# Patient Record
Sex: MALE | Race: WHITE | NOT HISPANIC OR LATINO | Employment: OTHER | URBAN - METROPOLITAN AREA
[De-identification: names, ages, dates, MRNs, and addresses within clinical notes are randomized per-mention and may not be internally consistent; named-entity substitution may affect disease eponyms.]

---

## 2017-01-19 ENCOUNTER — GENERIC CONVERSION - ENCOUNTER (OUTPATIENT)
Dept: OTHER | Facility: OTHER | Age: 62
End: 2017-01-19

## 2017-01-19 ENCOUNTER — LAB CONVERSION - ENCOUNTER (OUTPATIENT)
Dept: OTHER | Facility: OTHER | Age: 62
End: 2017-01-19

## 2017-01-19 LAB — HBA1C MFR BLD HPLC: 6.7 % OF TOTAL HGB

## 2017-02-10 ENCOUNTER — ALLSCRIPTS OFFICE VISIT (OUTPATIENT)
Dept: OTHER | Facility: OTHER | Age: 62
End: 2017-02-10

## 2017-07-24 ENCOUNTER — LAB CONVERSION - ENCOUNTER (OUTPATIENT)
Dept: OTHER | Facility: OTHER | Age: 62
End: 2017-07-24

## 2017-07-24 LAB
A/G RATIO (HISTORICAL): 1.3 (CALC) (ref 1–2.5)
ALBUMIN SERPL BCP-MCNC: 3.9 G/DL (ref 3.6–5.1)
ALP SERPL-CCNC: 152 U/L (ref 40–115)
ALT SERPL W P-5'-P-CCNC: 32 U/L (ref 9–46)
AST SERPL W P-5'-P-CCNC: 35 U/L (ref 10–35)
BILIRUB SERPL-MCNC: 0.4 MG/DL (ref 0.2–1.2)
BUN SERPL-MCNC: 18 MG/DL (ref 7–25)
BUN/CREA RATIO (HISTORICAL): ABNORMAL (CALC) (ref 6–22)
CALCIUM SERPL-MCNC: 9.4 MG/DL (ref 8.6–10.3)
CHLORIDE SERPL-SCNC: 104 MMOL/L (ref 98–110)
CHOLEST SERPL-MCNC: 159 MG/DL (ref 125–200)
CHOLEST/HDLC SERPL: 3.3 (CALC)
CO2 SERPL-SCNC: 27 MMOL/L (ref 20–31)
CREAT SERPL-MCNC: 1.1 MG/DL (ref 0.7–1.25)
CREATININE, RANDOM URINE (HISTORICAL): 137 MG/DL (ref 20–370)
EGFR AFRICAN AMERICAN (HISTORICAL): 84 ML/MIN/1.73M2
EGFR-AMERICAN CALC (HISTORICAL): 72 ML/MIN/1.73M2
GAMMA GLOBULIN (HISTORICAL): 3 G/DL (CALC) (ref 1.9–3.7)
GLUCOSE (HISTORICAL): 126 MG/DL (ref 65–99)
HBA1C MFR BLD HPLC: 6.6 % OF TOTAL HGB
HDLC SERPL-MCNC: 48 MG/DL
LDL CHOLESTEROL (HISTORICAL): 86 MG/DL (CALC)
MAGNESIUM, UR (HISTORICAL): 5 MG/DL
MICROALBUMIN/CREATININE RATIO (HISTORICAL): 36 MCG/MG CREAT
NON-HDL-CHOL (CHOL-HDL) (HISTORICAL): 111 MG/DL (CALC)
POTASSIUM SERPL-SCNC: 4.5 MMOL/L (ref 3.5–5.3)
SODIUM SERPL-SCNC: 139 MMOL/L (ref 135–146)
TOTAL PROTEIN (HISTORICAL): 6.9 G/DL (ref 6.1–8.1)
TRIGL SERPL-MCNC: 125 MG/DL

## 2017-08-01 ENCOUNTER — GENERIC CONVERSION - ENCOUNTER (OUTPATIENT)
Dept: OTHER | Facility: OTHER | Age: 62
End: 2017-08-01

## 2017-08-11 ENCOUNTER — ALLSCRIPTS OFFICE VISIT (OUTPATIENT)
Dept: OTHER | Facility: OTHER | Age: 62
End: 2017-08-11

## 2018-01-10 NOTE — RESULT NOTES
Verified Results  NM MYOCARDIAL PERFUSION SPECT (RX STRESS AND/OR REST) [id number] 08:26AM Lillie Winters     Test Name Result Flag Reference   NM MYOCARDIAL PERFUSION SPECT (RX STRESS AND/OR REST) (Report)     [facility] [address]   [phone]     Rest/Stress Gated SPECT Myocardial Perfusion Imaging After Regadenoson     Patient: Christiano Ross   MR number: [id number]   Account number: [de-identified]   : 1955   Age: 62 years   Gender: Male   Status: Outpatient   Location: [address] [place] [address] [facility]   Height: 60 in   Weight: 165 lb   BP: 120/ 80 mmHg     Allergies: NO KNOWN ALLERGIES     Referring Physician: Shyann Elizabeth DO   Primary Physician: Montez Rangel MD   RN: Corrinne Shih,, RN   Group: [facility]   Report Prepared by[de-identified] Corrinne Shih,, RN   RN: Hernesto Bustos RN   Interpreting Physician: Miles Sadler MD     INDICATIONS: Evaluation of known coronary artery disease  HISTORY: The patient is a 62year old  male  Chest pain status: no   chest pain  Coronary artery disease risk factors: dyslipidemia, hypertension,   former smoker, family history of premature coronary artery disease, and   diabetes mellitus  Cardiovascular history: coronary artery disease and prior   myocardial infarction  Prior cardiovascular procedures: percutaneous   transluminal coronary angioplasty  PHYSICAL EXAM: Baseline physical exam screening: normal      REST ECG: Normal sinus rhythm  Nonspecific ST and T wave abnormalities were   present  PROCEDURE: The study was performed at the [address] [facility]  A   regadenoson infusion pharmacologic stress test was performed  Gated SPECT   myocardial perfusion imaging was performed after stress  Systolic blood   pressure was 120 mmHg, at the start of the study  Diastolic blood pressure was   80 mmHg, at the start of the study   The heart rate was 91 bpm, at the start of   the study  IV double checked  Regadenoson protocol:   HR bpm SBP mmHg DBP mmHg Symptoms   Baseline 91 120 80 none   Immediate 136 138 78 nausea   2 min 90 136 76 none   No medications or fluids given  The patient also performed low level exercise   with leg lifts  STRESS SUMMARY: Duration of pharmacologic stress was 3 min  Maximal heart rate   during stress was 136 bpm  The rate-pressure product for the peak heart rate   and blood pressure was 02424  There was no chest pain during stress  The stress   test was terminated due to protocol completion  Pre oxygen saturation: 97 %  Peak oxygen saturation: 98 %  The stress ECG was negative for ischemia  There   were no stress arrhythmias or conduction abnormalities  ISOTOPE ADMINISTRATION:   Resting isotope administration Stress isotope administration   Agent Tetrofosmin Tetrofosmin   Dose 10 11 mCi 30 6 mCi   Date 04/26/2016 04/26/2016   Injection time 09:00 10:40   Imaging time 09:51 11:56   Injection-image interval 51 min 76 min     The radiopharmaceutical was injected at the peak effect of pharmacologic   stress  MYOCARDIAL PERFUSION IMAGING:   The image quality was good  The TID ratio was 0 86  PERFUSION DEFECTS:   - There was a small to moderate, moderately severe, partially reversible   myocardial perfusion defect of the basal to mid inferior wall  GATED SPECT:   The calculated left ventricular ejection fraction was 72 %  There was no left   ventricular regional abnormality  SUMMARY:   - Stress results: There was no chest pain during stress  - ECG conclusions: The stress ECG was negative for ischemia  - Perfusion imaging: There was a small to moderate, moderately severe,   partially reversible myocardial perfusion defect of the basal to mid inferior   wall  - Gated SPECT: The calculated left ventricular ejection fraction was 72   %  There was no left ventricular regional abnormality       IMPRESSIONS: Abnormal study after pharmacologic vasodilation  There was a   moderate amount of ischemia in the basal to mid inferior region   Left   ventricular systolic function was normal      Prepared and signed by     Ender Melissa MD   Signed 04/26/2016 16:10:56

## 2018-01-11 NOTE — RESULT NOTES
Verified Results  (Q) HEMOGLOBIN A1c WITH eAG [id number] 07:48AM [place]   REPORT COMMENT:  FASTING:NO     Test Name Result Flag Reference   HEMOGLOBIN A1c 7 1 % of total Hgb H <5 7   According to ADA guidelines, hemoglobin A1c <7 0%  represents optimal control in non-pregnant diabetic  patients  Different metrics may apply to specific  patient populations  Standards of Medical Care in  [phone]  Diabetes Care  2013;36:s11-s66     For the purpose of screening for the presence of  diabetes  <5 7%       Consistent with the absence of diabetes  5 7-6 4%    Consistent with increased risk for diabetes              (prediabetes)  >or=6 5%    Consistent with diabetes     This assay result is consistent with diabetes  mellitus  Currently, no consensus exists for use of hemoglobin  A1c for diagnosis of diabetes for children  eAG (mg/dL) 157 (calc)     eAG (mmol/L) 8 7 (calc)         Discussion/Summary   This shows much better sugar control   Dr Jean Jauregui

## 2018-01-11 NOTE — RESULT NOTES
Verified Results  (1) COMPREHENSIVE METABOLIC PANEL [id number] 17:05LE Beatrice Broccoli     Test Name Result Flag Reference   GLUCOSE 126 mg/dL H 65-99   Fasting reference interval     For someone without known diabetes, a glucose  value >125 mg/dL indicates that they may have  diabetes and this should be confirmed with a  follow-up test    UREA NITROGEN (BUN) 18 mg/dL  7-25   CREATININE 1 10 mg/dL  0 70-1 25   For patients >52years of age, the reference limit  for Creatinine is approximately 13% higher for people  identified as -American  eGFR NON-AFR  AMERICAN 72 mL/min/1 73m2  > OR = 60   eGFR AFRICAN AMERICAN 84 mL/min/1 73m2  > OR = 60   BUN/CREATININE RATIO   5-11   NOT APPLICABLE (calc)   SODIUM 139 mmol/L  135-146   POTASSIUM 4 5 mmol/L  3 5-5 3   CHLORIDE 104 mmol/L     CARBON DIOXIDE 27 mmol/L  20-31   CALCIUM 9 4 mg/dL  8 6-10 3   PROTEIN, TOTAL 6 9 g/dL  6 1-8 1   ALBUMIN 3 9 g/dL  3 6-5 1   GLOBULIN 3 0 g/dL (calc)  1 9-3 7   ALBUMIN/GLOBULIN RATIO 1 3 (calc)  1 0-2 5   BILIRUBIN, TOTAL 0 4 mg/dL  0 2-1 2   ALKALINE PHOSPHATASE 152 U/L H    AST 35 U/L  10-35   ALT 32 U/L  9-46     (1) LIPID PANEL, FASTING [id number] 08:01AM [facility]     Test Name Result Flag Reference   CHOLESTEROL, TOTAL 159 mg/dL  125-200   HDL CHOLESTEROL 48 mg/dL  > OR = 40   TRIGLICERIDES 664 mg/dL  <150   LDL-CHOLESTEROL 86 mg/dL (calc)  <130   Desirable range <100 mg/dL for patients with CHD or  diabetes and <70 mg/dL for diabetic patients with  known heart disease  CHOL/HDLC RATIO 3 3 (calc)  < OR = 5 0   NON HDL CHOLESTEROL 111 mg/dL (calc)     Target for non-HDL cholesterol is 30 mg/dL higher than   LDL cholesterol target       (Q) MICROALBUMIN, RANDOM URINE (W/CREATININE) [id number] 08:01AM [facility]     Test Name Result Flag Reference   CREATININE, RANDOM URINE 137 mg/dL     MICROALBUMIN 5 0 mg/dL     Reference Range  Not established   MICROALBUMIN/CREATININE$RATIO, RANDOM URINE 36 mcg/mg creat H <30   The ADA defines abnormalities in albumin  excretion as follows:     Category         Result (mcg/mg creatinine)     Normal                    <30  Microalbuminuria            Clinical albuminuria   > OR = 300     The ADA recommends that at least two of three  specimens collected within a 3-6 month period be  abnormal before considering a patient to be  within a diagnostic category  (Q) HEMOGLOBIN A1c [id number] 08:01AM Radha Alonzo   REPORT COMMENT:  FASTING:YES     Test Name Result Flag Reference   HEMOGLOBIN A1c 6 6 % of total Hgb H <5 7   For someone without known diabetes, a hemoglobin A1c  value of 6 5% or greater indicates that they may have   diabetes and this should be confirmed with a follow-up   test      For someone with known diabetes, a value <7% indicates   that their diabetes is well controlled and a value   greater than or equal to 7% indicates suboptimal   control  A1c targets should be individualized based on   duration of diabetes, age, comorbid conditions, and   other considerations  Currently, no consensus exists regarding use of  hemoglobin A1c for diagnosis of diabetes for children

## 2018-01-11 NOTE — RESULT NOTES
Verified Results  (Q) CBC (INCLUDES DIFF/PLT) WITH SMEAR REVIEW 08Apr2016 07:50AM [facility]     Test Name Result Flag Reference   WHITE BLOOD CELL COUNT 8 2 Thousand/uL  3 8-10 8   RED BLOOD CELL COUNT 5 17 Million/uL  4 20-5 80   HEMOGLOBIN 13 8 g/dL  13 2-17 1   HEMATOCRIT 43 0 %  38 5-50 0   MCV 83 1 fL  80 0-100 0   MCH 26 7 pg L 27 0-33 0   MCHC 32 1 g/dL  32 0-36 0   RDW 14 9 %  11 0-15 0   PLATELET COUNT 658 Thousand/uL  140-400   MPV 10 2 fL  7 5-11 5   ABSOLUTE NEUTROPHILS 5904 cells/uL  4656-3357   ABSOLUTE LYMPHOCYTES 1722 cells/uL  850-3900   ABSOLUTE MONOCYTES 492 cells/uL  200-950   ABSOLUTE EOSINOPHILS 82 cells/uL     ABSOLUTE BASOPHILS 0 cells/uL  0-200   NEUTROPHILS 72 %     LYMPHOCYTES 21 %     MONOCYTES 6 %     EOSINOPHILS 1 %     BASOPHILS 0 %     CBC MORPHOLOGY   NORMAL   Red cell morphology appears unremarkable     (1) COMPREHENSIVE METABOLIC PANEL [id number] 36:91OT [facility]     Test Name Result Flag Reference   GLUCOSE 269 mg/dL H 65-99   Fasting reference interval   UREA NITROGEN (BUN) 21 mg/dL  7-25   CREATININE 1 19 mg/dL  0 70-1 25   For patients >52years of age, the reference limit  for Creatinine is approximately 13% higher for people  identified as -American  eGFR NON-AFR   AMERICAN 66 mL/min/1 73m2  > OR = 60   eGFR AFRICAN AMERICAN 76 mL/min/1 73m2  > OR = 60   BUN/CREATININE RATIO   4-19   NOT APPLICABLE (calc)   SODIUM 135 mmol/L  135-146   POTASSIUM 4 5 mmol/L  3 5-5 3   CHLORIDE 102 mmol/L     CARBON DIOXIDE 21 mmol/L  19-30   CALCIUM 9 3 mg/dL  8 6-10 3   PROTEIN, TOTAL 7 0 g/dL  6 1-8 1   ALBUMIN 4 0 g/dL  3 6-5 1   GLOBULIN 3 0 g/dL (calc)  1 9-3 7   ALBUMIN/GLOBULIN RATIO 1 3 (calc)  1 0-2 5   BILIRUBIN, TOTAL 0 4 mg/dL  0 2-1 2   ALKALINE PHOSPHATASE 179 U/L H    AST 29 U/L  10-35   ALT 35 U/L  9-46     (Q) HEMOGLOBIN A1c WITH eAG 08Apr2016 07:50AM [facility]     Test Name Result Flag Reference   HEMOGLOBIN A1c 10 1 % of total Hgb H <5 7   According to ADA guidelines, hemoglobin A1c <7 0%  represents optimal control in non-pregnant diabetic  patients  Different metrics may apply to specific  patient populations  Standards of Medical Care in    Diabetes Care  2013;36:s11-s66     For the purpose of screening for the presence of  diabetes  <5 7%       Consistent with the absence of diabetes  5 7-6 4%    Consistent with increased risk for diabetes              (prediabetes)  >or=6 5%    Consistent with diabetes     This assay result is consistent with diabetes  mellitus  Currently, no consensus exists for use of hemoglobin  A1c for diagnosis of diabetes for children  eAG (mg/dL) 243 (calc)     eAG (mmol/L) 13 5 (calc)       (Q) LIPID PANEL WITH REFLEX TO DIRECT LDL 08Apr2016 07:50AM [facility]     Test Name Result Flag Reference   CHOLESTEROL, TOTAL 167 mg/dL  125-200   HDL CHOLESTEROL 44 mg/dL  > OR = 40   TRIGLICERIDES 688 mg/dL H <150   LDL-CHOLESTEROL 84 mg/dL (calc)  <130   Desirable range <100 mg/dL for patients with CHD or  diabetes and <70 mg/dL for diabetic patients with  known heart disease  CHOL/HDLC RATIO 3 8 (calc)  < OR = 5 0   NON HDL CHOLESTEROL 123 mg/dL (calc)     Target for non-HDL cholesterol is 30 mg/dL higher than   LDL cholesterol target  (Q) PSA, TOTAL WITH REFLEX TO PSA, FREE 08Apr2016 07:50AM Yohan Eri     Test Name Result Flag Reference   TOTAL PSA 2 0 ng/mL  < OR = 4 0   This test was performed using the [employer] Faxon  immunoassay method  Values obtained with other assay  methods cannot be used interchangeably  PSA levels,  regardless of value, should not be interpreted as  absolute evidence of the presence or absence of disease       (Q) TSH, 3RD GENERATION W/REFLEX TO FT4 08Apr2016 07:50AM Yohan Eri     Test Name Result Flag Reference   TSH W/REFLEX TO FT4 1 10 mIU/L  0 40-4 50     (Q) URINALYSIS REFLEX 08Apr2016 07:50AM Yohan Eri   REPORT COMMENT:  FASTING:YES     Test Name Result Flag Reference   COLOR YELLOW  YELLOW   APPEARANCE CLEAR  CLEAR   SPECIFIC GRAVITY 1 030  1 001-1 035   PH 5 5  5 0-8 0   GLUCOSE 3+ A NEGATIVE   BILIRUBIN NEGATIVE  NEGATIVE   KETONES NEGATIVE  NEGATIVE   OCCULT BLOOD NEGATIVE  NEGATIVE   PROTEIN TRACE A NEGATIVE   NITRITE NEGATIVE  NEGATIVE   LEUKOCYTE ESTERASE NEGATIVE  NEGATIVE   WBC NONE SEEN /HPF  < OR = 5   RBC NONE SEEN /HPF  < OR = 2   SQUAMOUS EPITHELIAL CELLS NONE SEEN /HPF  < OR = 5   BACTERIA NONE SEEN /HPF  NONE SEEN   HYALINE CAST NONE SEEN /LPF  NONE SEEN

## 2018-01-12 VITALS
WEIGHT: 156.25 LBS | DIASTOLIC BLOOD PRESSURE: 70 MMHG | TEMPERATURE: 96.5 F | SYSTOLIC BLOOD PRESSURE: 142 MMHG | RESPIRATION RATE: 16 BRPM | HEIGHT: 60 IN | HEART RATE: 83 BPM | BODY MASS INDEX: 30.67 KG/M2 | OXYGEN SATURATION: 92 %

## 2018-01-13 NOTE — RESULT NOTES
Verified Results  (1) CBC/PLT/DIFF [id number] 07:48AM Oswaldo Damian     Test Name Result Flag Reference   ABSOLUTE LYMPHOCYTES 2043 cells/uL  850-3900   ABSOLUTE MONOCYTES 494 cells/uL  200-950   ABSOLUTE EOSINOPHILS 257 cells/uL     ABSOLUTE BASOPHILS 19 cells/uL  0-200   NEUTROPHILS 70 4 %     LYMPHOCYTES 21 5 %     EOSINOPHILS 2 7 %     BASOPHILS 0 2 %     WHITE BLOOD CELL COUNT 9 5 Thousand/uL  3 8-10 8   RED BLOOD CELL COUNT 5 22 Million/uL  4 20-5 80   HEMOGLOBIN 13 9 g/dL  13 2-17 1   HEMATOCRIT 43 9 %  38 5-50 0   MCV 84 1 fL  80 0-100 0   MONOCYTES 5 2 %     MCH 26 7 pg L 27 0-33 0   MCHC 31 7 g/dL L 32 0-36 0   RDW 15 4 % H 11 0-15 0   PLATELET COUNT 487 Thousand/uL  140-400   MPV 10 3 fL  7 5-11 5   ABSOLUTE NEUTROPHILS 6688 cells/uL  4377-0132     (1) COMPREHENSIVE METABOLIC PANEL [id number] 31:90UP Oswaldo Damian     Test Name Result Flag Reference   ALT 33 U/L  9-46   ALBUMIN 4 1 g/dL  3 6-5 1   GLOBULIN 3 1 g/dL (calc)  1 9-3 7   ALBUMIN/GLOBULIN RATIO 1 3 (calc)  1 0-2 5   BILIRUBIN, TOTAL 0 3 mg/dL  0 2-1 2   ALKALINE PHOSPHATASE 172 U/L H    BUN/CREATININE RATIO   9-39   NOT APPLICABLE (calc)   PROTEIN, TOTAL 7 2 g/dL  6 1-8 1   GLUCOSE 138 mg/dL H 65-99   Fasting reference interval   UREA NITROGEN (BUN) 11 mg/dL  7-25   CREATININE 1 16 mg/dL  0 70-1 25   For patients >52years of age, the reference limit  for Creatinine is approximately 13% higher for people  identified as -American  eGFR NON-AFR   AMERICAN 68 mL/min/1 73m2  > OR = 60   eGFR AFRICAN AMERICAN 79 mL/min/1 73m2  > OR = 60   AST 33 U/L  10-35   SODIUM 140 mmol/L  135-146   POTASSIUM 5 1 mmol/L  3 5-5 3   CHLORIDE 103 mmol/L     CARBON DIOXIDE 29 mmol/L  20-31   CALCIUM 9 4 mg/dL  8 6-10 3     (1) LIPID PANEL, FASTING [id number] 07:48AM Radha Alonzo     Test Name Result Flag Reference   CHOLESTEROL, TOTAL 155 mg/dL  125-200   HDL CHOLESTEROL 49 mg/dL  > OR = 40   TRIGLICERIDES 066 mg/dL  <150 LDL-CHOLESTEROL 77 mg/dL (calc)  <130   Desirable range <100 mg/dL for patients with CHD or  diabetes and <70 mg/dL for diabetic patients with  known heart disease  CHOL/HDLC RATIO 3 2 (calc)  < OR = 5 0   NON HDL CHOLESTEROL 106 mg/dL (calc)     Target for non-HDL cholesterol is 30 mg/dL higher than   LDL cholesterol target  (1) PSA (SCREEN) (Dx V76 44 Screen for Prostate Cancer) [id number] 07:48AM Tito Mccordmarifer     Test Name Result Flag Reference   PSA, TOTAL 2 0 ng/mL  < OR = 4 0   This test was performed using the Siemens  chemiluminescent method  Values obtained from  different assay methods cannot be used  interchangeably  PSA levels, regardless of  value, should not be interpreted as absolute  evidence of the presence or absence of disease  (Q) HEMOGLOBIN A1c [id number] 07:48AM Radha Alonzo   REPORT COMMENT:  FASTING:YES     Test Name Result Flag Reference   HEMOGLOBIN A1c 6 7 % of total Hgb H <5 7   According to ADA guidelines, hemoglobin A1c <7 0%  represents optimal control in non-pregnant diabetic  patients  Different metrics may apply to specific  patient populations  Standards of Medical Care in    Diabetes Care  2013;36:s11-s66     For the purpose of screening for the presence of  diabetes  <5 7%       Consistent with the absence of diabetes  5 7-6 4%    Consistent with increased risk for diabetes              (prediabetes)  >or=6 5%    Consistent with diabetes     This assay result is consistent with diabetes  mellitus  Currently, no consensus exists for use of hemoglobin  A1c for diagnosis of diabetes for children

## 2018-01-14 VITALS
DIASTOLIC BLOOD PRESSURE: 80 MMHG | RESPIRATION RATE: 16 BRPM | HEIGHT: 60 IN | BODY MASS INDEX: 30.35 KG/M2 | TEMPERATURE: 98.1 F | OXYGEN SATURATION: 98 % | WEIGHT: 154.56 LBS | SYSTOLIC BLOOD PRESSURE: 136 MMHG | HEART RATE: 66 BPM

## 2018-01-24 DIAGNOSIS — I10 BENIGN ESSENTIAL HYPERTENSION: Primary | ICD-10-CM

## 2018-02-01 DIAGNOSIS — I10 ESSENTIAL (PRIMARY) HYPERTENSION: ICD-10-CM

## 2018-02-01 DIAGNOSIS — E78.5 HYPERLIPIDEMIA: ICD-10-CM

## 2018-02-01 DIAGNOSIS — E11.65 TYPE 2 DIABETES MELLITUS WITH HYPERGLYCEMIA (HCC): ICD-10-CM

## 2018-02-01 DIAGNOSIS — Z11.59 ENCOUNTER FOR SCREENING FOR OTHER VIRAL DISEASES: ICD-10-CM

## 2018-02-01 DIAGNOSIS — Z12.5 ENCOUNTER FOR SCREENING FOR MALIGNANT NEOPLASM OF PROSTATE: ICD-10-CM

## 2018-02-02 LAB
ALBUMIN SERPL-MCNC: 3.8 G/DL (ref 3.6–5.1)
ALBUMIN/CREAT UR: 37 MCG/MG CREAT
ALBUMIN/GLOB SERPL: 1.3 (CALC) (ref 1–2.5)
ALP SERPL-CCNC: 148 U/L (ref 40–115)
ALT SERPL-CCNC: 30 U/L (ref 9–46)
AST SERPL-CCNC: 43 U/L (ref 10–35)
BILIRUB SERPL-MCNC: 0.4 MG/DL (ref 0.2–1.2)
BUN SERPL-MCNC: 15 MG/DL (ref 7–25)
BUN/CREAT SERPL: ABNORMAL (CALC) (ref 6–22)
CALCIUM SERPL-MCNC: 9.1 MG/DL (ref 8.6–10.3)
CHLORIDE SERPL-SCNC: 101 MMOL/L (ref 98–110)
CHOLEST SERPL-MCNC: 149 MG/DL
CHOLEST/HDLC SERPL: 3.3 (CALC)
CO2 SERPL-SCNC: 27 MMOL/L (ref 20–31)
CREAT SERPL-MCNC: 1.1 MG/DL (ref 0.7–1.25)
CREAT UR-MCNC: 71 MG/DL (ref 20–370)
GLOBULIN SER CALC-MCNC: 2.9 G/DL (CALC) (ref 1.9–3.7)
GLUCOSE SERPL-MCNC: 123 MG/DL (ref 65–99)
HBA1C MFR BLD: 6.5 % OF TOTAL HGB
HCV AB S/CO SERPL IA: 0.04
HCV AB SERPL QL IA: NORMAL
HDLC SERPL-MCNC: 45 MG/DL
LDLC SERPL CALC-MCNC: 83 MG/DL (CALC)
MICROALBUMIN UR-MCNC: 2.6 MG/DL
NONHDLC SERPL-MCNC: 104 MG/DL (CALC)
POTASSIUM SERPL-SCNC: 4.8 MMOL/L (ref 3.5–5.3)
PROT SERPL-MCNC: 6.7 G/DL (ref 6.1–8.1)
PSA SERPL-MCNC: 2.3 NG/ML
SL AMB EGFR AFRICAN AMERICAN: 84 ML/MIN/1.73M2
SL AMB EGFR NON AFRICAN AMERICAN: 72 ML/MIN/1.73M2
SODIUM SERPL-SCNC: 137 MMOL/L (ref 135–146)
TRIGL SERPL-MCNC: 109 MG/DL

## 2018-05-10 ENCOUNTER — OFFICE VISIT (OUTPATIENT)
Dept: FAMILY MEDICINE CLINIC | Facility: OTHER | Age: 63
End: 2018-05-10
Payer: COMMERCIAL

## 2018-05-10 VITALS
DIASTOLIC BLOOD PRESSURE: 74 MMHG | OXYGEN SATURATION: 95 % | SYSTOLIC BLOOD PRESSURE: 148 MMHG | WEIGHT: 151.06 LBS | HEART RATE: 75 BPM | BODY MASS INDEX: 29.66 KG/M2 | HEIGHT: 60 IN | TEMPERATURE: 98.3 F

## 2018-05-10 DIAGNOSIS — IMO0001 UNCONTROLLED TYPE 2 DIABETES MELLITUS WITHOUT COMPLICATION, WITHOUT LONG-TERM CURRENT USE OF INSULIN: ICD-10-CM

## 2018-05-10 DIAGNOSIS — E78.2 MIXED HYPERLIPIDEMIA: ICD-10-CM

## 2018-05-10 DIAGNOSIS — I10 BENIGN ESSENTIAL HYPERTENSION: Primary | ICD-10-CM

## 2018-05-10 DIAGNOSIS — Z23 NEED FOR PNEUMOCOCCAL VACCINATION: ICD-10-CM

## 2018-05-10 PROCEDURE — 90471 IMMUNIZATION ADMIN: CPT

## 2018-05-10 PROCEDURE — 99214 OFFICE O/P EST MOD 30 MIN: CPT | Performed by: FAMILY MEDICINE

## 2018-05-10 PROCEDURE — 90732 PPSV23 VACC 2 YRS+ SUBQ/IM: CPT

## 2018-05-10 PROCEDURE — 3725F SCREEN DEPRESSION PERFORMED: CPT | Performed by: FAMILY MEDICINE

## 2018-05-10 RX ORDER — NITROGLYCERIN 0.4 MG/1
TABLET SUBLINGUAL
COMMUNITY
Start: 2013-01-11 | End: 2019-01-14

## 2018-05-10 RX ORDER — LISINOPRIL 10 MG/1
1 TABLET ORAL DAILY
COMMUNITY
Start: 2011-11-29 | End: 2018-05-10 | Stop reason: SDUPTHER

## 2018-05-10 RX ORDER — ATORVASTATIN CALCIUM 40 MG/1
1 TABLET, FILM COATED ORAL DAILY
COMMUNITY
Start: 2013-03-19 | End: 2018-10-22 | Stop reason: SDUPTHER

## 2018-05-10 RX ORDER — LANCETS 33 GAUGE
EACH MISCELLANEOUS DAILY
COMMUNITY
Start: 2016-05-17

## 2018-05-10 RX ORDER — LISINOPRIL 10 MG/1
10 TABLET ORAL DAILY
Qty: 90 TABLET | Refills: 1 | Status: SHIPPED | OUTPATIENT
Start: 2018-05-10 | End: 2018-06-01 | Stop reason: SDUPTHER

## 2018-05-10 NOTE — PROGRESS NOTES
Subjective:      Patient ID: Ronan Wu is a 59 y o  male  Hypertension   This is a chronic problem  The current episode started more than 1 year ago  The problem has been gradually improving since onset  The problem is controlled  Pertinent negatives include no anxiety, blurred vision, chest pain, headaches, malaise/fatigue, palpitations, peripheral edema, shortness of breath or sweats  There are no associated agents to hypertension  Risk factors for coronary artery disease include dyslipidemia, male gender, obesity and sedentary lifestyle  Past treatments include ACE inhibitors and beta blockers  The current treatment provides moderate improvement  There are no compliance problems  There is no history of angina, kidney disease, CAD/MI or heart failure  There is no history of chronic renal disease, hyperparathyroidism, a hypertension causing med, renovascular disease or a thyroid problem  Hyperlipidemia   This is a chronic problem  The current episode started more than 1 year ago  The problem is controlled  Exacerbating diseases include diabetes and obesity  He has no history of chronic renal disease, hypothyroidism or nephrotic syndrome  Factors aggravating his hyperlipidemia include fatty foods  Pertinent negatives include no chest pain, myalgias or shortness of breath  Current antihyperlipidemic treatment includes statins and diet change  The current treatment provides moderate improvement of lipids  There are no compliance problems  Risk factors for coronary artery disease include diabetes mellitus, dyslipidemia, hypertension, male sex and obesity  Diabetes   He presents for his follow-up diabetic visit  He has type 2 diabetes mellitus  His disease course has been stable  Pertinent negatives for hypoglycemia include no confusion, dizziness, headaches, mood changes, nervousness/anxiousness or sweats   Pertinent negatives for diabetes include no blurred vision, no chest pain, no fatigue, no foot paresthesias, no polydipsia, no polyphagia, no polyuria, no visual change and no weakness  There are no hypoglycemic complications  There are no diabetic complications  Risk factors for coronary artery disease include diabetes mellitus, dyslipidemia, hypertension, male sex, obesity and sedentary lifestyle  Current diabetic treatment includes diet and oral agent (monotherapy)  He is compliant with treatment all of the time  His weight is stable  He is following a diabetic diet  He participates in exercise intermittently  He monitors blood glucose at home 1-2 x per day  Blood glucose monitoring compliance is excellent  There is no change in his home blood glucose trend  His breakfast blood glucose is taken between 6-7 am  His breakfast blood glucose range is generally  mg/dl  An ACE inhibitor/angiotensin II receptor blocker is being taken  He does not see a podiatrist Eye exam is current  The following portions of the patient's history were reviewed and updated as appropriate: allergies, current medications, past family history, past medical history, past social history, past surgical history and problem list     Review of Systems   Constitutional: Negative for activity change, fatigue, fever and malaise/fatigue  HENT: Negative for congestion, ear pain, sinus pain and sore throat  Eyes: Negative for blurred vision, pain, itching and visual disturbance  Respiratory: Negative for cough and shortness of breath  Cardiovascular: Negative for chest pain, palpitations and leg swelling  Gastrointestinal: Negative for abdominal pain, constipation, diarrhea, nausea and vomiting  Endocrine: Negative for cold intolerance, heat intolerance, polydipsia, polyphagia and polyuria  Genitourinary: Negative for dysuria  Musculoskeletal: Negative for myalgias  Skin: Negative for color change and rash  Neurological: Negative for dizziness, syncope, weakness and headaches     Hematological: Negative for adenopathy  Psychiatric/Behavioral: Negative for confusion and dysphoric mood  The patient is not nervous/anxious  Objective:      /74 (BP Location: Left arm, Patient Position: Sitting, Cuff Size: Adult)   Pulse 75   Temp 98 3 °F (36 8 °C) (Tympanic)   Ht 4' 10" (1 473 m)   Wt 68 5 kg (151 lb 1 oz)   SpO2 95%   BMI 31 57 kg/m²          Physical Exam   Constitutional: He is oriented to person, place, and time  He appears well-developed and well-nourished  No distress  HENT:   Head: Normocephalic and atraumatic  Mouth/Throat: Oropharynx is clear and moist    Eyes: Conjunctivae and EOM are normal  Pupils are equal, round, and reactive to light  No scleral icterus  Neck: Normal range of motion  Neck supple  Cardiovascular: Normal rate, regular rhythm and normal heart sounds  No murmur heard  Pulmonary/Chest: Effort normal and breath sounds normal  No respiratory distress  Abdominal: Soft  Bowel sounds are normal  He exhibits no distension  There is no tenderness  Musculoskeletal: Normal range of motion  He exhibits no edema  Lymphadenopathy:     He has no cervical adenopathy  Neurological: He is alert and oriented to person, place, and time  Skin: Skin is warm and dry  No rash noted  Psychiatric: He has a normal mood and affect   His behavior is normal          Lab Results   Component Value Date     07/21/2017    K 4 5 07/21/2017     07/21/2017    CO2 27 07/21/2017    BUN 15 02/01/2018    CREATININE 1 10 02/01/2018    CALCIUM 9 1 02/01/2018    AST 35 07/21/2017    ALT 32 07/21/2017    ALKPHOS 152 (H) 07/21/2017    PROT 6 9 07/21/2017    BILITOT 0 4 07/21/2017     Lab Results   Component Value Date    HGBA1C 6 5 (H) 02/01/2018     Lab Results   Component Value Date    CHOL 159 07/21/2017    CHOL 155 01/18/2017    CHOL 167 04/08/2016     Lab Results   Component Value Date    HDL 48 07/21/2017    HDL 49 01/18/2017    HDL 44 04/08/2016     No results found for: [facility]  Lab Results   Component Value Date    TRIG 109 02/01/2018    TRIG 125 07/21/2017    TRIG 143 01/18/2017     No components found for: CHOLHDL      Assessment/Plan:    Benign essential hypertension  -     Comprehensive metabolic panel; Future  -     lisinopril (ZESTRIL) 10 mg tablet; Take 1 tablet (10 mg total) by mouth daily    Mixed hyperlipidemia  -     Comprehensive metabolic panel; Future  -     Lipid panel; Future    Uncontrolled type 2 diabetes mellitus without complication, without long-term current use of insulin (HCC)  -     HEMOGLOBIN A1C W/ EAG ESTIMATION; Future  -     Comprehensive metabolic panel; Future  -     Microalbumin / creatinine urine ratio; Future    Need for pneumococcal vaccination  -     Pneumococcal polysaccharide vaccine 23-valent greater than or equal to 3yo subcutaneous/IM      Healthy appearing 60-year-old male multiple comorbid conditions appears for routine follow-up  Labs from February 2018 reviewed, orders placed today for follow-up blood work to reassess glucose, lipids, and renal function  Continue current medications per lab review  Pneumovax administered today per current ACIP guidelines  RTO 6 months for recheck chronic conditions/AWV  The patient indicates understanding of these issues and agrees with the plan      Mercedes Paniagua DO

## 2018-06-01 DIAGNOSIS — I10 BENIGN ESSENTIAL HYPERTENSION: ICD-10-CM

## 2018-06-02 PROCEDURE — 4010F ACE/ARB THERAPY RXD/TAKEN: CPT | Performed by: FAMILY MEDICINE

## 2018-06-02 RX ORDER — LISINOPRIL 10 MG/1
TABLET ORAL
Qty: 90 TABLET | Refills: 3 | Status: SHIPPED | OUTPATIENT
Start: 2018-06-02 | End: 2019-08-30 | Stop reason: SDUPTHER

## 2018-08-31 DIAGNOSIS — E11.9 TYPE 2 DIABETES MELLITUS TREATED WITHOUT INSULIN (HCC): Primary | ICD-10-CM

## 2018-09-19 ENCOUNTER — OFFICE VISIT (OUTPATIENT)
Dept: FAMILY MEDICINE CLINIC | Facility: OTHER | Age: 63
End: 2018-09-19
Payer: COMMERCIAL

## 2018-09-19 VITALS
HEART RATE: 98 BPM | BODY MASS INDEX: 29.08 KG/M2 | OXYGEN SATURATION: 99 % | SYSTOLIC BLOOD PRESSURE: 144 MMHG | DIASTOLIC BLOOD PRESSURE: 80 MMHG | HEIGHT: 60 IN | WEIGHT: 148.13 LBS | TEMPERATURE: 99.4 F

## 2018-09-19 DIAGNOSIS — B02.9 HERPES ZOSTER WITHOUT COMPLICATION: Primary | ICD-10-CM

## 2018-09-19 PROCEDURE — 99213 OFFICE O/P EST LOW 20 MIN: CPT | Performed by: FAMILY MEDICINE

## 2018-09-19 PROCEDURE — 1036F TOBACCO NON-USER: CPT | Performed by: FAMILY MEDICINE

## 2018-09-19 PROCEDURE — 3008F BODY MASS INDEX DOCD: CPT | Performed by: FAMILY MEDICINE

## 2018-09-19 RX ORDER — VALACYCLOVIR HYDROCHLORIDE 1 G/1
1000 TABLET, FILM COATED ORAL 2 TIMES DAILY
Qty: 14 TABLET | Refills: 0 | Status: SHIPPED | OUTPATIENT
Start: 2018-09-19 | End: 2019-01-14

## 2018-09-19 NOTE — PROGRESS NOTES
Subjective:      Patient ID: Lizbeth Beltrán is a 59 y o  male  Rash   This is a new problem  The current episode started yesterday  The problem has been gradually worsening since onset  The affected locations include the chest, left arm, left shoulder and back  The rash is characterized by redness, burning and blistering  He was exposed to nothing  Pertinent negatives include no anorexia, congestion, cough, diarrhea, eye pain, facial edema, fatigue, fever, joint pain, nail changes, rhinorrhea, shortness of breath, sore throat or vomiting  (MILD HA AND BODY ACHES) Past treatments include nothing  His past medical history is significant for varicella  There is no history of allergies, asthma or eczema  The following portions of the patient's history were reviewed and updated as appropriate: allergies, current medications, past family history, past medical history, past social history, past surgical history and problem list       Current Outpatient Prescriptions:     aspirin 81 MG tablet, Take 2 tablets by mouth daily, Disp: , Rfl:     atorvastatin (LIPITOR) 40 mg tablet, Take 1 tablet by mouth daily, Disp: , Rfl:     glucose blood test strip, by In Vitro route daily, Disp: , Rfl:     lisinopril (ZESTRIL) 10 mg tablet, TAKE 1 TABLET EVERY DAY, Disp: 90 tablet, Rfl: 3    metFORMIN (GLUCOPHAGE) 500 mg tablet, TAKE 1 TABLET BY MOUTH TWICE A DAY, Disp: 180 tablet, Rfl: 1    metoprolol tartrate (LOPRESSOR) 25 mg tablet, Take 1 tablet by mouth 2 (two) times a day, Disp: 180 tablet, Rfl: 3    nitroglycerin (NITROSTAT) 0 4 mg SL tablet, Place under the tongue, Disp: , Rfl:     ONETOUCH DELICA LANCETS 57P MISC, by Does not apply route daily, Disp: , Rfl:     valACYclovir (VALTREX) 1,000 mg tablet, Take 1 tablet (1,000 mg total) by mouth 2 (two) times a day for 7 days, Disp: 14 tablet, Rfl: 0      Review of Systems   Constitutional: Negative for activity change, fatigue and fever     HENT: Negative for congestion, ear pain, rhinorrhea, sinus pain and sore throat  Eyes: Negative for pain, itching and visual disturbance  Respiratory: Negative for cough and shortness of breath  Cardiovascular: Negative for chest pain, palpitations and leg swelling  Gastrointestinal: Negative for abdominal pain, anorexia, constipation, diarrhea, nausea and vomiting  Endocrine: Negative for cold intolerance and heat intolerance  Genitourinary: Negative for dysuria  Musculoskeletal: Positive for myalgias  Negative for joint pain  Skin: Positive for rash  Negative for color change and nail changes  Neurological: Positive for headaches  Negative for dizziness and syncope  Hematological: Negative for adenopathy  Psychiatric/Behavioral: Negative for dysphoric mood  The patient is not nervous/anxious  Objective:      /80 (BP Location: Right arm, Patient Position: Sitting, Cuff Size: Large)   Pulse 98   Temp 99 4 °F (37 4 °C) (Tympanic)   Ht 4' 10" (1 473 m)   Wt 67 2 kg (148 lb 2 oz)   SpO2 99%   BMI 30 96 kg/m²          Physical Exam   Constitutional: He is oriented to person, place, and time  He appears well-developed and well-nourished  No distress  HENT:   Head: Normocephalic and atraumatic  Nose: Nose normal    Mouth/Throat: Oropharynx is clear and moist    Eyes: Conjunctivae and EOM are normal  Pupils are equal, round, and reactive to light  No scleral icterus  Neck: Normal range of motion  Neck supple  Cardiovascular: Normal rate, regular rhythm and normal heart sounds  No murmur heard  Pulmonary/Chest: Effort normal and breath sounds normal  No stridor  No respiratory distress  He has no wheezes  Abdominal: Soft  Bowel sounds are normal  He exhibits no distension  There is no tenderness  Musculoskeletal: Normal range of motion  He exhibits no edema, tenderness or deformity  Lymphadenopathy:     He has no cervical adenopathy     Neurological: He is alert and oriented to person, place, and time  No cranial nerve deficit  Skin: Skin is warm and dry  Rash noted  Rash is vesicular  He is not diaphoretic  There is erythema  Erythematous, vesicular rash in C7-8 and T1-2 distribution on LEFT SIDE   Psychiatric: He has a normal mood and affect  His behavior is normal            Assessment/Plan:   Diagnoses and all orders for this visit:    Herpes zoster without complication  -     valACYclovir (VALTREX) 1,000 mg tablet; Take 1 tablet (1,000 mg total) by mouth 2 (two) times a day for 7 days            21-year-old male presents with symptoms concerning for shingles  Will empirically treat with 7 day course of oral Valtrex  Patient to call if symptoms not improving in the next 5-7 days  RTO as previously planned next month  The patient indicates understanding of these issues and agrees with the plan          Melissa Molina DO

## 2018-09-26 ENCOUNTER — TELEPHONE (OUTPATIENT)
Dept: FAMILY MEDICINE CLINIC | Facility: OTHER | Age: 63
End: 2018-09-26

## 2018-09-26 DIAGNOSIS — B02.9 HERPES ZOSTER WITHOUT COMPLICATION: Primary | ICD-10-CM

## 2018-09-26 RX ORDER — NAPROXEN 500 MG/1
500 TABLET ORAL 2 TIMES DAILY WITH MEALS
Qty: 28 TABLET | Refills: 0 | Status: SHIPPED | OUTPATIENT
Start: 2018-09-26 | End: 2019-01-14

## 2018-09-26 NOTE — TELEPHONE ENCOUNTER
Patient called today stating that Dr Lavonne Moody wanted to know how he was doing whith his Shingles  He was seen in our office last week  He finished his antibiotic today and still has pain on his back and arm and wants to know what he should do

## 2018-09-26 NOTE — TELEPHONE ENCOUNTER
Spoke to pt via telephone -- shingles is overall better than last week, but still c/o pain  Recommend trial of NSAID and follow up if not improving in 1-2 wks  If pain persists at that time, will consider long-term medication for nerve pain  The patient indicates understanding of these issues and agrees with the plan        Romeo Cartwright,

## 2018-10-22 DIAGNOSIS — E78.2 MIXED HYPERLIPIDEMIA: Primary | ICD-10-CM

## 2018-10-22 RX ORDER — ATORVASTATIN CALCIUM 40 MG/1
TABLET, FILM COATED ORAL
Qty: 90 TABLET | Refills: 1 | Status: SHIPPED | OUTPATIENT
Start: 2018-10-22 | End: 2019-04-29 | Stop reason: SDUPTHER

## 2018-12-20 ENCOUNTER — TRANSCRIBE ORDERS (OUTPATIENT)
Dept: LAB | Facility: HOSPITAL | Age: 63
End: 2018-12-20

## 2018-12-20 DIAGNOSIS — E11.00 TYPE II DIABETES MELLITUS WITH HYPEROSMOLARITY, UNCONTROLLED (HCC): Primary | ICD-10-CM

## 2018-12-20 DIAGNOSIS — E11.65 TYPE II DIABETES MELLITUS WITH HYPEROSMOLARITY, UNCONTROLLED (HCC): Primary | ICD-10-CM

## 2019-01-02 DIAGNOSIS — I10 BENIGN ESSENTIAL HYPERTENSION: ICD-10-CM

## 2019-01-03 ENCOUNTER — APPOINTMENT (OUTPATIENT)
Dept: LAB | Facility: HOSPITAL | Age: 64
End: 2019-01-03
Payer: COMMERCIAL

## 2019-01-03 DIAGNOSIS — E78.2 MIXED HYPERLIPIDEMIA: ICD-10-CM

## 2019-01-03 DIAGNOSIS — I10 BENIGN ESSENTIAL HYPERTENSION: ICD-10-CM

## 2019-01-03 DIAGNOSIS — E11.65 TYPE II DIABETES MELLITUS WITH HYPEROSMOLARITY, UNCONTROLLED (HCC): ICD-10-CM

## 2019-01-03 DIAGNOSIS — E11.00 TYPE II DIABETES MELLITUS WITH HYPEROSMOLARITY, UNCONTROLLED (HCC): ICD-10-CM

## 2019-01-03 DIAGNOSIS — IMO0001 UNCONTROLLED TYPE 2 DIABETES MELLITUS WITHOUT COMPLICATION, WITHOUT LONG-TERM CURRENT USE OF INSULIN: ICD-10-CM

## 2019-01-03 LAB
ALBUMIN SERPL BCP-MCNC: 3.4 G/DL (ref 3.5–5)
ALP SERPL-CCNC: 151 U/L (ref 46–116)
ALT SERPL W P-5'-P-CCNC: 68 U/L (ref 12–78)
ANION GAP SERPL CALCULATED.3IONS-SCNC: 5 MMOL/L (ref 4–13)
AST SERPL W P-5'-P-CCNC: 94 U/L (ref 5–45)
BILIRUB SERPL-MCNC: 0.43 MG/DL (ref 0.2–1)
BUN SERPL-MCNC: 16 MG/DL (ref 5–25)
CALCIUM SERPL-MCNC: 8.9 MG/DL (ref 8.3–10.1)
CHLORIDE SERPL-SCNC: 103 MMOL/L (ref 100–108)
CHOLEST SERPL-MCNC: 144 MG/DL (ref 50–200)
CO2 SERPL-SCNC: 27 MMOL/L (ref 21–32)
CREAT SERPL-MCNC: 0.98 MG/DL (ref 0.6–1.3)
CREAT UR-MCNC: 68.9 MG/DL
EST. AVERAGE GLUCOSE BLD GHB EST-MCNC: 148 MG/DL
GFR SERPL CREATININE-BSD FRML MDRD: 82 ML/MIN/1.73SQ M
GLUCOSE P FAST SERPL-MCNC: 116 MG/DL (ref 65–99)
HBA1C MFR BLD: 6.8 % (ref 4.2–6.3)
HDLC SERPL-MCNC: 47 MG/DL (ref 40–60)
LDLC SERPL CALC-MCNC: 74 MG/DL (ref 0–100)
MICROALBUMIN UR-MCNC: 20.4 MG/L (ref 0–20)
MICROALBUMIN/CREAT 24H UR: 30 MG/G CREATININE (ref 0–30)
NONHDLC SERPL-MCNC: 97 MG/DL
POTASSIUM SERPL-SCNC: 4.7 MMOL/L (ref 3.5–5.3)
PROT SERPL-MCNC: 7.8 G/DL (ref 6.4–8.2)
SODIUM SERPL-SCNC: 135 MMOL/L (ref 136–145)
TRIGL SERPL-MCNC: 116 MG/DL
VENIPUNCTURE: NORMAL

## 2019-01-03 PROCEDURE — 36415 COLL VENOUS BLD VENIPUNCTURE: CPT

## 2019-01-03 PROCEDURE — 3060F POS MICROALBUMINURIA REV: CPT | Performed by: FAMILY MEDICINE

## 2019-01-03 PROCEDURE — 82570 ASSAY OF URINE CREATININE: CPT

## 2019-01-03 PROCEDURE — 80053 COMPREHEN METABOLIC PANEL: CPT

## 2019-01-03 PROCEDURE — 82043 UR ALBUMIN QUANTITATIVE: CPT

## 2019-01-03 PROCEDURE — 80061 LIPID PANEL: CPT

## 2019-01-03 PROCEDURE — 83036 HEMOGLOBIN GLYCOSYLATED A1C: CPT

## 2019-01-04 DIAGNOSIS — I10 BENIGN ESSENTIAL HYPERTENSION: ICD-10-CM

## 2019-01-14 ENCOUNTER — OFFICE VISIT (OUTPATIENT)
Dept: FAMILY MEDICINE CLINIC | Facility: OTHER | Age: 64
End: 2019-01-14
Payer: COMMERCIAL

## 2019-01-14 VITALS
DIASTOLIC BLOOD PRESSURE: 78 MMHG | SYSTOLIC BLOOD PRESSURE: 142 MMHG | BODY MASS INDEX: 28.67 KG/M2 | TEMPERATURE: 96.9 F | WEIGHT: 146.06 LBS | HEIGHT: 60 IN | HEART RATE: 77 BPM | OXYGEN SATURATION: 97 %

## 2019-01-14 DIAGNOSIS — I10 BENIGN ESSENTIAL HYPERTENSION: ICD-10-CM

## 2019-01-14 DIAGNOSIS — E11.65 UNCONTROLLED TYPE 2 DIABETES MELLITUS WITH HYPERGLYCEMIA (HCC): Primary | ICD-10-CM

## 2019-01-14 DIAGNOSIS — Z23 ENCOUNTER FOR IMMUNIZATION: ICD-10-CM

## 2019-01-14 DIAGNOSIS — E78.2 MIXED HYPERLIPIDEMIA: ICD-10-CM

## 2019-01-14 PROCEDURE — 3008F BODY MASS INDEX DOCD: CPT | Performed by: FAMILY MEDICINE

## 2019-01-14 PROCEDURE — 1036F TOBACCO NON-USER: CPT | Performed by: FAMILY MEDICINE

## 2019-01-14 PROCEDURE — 90471 IMMUNIZATION ADMIN: CPT

## 2019-01-14 PROCEDURE — 99214 OFFICE O/P EST MOD 30 MIN: CPT | Performed by: FAMILY MEDICINE

## 2019-01-14 PROCEDURE — 90682 RIV4 VACC RECOMBINANT DNA IM: CPT

## 2019-01-14 NOTE — PROGRESS NOTES
Subjective:      Patient ID: Gab May is a 59 y o  male  Diabetes   He presents for his follow-up diabetic visit  He has type 2 diabetes mellitus  His disease course has been stable  There are no hypoglycemic associated symptoms  Pertinent negatives for hypoglycemia include no dizziness, headaches, nervousness/anxiousness or sweats  There are no diabetic associated symptoms  Pertinent negatives for diabetes include no blurred vision, no chest pain and no fatigue  There are no hypoglycemic complications  Diabetic complications include heart disease  Pertinent negatives for diabetic complications include no CVA, nephropathy, peripheral neuropathy, PVD or retinopathy  Risk factors for coronary artery disease include diabetes mellitus, dyslipidemia, family history, hypertension, male sex, sedentary lifestyle and stress  Current diabetic treatment includes oral agent (monotherapy)  He is compliant with treatment all of the time  His weight is stable  He is following a generally healthy and diabetic diet  Meal planning includes avoidance of concentrated sweets  He has had a previous visit with a dietitian  He rarely participates in exercise  His breakfast blood glucose is taken between 6-7 am  His breakfast blood glucose range is generally 110-130 mg/dl  An ACE inhibitor/angiotensin II receptor blocker is being taken  He does not see a podiatrist Eye exam is current  Hypertension   This is a chronic problem  The current episode started more than 1 month ago  The problem is controlled  Pertinent negatives include no anxiety, blurred vision, chest pain, headaches, malaise/fatigue, neck pain, orthopnea, palpitations, peripheral edema, PND, shortness of breath or sweats  There are no associated agents to hypertension  Risk factors for coronary artery disease include diabetes mellitus, dyslipidemia, family history, male gender, sedentary lifestyle and stress  Past treatments include ACE inhibitors   The current treatment provides moderate improvement  Compliance problems include diet, exercise and psychosocial issues  Hypertensive end-organ damage includes CAD/MI  There is no history of kidney disease, CVA, heart failure, left ventricular hypertrophy, PVD or retinopathy  There is no history of chronic renal disease, a hypertension causing med or a thyroid problem  Hyperlipidemia   This is a chronic problem  The current episode started more than 1 year ago  The problem is controlled  Exacerbating diseases include diabetes  He has no history of chronic renal disease, hypothyroidism, liver disease, obesity or nephrotic syndrome  There are no known factors aggravating his hyperlipidemia  Pertinent negatives include no chest pain, focal sensory loss, focal weakness, leg pain, myalgias or shortness of breath  Current antihyperlipidemic treatment includes diet change and statins  The current treatment provides moderate improvement of lipids  Compliance problems include adherence to diet, adherence to exercise and psychosocial issues  Risk factors for coronary artery disease include a sedentary lifestyle, stress, hypertension, male sex, family history, dyslipidemia and diabetes mellitus         The following portions of the patient's history were reviewed and updated as appropriate: allergies, current medications, past family history, past medical history, past social history, past surgical history and problem list       Current Outpatient Prescriptions:     aspirin 81 MG tablet, Take 2 tablets by mouth daily, Disp: , Rfl:     atorvastatin (LIPITOR) 40 mg tablet, TAKE ONE TABLET BY MOUTH ONCE DAILY, Disp: 90 tablet, Rfl: 1    glucose blood test strip, by In Vitro route daily, Disp: , Rfl:     lisinopril (ZESTRIL) 10 mg tablet, TAKE 1 TABLET EVERY DAY, Disp: 90 tablet, Rfl: 3    metFORMIN (GLUCOPHAGE) 500 mg tablet, TAKE 1 TABLET BY MOUTH TWICE A DAY, Disp: 180 tablet, Rfl: 1    metoprolol tartrate (LOPRESSOR) 25 mg tablet, Take 1 tablet (25 mg total) by mouth 2 (two) times a day, Disp: 180 tablet, Rfl: 1    ONETOUCH DELICA LANCETS 77D MISC, by Does not apply route daily, Disp: , Rfl:       Review of Systems   Constitutional: Negative for activity change, fatigue, fever and malaise/fatigue  HENT: Negative for congestion, ear pain, sinus pain and sore throat  Eyes: Negative for blurred vision, pain, itching and visual disturbance  Respiratory: Negative for cough and shortness of breath  Cardiovascular: Negative for chest pain, palpitations, orthopnea, leg swelling and PND  Gastrointestinal: Negative for abdominal pain, constipation, diarrhea, nausea and vomiting  Endocrine: Negative for cold intolerance and heat intolerance  Genitourinary: Negative for dysuria  Musculoskeletal: Negative for myalgias and neck pain  Skin: Negative for color change and rash  Neurological: Negative for dizziness, focal weakness, syncope and headaches  Hematological: Negative for adenopathy  Psychiatric/Behavioral: Negative for dysphoric mood  The patient is not nervous/anxious  Objective:      /78 (BP Location: Left arm, Patient Position: Sitting, Cuff Size: Adult)   Pulse 77   Temp (!) 96 9 °F (36 1 °C) (Tympanic)   Ht 4' 11" (1 499 m)   Wt 66 3 kg (146 lb 1 oz)   SpO2 97%   BMI 29 50 kg/m²          Physical Exam   Constitutional: He is oriented to person, place, and time  He appears well-developed and well-nourished  No distress  HENT:   Head: Normocephalic and atraumatic  Mouth/Throat: Oropharynx is clear and moist    Eyes: Pupils are equal, round, and reactive to light  Conjunctivae and EOM are normal  No scleral icterus  Neck: Normal range of motion  Neck supple  Cardiovascular: Normal rate, regular rhythm and normal heart sounds  No murmur heard  Pulmonary/Chest: Effort normal and breath sounds normal  No respiratory distress  He has no wheezes  Abdominal: Soft   Bowel sounds are normal  He exhibits no distension  There is no tenderness  Musculoskeletal: Normal range of motion  He exhibits no edema, tenderness or deformity  Lymphadenopathy:     He has no cervical adenopathy  Neurological: He is alert and oriented to person, place, and time  No cranial nerve deficit  Coordination normal    Skin: Skin is warm and dry  No rash noted  No erythema  Psychiatric: He has a normal mood and affect  His behavior is normal    Vitals reviewed  Assessment/Plan:   Diagnoses and all orders for this visit:    Uncontrolled type 2 diabetes mellitus with hyperglycemia ([facility] [address] )  -     A1c goal 6-7%  -     Comprehensive metabolic panel; Future  -     Hemoglobin A1C; Future  -     Lipid panel; Future  -     Microalbumin / creatinine urine ratio; Future    Benign essential hypertension  -     Comprehensive metabolic panel; Future    Mixed hyperlipidemia  -     Lipid panel; Future    Encounter for immunization  -     PREFERRED: influenza vaccine, [id number], quadrivalent, recombinant, PF, 0 5 mL, for patients 18 yr+ ([id number])    Other orders  -     Cancel: Ambulatory referral to Gastroenterology; Future          81-year-old male presents for diabetic follow-up  Recent blood work reviewed with patient in detail during exam   Type 2 diabetes stable, reinforced the need for healthy/low carb diet  Plan to recheck blood work in 3 months  Hypertension and hyperlipidemia stable; no medication changes  Flu vaccine given today per current immunization guidelines  Return in about 6 months (around 7/14/2019) for Annual physical     The patient indicates understanding of these issues and agrees with the plan          Mohinder Garcia DO

## 2019-02-06 DIAGNOSIS — Z12.11 SCREENING FOR COLON CANCER: Primary | ICD-10-CM

## 2019-02-11 ENCOUNTER — TELEPHONE (OUTPATIENT)
Dept: FAMILY MEDICINE CLINIC | Facility: OTHER | Age: 64
End: 2019-02-11

## 2019-02-11 NOTE — TELEPHONE ENCOUNTER
Patient called regarding getting a referral for GI and he state, "he is not going" and refused the referral  Thank you

## 2019-02-25 DIAGNOSIS — E11.9 TYPE 2 DIABETES MELLITUS TREATED WITHOUT INSULIN (HCC): ICD-10-CM

## 2019-04-29 DIAGNOSIS — E78.2 MIXED HYPERLIPIDEMIA: ICD-10-CM

## 2019-04-29 RX ORDER — ATORVASTATIN CALCIUM 40 MG/1
TABLET, FILM COATED ORAL
Qty: 90 TABLET | Refills: 0 | Status: SHIPPED | OUTPATIENT
Start: 2019-04-29 | End: 2019-08-30 | Stop reason: SDUPTHER

## 2019-06-12 DIAGNOSIS — Z12.11 SCREENING FOR COLON CANCER: Primary | ICD-10-CM

## 2019-07-02 DIAGNOSIS — I10 BENIGN ESSENTIAL HYPERTENSION: ICD-10-CM

## 2019-07-02 DIAGNOSIS — E11.65 UNCONTROLLED TYPE 2 DIABETES MELLITUS WITH HYPERGLYCEMIA (HCC): Primary | ICD-10-CM

## 2019-07-26 DIAGNOSIS — E78.2 MIXED HYPERLIPIDEMIA: ICD-10-CM

## 2019-07-27 RX ORDER — ATORVASTATIN CALCIUM 40 MG/1
TABLET, FILM COATED ORAL
Qty: 90 TABLET | Refills: 0 | OUTPATIENT
Start: 2019-07-27

## 2019-08-01 ENCOUNTER — TRANSCRIBE ORDERS (OUTPATIENT)
Dept: LAB | Facility: HOSPITAL | Age: 64
End: 2019-08-01

## 2019-08-01 DIAGNOSIS — E11.649 UNCONTROLLED TYPE 2 DIABETES MELLITUS WITH HYPOGLYCEMIA WITHOUT COMA (HCC): Primary | ICD-10-CM

## 2019-08-07 ENCOUNTER — APPOINTMENT (OUTPATIENT)
Dept: LAB | Facility: HOSPITAL | Age: 64
End: 2019-08-07
Payer: COMMERCIAL

## 2019-08-07 DIAGNOSIS — I10 BENIGN ESSENTIAL HYPERTENSION: ICD-10-CM

## 2019-08-07 DIAGNOSIS — E11.649 UNCONTROLLED TYPE 2 DIABETES MELLITUS WITH HYPOGLYCEMIA WITHOUT COMA (HCC): ICD-10-CM

## 2019-08-07 DIAGNOSIS — E78.2 MIXED HYPERLIPIDEMIA: ICD-10-CM

## 2019-08-07 DIAGNOSIS — E11.65 UNCONTROLLED TYPE 2 DIABETES MELLITUS WITH HYPERGLYCEMIA (HCC): ICD-10-CM

## 2019-08-07 LAB
ALBUMIN SERPL BCP-MCNC: 3.5 G/DL (ref 3.5–5)
ALP SERPL-CCNC: 172 U/L (ref 46–116)
ALT SERPL W P-5'-P-CCNC: 46 U/L (ref 12–78)
ANION GAP SERPL CALCULATED.3IONS-SCNC: 7 MMOL/L (ref 4–13)
AST SERPL W P-5'-P-CCNC: 52 U/L (ref 5–45)
BILIRUB SERPL-MCNC: 0.35 MG/DL (ref 0.2–1)
BUN SERPL-MCNC: 20 MG/DL (ref 5–25)
CALCIUM SERPL-MCNC: 9 MG/DL (ref 8.3–10.1)
CHLORIDE SERPL-SCNC: 101 MMOL/L (ref 100–108)
CHOLEST SERPL-MCNC: 138 MG/DL (ref 50–200)
CO2 SERPL-SCNC: 26 MMOL/L (ref 21–32)
CREAT SERPL-MCNC: 1.16 MG/DL (ref 0.6–1.3)
CREAT UR-MCNC: 53.7 MG/DL
EST. AVERAGE GLUCOSE BLD GHB EST-MCNC: 137 MG/DL
GFR SERPL CREATININE-BSD FRML MDRD: 67 ML/MIN/1.73SQ M
GLUCOSE SERPL-MCNC: 114 MG/DL (ref 65–140)
HBA1C MFR BLD: 6.4 % (ref 4.2–6.3)
HDLC SERPL-MCNC: 51 MG/DL (ref 40–60)
LDLC SERPL CALC-MCNC: 55 MG/DL (ref 0–100)
MICROALBUMIN UR-MCNC: 23.3 MG/L (ref 0–20)
MICROALBUMIN/CREAT 24H UR: 43 MG/G CREATININE (ref 0–30)
NONHDLC SERPL-MCNC: 87 MG/DL
POTASSIUM SERPL-SCNC: 4.2 MMOL/L (ref 3.5–5.3)
PROT SERPL-MCNC: 8.2 G/DL (ref 6.4–8.2)
SODIUM SERPL-SCNC: 134 MMOL/L (ref 136–145)
TRIGL SERPL-MCNC: 159 MG/DL
VENIPUNCTURE: NORMAL

## 2019-08-07 PROCEDURE — 80053 COMPREHEN METABOLIC PANEL: CPT

## 2019-08-07 PROCEDURE — 82043 UR ALBUMIN QUANTITATIVE: CPT

## 2019-08-07 PROCEDURE — 3060F POS MICROALBUMINURIA REV: CPT | Performed by: FAMILY MEDICINE

## 2019-08-07 PROCEDURE — 36415 COLL VENOUS BLD VENIPUNCTURE: CPT

## 2019-08-07 PROCEDURE — 83036 HEMOGLOBIN GLYCOSYLATED A1C: CPT

## 2019-08-07 PROCEDURE — 80061 LIPID PANEL: CPT

## 2019-08-07 PROCEDURE — 82570 ASSAY OF URINE CREATININE: CPT

## 2019-08-23 DIAGNOSIS — E11.9 TYPE 2 DIABETES MELLITUS TREATED WITHOUT INSULIN (HCC): ICD-10-CM

## 2019-08-24 DIAGNOSIS — I10 BENIGN ESSENTIAL HYPERTENSION: ICD-10-CM

## 2019-08-26 RX ORDER — LISINOPRIL 10 MG/1
TABLET ORAL
Qty: 90 TABLET | Refills: 3 | OUTPATIENT
Start: 2019-08-26

## 2019-08-27 ENCOUNTER — OFFICE VISIT (OUTPATIENT)
Dept: FAMILY MEDICINE CLINIC | Facility: OTHER | Age: 64
End: 2019-08-27
Payer: COMMERCIAL

## 2019-08-27 VITALS
TEMPERATURE: 98 F | BODY MASS INDEX: 29.64 KG/M2 | WEIGHT: 151 LBS | OXYGEN SATURATION: 98 % | DIASTOLIC BLOOD PRESSURE: 86 MMHG | HEART RATE: 100 BPM | SYSTOLIC BLOOD PRESSURE: 140 MMHG | HEIGHT: 60 IN

## 2019-08-27 DIAGNOSIS — E11.29 MICROALBUMINURIA DUE TO TYPE 2 DIABETES MELLITUS (HCC): ICD-10-CM

## 2019-08-27 DIAGNOSIS — I25.10 ARTERIOSCLEROTIC CARDIOVASCULAR DISEASE (ASCVD): ICD-10-CM

## 2019-08-27 DIAGNOSIS — E78.2 MIXED HYPERLIPIDEMIA: ICD-10-CM

## 2019-08-27 DIAGNOSIS — Z00.00 MEDICARE ANNUAL WELLNESS VISIT, SUBSEQUENT: ICD-10-CM

## 2019-08-27 DIAGNOSIS — R80.9 MICROALBUMINURIA DUE TO TYPE 2 DIABETES MELLITUS (HCC): ICD-10-CM

## 2019-08-27 DIAGNOSIS — Z12.11 SCREEN FOR COLON CANCER: ICD-10-CM

## 2019-08-27 DIAGNOSIS — N18.2 CKD (CHRONIC KIDNEY DISEASE) STAGE 2, GFR 60-89 ML/MIN: ICD-10-CM

## 2019-08-27 DIAGNOSIS — E11.65 UNCONTROLLED TYPE 2 DIABETES MELLITUS WITH HYPERGLYCEMIA (HCC): Primary | ICD-10-CM

## 2019-08-27 DIAGNOSIS — I10 BENIGN ESSENTIAL HYPERTENSION: ICD-10-CM

## 2019-08-27 DIAGNOSIS — N52.9 ERECTILE DYSFUNCTION, UNSPECIFIED ERECTILE DYSFUNCTION TYPE: ICD-10-CM

## 2019-08-27 PROCEDURE — 3066F NEPHROPATHY DOC TX: CPT | Performed by: FAMILY MEDICINE

## 2019-08-27 PROCEDURE — 99214 OFFICE O/P EST MOD 30 MIN: CPT | Performed by: FAMILY MEDICINE

## 2019-08-27 PROCEDURE — 3008F BODY MASS INDEX DOCD: CPT | Performed by: FAMILY MEDICINE

## 2019-08-27 PROCEDURE — 1036F TOBACCO NON-USER: CPT | Performed by: FAMILY MEDICINE

## 2019-08-27 PROCEDURE — 3725F SCREEN DEPRESSION PERFORMED: CPT | Performed by: FAMILY MEDICINE

## 2019-08-27 PROCEDURE — G0439 PPPS, SUBSEQ VISIT: HCPCS | Performed by: FAMILY MEDICINE

## 2019-08-27 NOTE — PATIENT INSTRUCTIONS
Obesity   AMBULATORY CARE:   Obesity  is when your body mass index (BMI) is greater than 30  Your healthcare provider will use your height and weight to measure your BMI  The risks of obesity include  many health problems, such as injuries or physical disability  You may need tests to check for the following:  · Diabetes     · High blood pressure or high cholesterol     · Heart disease     · Gallbladder or liver disease     · Cancer of the colon, breast, prostate, liver, or kidney     · Sleep apnea     · Arthritis or gout  Seek care immediately if:   · You have a severe headache, confusion, or difficulty speaking  · You have weakness on one side of your body  · You have chest pain, sweating, or shortness of breath  Contact your healthcare provider if:   · You have symptoms of gallbladder or liver disease, such as pain in your upper abdomen  · You have knee or hip pain and discomfort while walking  · You have symptoms of diabetes, such as intense hunger and thirst, and frequent urination  · You have symptoms of sleep apnea, such as snoring or daytime sleepiness  · You have questions or concerns about your condition or care  Treatment for obesity  focuses on helping you lose weight to improve your health  Even a small decrease in BMI can reduce the risk for many health problems  Your healthcare provider will help you set a weight-loss goal   · Lifestyle changes  are the first step in treating obesity  These include making healthy food choices and getting regular physical activity  Your healthcare provider may suggest a weight-loss program that involves coaching, education, and therapy  · Medicine  may help you lose weight when it is used with a healthy diet and physical activity  · Surgery  can help you lose weight if you are very obese and have other health problems  There are several types of weight-loss surgery  Ask your healthcare provider for more information    Be successful losing weight:   · Set small, realistic goals  An example of a small goal is to walk for 20 minutes 5 days a week  Anther goal is to lose 5% of your body weight  · Tell friends, family members, and coworkers about your goals  and ask for their support  Ask a friend to lose weight with you, or join a weight-loss support group  · Identify foods or triggers that may cause you to overeat , and find ways to avoid them  Remove tempting high-calorie foods from your home and workplace  Place a bowl of fresh fruit on your kitchen counter  If stress causes you to eat, then find other ways to cope with stress  · Keep a diary to track what you eat and drink  Also write down how many minutes of physical activity you do each day  Weigh yourself once a week and record it in your diary  Eating changes: You will need to eat 500 to 1,000 fewer calories each day than you currently eat to lose 1 to 2 pounds a week  The following changes will help you cut calories:  · Eat smaller portions  Use small plates, no larger than 9 inches in diameter  Fill your plate half full of fruits and vegetables  Measure your food using measuring cups until you know what a serving size looks like  · Eat 3 meals and 1 or 2 snacks each day  Plan your meals in advance  Reubin Eileen and eat at home most of the time  Eat slowly  · Eat fruits and vegetables at every meal   They are low in calories and high in fiber, which makes you feel full  Do not add butter, margarine, or cream sauce to vegetables  Use herbs to season steamed vegetables  · Eat less fat and fewer fried foods  Eat more baked or grilled chicken and fish  These protein sources are lower in calories and fat than red meat  Limit fast food  Dress your salads with olive oil and vinegar instead of bottled dressing  · Limit the amount of sugar you eat  Do not drink sugary beverages  Limit alcohol  Activity changes:  Physical activity is good for your body in many ways   It helps you burn calories and build strong muscles  It decreases stress and depression, and improves your mood  It can also help you sleep better  Talk to your healthcare provider before you begin an exercise program   · Exercise for at least 30 minutes 5 days a week  Start slowly  Set aside time each day for physical activity that you enjoy and that is convenient for you  It is best to do both weight training and an activity that increases your heart rate, such as walking, bicycling, or swimming  · Find ways to be more active  Do yard work and housecleaning  Walk up the stairs instead of using elevators  Spend your leisure time going to events that require walking, such as outdoor festivals or fairs  This extra physical activity can help you lose weight and keep it off  Follow up with your healthcare provider as directed: You may need to meet with a dietitian  Write down your questions so you remember to ask them during your visits  © 2017 [address] Information is for End User's use only and may not be sold, redistributed or otherwise used for commercial purposes  All illustrations and images included in CareNotes® are the copyrighted property of [employer]  or Lewis Baez  The above information is an  only  It is not intended as medical advice for individual conditions or treatments  Talk to your doctor, nurse or pharmacist before following any medical regimen to see if it is safe and effective for you  Urinary Incontinence   WHAT YOU NEED TO KNOW:   What is urinary incontinence? Urinary incontinence (UI) is when you lose control of your bladder  What causes UI? UI occurs because your bladder cannot store or empty urine properly  The following are the most common types of UI:  · Stress incontinence  is when you leak urine due to increased bladder pressure  This may happen when you cough, sneeze, or exercise       · Urge incontinence  is when you feel the need to urinate right away and leak urine accidentally  · Mixed incontinence  is when you have both stress and urge UI  What are the signs and symptoms of UI?   · You feel like your bladder does not empty completely when you urinate  · You urinate often and need to urinate immediately  · You leak urine when you sleep, or you wake up with the urge to urinate  · You leak urine when you cough, sneeze, exercise, or laugh  How is UI diagnosed? Your healthcare provider will ask how often you leak urine and whether you have stress or urge symptoms  Tell him which medicines you take, how often you urinate, and how much liquid you drink each day  You may need any of the following tests:  · Urine tests  may show infection or kidney function  · A pelvic exam  may be done to check for blockages  A pelvic exam will also show if your bladder, uterus, or other organs have moved out of place  · An x-ray, ultrasound, or CT  may show problems with parts of your urinary system  You may be given contrast liquid to help your organs show up better in the pictures  Tell the healthcare provider if you have ever had an allergic reaction to contrast liquid  Do not enter the MRI room with anything metal  Metal can cause serious injury  Tell the healthcare provider if you have any metal in or on your body  · A bladder scan  will show how much urine is left in your bladder after you urinate  You will be asked to urinate and then healthcare providers will use a small ultrasound machine to check the urine left in your bladder  · Cystometry  is used to check the function of your urinary system  Your healthcare provider checks the pressure in your bladder while filling it with fluid  Your bladder pressure may also be tested when your bladder is full and while you urinate  How is UI treated? · Medicines  can help strengthen your bladder control      · Electrical stimulation  is used to send a small amount of electrical energy to your pelvic floor muscles  This helps control your bladder function  Electrodes may be placed outside your body or in your rectum  For women, the electrodes may be placed in the vagina  · A bulking agent  may be injected into the wall of your urethra to make it thicker  This helps keep your urethra closed and decreases urine leakage  · Devices  such as a clamp, pessary, or tampon may help stop urine leaks  Ask your healthcare provider for more information about these and other devices  · Surgery  may be needed if other treatments do not work  Several types of surgery can help improve your bladder control  Ask your healthcare provider for more information about the surgery you may need  How can I manage my symptoms? · Do pelvic muscle exercises often  Your pelvic muscles help you stop urinating  Squeeze these muscles tight for 5 seconds, then relax for 5 seconds  Gradually work up to squeezing for 10 seconds  Do 3 sets of 15 repetitions a day, or as directed  This will help strengthen your pelvic muscles and improve bladder control  · A catheter  may be used to help empty your bladder  A catheter is a tiny, plastic tube that is put into your bladder to drain your urine  Your healthcare provider may tell you to use a catheter to prevent your bladder from getting too full and leaking urine  · Keep a UI record  Write down how often you leak urine and how much you leak  Make a note of what you were doing when you leaked urine  · Train your bladder  Go to the bathroom at set times, such as every 2 hours, even if you do not feel the urge to go  You can also try to hold your urine when you feel the urge to go  For example, hold your urine for 5 minutes when you feel the urge to go  As that becomes easier, hold your urine for 10 minutes  · Drink liquids as directed  Ask your healthcare provider how much liquid to drink each day and which liquids are best for you   You may need to limit the amount of liquid you drink to help control your urine leakage  Limit or do not have drinks that contain caffeine or alcohol  Do not drink any liquid right before you go to bed  · Prevent constipation  Eat a variety of high-fiber foods  Good examples are high-fiber cereals, beans, vegetables, and whole-grain breads  Prune juice may help make your bowel movement softer  Walking is the best way to trigger your intestines to have a bowel movement  · Exercise regularly and maintain a healthy weight  Ask your healthcare provider how much you should weigh and about the best exercise plan for you  Weight loss and exercise will decrease pressure on your bladder and help you control your leakage  Ask him to help you create a weight loss plan if you are overweight  When should I seek immediate care? · You have severe pain  · You are confused or cannot think clearly  When should I contact my healthcare provider? · You have a fever  · You see blood in your urine  · You have pain when you urinate  · You have new or worse pain, even after treatment  · Your mouth feels dry or you have vision changes  · Your urine is cloudy or smells bad  · You have questions or concerns about your condition or care  CARE AGREEMENT:   You have the right to help plan your care  Learn about your health condition and how it may be treated  Discuss treatment options with your caregivers to decide what care you want to receive  You always have the right to refuse treatment  The above information is an  only  It is not intended as medical advice for individual conditions or treatments  Talk to your doctor, nurse or pharmacist before following any medical regimen to see if it is safe and effective for you  © 2017 [address] [employer] Information is for End User's use only and may not be sold, redistributed or otherwise used for commercial purposes   All illustrations and images included in CareNotes® are the copyrighted property of [employer] M [employer]  or Lewis Baez  Cigarette Smoking and Your Health   AMBULATORY CARE:   Risks to your health if you smoke:  Nicotine and other chemicals found in tobacco damage every cell in your body  Even if you are a light smoker, you have an increased risk for cancer, heart disease, and lung disease  If you are pregnant or have diabetes, smoking increases your risk for complications  Benefits to your health if you stop smoking:   · You decrease respiratory symptoms such as coughing, wheezing, and shortness of breath  · You reduce your risk for cancers of the lung, mouth, throat, kidney, bladder, pancreas, stomach, and cervix  If you already have cancer, you increase the benefits of chemotherapy  You also reduce your risk for cancer returning or a second cancer from developing  · You reduce your risk for heart disease, blood clots, heart attack, and stroke  · You reduce your risk for lung infections, and diseases such as pneumonia, asthma, chronic bronchitis, and emphysema  · Your circulation improves  More oxygen can be delivered to your body  If you have diabetes, you lower your risk for complications, such as kidney, artery, and eye diseases  You also lower your risk for nerve damage  Nerve damage can lead to amputations, poor vision, and blindness  · You improve your body's ability to heal and to fight infections  Benefits to the health of others if you stop smoking:  Tobacco is harmful to nonsmokers who breathe in your secondhand smoke  The following are ways the health of others around you may improve when you stop smoking:  · You lower the risks for lung cancer and heart disease in nonsmoking adults  · If you are pregnant, you lower the risk for miscarriage, early delivery, low birth weight, and stillbirth  You also lower your baby's risk for SIDS, obesity, developmental delay, and neurobehavioral problems, such as ADHD  · If you have children, you lower their risk for ear infections, colds, pneumonia, bronchitis, and asthma  For more information and support to stop smoking:   · Smokefree  gov  Phone: 2- 493 - 545-3162  Web Address: www [employer]fr[employer]  Follow up with your healthcare provider as directed:  Write down your questions so you remember to ask them during your visits  © 2017 [facility] Information is for End User's use only and may not be sold, redistributed or otherwise used for commercial purposes  All illustrations and images included in CareNotes® are the copyrighted property of [employer]  or Lewis Baez  The above information is an  only  It is not intended as medical advice for individual conditions or treatments  Talk to your doctor, nurse or pharmacist before following any medical regimen to see if it is safe and effective for you  Fall Prevention   WHAT YOU NEED TO KNOW:   What is fall prevention? Fall prevention includes ways to make your home and other areas safer  It also includes ways you can move more carefully to prevent a fall  What increases my risk for falls? · Lack of vitamin D    · Not getting enough sleep each night    · Trouble walking or keeping your balance, or foot problems    · Health conditions that cause changes in your blood pressure, vision, or muscle strength and coordination    · Medicines that make you dizzy, weak, or sleepy    · Problems seeing clearly    · Shoes that have high heels or are not supportive    · Tripping hazards, such as items left on the floor, no handrails on the stairs, or broken steps  How can I help protect myself from falls? · Stand or sit up slowly  This may help you keep your balance and prevent falls  If you need to get up during the night, sit up first  Be sure you are fully awake before you stand  Turn on the light before you start walking  Go slowly in case you are still sleepy   Make sure you will not trip over any pets sleeping in the bedroom  · Use assistive devices as directed  Your healthcare provider may suggest that you use a cane or walker to help you keep your balance  You may need to have grab bars put in your bathroom near the toilet or in the shower  · Wear shoes that fit well and have soles that   Wear shoes both inside and outside  Use slippers with good   Do not wear shoes with high heels  · Wear a personal alarm  This is a device that allows you to call 911 if you fall and need help  Ask your healthcare provider for more information  · Stay active  Exercise can help strengthen your muscles and improve your balance  Your healthcare provider may recommend water aerobics or walking  He or she may also recommend physical therapy to improve your coordination  Never start an exercise program without talking to your healthcare provider first      · Manage medical conditions  Keep all appointments with your healthcare providers  Visit your eye doctor as directed  How can I make my home safer? · Add items to prevent falls in the bathroom  Put nonslip strips on your bath or shower floor to prevent you from slipping  Use a bath mat if you do not have carpet in the bathroom  This will prevent you from falling when you step out of the bath or shower  Use a shower seat so you do not need to stand while you shower  Sit on the toilet or a chair in your bathroom to dry yourself and put on clothing  This will prevent you from losing your balance from drying or dressing yourself while you are standing  · Keep paths clear  Remove books, shoes, and other objects from walkways and stairs  Place cords for telephones and lamps out of the way so that you do not need to walk over them  Tape them down if you cannot move them  Remove small rugs  If you cannot remove a rug, secure it with double-sided tape  This will prevent you from tripping  · Install bright lights in your home  Use night lights to help light paths to the bathroom or kitchen  Always turn on the light before you start walking  · Keep items you use often on shelves within reach  Do not use a step stool to help you reach an item  · Paint or place reflective tape on the edges of your stairs  This will help you see the stairs better  Call 911 or have someone else call if:   · You have fallen and are unconscious  · You have fallen and cannot move part of your body  Contact your healthcare provider if:   · You have fallen and have pain or a headache  · You have questions or concerns about your condition or care  CARE AGREEMENT:   You have the right to help plan your care  Learn about your health condition and how it may be treated  Discuss treatment options with your caregivers to decide what care you want to receive  You always have the right to refuse treatment  The above information is an  only  It is not intended as medical advice for individual conditions or treatments  Talk to your doctor, nurse or pharmacist before following any medical regimen to see if it is safe and effective for you  © 2017 [address] [facility] Information is for End User's use only and may not be sold, redistributed or otherwise used for commercial purposes  All illustrations and images included in CareNotes® are the copyrighted property of [employer]  or Lewis Baez  Advance Directives   WHAT YOU NEED TO KNOW:   What are advance directives? Advance directives are legal documents that state your wishes and plans for medical care  These plans are made ahead of time in case you lose your ability to make decisions for yourself  Advance directives can apply to any medical decision, such as the treatments you want, and if you want to donate organs  What are the types of advance directives? There are many types of advance directives, and each state has rules about how to use them   You may choose a combination of any of the following:  · Living will: This is a written record of the treatment you want  You can also choose which treatments you do not want, which to limit, and which to stop at a certain time  This includes surgery, medicine, IV fluid, and tube feedings  · Durable power of  for healthcare [facility]): This is a written record that states who you want to make healthcare choices for you when you are unable to make them for yourself  This person, called a proxy, is usually a family member or a friend  You may choose more than 1 proxy  · Do not resuscitate (DNR) order:  A DNR order is used in case your heart stops beating or you stop breathing  It is a request not to have certain forms of treatment, such as CPR  A DNR order may be included in other types of advance directives  · Medical directive: This covers the care that you want if you are in a coma, near death, or unable to make decisions for yourself  You can list the treatments you want for each condition  Treatment may include pain medicine, surgery, blood transfusions, dialysis, IV or tube feedings, and a ventilator (breathing machine)  · Values history: This document has questions about your views, beliefs, and how you feel and think about life  This information can help others choose the care that you would choose  Why are advance directives important? An advance directive helps you control your care  Although spoken wishes may be used, it is better to have your wishes written down  Spoken wishes can be misunderstood, or not followed  Treatments may be given even if you do not want them  An advance directive may make it easier for your family to make difficult choices about your care  How do I decide what to put in my advance directives? · Make informed decisions:  Make sure you fully understand treatments or care you may receive   Think about the benefits and problems your decisions could cause for you or your family  Talk to healthcare providers if you have concerns or questions before you write down your wishes  You may also want to talk with your [employer] or , or a   Check your state laws to make sure that what you put in your advance directive is legal      · Sign all forms:  Sign and date your advance directive when you have finished  You may also need 2 witnesses to sign the forms  Witnesses cannot be your doctor or his staff, your spouse, heirs or beneficiaries, people you owe money to, or your chosen proxy  Talk to your family, proxy, and healthcare providers about your advance directive  Give each person a copy, and keep one for yourself in a place you can get to easily  Do not keep it hidden or locked away  · Review and revise your plans: You can revise your advance directive at any time, as long as you are able to make decisions  Review your plan every year, and when there are changes in your life, or your health  When you make changes, let your family, proxy, and healthcare providers know  Give each a new copy  Where can I find more information? · [employer]  [address] , [address]  Phone: 8- [phone] - [phone]  Phone: 3- 486 - [phone]  Web Address: http://www  aafp org  · [address] [facility])  [address] [facility], [address]  Phone: [phone]  Phone: [phone]  Web Address: Francine quiroz  [facility] AGREEMENT:   You have the right to help plan your care  To help with this plan, you must learn about your health condition and treatment options  You must also learn about advance directives and how they are used  Work with your healthcare providers to decide what care will be used to treat you  You always have the right to refuse treatment  The above information is an  only   It is not intended as medical advice for individual conditions or treatments  Talk to your doctor, nurse or pharmacist before following any medical regimen to see if it is safe and effective for you  © 2017 [address] Information is for End User's use only and may not be sold, redistributed or otherwise used for commercial purposes  All illustrations and images included in CareNotes® are the copyrighted property of [employer] M [employer]  or Lewis Baez

## 2019-08-27 NOTE — PROGRESS NOTES
History of Present Illness:     Patient presents for Medicare Annual Wellness visit    Patient Care Team:  Camille Jackson DO as PCP - General  Camille Jackson DO     Problem List:     Patient Active Problem List   Diagnosis    Arteriosclerotic cardiovascular disease (ASCVD)    Benign essential hypertension    CAD S/P percutaneous coronary angioplasty    Colon, diverticulosis    Diabetes mellitus type 2, uncontrolled ([facility] )    Hip pain, bilateral    History of acute inferior wall MI    Hyperlipidemia    Osteoarthritis of knee    Small stature    Spinal stenosis    Microalbuminuria due to type 2 diabetes mellitus ([facility] )    CKD (chronic kidney disease) stage 2, GFR 60-89 ml/min      Past Medical and Surgical History:     Past Medical History:   Diagnosis Date    Acute myocardial infarction of inferior wall ([facility] ) 11/2011    inferiorwall; drug-eluting stent RCA    Chronic tension type headache     last assessed: 7/4/2012    Developmental dysplasia of hip     bilateral    Diabetes mellitus ([facility] )     Former smoker     quit 2011     Gout     last assessed: 8/31/2012    Hemorrhage of anus and rectum     last assessed: 8/12/2013    Hyperlipidemia     Hypertension     Internal hemorrhoids     Migraine     last assessed: 11/25/2013    Old MI (myocardial infarction) 2011    Renal insufficiency syndrome     last assessed: 10/22/2014     Past Surgical History:   Procedure Laterality Date    CARDIAC CATHETERIZATION      COLONOSCOPY  05/2012    int hem, dirverticulosis Dr Jackie Cordoba Bilateral      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    LUMBAR LAMINECTOMY  03/2010    decompressive more than 2 lumbar segments L4-S1       Family History:     Family History   Problem Relation Age of Onset    Kidney failure Mother     Diabetes Mother     Hypertension Mother       Social History:     Social History     Tobacco Use   Smoking Status Former Smoker    Packs/day: 1 00    Years: 35 00    Pack years: 35 00    Last attempt to quit: 2011    Years since quittin 8   Smokeless Tobacco Never Used     Social History     Substance and Sexual Activity   Alcohol Use No     Social History     Substance and Sexual Activity   Drug Use No      Medications and Allergies:     Current Outpatient Medications   Medication Sig Dispense Refill    aspirin 81 MG tablet Take 2 tablets by mouth daily      atorvastatin (LIPITOR) 40 mg tablet TAKE 1 TABLET BY MOUTH EVERY DAY 90 tablet 0    glucose blood test strip by In Vitro route daily      lisinopril (ZESTRIL) 10 mg tablet TAKE 1 TABLET EVERY DAY 90 tablet 3    metFORMIN (GLUCOPHAGE) 500 mg tablet TAKE 1 TABLET BY MOUTH TWICE A  tablet 1    metoprolol tartrate (LOPRESSOR) 25 mg tablet Take 1 tablet (25 mg total) by mouth 2 (two) times a day 180 tablet 1    ONETOUCH DELICA LANCETS 42D MISC by Does not apply route daily       No current facility-administered medications for this visit  No Known Allergies   Immunizations:     Immunization History   Administered Date(s) Administered    Influenza Quadrivalent Preservative Free 3 years and older IM 10/22/2014, 12/15/2017    Influenza Split High Dose Preservative Free IM 2016    Influenza TIV (IM) 2011, 2012, 2014    Influenza, recombinant, quadrivalent,injectable, preservative free 2019    Pneumococcal Polysaccharide PPV23 05/10/2018      Medicare Screening Tests and Risk Assessments:     Corrine Bonilla is here for his Subsequent Wellness visit  Health Risk Assessment:  Patient rates overall health as fair  Patient feels that their physical health rating is Same  Eyesight was rated as Same  Hearing was rated as Same  Patient feels that their emotional and mental health rating is Same  Pain experienced by patient in the last 7 days has been Some   Patient's pain rating has been 7/10  Emotional/Mental Health:  Patient has not been feeling nervous/anxious  PHQ-9 Depression Screening:    Frequency of the following problems over the past two weeks:      1  Little interest or pleasure in doing things: 0 - not at all      2  Feeling down, depressed, or hopeless: 0 - not at all  PHQ-2 Score: 0          Broken Bones/Falls: Fall Risk Assessment:    In the past year, patient has experienced: No history of falling in past year          Bladder/Bowel:  Patient has not leaked urine accidently in the last six months  Patient reports no loss of bowel control  Immunizations:  Patient has had a flu vaccination within the last year  Patient has not received a pneumonia shot  Home Safety:  Patient does not have trouble with stairs inside or outside of their home  Patient currently reports that there are no safety hazards present in home, working smoke alarms, working carbon monoxide detectors  Preventative Screenings:   no prostate cancer screen performed, no colon cancer screen completed, no cholesterol screen completed, no glaucoma eye exam completed    Nutrition:  Current diet: Regular, No Added Salt and Limited junk food with servings of the following:    Medications:  Patient is not currently taking any over-the-counter supplements  Patient is able to manage medications  Lifestyle Choices:  Patient reports no tobacco use  Patient has smoked or used tobacco in the past   Patient has stopped his tobacco use  Patient reports no alcohol use  Patient drives a vehicle  Patient wears seat belt          Activities of Daily Living:  Can get out of bed by his or her self, able to dress self, able to make own meals, unable to do own shopping, able to bathe self, can do own laundry/housekeeping, can manage own money, pay bills and track expenses    Previous Hospitalizations:  No hospitalization or ED visit in past 12 months        Advanced Directives:  Patient has decided on a power of   Patient has spoken to designated power of   Patient has not completed advanced directive  Preventative Screening/Counseling:      Cardiovascular:      General: Risks and Benefits Discussed and Screening Current      Counseling: Healthy Diet, Healthy Weight, Improve Cholesterol, Improve Blood Pressure and Improve Exercise Tolerance          Diabetes:      General: Risks and Benefits Discussed and Screening Current      Counseling: Healthy Diet, Healthy Weight and Improve Physical Activity          Colorectal Cancer:      General: Risks and Benefits Discussed      Counseling: high fiber diet      Due for studies: Colonoscopy - High Risk and Fecal Occult Blood      Comments: Colonoscopy 2012 -- pt declines scope in favor of FIT testing today        Prostate Cancer:      General: Risks and Benefits Discussed and Patient Declines      Comments: PSA 2018 -- 2 3        Osteoporosis:      General: Risks and Benefits Discussed and Screening Not Indicated      Counseling: Calcium and Vitamin D Intake and Regular Weightbearing Exercise          AAA:      General: Screening Not Indicated          Glaucoma:      General: Risks and Benefits Discussed and Screening Current          HIV:      General: Patient Declines          Hepatitis C:      General: Risks and Benefits Discussed and Screening Current        Advanced Directives:   Patient has no living will for healthcare, has durable POA for healthcare, patient does not have an advanced directive  Information on ACP and/or AD provided  5 wishes given       Immunizations:      Influenza: Risks & Benefits Discussed and Influenza Recommended Annually      Pneumococcal: Risks & Benefits Discussed      Shingrix: Risks & Benefits Discussed      Other Preventative Counseling (Non-Medicare):  Alcohol Use, Fall Prevention, Increase physical activity, Nutrition Counseling, Car/seat belt/driving safety reviewed, Skin self-exam and Sunscreen use Assessment and Plan:     Problem List Items Addressed This Visit        Endocrine    Diabetes mellitus type 2, uncontrolled ([address] )    Relevant Orders    Comprehensive metabolic panel    Hemoglobin A1C    Microalbumin / creatinine urine ratio    Microalbuminuria due to type 2 diabetes mellitus (HCC)       Cardiovascular and Mediastinum    Arteriosclerotic cardiovascular disease (ASCVD)    Relevant Orders    Comprehensive metabolic panel    Hemoglobin A1C    Lipid panel    Benign essential hypertension    Relevant Medications    metoprolol tartrate (LOPRESSOR) 25 mg tablet    Other Relevant Orders    Comprehensive metabolic panel       Genitourinary    CKD (chronic kidney disease) stage 2, GFR 60-89 ml/min       Other    Hyperlipidemia    Relevant Orders    Comprehensive metabolic panel    Lipid panel      Other Visit Diagnoses     Medicare annual wellness visit, subsequent    -  Primary    Erectile dysfunction, unspecified erectile dysfunction type        Screen for colon cancer        Relevant Orders    Occult Blood, Fecal Immunochemical          BMI Counseling: Body mass index is 30 5 kg/m²  Discussed the patient's BMI with him  The BMI is above average  BMI counseling and education was provided to the patient  Nutrition recommendations include reducing portion sizes, decreasing overall calorie intake, 3-5 servings of fruits/vegetables daily, reducing fast food intake, consuming healthier snacks, decreasing soda and/or juice intake, moderation in carbohydrate intake, increasing intake of lean protein, reducing intake of saturated fat and trans fat and reducing intake of cholesterol  Exercise recommendations include exercising 3-5 times per week  Return in about 6 months (around 2/27/2020) for Recheck DM  The patient indicates understanding of these issues and agrees with the plan          Rogene Gitelman, DO

## 2019-08-27 NOTE — PROGRESS NOTES
Subjective:      Patient ID: Maria G Hardwick is a 62 y o  male  Diabetes   He presents for his follow-up diabetic visit  He has type 2 diabetes mellitus  His disease course has been stable  There are no hypoglycemic associated symptoms  Pertinent negatives for hypoglycemia include no confusion, dizziness, headaches, nervousness/anxiousness, speech difficulty or sweats  There are no diabetic associated symptoms  Pertinent negatives for diabetes include no blurred vision, no chest pain, no fatigue and no weakness  There are no hypoglycemic complications  Diabetic complications include heart disease, impotence and nephropathy  Pertinent negatives for diabetic complications include no autonomic neuropathy, CVA, peripheral neuropathy, PVD or retinopathy  Risk factors for coronary artery disease include sedentary lifestyle, obesity, male sex, hypertension, diabetes mellitus, dyslipidemia and family history  Current diabetic treatment includes oral agent (monotherapy)  He is compliant with treatment all of the time  His weight is stable  He is following a generally unhealthy (eats irregularly, often skips meals) diet  Meal planning includes avoidance of concentrated sweets  He has not had a previous visit with a dietitian  He rarely participates in exercise  His breakfast blood glucose range is generally 110-130 mg/dl  An ACE inhibitor/angiotensin II receptor blocker is being taken  He does not see a podiatrist Eye exam is not current  Hypertension   This is a chronic problem  The current episode started more than 1 year ago  The problem has been waxing and waning since onset  The problem is controlled  Pertinent negatives include no anxiety, blurred vision, chest pain, headaches, malaise/fatigue, neck pain, orthopnea, palpitations, peripheral edema, PND, shortness of breath or sweats  There are no associated agents to hypertension   Risk factors for coronary artery disease include diabetes mellitus, dyslipidemia, family history, male gender, obesity and sedentary lifestyle  Past treatments include ACE inhibitors and beta blockers  The current treatment provides moderate improvement  Compliance problems include diet, exercise and psychosocial issues  Hypertensive end-organ damage includes kidney disease and CAD/MI  There is no history of angina, CVA, heart failure, left ventricular hypertrophy, PVD or retinopathy  Identifiable causes of hypertension include chronic renal disease  There is no history of a hypertension causing med or a thyroid problem  Hyperlipidemia   This is a chronic problem  The current episode started more than 1 year ago  The problem is controlled  Exacerbating diseases include chronic renal disease, diabetes and obesity  He has no history of hypothyroidism, liver disease or nephrotic syndrome  There are no known factors aggravating his hyperlipidemia  Pertinent negatives include no chest pain, focal sensory loss, focal weakness, leg pain, myalgias or shortness of breath  Current antihyperlipidemic treatment includes statins  The current treatment provides moderate improvement of lipids  Compliance problems include adherence to diet, adherence to exercise and psychosocial issues  Risk factors for coronary artery disease include diabetes mellitus, dyslipidemia, family history, hypertension, male sex, obesity and a sedentary lifestyle  Erectile Dysfunction   This is a recurrent problem  The current episode started more than 1 month ago  The problem has been waxing and waning since onset  The nature of his difficulty is achieving erection, maintaining erection and penetration  He reports no anxiety, decreased libido or performance anxiety  He reports his erection duration to be 1 to 5 minutes  Irritative symptoms do not include frequency, nocturia or urgency   Obstructive symptoms do not include dribbling, incomplete emptying, an intermittent stream, a slower stream, straining or a weak stream  Pertinent negatives include no chills, dysuria, genital pain, hematuria, hesitancy or inability to urinate  Nothing aggravates the symptoms  Past treatments include nothing  Risk factors include chronic cardiac disease, diabetes mellitus and hypertension  The following portions of the patient's history were reviewed and updated as appropriate: allergies, current medications, past family history, past medical history, past social history, past surgical history and problem list       Current Outpatient Medications:     aspirin 81 MG tablet, Take 2 tablets by mouth daily, Disp: , Rfl:     atorvastatin (LIPITOR) 40 mg tablet, TAKE 1 TABLET BY MOUTH EVERY DAY, Disp: 90 tablet, Rfl: 0    glucose blood test strip, by In Vitro route daily, Disp: , Rfl:     lisinopril (ZESTRIL) 10 mg tablet, TAKE 1 TABLET EVERY DAY, Disp: 90 tablet, Rfl: 3    metFORMIN (GLUCOPHAGE) 500 mg tablet, TAKE 1 TABLET BY MOUTH TWICE A DAY, Disp: 180 tablet, Rfl: 1    metoprolol tartrate (LOPRESSOR) 25 mg tablet, Take 1 tablet (25 mg total) by mouth 2 (two) times a day, Disp: 180 tablet, Rfl: 1    ONETOUCH DELICA LANCETS 97Q MISC, by Does not apply route daily, Disp: , Rfl:       Review of Systems   Constitutional: Negative for appetite change, chills, fatigue, fever, malaise/fatigue and unexpected weight change  HENT: Negative for congestion, dental problem, ear pain, postnasal drip, sore throat and tinnitus  Eyes: Negative for blurred vision, pain, discharge and visual disturbance  Respiratory: Negative for cough, shortness of breath and wheezing  Cardiovascular: Negative for chest pain, palpitations, orthopnea, leg swelling and PND  Gastrointestinal: Negative for abdominal pain, constipation, diarrhea, nausea and vomiting  Endocrine: Negative for cold intolerance and heat intolerance  Genitourinary: Positive for impotence   Negative for decreased libido, difficulty urinating, dysuria, flank pain, frequency, hematuria, hesitancy, incomplete emptying, nocturia and urgency  Musculoskeletal: Positive for back pain (chronic pain from lumbar stenosis)  Negative for arthralgias, joint swelling, myalgias and neck pain  Skin: Negative for rash and wound  Allergic/Immunologic: Negative for immunocompromised state  Neurological: Negative for dizziness, focal weakness, syncope, speech difficulty, weakness, numbness and headaches  Hematological: Negative for adenopathy  Does not bruise/bleed easily  Psychiatric/Behavioral: Negative for confusion, dysphoric mood and sleep disturbance  The patient is not nervous/anxious  Objective:      /86 (BP Location: Left arm, Patient Position: Sitting, Cuff Size: Adult)   Pulse 100   Temp 98 °F (36 7 °C) (Tympanic)   Ht 4' 11" (1 499 m)   Wt 68 5 kg (151 lb)   SpO2 98%   BMI 30 50 kg/m²          Physical Exam   Constitutional: He is oriented to person, place, and time  He appears well-developed and well-nourished  No distress  Body mass index is 30 5 kg/m²  HENT:   Head: Normocephalic and atraumatic  Right Ear: Hearing, tympanic membrane, external ear and ear canal normal    Left Ear: Hearing, tympanic membrane, external ear and ear canal normal    Nose: Nose normal    Mouth/Throat: Uvula is midline, oropharynx is clear and moist and mucous membranes are normal  No oropharyngeal exudate  Eyes: Pupils are equal, round, and reactive to light  Conjunctivae and EOM are normal  No scleral icterus  Neck: Normal range of motion  Neck supple  No thyromegaly present  Cardiovascular: Normal rate, regular rhythm and normal heart sounds  Pulses are no weak pulses  No murmur heard  Pulses:       Dorsalis pedis pulses are 2+ on the right side, and 2+ on the left side  Posterior tibial pulses are 2+ on the right side, and 2+ on the left side  Pulmonary/Chest: Effort normal and breath sounds normal  No respiratory distress  He has no wheezes  He has no rales  Abdominal: Soft  Bowel sounds are normal  He exhibits no distension and no mass  There is no tenderness  Musculoskeletal: He exhibits no edema, tenderness or deformity  Feet:   Right Foot:   Skin Integrity: Negative for ulcer, skin breakdown, erythema, warmth, callus or dry skin  Left Foot:   Skin Integrity: Negative for ulcer, skin breakdown, erythema, warmth, callus or dry skin  Lymphadenopathy:     He has no cervical adenopathy  Neurological: He is alert and oriented to person, place, and time  He has normal reflexes  No cranial nerve deficit  Coordination normal    Skin: Skin is warm and dry  No rash noted  No erythema  No pallor  Psychiatric: He has a normal mood and affect  His behavior is normal    Nursing note and vitals reviewed  Patient's shoes and socks removed  Right Foot/Ankle   Right Foot Inspection  Skin Exam: skin normal and skin intact no dry skin, no warmth, no callus, no erythema, no maceration, no abnormal color, no pre-ulcer, no ulcer and no callus                          Toe Exam: ROM and strength within normal limits  Sensory   Vibration: intact  Proprioception: intact   Monofilament testing: intact  Vascular  Capillary refills: < 3 seconds  The right DP pulse is 2+  The right PT pulse is 2+  Left Foot/Ankle  Left Foot Inspection  Skin Exam: skin normal and skin intactno dry skin, no warmth, no erythema, no maceration, normal color, no pre-ulcer, no ulcer and no callus                         Toe Exam: ROM and strength within normal limits                   Sensory   Vibration: intact  Proprioception: intact  Monofilament: intact  Vascular  Capillary refills: < 3 seconds  The left DP pulse is 2+  The left PT pulse is 2+  Assign Risk Category:  No deformity present; No loss of protective sensation;  No weak pulses       Risk: 0          Assessment/Plan:  Diagnoses and all orders for this visit:    Uncontrolled type 2 diabetes mellitus with hyperglycemia ([facility] [address] )  - Comprehensive metabolic panel; Future  -     Hemoglobin A1C; Future  -     Microalbumin / creatinine urine ratio; Future  -     A1C STABLE <7%; CONTINUE METFORMIN AND REVIEWED NUTRITION GOALS    Benign essential hypertension  -     Comprehensive metabolic panel; Future  -     metoprolol tartrate (LOPRESSOR) 25 mg tablet; Take 1 tablet (25 mg total) by mouth 2 (two) times a day  -     HTN STABLE ON LISINOPRIL + METOPROLOL, CONTINUE TO WATCH SODIUM INTAKE    Mixed hyperlipidemia  -     Comprehensive metabolic panel; Future  -     Lipid panel; Future  -     LDL <70; CONTINUE STATIN    Arteriosclerotic cardiovascular disease (ASCVD)  -     Comprehensive metabolic panel; Future  -     Hemoglobin A1C; Future  -     Lipid panel; Future  -     PT DENIES CP/SOB, LAST STRESS TEST ABNORMAL (2016);  HE DOES NOT CURRENTLY FOLLOW WITH CARDIOLOGY AND DOES NOT WISH TO ESTABLISH WITH THEM OR HAVE ANOTHER STRESS TEST AT THIS TIME;  CONTINUE ACE+BB+STATIN    Microalbuminuria due to type 2 diabetes mellitus (HCC)        -     STABLE, CONTINUE ACE FOR RENAL PROTECTION -- EMPHASIZED IMPORTANCE OF GOOD BP CONTROL AND PROPER NUTRITION     CKD (chronic kidney disease) stage 2, GFR 60-89 ml/min        -     GFR 67 -- MONITOR CLOSELY AND AVOID NEPHROTOXIC MEDICATIONS WHEN ABLE    Erectile dysfunction, unspecified erectile dysfunction type  -     LIKELY MULTIFACTORIAL;  PT DOES NOT WISH TO PROCEED WITH LABS/CARDIO EVAL AT THIS TIME (h/o abnormal stress)          Return in about 6 months (around 2/27/2020) for Recheck DM  The patient indicates understanding of these issues and agrees with the plan        Romeo Cartwright DO

## 2019-08-30 DIAGNOSIS — I10 BENIGN ESSENTIAL HYPERTENSION: ICD-10-CM

## 2019-08-30 DIAGNOSIS — E78.2 MIXED HYPERLIPIDEMIA: ICD-10-CM

## 2019-08-30 LAB
LEFT EYE DIABETIC RETINOPATHY: NORMAL
RIGHT EYE DIABETIC RETINOPATHY: NORMAL

## 2019-08-30 RX ORDER — LISINOPRIL 10 MG/1
10 TABLET ORAL DAILY
Qty: 90 TABLET | Refills: 1 | Status: SHIPPED | OUTPATIENT
Start: 2019-08-30 | End: 2020-02-24 | Stop reason: SDUPTHER

## 2019-08-30 RX ORDER — ATORVASTATIN CALCIUM 40 MG/1
40 TABLET, FILM COATED ORAL DAILY
Qty: 90 TABLET | Refills: 1 | Status: SHIPPED | OUTPATIENT
Start: 2019-08-30 | End: 2020-03-10

## 2019-11-04 ENCOUNTER — OFFICE VISIT (OUTPATIENT)
Dept: FAMILY MEDICINE CLINIC | Facility: OTHER | Age: 64
End: 2019-11-04
Payer: COMMERCIAL

## 2019-11-04 VITALS
WEIGHT: 146.25 LBS | TEMPERATURE: 97 F | HEART RATE: 107 BPM | SYSTOLIC BLOOD PRESSURE: 142 MMHG | HEIGHT: 60 IN | BODY MASS INDEX: 28.71 KG/M2 | OXYGEN SATURATION: 90 % | DIASTOLIC BLOOD PRESSURE: 84 MMHG

## 2019-11-04 DIAGNOSIS — R10.9 ABDOMINAL CRAMPING: ICD-10-CM

## 2019-11-04 DIAGNOSIS — R11.0 NAUSEA: Primary | ICD-10-CM

## 2019-11-04 DIAGNOSIS — Z23 ENCOUNTER FOR IMMUNIZATION: ICD-10-CM

## 2019-11-04 PROCEDURE — 99213 OFFICE O/P EST LOW 20 MIN: CPT

## 2019-11-04 PROCEDURE — 3008F BODY MASS INDEX DOCD: CPT

## 2019-11-04 PROCEDURE — 90682 RIV4 VACC RECOMBINANT DNA IM: CPT

## 2019-11-04 PROCEDURE — G0008 ADMIN INFLUENZA VIRUS VAC: HCPCS

## 2019-11-04 RX ORDER — FAMOTIDINE 40 MG/1
40 TABLET, FILM COATED ORAL DAILY
Qty: 30 TABLET | Refills: 1 | Status: SHIPPED | OUTPATIENT
Start: 2019-11-04 | End: 2020-01-06 | Stop reason: SDUPTHER

## 2019-11-04 NOTE — PROGRESS NOTES
Subjective:      Patient ID: J Carlos Brand is a 62 y o  male  Chief Complaint   Patient presents with    Nausea     stomach discomfort, losing voice - about a week now, hasnt eaten in the past two days        HPI   Pt presents c/o nausea/abd cramping and hoarse voice  Sx began about 1 wk ago, waxing/waning  Reports nausea, abdominal cramping, and cold sweats in AM  This reportedly does not happen if he does not eat the day prior  Fasting blood sugar 99 at its lowest  Denies voming, fevers, CP, SOB, cough  No known sick contacts  No questionable food ingested recently        The following portions of the patient's history were reviewed and updated as appropriate: allergies, current medications, past family history, past medical history, past social history, past surgical history and problem list       Current Outpatient Medications:     aspirin 81 MG tablet, Take 2 tablets by mouth daily, Disp: , Rfl:     atorvastatin (LIPITOR) 40 mg tablet, Take 1 tablet (40 mg total) by mouth daily, Disp: 90 tablet, Rfl: 1    glucose blood test strip, by In Vitro route daily, Disp: , Rfl:     lisinopril (ZESTRIL) 10 mg tablet, Take 1 tablet (10 mg total) by mouth daily, Disp: 90 tablet, Rfl: 1    metFORMIN (GLUCOPHAGE) 500 mg tablet, TAKE 1 TABLET BY MOUTH TWICE A DAY, Disp: 180 tablet, Rfl: 1    metoprolol tartrate (LOPRESSOR) 25 mg tablet, Take 1 tablet (25 mg total) by mouth 2 (two) times a day, Disp: 180 tablet, Rfl: 1    ONETOUCH DELICA LANCETS 19S MISC, by Does not apply route daily, Disp: , Rfl:     famotidine (PEPCID) 40 MG tablet, Take 1 tablet (40 mg total) by mouth daily, Disp: 30 tablet, Rfl: 1     Review of Systems   Constitutional: Negative for activity change, fatigue and fever  HENT: Negative for congestion, ear pain, sinus pain and sore throat  Eyes: Negative for pain, itching and visual disturbance  Respiratory: Negative for cough and shortness of breath      Cardiovascular: Negative for chest pain, palpitations and leg swelling  Gastrointestinal: Positive for abdominal pain and nausea  Negative for constipation, diarrhea and vomiting  Endocrine: Negative for cold intolerance and heat intolerance  Genitourinary: Negative for dysuria, frequency and urgency  Musculoskeletal: Negative for arthralgias, back pain and myalgias  Skin: Negative for color change and rash  Neurological: Negative for dizziness, syncope and headaches  Hematological: Negative for adenopathy  Psychiatric/Behavioral: Negative for dysphoric mood  The patient is not nervous/anxious  Objective:      /84 (BP Location: Left arm, Patient Position: Sitting, Cuff Size: Adult)   Pulse (!) 107   Temp (!) 97 °F (36 1 °C) (Tympanic)   Ht 4' 11" (1 499 m)   Wt 66 3 kg (146 lb 4 oz)   SpO2 90%   BMI 29 54 kg/m²          Physical Exam   Constitutional: He is oriented to person, place, and time  He appears well-developed and well-nourished  No distress  Body mass index is 29 54 kg/m²  HENT:   Head: Normocephalic and atraumatic  Mouth/Throat: Oropharynx is clear and moist    Eyes: Pupils are equal, round, and reactive to light  Conjunctivae and EOM are normal  No scleral icterus  Neck: Normal range of motion  Neck supple  Cardiovascular: Normal rate, regular rhythm and normal heart sounds  No murmur heard  Pulmonary/Chest: Effort normal and breath sounds normal  No respiratory distress  He has no wheezes  Abdominal: Soft  Bowel sounds are normal  He exhibits no distension  There is no tenderness  Musculoskeletal: Normal range of motion  He exhibits no edema, tenderness or deformity  Lymphadenopathy:     He has no cervical adenopathy  Neurological: He is alert and oriented to person, place, and time  No cranial nerve deficit  Coordination normal    Skin: Skin is warm and dry  No rash noted  No erythema  No pallor  Psychiatric: He has a normal mood and affect   His behavior is normal  Nursing note reviewed  Assessment/Plan:  Diagnoses and all orders for this visit:    Nausea  Abdominal cramping  -     famotidine (PEPCID) 40 MG tablet; Take 1 tablet (40 mg total) by mouth daily  -     Sx sound GERD-related or, possibly, d/t hypoglycemia even though pt does not see this on his at home monitor;  Recommend BRAT diet and trial of H2 blocker for a few weeks; Refer to GI for scope if not improving    Encounter for immunization  -     influenza vaccine, [id number], quadrivalent, recombinant, PF, 0 5 mL, for patients 18 yr+ (FLUBLOK)            Return if symptoms worsen or fail to improve  The patient indicates understanding of these issues and agrees with the plan            Lorena Denny,

## 2019-11-06 DIAGNOSIS — E11.65 UNCONTROLLED TYPE 2 DIABETES MELLITUS WITH HYPERGLYCEMIA (HCC): Primary | ICD-10-CM

## 2019-11-27 DIAGNOSIS — R11.0 NAUSEA: ICD-10-CM

## 2019-11-27 DIAGNOSIS — R10.9 ABDOMINAL CRAMPING: ICD-10-CM

## 2019-11-27 RX ORDER — FAMOTIDINE 40 MG/1
TABLET, FILM COATED ORAL
Qty: 30 TABLET | Refills: 1 | OUTPATIENT
Start: 2019-11-27

## 2019-12-29 DIAGNOSIS — R11.0 NAUSEA: ICD-10-CM

## 2019-12-29 DIAGNOSIS — R10.9 ABDOMINAL CRAMPING: ICD-10-CM

## 2019-12-29 RX ORDER — FAMOTIDINE 40 MG/1
TABLET, FILM COATED ORAL
Qty: 30 TABLET | Refills: 1 | OUTPATIENT
Start: 2019-12-29

## 2020-01-01 ENCOUNTER — TELEMEDICINE (OUTPATIENT)
Dept: FAMILY MEDICINE CLINIC | Facility: OTHER | Age: 65
End: 2020-01-01
Payer: COMMERCIAL

## 2020-01-01 ENCOUNTER — APPOINTMENT (OUTPATIENT)
Dept: LAB | Facility: HOSPITAL | Age: 65
End: 2020-01-01
Attending: FAMILY MEDICINE
Payer: COMMERCIAL

## 2020-01-01 ENCOUNTER — TRANSITIONAL CARE MANAGEMENT (OUTPATIENT)
Dept: FAMILY MEDICINE CLINIC | Facility: OTHER | Age: 65
End: 2020-01-01

## 2020-01-01 ENCOUNTER — HOSPITAL ENCOUNTER (INPATIENT)
Facility: HOSPITAL | Age: 65
LOS: 6 days | Discharge: HOME | DRG: 281 | End: 2020-11-17
Attending: EMERGENCY MEDICINE | Admitting: INTERNAL MEDICINE
Payer: COMMERCIAL

## 2020-01-01 ENCOUNTER — APPOINTMENT (INPATIENT)
Dept: RADIOLOGY | Facility: HOSPITAL | Age: 65
DRG: 281 | End: 2020-01-01
Payer: COMMERCIAL

## 2020-01-01 ENCOUNTER — TELEPHONE (OUTPATIENT)
Dept: LAB | Facility: HOSPITAL | Age: 65
End: 2020-01-01

## 2020-01-01 ENCOUNTER — TELEPHONE (OUTPATIENT)
Dept: GASTROENTEROLOGY | Facility: AMBULARY SURGERY CENTER | Age: 65
End: 2020-01-01

## 2020-01-01 ENCOUNTER — TELEPHONE (OUTPATIENT)
Dept: PULMONOLOGY | Facility: CLINIC | Age: 65
End: 2020-01-01

## 2020-01-01 ENCOUNTER — TELEPHONE (OUTPATIENT)
Dept: FAMILY MEDICINE CLINIC | Facility: OTHER | Age: 65
End: 2020-01-01

## 2020-01-01 ENCOUNTER — APPOINTMENT (INPATIENT)
Dept: ULTRASOUND IMAGING | Facility: HOSPITAL | Age: 65
DRG: 281 | End: 2020-01-01
Payer: COMMERCIAL

## 2020-01-01 ENCOUNTER — OFFICE VISIT (OUTPATIENT)
Dept: PULMONOLOGY | Facility: CLINIC | Age: 65
End: 2020-01-01
Payer: COMMERCIAL

## 2020-01-01 ENCOUNTER — APPOINTMENT (EMERGENCY)
Dept: RADIOLOGY | Facility: HOSPITAL | Age: 65
DRG: 281 | End: 2020-01-01
Payer: COMMERCIAL

## 2020-01-01 ENCOUNTER — TRANSCRIBE ORDERS (OUTPATIENT)
Dept: LAB | Facility: HOSPITAL | Age: 65
End: 2020-01-01

## 2020-01-01 ENCOUNTER — APPOINTMENT (INPATIENT)
Dept: NON INVASIVE DIAGNOSTICS | Facility: HOSPITAL | Age: 65
DRG: 281 | End: 2020-01-01
Payer: COMMERCIAL

## 2020-01-01 ENCOUNTER — TELEPHONE (OUTPATIENT)
Dept: GASTROENTEROLOGY | Facility: CLINIC | Age: 65
End: 2020-01-01

## 2020-01-01 ENCOUNTER — TELEMEDICINE (OUTPATIENT)
Dept: FAMILY MEDICINE CLINIC | Facility: CLINIC | Age: 65
End: 2020-01-01
Payer: COMMERCIAL

## 2020-01-01 ENCOUNTER — ANESTHESIA EVENT (INPATIENT)
Dept: GASTROENTEROLOGY | Facility: HOSPITAL | Age: 65
DRG: 281 | End: 2020-01-01
Payer: COMMERCIAL

## 2020-01-01 ENCOUNTER — APPOINTMENT (INPATIENT)
Dept: GASTROENTEROLOGY | Facility: HOSPITAL | Age: 65
DRG: 281 | End: 2020-01-01
Payer: COMMERCIAL

## 2020-01-01 ENCOUNTER — APPOINTMENT (EMERGENCY)
Dept: CT IMAGING | Facility: HOSPITAL | Age: 65
DRG: 189 | End: 2020-01-01
Payer: COMMERCIAL

## 2020-01-01 ENCOUNTER — HOSPITAL ENCOUNTER (INPATIENT)
Facility: HOSPITAL | Age: 65
LOS: 1 days | Discharge: HOME | DRG: 189 | End: 2020-10-24
Attending: EMERGENCY MEDICINE | Admitting: INTERNAL MEDICINE
Payer: COMMERCIAL

## 2020-01-01 ENCOUNTER — OFFICE VISIT (OUTPATIENT)
Dept: CARDIOLOGY CLINIC | Facility: CLINIC | Age: 65
End: 2020-01-01
Payer: COMMERCIAL

## 2020-01-01 ENCOUNTER — ANESTHESIA (INPATIENT)
Dept: GASTROENTEROLOGY | Facility: HOSPITAL | Age: 65
DRG: 281 | End: 2020-01-01
Payer: COMMERCIAL

## 2020-01-01 ENCOUNTER — TELEPHONE (OUTPATIENT)
Dept: OTHER | Facility: OTHER | Age: 65
End: 2020-01-01

## 2020-01-01 ENCOUNTER — APPOINTMENT (EMERGENCY)
Dept: RADIOLOGY | Facility: HOSPITAL | Age: 65
DRG: 189 | End: 2020-01-01
Payer: COMMERCIAL

## 2020-01-01 ENCOUNTER — APPOINTMENT (EMERGENCY)
Dept: CT IMAGING | Facility: HOSPITAL | Age: 65
DRG: 281 | End: 2020-01-01
Payer: COMMERCIAL

## 2020-01-01 VITALS
TEMPERATURE: 98.2 F | SYSTOLIC BLOOD PRESSURE: 120 MMHG | HEIGHT: 60 IN | BODY MASS INDEX: 24.63 KG/M2 | WEIGHT: 125.44 LBS | DIASTOLIC BLOOD PRESSURE: 56 MMHG | OXYGEN SATURATION: 96 % | HEART RATE: 75 BPM | RESPIRATION RATE: 16 BRPM

## 2020-01-01 VITALS
SYSTOLIC BLOOD PRESSURE: 130 MMHG | TEMPERATURE: 98.5 F | WEIGHT: 127 LBS | RESPIRATION RATE: 16 BRPM | DIASTOLIC BLOOD PRESSURE: 80 MMHG | BODY MASS INDEX: 24.94 KG/M2 | OXYGEN SATURATION: 98 % | HEART RATE: 87 BPM | HEIGHT: 60 IN

## 2020-01-01 VITALS
HEIGHT: 60 IN | WEIGHT: 119.3 LBS | DIASTOLIC BLOOD PRESSURE: 52 MMHG | BODY MASS INDEX: 23.42 KG/M2 | OXYGEN SATURATION: 95 % | SYSTOLIC BLOOD PRESSURE: 132 MMHG | HEART RATE: 94 BPM

## 2020-01-01 VITALS
SYSTOLIC BLOOD PRESSURE: 126 MMHG | TEMPERATURE: 97.4 F | HEART RATE: 72 BPM | OXYGEN SATURATION: 98 % | BODY MASS INDEX: 24.76 KG/M2 | DIASTOLIC BLOOD PRESSURE: 68 MMHG | HEIGHT: 60 IN | WEIGHT: 126.1 LBS | RESPIRATION RATE: 18 BRPM

## 2020-01-01 VITALS — HEART RATE: 62 BPM

## 2020-01-01 DIAGNOSIS — R10.13 EPIGASTRIC PAIN: ICD-10-CM

## 2020-01-01 DIAGNOSIS — I25.10 ARTERIOSCLEROTIC CARDIOVASCULAR DISEASE (ASCVD): ICD-10-CM

## 2020-01-01 DIAGNOSIS — Z87.891 FORMER SMOKER: ICD-10-CM

## 2020-01-01 DIAGNOSIS — I21.4 NSTEMI (NON-ST ELEVATED MYOCARDIAL INFARCTION) (HCC): ICD-10-CM

## 2020-01-01 DIAGNOSIS — Z98.61 CAD S/P PERCUTANEOUS CORONARY ANGIOPLASTY: ICD-10-CM

## 2020-01-01 DIAGNOSIS — R10.9 ABDOMINAL CRAMPING: ICD-10-CM

## 2020-01-01 DIAGNOSIS — I10 HYPERTENSION, UNSPECIFIED TYPE: ICD-10-CM

## 2020-01-01 DIAGNOSIS — D51.8 OTHER VITAMIN B12 DEFICIENCY ANEMIA: Primary | ICD-10-CM

## 2020-01-01 DIAGNOSIS — J84.10 PULMONARY FIBROSIS DETERMINED BY HIGH RESOLUTION COMPUTED TOMOGRAPHY (HCC): ICD-10-CM

## 2020-01-01 DIAGNOSIS — D51.8 OTHER VITAMIN B12 DEFICIENCY ANEMIA: ICD-10-CM

## 2020-01-01 DIAGNOSIS — J84.10 PULMONARY FIBROSIS (HCC): Primary | ICD-10-CM

## 2020-01-01 DIAGNOSIS — R10.9 ABDOMINAL PAIN: Primary | ICD-10-CM

## 2020-01-01 DIAGNOSIS — I10 BENIGN ESSENTIAL HYPERTENSION: ICD-10-CM

## 2020-01-01 DIAGNOSIS — E78.2 MIXED HYPERLIPIDEMIA: ICD-10-CM

## 2020-01-01 DIAGNOSIS — E11.9 TYPE 2 DIABETES MELLITUS TREATED WITHOUT INSULIN (HCC): ICD-10-CM

## 2020-01-01 DIAGNOSIS — I25.10 CORONARY ARTERY DISEASE INVOLVING NATIVE HEART WITHOUT ANGINA PECTORIS, UNSPECIFIED VESSEL OR LESION TYPE: Primary | ICD-10-CM

## 2020-01-01 DIAGNOSIS — R63.4 WEIGHT LOSS: ICD-10-CM

## 2020-01-01 DIAGNOSIS — I25.5 ISCHEMIC CARDIOMYOPATHY: ICD-10-CM

## 2020-01-01 DIAGNOSIS — I25.10 CAD S/P PERCUTANEOUS CORONARY ANGIOPLASTY: ICD-10-CM

## 2020-01-01 DIAGNOSIS — R63.4 UNINTENTIONAL WEIGHT LOSS: ICD-10-CM

## 2020-01-01 DIAGNOSIS — J84.10 PULMONARY FIBROSIS DETERMINED BY HIGH RESOLUTION COMPUTED TOMOGRAPHY (HCC): Primary | ICD-10-CM

## 2020-01-01 DIAGNOSIS — R10.30 LOWER ABDOMINAL PAIN: ICD-10-CM

## 2020-01-01 DIAGNOSIS — R63.0 LOSS OF APPETITE: ICD-10-CM

## 2020-01-01 DIAGNOSIS — J96.11 CHRONIC RESPIRATORY FAILURE WITH HYPOXIA (HCC): ICD-10-CM

## 2020-01-01 DIAGNOSIS — R79.89 ABNORMAL LFTS: ICD-10-CM

## 2020-01-01 DIAGNOSIS — R11.0 NAUSEA: ICD-10-CM

## 2020-01-01 DIAGNOSIS — I25.2 HISTORY OF ACUTE INFERIOR WALL MI: ICD-10-CM

## 2020-01-01 DIAGNOSIS — R09.02 HYPOXIA: ICD-10-CM

## 2020-01-01 DIAGNOSIS — I21.4 NSTEMI (NON-ST ELEVATED MYOCARDIAL INFARCTION) (HCC): Primary | ICD-10-CM

## 2020-01-01 DIAGNOSIS — R74.01 TRANSAMINITIS: ICD-10-CM

## 2020-01-01 LAB
A FUMIGATUS1 AB SER QL ID: NEGATIVE
A PULLULANS AB SER QL: NEGATIVE
ACTIN IGG SERPL-ACNC: 4 UNITS (ref 0–19)
ACTIN IGG SERPL-ACNC: NORMAL
ALBUMIN SERPL BCP-MCNC: 2.7 G/DL (ref 3.5–5)
ALBUMIN SERPL BCP-MCNC: 2.9 G/DL (ref 3.5–5)
ALBUMIN SERPL BCP-MCNC: 3 G/DL (ref 3.5–5)
ALBUMIN SERPL BCP-MCNC: 3.2 G/DL (ref 3.5–5)
ALBUMIN SERPL BCP-MCNC: 3.2 G/DL (ref 3.5–5)
ALBUMIN SERPL BCP-MCNC: 3.8 G/DL (ref 3.5–5)
ALP SERPL-CCNC: 104 U/L (ref 46–116)
ALP SERPL-CCNC: 108 U/L (ref 46–116)
ALP SERPL-CCNC: 111 U/L (ref 46–116)
ALP SERPL-CCNC: 115 U/L (ref 46–116)
ALP SERPL-CCNC: 116 U/L (ref 46–116)
ALP SERPL-CCNC: 129 U/L (ref 46–116)
ALP SERPL-CCNC: 146 U/L (ref 46–116)
ALP SERPL-CCNC: 98 U/L (ref 46–116)
ALT SERPL W P-5'-P-CCNC: 102 U/L (ref 12–78)
ALT SERPL W P-5'-P-CCNC: 64 U/L (ref 12–78)
ALT SERPL W P-5'-P-CCNC: 72 U/L (ref 12–78)
ALT SERPL W P-5'-P-CCNC: 76 U/L (ref 12–78)
ALT SERPL W P-5'-P-CCNC: 78 U/L (ref 12–78)
ALT SERPL W P-5'-P-CCNC: 85 U/L (ref 12–78)
ALT SERPL W P-5'-P-CCNC: 86 U/L (ref 12–78)
ALT SERPL W P-5'-P-CCNC: 88 U/L (ref 12–78)
ANION GAP SERPL CALCULATED.3IONS-SCNC: 10 MMOL/L (ref 4–13)
ANION GAP SERPL CALCULATED.3IONS-SCNC: 10 MMOL/L (ref 4–13)
ANION GAP SERPL CALCULATED.3IONS-SCNC: 11 MMOL/L (ref 4–13)
ANION GAP SERPL CALCULATED.3IONS-SCNC: 13 MMOL/L (ref 4–13)
ANION GAP SERPL CALCULATED.3IONS-SCNC: 13 MMOL/L (ref 4–13)
ANION GAP SERPL CALCULATED.3IONS-SCNC: 6 MMOL/L (ref 4–13)
ANION GAP SERPL CALCULATED.3IONS-SCNC: 8 MMOL/L (ref 4–13)
ANION GAP SERPL CALCULATED.3IONS-SCNC: 8 MMOL/L (ref 4–13)
APTT PPP: 27 SECONDS (ref 23–37)
APTT PPP: 28 SECONDS (ref 23–37)
APTT PPP: 66 SECONDS (ref 23–37)
APTT PPP: 66 SECONDS (ref 23–37)
APTT PPP: 77 SECONDS (ref 23–37)
APTT PPP: 85 SECONDS (ref 23–37)
APTT PPP: 97 SECONDS (ref 23–37)
AST SERPL W P-5'-P-CCNC: 108 U/L (ref 5–45)
AST SERPL W P-5'-P-CCNC: 137 U/L (ref 5–45)
AST SERPL W P-5'-P-CCNC: 143 U/L (ref 5–45)
AST SERPL W P-5'-P-CCNC: 148 U/L (ref 5–45)
AST SERPL W P-5'-P-CCNC: 158 U/L (ref 5–45)
AST SERPL W P-5'-P-CCNC: 158 U/L (ref 5–45)
AST SERPL W P-5'-P-CCNC: 160 U/L (ref 5–45)
AST SERPL W P-5'-P-CCNC: 199 U/L (ref 5–45)
ATRIAL RATE: 131 BPM
ATRIAL RATE: 86 BPM
ATRIAL RATE: 92 BPM
BASOPHILS # BLD AUTO: 0.04 THOUSANDS/ΜL (ref 0–0.1)
BASOPHILS # BLD AUTO: 0.06 THOUSANDS/ΜL (ref 0–0.1)
BASOPHILS NFR BLD AUTO: 0 % (ref 0–1)
BASOPHILS NFR BLD AUTO: 1 % (ref 0–1)
BILIRUB SERPL-MCNC: 0.38 MG/DL (ref 0.2–1)
BILIRUB SERPL-MCNC: 0.41 MG/DL (ref 0.2–1)
BILIRUB SERPL-MCNC: 0.43 MG/DL (ref 0.2–1)
BILIRUB SERPL-MCNC: 0.48 MG/DL (ref 0.2–1)
BILIRUB SERPL-MCNC: 0.5 MG/DL (ref 0.2–1)
BILIRUB SERPL-MCNC: 0.56 MG/DL (ref 0.2–1)
BILIRUB SERPL-MCNC: 0.56 MG/DL (ref 0.2–1)
BILIRUB SERPL-MCNC: 0.64 MG/DL (ref 0.2–1)
BUN SERPL-MCNC: 11 MG/DL (ref 5–25)
BUN SERPL-MCNC: 11 MG/DL (ref 5–25)
BUN SERPL-MCNC: 13 MG/DL (ref 5–25)
BUN SERPL-MCNC: 14 MG/DL (ref 5–25)
BUN SERPL-MCNC: 15 MG/DL (ref 5–25)
BUN SERPL-MCNC: 16 MG/DL (ref 5–25)
BUN SERPL-MCNC: 17 MG/DL (ref 5–25)
BUN SERPL-MCNC: 9 MG/DL (ref 5–25)
C-ANCA TITR SER IF: NORMAL TITER
CALCIUM ALBUM COR SERPL-MCNC: 10.1 MG/DL (ref 8.3–10.1)
CALCIUM ALBUM COR SERPL-MCNC: 10.1 MG/DL (ref 8.3–10.1)
CALCIUM ALBUM COR SERPL-MCNC: 9.2 MG/DL (ref 8.3–10.1)
CALCIUM ALBUM COR SERPL-MCNC: 9.3 MG/DL (ref 8.3–10.1)
CALCIUM ALBUM COR SERPL-MCNC: 9.4 MG/DL (ref 8.3–10.1)
CALCIUM ALBUM COR SERPL-MCNC: 9.5 MG/DL (ref 8.3–10.1)
CALCIUM ALBUM COR SERPL-MCNC: 9.7 MG/DL (ref 8.3–10.1)
CALCIUM SERPL-MCNC: 8.4 MG/DL (ref 8.3–10.1)
CALCIUM SERPL-MCNC: 8.4 MG/DL (ref 8.3–10.1)
CALCIUM SERPL-MCNC: 8.5 MG/DL (ref 8.3–10.1)
CALCIUM SERPL-MCNC: 8.7 MG/DL (ref 8.3–10.1)
CALCIUM SERPL-MCNC: 8.7 MG/DL (ref 8.3–10.1)
CALCIUM SERPL-MCNC: 9.2 MG/DL (ref 8.3–10.1)
CALCIUM SERPL-MCNC: 9.3 MG/DL (ref 8.3–10.1)
CALCIUM SERPL-MCNC: 9.5 MG/DL (ref 8.3–10.1)
CCP IGA+IGG SERPL IA-ACNC: 2 UNITS (ref 0–19)
CHLORIDE SERPL-SCNC: 101 MMOL/L (ref 100–108)
CHLORIDE SERPL-SCNC: 102 MMOL/L (ref 100–108)
CHLORIDE SERPL-SCNC: 103 MMOL/L (ref 100–108)
CHLORIDE SERPL-SCNC: 104 MMOL/L (ref 100–108)
CHLORIDE SERPL-SCNC: 104 MMOL/L (ref 100–108)
CHOLEST SERPL-MCNC: 127 MG/DL (ref 50–200)
CO2 SERPL-SCNC: 23 MMOL/L (ref 21–32)
CO2 SERPL-SCNC: 24 MMOL/L (ref 21–32)
CO2 SERPL-SCNC: 26 MMOL/L (ref 21–32)
CO2 SERPL-SCNC: 26 MMOL/L (ref 21–32)
CO2 SERPL-SCNC: 27 MMOL/L (ref 21–32)
CO2 SERPL-SCNC: 28 MMOL/L (ref 21–32)
CO2 SERPL-SCNC: 29 MMOL/L (ref 21–32)
CO2 SERPL-SCNC: 29 MMOL/L (ref 21–32)
CREAT SERPL-MCNC: 0.77 MG/DL (ref 0.6–1.3)
CREAT SERPL-MCNC: 0.79 MG/DL (ref 0.6–1.3)
CREAT SERPL-MCNC: 0.82 MG/DL (ref 0.6–1.3)
CREAT SERPL-MCNC: 0.86 MG/DL (ref 0.6–1.3)
CREAT SERPL-MCNC: 0.87 MG/DL (ref 0.6–1.3)
CREAT SERPL-MCNC: 0.92 MG/DL (ref 0.6–1.3)
CREAT SERPL-MCNC: 0.94 MG/DL (ref 0.6–1.3)
CREAT SERPL-MCNC: 1.04 MG/DL (ref 0.6–1.3)
ENA SCL70 AB SER-ACNC: <0.2 AI (ref 0–0.9)
ENA SS-A AB SER-ACNC: <0.2 AI (ref 0–0.9)
ENA SS-B AB SER-ACNC: <0.2 AI (ref 0–0.9)
EOSINOPHIL # BLD AUTO: 0.15 THOUSAND/ΜL (ref 0–0.61)
EOSINOPHIL # BLD AUTO: 0.16 THOUSAND/ΜL (ref 0–0.61)
EOSINOPHIL NFR BLD AUTO: 2 % (ref 0–6)
EOSINOPHIL NFR BLD AUTO: 2 % (ref 0–6)
ERYTHROCYTE [DISTWIDTH] IN BLOOD BY AUTOMATED COUNT: 14.5 % (ref 11.6–15.1)
ERYTHROCYTE [DISTWIDTH] IN BLOOD BY AUTOMATED COUNT: 14.6 % (ref 11.6–15.1)
EST. AVERAGE GLUCOSE BLD GHB EST-MCNC: 123 MG/DL
EST. AVERAGE GLUCOSE BLD GHB EST-MCNC: 137 MG/DL
FERRITIN SERPL-MCNC: 77 NG/ML (ref 8–388)
GFR SERPL CREATININE-BSD FRML MDRD: 76 ML/MIN/1.73SQ M
GFR SERPL CREATININE-BSD FRML MDRD: 85 ML/MIN/1.73SQ M
GFR SERPL CREATININE-BSD FRML MDRD: 88 ML/MIN/1.73SQ M
GFR SERPL CREATININE-BSD FRML MDRD: 91 ML/MIN/1.73SQ M
GFR SERPL CREATININE-BSD FRML MDRD: 92 ML/MIN/1.73SQ M
GFR SERPL CREATININE-BSD FRML MDRD: 93 ML/MIN/1.73SQ M
GFR SERPL CREATININE-BSD FRML MDRD: 95 ML/MIN/1.73SQ M
GFR SERPL CREATININE-BSD FRML MDRD: 96 ML/MIN/1.73SQ M
GLUCOSE SERPL-MCNC: 101 MG/DL (ref 65–140)
GLUCOSE SERPL-MCNC: 102 MG/DL (ref 65–140)
GLUCOSE SERPL-MCNC: 105 MG/DL (ref 65–140)
GLUCOSE SERPL-MCNC: 106 MG/DL (ref 65–140)
GLUCOSE SERPL-MCNC: 106 MG/DL (ref 65–140)
GLUCOSE SERPL-MCNC: 107 MG/DL (ref 65–140)
GLUCOSE SERPL-MCNC: 107 MG/DL (ref 65–140)
GLUCOSE SERPL-MCNC: 108 MG/DL (ref 65–140)
GLUCOSE SERPL-MCNC: 109 MG/DL (ref 65–140)
GLUCOSE SERPL-MCNC: 116 MG/DL (ref 65–140)
GLUCOSE SERPL-MCNC: 116 MG/DL (ref 65–140)
GLUCOSE SERPL-MCNC: 120 MG/DL (ref 65–140)
GLUCOSE SERPL-MCNC: 121 MG/DL (ref 65–140)
GLUCOSE SERPL-MCNC: 121 MG/DL (ref 65–140)
GLUCOSE SERPL-MCNC: 124 MG/DL (ref 65–140)
GLUCOSE SERPL-MCNC: 148 MG/DL (ref 65–140)
GLUCOSE SERPL-MCNC: 150 MG/DL (ref 65–140)
GLUCOSE SERPL-MCNC: 158 MG/DL (ref 65–140)
GLUCOSE SERPL-MCNC: 166 MG/DL (ref 65–140)
GLUCOSE SERPL-MCNC: 204 MG/DL (ref 65–140)
GLUCOSE SERPL-MCNC: 80 MG/DL (ref 65–140)
GLUCOSE SERPL-MCNC: 82 MG/DL (ref 65–140)
GLUCOSE SERPL-MCNC: 86 MG/DL (ref 65–140)
GLUCOSE SERPL-MCNC: 88 MG/DL (ref 65–140)
GLUCOSE SERPL-MCNC: 89 MG/DL (ref 65–140)
GLUCOSE SERPL-MCNC: 90 MG/DL (ref 65–140)
GLUCOSE SERPL-MCNC: 92 MG/DL (ref 65–140)
GLUCOSE SERPL-MCNC: 93 MG/DL (ref 65–140)
GLUCOSE SERPL-MCNC: 93 MG/DL (ref 65–140)
GLUCOSE SERPL-MCNC: 95 MG/DL (ref 65–140)
GLUCOSE SERPL-MCNC: 96 MG/DL (ref 65–140)
GLUCOSE SERPL-MCNC: 97 MG/DL (ref 65–140)
HAV IGM SER QL: NORMAL
HBA1C MFR BLD: 5.9 %
HBA1C MFR BLD: 6.4 %
HBV CORE IGM SER QL: NORMAL
HBV SURFACE AG SER QL: NORMAL
HCT VFR BLD AUTO: 38.5 % (ref 36.5–49.3)
HCT VFR BLD AUTO: 38.8 % (ref 36.5–49.3)
HCT VFR BLD AUTO: 39 % (ref 36.5–49.3)
HCT VFR BLD AUTO: 41.3 % (ref 36.5–49.3)
HCT VFR BLD AUTO: 41.8 % (ref 36.5–49.3)
HCT VFR BLD AUTO: 43.9 % (ref 36.5–49.3)
HCV AB SER QL: NORMAL
HDLC SERPL-MCNC: 51 MG/DL
HGB BLD-MCNC: 11.6 G/DL (ref 12–17)
HGB BLD-MCNC: 11.6 G/DL (ref 12–17)
HGB BLD-MCNC: 12 G/DL (ref 12–17)
HGB BLD-MCNC: 12.7 G/DL (ref 12–17)
HGB BLD-MCNC: 13.2 G/DL (ref 12–17)
HGB BLD-MCNC: 13.6 G/DL (ref 12–17)
IF BLOCK AB SER QL RIA: 1.3 AU/ML (ref 0–1.1)
IMM GRANULOCYTES # BLD AUTO: 0.02 THOUSAND/UL (ref 0–0.2)
IMM GRANULOCYTES # BLD AUTO: 0.03 THOUSAND/UL (ref 0–0.2)
IMM GRANULOCYTES NFR BLD AUTO: 0 % (ref 0–2)
IMM GRANULOCYTES NFR BLD AUTO: 0 % (ref 0–2)
INR PPP: 0.99 (ref 0.84–1.19)
INR PPP: 0.99 (ref 0.84–1.19)
LACEYELLA SACCHARI AB SER QL: NEGATIVE
LACTATE SERPL-SCNC: 0.8 MMOL/L (ref 0.5–2)
LDLC SERPL CALC-MCNC: 56 MG/DL (ref 0–100)
LIPASE SERPL-CCNC: 133 U/L (ref 73–393)
LYMPHOCYTES # BLD AUTO: 1.23 THOUSANDS/ΜL (ref 0.6–4.47)
LYMPHOCYTES # BLD AUTO: 1.63 THOUSANDS/ΜL (ref 0.6–4.47)
LYMPHOCYTES NFR BLD AUTO: 14 % (ref 14–44)
LYMPHOCYTES NFR BLD AUTO: 19 % (ref 14–44)
MAGNESIUM SERPL-MCNC: 1.5 MG/DL (ref 1.6–2.6)
MCH RBC QN AUTO: 25.8 PG (ref 26.8–34.3)
MCH RBC QN AUTO: 26.1 PG (ref 26.8–34.3)
MCH RBC QN AUTO: 26.3 PG (ref 26.8–34.3)
MCH RBC QN AUTO: 26.6 PG (ref 26.8–34.3)
MCH RBC QN AUTO: 26.6 PG (ref 26.8–34.3)
MCH RBC QN AUTO: 26.7 PG (ref 26.8–34.3)
MCHC RBC AUTO-ENTMCNC: 29.7 G/DL (ref 31.4–37.4)
MCHC RBC AUTO-ENTMCNC: 30.1 G/DL (ref 31.4–37.4)
MCHC RBC AUTO-ENTMCNC: 30.8 G/DL (ref 31.4–37.4)
MCHC RBC AUTO-ENTMCNC: 30.9 G/DL (ref 31.4–37.4)
MCHC RBC AUTO-ENTMCNC: 31 G/DL (ref 31.4–37.4)
MCHC RBC AUTO-ENTMCNC: 31.6 G/DL (ref 31.4–37.4)
MCV RBC AUTO: 84 FL (ref 82–98)
MCV RBC AUTO: 86 FL (ref 82–98)
MCV RBC AUTO: 87 FL (ref 82–98)
MCV RBC AUTO: 87 FL (ref 82–98)
MITOCHONDRIA M2 IGG SER-ACNC: <20 UNITS (ref 0–20)
MONOCYTES # BLD AUTO: 0.48 THOUSAND/ΜL (ref 0.17–1.22)
MONOCYTES # BLD AUTO: 0.55 THOUSAND/ΜL (ref 0.17–1.22)
MONOCYTES NFR BLD AUTO: 6 % (ref 4–12)
MONOCYTES NFR BLD AUTO: 6 % (ref 4–12)
MYELOPEROXIDASE AB SER IA-ACNC: <9 U/ML (ref 0–9)
NEUTROPHILS # BLD AUTO: 6.28 THOUSANDS/ΜL (ref 1.85–7.62)
NEUTROPHILS # BLD AUTO: 7.06 THOUSANDS/ΜL (ref 1.85–7.62)
NEUTS SEG NFR BLD AUTO: 72 % (ref 43–75)
NEUTS SEG NFR BLD AUTO: 78 % (ref 43–75)
NONHDLC SERPL-MCNC: 76 MG/DL
NRBC BLD AUTO-RTO: 0 /100 WBCS
NRBC BLD AUTO-RTO: 0 /100 WBCS
NT-PROBNP SERPL-MCNC: 1953 PG/ML
NT-PROBNP SERPL-MCNC: 868 PG/ML
P AXIS: 31 DEGREES
P AXIS: 38 DEGREES
P AXIS: 39 DEGREES
P-ANCA ATYPICAL TITR SER IF: NORMAL TITER
P-ANCA TITR SER IF: NORMAL TITER
PCA AB SER-ACNC: 4.9 UNITS (ref 0–20)
PIGEON SERUM AB QL ID: NEGATIVE
PLATELET # BLD AUTO: 243 THOUSANDS/UL (ref 149–390)
PLATELET # BLD AUTO: 247 THOUSANDS/UL (ref 149–390)
PLATELET # BLD AUTO: 249 THOUSANDS/UL (ref 149–390)
PLATELET # BLD AUTO: 286 THOUSANDS/UL (ref 149–390)
PLATELET # BLD AUTO: 307 THOUSANDS/UL (ref 149–390)
PLATELET # BLD AUTO: 309 THOUSANDS/UL (ref 149–390)
PLATELET # BLD AUTO: 321 THOUSANDS/UL (ref 149–390)
PMV BLD AUTO: 10.9 FL (ref 8.9–12.7)
PMV BLD AUTO: 11.1 FL (ref 8.9–12.7)
PMV BLD AUTO: 11.1 FL (ref 8.9–12.7)
PMV BLD AUTO: 11.3 FL (ref 8.9–12.7)
PMV BLD AUTO: 11.6 FL (ref 8.9–12.7)
PMV BLD AUTO: 11.8 FL (ref 8.9–12.7)
PMV BLD AUTO: 11.9 FL (ref 8.9–12.7)
POTASSIUM SERPL-SCNC: 3.7 MMOL/L (ref 3.5–5.3)
POTASSIUM SERPL-SCNC: 3.8 MMOL/L (ref 3.5–5.3)
POTASSIUM SERPL-SCNC: 4 MMOL/L (ref 3.5–5.3)
POTASSIUM SERPL-SCNC: 4.1 MMOL/L (ref 3.5–5.3)
POTASSIUM SERPL-SCNC: 4.1 MMOL/L (ref 3.5–5.3)
POTASSIUM SERPL-SCNC: 4.2 MMOL/L (ref 3.5–5.3)
POTASSIUM SERPL-SCNC: 4.4 MMOL/L (ref 3.5–5.3)
POTASSIUM SERPL-SCNC: 4.9 MMOL/L (ref 3.5–5.3)
PR INTERVAL: 144 MS
PR INTERVAL: 148 MS
PR INTERVAL: 150 MS
PROT SERPL-MCNC: 6.7 G/DL (ref 6.4–8.2)
PROT SERPL-MCNC: 6.8 G/DL (ref 6.4–8.2)
PROT SERPL-MCNC: 6.8 G/DL (ref 6.4–8.2)
PROT SERPL-MCNC: 7 G/DL (ref 6.4–8.2)
PROT SERPL-MCNC: 7.6 G/DL (ref 6.4–8.2)
PROT SERPL-MCNC: 7.6 G/DL (ref 6.4–8.2)
PROT SERPL-MCNC: 7.7 G/DL (ref 6.4–8.2)
PROT SERPL-MCNC: 8.5 G/DL (ref 6.4–8.2)
PROTEINASE3 AB SER IA-ACNC: <3.5 U/ML (ref 0–3.5)
PROTHROMBIN TIME: 13.2 SECONDS (ref 11.6–14.5)
PROTHROMBIN TIME: 13.2 SECONDS (ref 11.6–14.5)
QRS AXIS: 111 DEGREES
QRS AXIS: 121 DEGREES
QRS AXIS: 6 DEGREES
QRSD INTERVAL: 84 MS
QRSD INTERVAL: 90 MS
QRSD INTERVAL: 92 MS
QT INTERVAL: 290 MS
QT INTERVAL: 326 MS
QT INTERVAL: 354 MS
QTC INTERVAL: 403 MS
QTC INTERVAL: 423 MS
QTC INTERVAL: 428 MS
RBC # BLD AUTO: 4.45 MILLION/UL (ref 3.88–5.62)
RBC # BLD AUTO: 4.5 MILLION/UL (ref 3.88–5.62)
RBC # BLD AUTO: 4.5 MILLION/UL (ref 3.88–5.62)
RBC # BLD AUTO: 4.82 MILLION/UL (ref 3.88–5.62)
RBC # BLD AUTO: 4.97 MILLION/UL (ref 3.88–5.62)
RBC # BLD AUTO: 5.11 MILLION/UL (ref 3.88–5.62)
RHEUMATOID FACT SER QL LA: NEGATIVE
RYE IGE QN: NEGATIVE
S RECTIVIRGULA AB SER QL ID: NEGATIVE
SARS-COV-2 RNA RESP QL NAA+PROBE: NEGATIVE
SODIUM SERPL-SCNC: 137 MMOL/L (ref 136–145)
SODIUM SERPL-SCNC: 138 MMOL/L (ref 136–145)
SODIUM SERPL-SCNC: 138 MMOL/L (ref 136–145)
SODIUM SERPL-SCNC: 139 MMOL/L (ref 136–145)
SODIUM SERPL-SCNC: 140 MMOL/L (ref 136–145)
SODIUM SERPL-SCNC: 141 MMOL/L (ref 136–145)
T VULGARIS AB SER QL ID: NEGATIVE
T WAVE AXIS: 129 DEGREES
T WAVE AXIS: 139 DEGREES
T WAVE AXIS: 23 DEGREES
TRIGL SERPL-MCNC: 100 MG/DL
TROPONIN I SERPL-MCNC: 0.02 NG/ML
TROPONIN I SERPL-MCNC: 0.18 NG/ML
TROPONIN I SERPL-MCNC: 0.18 NG/ML
TROPONIN I SERPL-MCNC: 0.29 NG/ML
TROPONIN I SERPL-MCNC: 0.32 NG/ML
TROPONIN I SERPL-MCNC: 0.35 NG/ML
TSH SERPL DL<=0.05 MIU/L-ACNC: 1.58 UIU/ML (ref 0.36–3.74)
TSH SERPL DL<=0.05 MIU/L-ACNC: 1.63 UIU/ML (ref 0.36–3.74)
VENTRICULAR RATE: 131 BPM
VENTRICULAR RATE: 86 BPM
VENTRICULAR RATE: 92 BPM
VIT B12 SERPL-MCNC: 152 PG/ML (ref 100–900)
WBC # BLD AUTO: 6.81 THOUSAND/UL (ref 4.31–10.16)
WBC # BLD AUTO: 7.55 THOUSAND/UL (ref 4.31–10.16)
WBC # BLD AUTO: 8.18 THOUSAND/UL (ref 4.31–10.16)
WBC # BLD AUTO: 8.63 THOUSAND/UL (ref 4.31–10.16)
WBC # BLD AUTO: 9.06 THOUSAND/UL (ref 4.31–10.16)
WBC # BLD AUTO: 9.29 THOUSAND/UL (ref 4.31–10.16)

## 2020-01-01 PROCEDURE — 85610 PROTHROMBIN TIME: CPT | Performed by: EMERGENCY MEDICINE

## 2020-01-01 PROCEDURE — 3044F HG A1C LEVEL LT 7.0%: CPT | Performed by: FAMILY MEDICINE

## 2020-01-01 PROCEDURE — 85027 COMPLETE CBC AUTOMATED: CPT | Performed by: INTERNAL MEDICINE

## 2020-01-01 PROCEDURE — 83880 ASSAY OF NATRIURETIC PEPTIDE: CPT | Performed by: NURSE PRACTITIONER

## 2020-01-01 PROCEDURE — G1004 CDSM NDSC: HCPCS

## 2020-01-01 PROCEDURE — 99285 EMERGENCY DEPT VISIT HI MDM: CPT | Performed by: EMERGENCY MEDICINE

## 2020-01-01 PROCEDURE — 86256 FLUORESCENT ANTIBODY TITER: CPT | Performed by: PHYSICIAN ASSISTANT

## 2020-01-01 PROCEDURE — 83036 HEMOGLOBIN GLYCOSYLATED A1C: CPT

## 2020-01-01 PROCEDURE — 86331 IMMUNODIFFUSION OUCHTERLONY: CPT | Performed by: NURSE PRACTITIONER

## 2020-01-01 PROCEDURE — 99152 MOD SED SAME PHYS/QHP 5/>YRS: CPT | Performed by: NURSE PRACTITIONER

## 2020-01-01 PROCEDURE — 99232 SBSQ HOSP IP/OBS MODERATE 35: CPT | Performed by: INTERNAL MEDICINE

## 2020-01-01 PROCEDURE — 82728 ASSAY OF FERRITIN: CPT | Performed by: PHYSICIAN ASSISTANT

## 2020-01-01 PROCEDURE — 74177 CT ABD & PELVIS W/CONTRAST: CPT

## 2020-01-01 PROCEDURE — 99232 SBSQ HOSP IP/OBS MODERATE 35: CPT | Performed by: NURSE PRACTITIONER

## 2020-01-01 PROCEDURE — 88305 TISSUE EXAM BY PATHOLOGIST: CPT | Performed by: PATHOLOGY

## 2020-01-01 PROCEDURE — 85730 THROMBOPLASTIN TIME PARTIAL: CPT | Performed by: EMERGENCY MEDICINE

## 2020-01-01 PROCEDURE — 1036F TOBACCO NON-USER: CPT | Performed by: NURSE PRACTITIONER

## 2020-01-01 PROCEDURE — 82948 REAGENT STRIP/BLOOD GLUCOSE: CPT

## 2020-01-01 PROCEDURE — 86602 ANTINOMYCES ANTIBODY: CPT | Performed by: NURSE PRACTITIONER

## 2020-01-01 PROCEDURE — 93010 ELECTROCARDIOGRAM REPORT: CPT | Performed by: INTERNAL MEDICINE

## 2020-01-01 PROCEDURE — G0008 ADMIN INFLUENZA VIRUS VAC: HCPCS | Performed by: INTERNAL MEDICINE

## 2020-01-01 PROCEDURE — 1111F DSCHRG MED/CURRENT MED MERGE: CPT | Performed by: INTERNAL MEDICINE

## 2020-01-01 PROCEDURE — 99223 1ST HOSP IP/OBS HIGH 75: CPT | Performed by: INTERNAL MEDICINE

## 2020-01-01 PROCEDURE — 93454 CORONARY ARTERY ANGIO S&I: CPT | Performed by: NURSE PRACTITIONER

## 2020-01-01 PROCEDURE — 83605 ASSAY OF LACTIC ACID: CPT | Performed by: EMERGENCY MEDICINE

## 2020-01-01 PROCEDURE — 85730 THROMBOPLASTIN TIME PARTIAL: CPT | Performed by: INTERNAL MEDICINE

## 2020-01-01 PROCEDURE — 85027 COMPLETE CBC AUTOMATED: CPT | Performed by: PHYSICIAN ASSISTANT

## 2020-01-01 PROCEDURE — 84443 ASSAY THYROID STIM HORMONE: CPT | Performed by: EMERGENCY MEDICINE

## 2020-01-01 PROCEDURE — 99239 HOSP IP/OBS DSCHRG MGMT >30: CPT | Performed by: PHYSICIAN ASSISTANT

## 2020-01-01 PROCEDURE — 85025 COMPLETE CBC W/AUTO DIFF WBC: CPT | Performed by: EMERGENCY MEDICINE

## 2020-01-01 PROCEDURE — 36415 COLL VENOUS BLD VENIPUNCTURE: CPT | Performed by: EMERGENCY MEDICINE

## 2020-01-01 PROCEDURE — 99285 EMERGENCY DEPT VISIT HI MDM: CPT

## 2020-01-01 PROCEDURE — 1036F TOBACCO NON-USER: CPT | Performed by: FAMILY MEDICINE

## 2020-01-01 PROCEDURE — C9113 INJ PANTOPRAZOLE SODIUM, VIA: HCPCS | Performed by: PHYSICIAN ASSISTANT

## 2020-01-01 PROCEDURE — 99232 SBSQ HOSP IP/OBS MODERATE 35: CPT | Performed by: PHYSICIAN ASSISTANT

## 2020-01-01 PROCEDURE — 85049 AUTOMATED PLATELET COUNT: CPT | Performed by: INTERNAL MEDICINE

## 2020-01-01 PROCEDURE — 88341 IMHCHEM/IMCYTCHM EA ADD ANTB: CPT | Performed by: PATHOLOGY

## 2020-01-01 PROCEDURE — 93306 TTE W/DOPPLER COMPLETE: CPT

## 2020-01-01 PROCEDURE — 86200 CCP ANTIBODY: CPT | Performed by: NURSE PRACTITIONER

## 2020-01-01 PROCEDURE — 80053 COMPREHEN METABOLIC PANEL: CPT | Performed by: INTERNAL MEDICINE

## 2020-01-01 PROCEDURE — 86255 FLUORESCENT ANTIBODY SCREEN: CPT | Performed by: NURSE PRACTITIONER

## 2020-01-01 PROCEDURE — C1894 INTRO/SHEATH, NON-LASER: HCPCS | Performed by: NURSE PRACTITIONER

## 2020-01-01 PROCEDURE — 99213 OFFICE O/P EST LOW 20 MIN: CPT | Performed by: FAMILY MEDICINE

## 2020-01-01 PROCEDURE — 3078F DIAST BP <80 MM HG: CPT | Performed by: NURSE PRACTITIONER

## 2020-01-01 PROCEDURE — 87635 SARS-COV-2 COVID-19 AMP PRB: CPT | Performed by: EMERGENCY MEDICINE

## 2020-01-01 PROCEDURE — 84484 ASSAY OF TROPONIN QUANT: CPT | Performed by: EMERGENCY MEDICINE

## 2020-01-01 PROCEDURE — 86671 FUNGUS NES ANTIBODY: CPT | Performed by: NURSE PRACTITIONER

## 2020-01-01 PROCEDURE — 99239 HOSP IP/OBS DSCHRG MGMT >30: CPT | Performed by: INTERNAL MEDICINE

## 2020-01-01 PROCEDURE — 45378 DIAGNOSTIC COLONOSCOPY: CPT | Performed by: INTERNAL MEDICINE

## 2020-01-01 PROCEDURE — 90682 RIV4 VACC RECOMBINANT DNA IM: CPT | Performed by: INTERNAL MEDICINE

## 2020-01-01 PROCEDURE — 80074 ACUTE HEPATITIS PANEL: CPT | Performed by: PHYSICIAN ASSISTANT

## 2020-01-01 PROCEDURE — 83516 IMMUNOASSAY NONANTIBODY: CPT

## 2020-01-01 PROCEDURE — 93454 CORONARY ARTERY ANGIO S&I: CPT | Performed by: INTERNAL MEDICINE

## 2020-01-01 PROCEDURE — 86340 INTRINSIC FACTOR ANTIBODY: CPT

## 2020-01-01 PROCEDURE — 43239 EGD BIOPSY SINGLE/MULTIPLE: CPT | Performed by: INTERNAL MEDICINE

## 2020-01-01 PROCEDURE — 80053 COMPREHEN METABOLIC PANEL: CPT

## 2020-01-01 PROCEDURE — 76705 ECHO EXAM OF ABDOMEN: CPT

## 2020-01-01 PROCEDURE — 80053 COMPREHEN METABOLIC PANEL: CPT | Performed by: EMERGENCY MEDICINE

## 2020-01-01 PROCEDURE — 83036 HEMOGLOBIN GLYCOSYLATED A1C: CPT | Performed by: INTERNAL MEDICINE

## 2020-01-01 PROCEDURE — 83880 ASSAY OF NATRIURETIC PEPTIDE: CPT | Performed by: EMERGENCY MEDICINE

## 2020-01-01 PROCEDURE — 86430 RHEUMATOID FACTOR TEST QUAL: CPT | Performed by: NURSE PRACTITIONER

## 2020-01-01 PROCEDURE — 84484 ASSAY OF TROPONIN QUANT: CPT | Performed by: INTERNAL MEDICINE

## 2020-01-01 PROCEDURE — 86606 ASPERGILLUS ANTIBODY: CPT | Performed by: NURSE PRACTITIONER

## 2020-01-01 PROCEDURE — 3075F SYST BP GE 130 - 139MM HG: CPT | Performed by: NURSE PRACTITIONER

## 2020-01-01 PROCEDURE — 94760 N-INVAS EAR/PLS OXIMETRY 1: CPT

## 2020-01-01 PROCEDURE — 86235 NUCLEAR ANTIGEN ANTIBODY: CPT | Performed by: NURSE PRACTITIONER

## 2020-01-01 PROCEDURE — 99214 OFFICE O/P EST MOD 30 MIN: CPT | Performed by: FAMILY MEDICINE

## 2020-01-01 PROCEDURE — 71250 CT THORAX DX C-: CPT

## 2020-01-01 PROCEDURE — C1760 CLOSURE DEV, VASC: HCPCS | Performed by: NURSE PRACTITIONER

## 2020-01-01 PROCEDURE — 3008F BODY MASS INDEX DOCD: CPT | Performed by: NURSE PRACTITIONER

## 2020-01-01 PROCEDURE — 99214 OFFICE O/P EST MOD 30 MIN: CPT | Performed by: NURSE PRACTITIONER

## 2020-01-01 PROCEDURE — 93005 ELECTROCARDIOGRAM TRACING: CPT

## 2020-01-01 PROCEDURE — 84443 ASSAY THYROID STIM HORMONE: CPT | Performed by: INTERNAL MEDICINE

## 2020-01-01 PROCEDURE — 3008F BODY MASS INDEX DOCD: CPT | Performed by: FAMILY MEDICINE

## 2020-01-01 PROCEDURE — 93306 TTE W/DOPPLER COMPLETE: CPT | Performed by: INTERNAL MEDICINE

## 2020-01-01 PROCEDURE — 82607 VITAMIN B-12: CPT

## 2020-01-01 PROCEDURE — 71045 X-RAY EXAM CHEST 1 VIEW: CPT

## 2020-01-01 PROCEDURE — 80061 LIPID PANEL: CPT | Performed by: INTERNAL MEDICINE

## 2020-01-01 PROCEDURE — 4010F ACE/ARB THERAPY RXD/TAKEN: CPT | Performed by: FAMILY MEDICINE

## 2020-01-01 PROCEDURE — 94761 N-INVAS EAR/PLS OXIMETRY MLT: CPT

## 2020-01-01 PROCEDURE — C1769 GUIDE WIRE: HCPCS | Performed by: NURSE PRACTITIONER

## 2020-01-01 PROCEDURE — 80053 COMPREHEN METABOLIC PANEL: CPT | Performed by: PHYSICIAN ASSISTANT

## 2020-01-01 PROCEDURE — 0DB98ZX EXCISION OF DUODENUM, VIA NATURAL OR ARTIFICIAL OPENING ENDOSCOPIC, DIAGNOSTIC: ICD-10-PCS | Performed by: INTERNAL MEDICINE

## 2020-01-01 PROCEDURE — 96361 HYDRATE IV INFUSION ADD-ON: CPT

## 2020-01-01 PROCEDURE — 1111F DSCHRG MED/CURRENT MED MERGE: CPT | Performed by: FAMILY MEDICINE

## 2020-01-01 PROCEDURE — 36415 COLL VENOUS BLD VENIPUNCTURE: CPT

## 2020-01-01 PROCEDURE — 88342 IMHCHEM/IMCYTCHM 1ST ANTB: CPT | Performed by: PATHOLOGY

## 2020-01-01 PROCEDURE — 99152 MOD SED SAME PHYS/QHP 5/>YRS: CPT | Performed by: INTERNAL MEDICINE

## 2020-01-01 PROCEDURE — 86235 NUCLEAR ANTIGEN ANTIBODY: CPT | Performed by: PHYSICIAN ASSISTANT

## 2020-01-01 PROCEDURE — 99221 1ST HOSP IP/OBS SF/LOW 40: CPT | Performed by: INTERNAL MEDICINE

## 2020-01-01 PROCEDURE — 83735 ASSAY OF MAGNESIUM: CPT | Performed by: INTERNAL MEDICINE

## 2020-01-01 PROCEDURE — 83690 ASSAY OF LIPASE: CPT | Performed by: EMERGENCY MEDICINE

## 2020-01-01 PROCEDURE — 3079F DIAST BP 80-89 MM HG: CPT | Performed by: INTERNAL MEDICINE

## 2020-01-01 PROCEDURE — 0DJD8ZZ INSPECTION OF LOWER INTESTINAL TRACT, VIA NATURAL OR ARTIFICIAL OPENING ENDOSCOPIC: ICD-10-PCS | Performed by: INTERNAL MEDICINE

## 2020-01-01 PROCEDURE — 3075F SYST BP GE 130 - 139MM HG: CPT | Performed by: INTERNAL MEDICINE

## 2020-01-01 PROCEDURE — 86038 ANTINUCLEAR ANTIBODIES: CPT | Performed by: NURSE PRACTITIONER

## 2020-01-01 PROCEDURE — 99495 TRANSJ CARE MGMT MOD F2F 14D: CPT | Performed by: FAMILY MEDICINE

## 2020-01-01 PROCEDURE — 0DB78ZX EXCISION OF STOMACH, PYLORUS, VIA NATURAL OR ARTIFICIAL OPENING ENDOSCOPIC, DIAGNOSTIC: ICD-10-PCS | Performed by: INTERNAL MEDICINE

## 2020-01-01 PROCEDURE — 99214 OFFICE O/P EST MOD 30 MIN: CPT | Performed by: INTERNAL MEDICINE

## 2020-01-01 PROCEDURE — NC001 PR NO CHARGE: Performed by: INTERNAL MEDICINE

## 2020-01-01 PROCEDURE — 96360 HYDRATION IV INFUSION INIT: CPT

## 2020-01-01 PROCEDURE — B211YZZ FLUOROSCOPY OF MULTIPLE CORONARY ARTERIES USING OTHER CONTRAST: ICD-10-PCS | Performed by: INTERNAL MEDICINE

## 2020-01-01 RX ORDER — ACETAMINOPHEN 325 MG/1
650 TABLET ORAL EVERY 6 HOURS PRN
Status: DISCONTINUED | OUTPATIENT
Start: 2020-01-01 | End: 2020-01-01 | Stop reason: HOSPADM

## 2020-01-01 RX ORDER — HEPARIN SODIUM 10000 [USP'U]/100ML
3-20 INJECTION, SOLUTION INTRAVENOUS
Status: DISCONTINUED | OUTPATIENT
Start: 2020-01-01 | End: 2020-01-01

## 2020-01-01 RX ORDER — ASPIRIN 81 MG/1
162 TABLET ORAL ONCE
Status: COMPLETED | OUTPATIENT
Start: 2020-01-01 | End: 2020-01-01

## 2020-01-01 RX ORDER — SODIUM CHLORIDE, SODIUM LACTATE, POTASSIUM CHLORIDE, CALCIUM CHLORIDE 600; 310; 30; 20 MG/100ML; MG/100ML; MG/100ML; MG/100ML
INJECTION, SOLUTION INTRAVENOUS CONTINUOUS PRN
Status: DISCONTINUED | OUTPATIENT
Start: 2020-01-01 | End: 2020-01-01

## 2020-01-01 RX ORDER — LISINOPRIL 10 MG/1
10 TABLET ORAL DAILY
Status: DISCONTINUED | OUTPATIENT
Start: 2020-01-01 | End: 2020-01-01 | Stop reason: HOSPADM

## 2020-01-01 RX ORDER — FAMOTIDINE 20 MG/1
40 TABLET, FILM COATED ORAL DAILY
Status: DISCONTINUED | OUTPATIENT
Start: 2020-01-01 | End: 2020-01-01

## 2020-01-01 RX ORDER — FAMOTIDINE 40 MG/1
TABLET, FILM COATED ORAL
Qty: 90 TABLET | Refills: 1 | OUTPATIENT
Start: 2020-01-01

## 2020-01-01 RX ORDER — ATORVASTATIN CALCIUM 40 MG/1
40 TABLET, FILM COATED ORAL EVERY EVENING
Status: DISCONTINUED | OUTPATIENT
Start: 2020-01-01 | End: 2020-01-01 | Stop reason: HOSPADM

## 2020-01-01 RX ORDER — LISINOPRIL 10 MG/1
TABLET ORAL
Qty: 90 TABLET | Refills: 1 | Status: SHIPPED | OUTPATIENT
Start: 2020-01-01 | End: 2021-01-01

## 2020-01-01 RX ORDER — FAMOTIDINE 40 MG/1
40 TABLET, FILM COATED ORAL DAILY
Qty: 90 TABLET | Refills: 1 | Status: SHIPPED | OUTPATIENT
Start: 2020-01-01 | End: 2021-01-01 | Stop reason: SDUPTHER

## 2020-01-01 RX ORDER — PROPOFOL 10 MG/ML
INJECTION, EMULSION INTRAVENOUS CONTINUOUS PRN
Status: DISCONTINUED | OUTPATIENT
Start: 2020-01-01 | End: 2020-01-01

## 2020-01-01 RX ORDER — ASPIRIN 81 MG/1
162 TABLET, CHEWABLE ORAL ONCE
Status: DISCONTINUED | OUTPATIENT
Start: 2020-01-01 | End: 2020-01-01

## 2020-01-01 RX ORDER — NITROGLYCERIN 0.4 MG/1
0.4 TABLET SUBLINGUAL
Status: DISCONTINUED | OUTPATIENT
Start: 2020-01-01 | End: 2020-01-01 | Stop reason: HOSPADM

## 2020-01-01 RX ORDER — METOPROLOL TARTRATE 5 MG/5ML
5 INJECTION INTRAVENOUS ONCE
Status: COMPLETED | OUTPATIENT
Start: 2020-01-01 | End: 2020-01-01

## 2020-01-01 RX ORDER — SODIUM CHLORIDE 9 MG/ML
INJECTION, SOLUTION INTRAVENOUS
Status: COMPLETED | OUTPATIENT
Start: 2020-01-01 | End: 2020-01-01

## 2020-01-01 RX ORDER — HEPARIN SODIUM 1000 [USP'U]/ML
3300 INJECTION, SOLUTION INTRAVENOUS; SUBCUTANEOUS
Status: DISCONTINUED | OUTPATIENT
Start: 2020-01-01 | End: 2020-01-01

## 2020-01-01 RX ORDER — SODIUM CHLORIDE 9 MG/ML
100 INJECTION, SOLUTION INTRAVENOUS CONTINUOUS
Status: DISPENSED | OUTPATIENT
Start: 2020-01-01 | End: 2020-01-01

## 2020-01-01 RX ORDER — HEPARIN SODIUM 1000 [USP'U]/ML
1650 INJECTION, SOLUTION INTRAVENOUS; SUBCUTANEOUS
Status: DISCONTINUED | OUTPATIENT
Start: 2020-01-01 | End: 2020-01-01

## 2020-01-01 RX ORDER — PROPOFOL 10 MG/ML
INJECTION, EMULSION INTRAVENOUS AS NEEDED
Status: DISCONTINUED | OUTPATIENT
Start: 2020-01-01 | End: 2020-01-01

## 2020-01-01 RX ORDER — PANTOPRAZOLE SODIUM 40 MG/1
40 INJECTION, POWDER, FOR SOLUTION INTRAVENOUS EVERY 12 HOURS SCHEDULED
Status: DISCONTINUED | OUTPATIENT
Start: 2020-01-01 | End: 2020-01-01 | Stop reason: HOSPADM

## 2020-01-01 RX ORDER — HEPARIN SODIUM 1000 [USP'U]/ML
3300 INJECTION, SOLUTION INTRAVENOUS; SUBCUTANEOUS ONCE
Status: COMPLETED | OUTPATIENT
Start: 2020-01-01 | End: 2020-01-01

## 2020-01-01 RX ORDER — SENNOSIDES 8.6 MG
1 TABLET ORAL
Status: DISCONTINUED | OUTPATIENT
Start: 2020-01-01 | End: 2020-01-01 | Stop reason: HOSPADM

## 2020-01-01 RX ORDER — DOCUSATE SODIUM 100 MG/1
100 CAPSULE, LIQUID FILLED ORAL 2 TIMES DAILY
Status: DISCONTINUED | OUTPATIENT
Start: 2020-01-01 | End: 2020-01-01 | Stop reason: HOSPADM

## 2020-01-01 RX ORDER — FUROSEMIDE 10 MG/ML
20 INJECTION INTRAMUSCULAR; INTRAVENOUS ONCE
Status: CANCELLED | OUTPATIENT
Start: 2020-01-01

## 2020-01-01 RX ORDER — METOPROLOL TARTRATE 50 MG/1
50 TABLET, FILM COATED ORAL 2 TIMES DAILY
Status: DISCONTINUED | OUTPATIENT
Start: 2020-01-01 | End: 2020-01-01 | Stop reason: HOSPADM

## 2020-01-01 RX ORDER — LIDOCAINE HYDROCHLORIDE 10 MG/ML
INJECTION, SOLUTION EPIDURAL; INFILTRATION; INTRACAUDAL; PERINEURAL CODE/TRAUMA/SEDATION MEDICATION
Status: COMPLETED | OUTPATIENT
Start: 2020-01-01 | End: 2020-01-01

## 2020-01-01 RX ORDER — ASPIRIN 81 MG/1
162 TABLET, CHEWABLE ORAL DAILY
Status: DISCONTINUED | OUTPATIENT
Start: 2020-01-01 | End: 2020-01-01 | Stop reason: HOSPADM

## 2020-01-01 RX ORDER — ONDANSETRON 2 MG/ML
4 INJECTION INTRAMUSCULAR; INTRAVENOUS EVERY 6 HOURS PRN
Status: DISCONTINUED | OUTPATIENT
Start: 2020-01-01 | End: 2020-01-01 | Stop reason: HOSPADM

## 2020-01-01 RX ORDER — ECHINACEA PURPUREA EXTRACT 125 MG
2 TABLET ORAL
Status: DISCONTINUED | OUTPATIENT
Start: 2020-01-01 | End: 2020-01-01 | Stop reason: HOSPADM

## 2020-01-01 RX ORDER — ATORVASTATIN CALCIUM 40 MG/1
40 TABLET, FILM COATED ORAL DAILY
Status: DISCONTINUED | OUTPATIENT
Start: 2020-01-01 | End: 2020-01-01 | Stop reason: HOSPADM

## 2020-01-01 RX ORDER — FAMOTIDINE 20 MG/1
40 TABLET, FILM COATED ORAL DAILY
Status: DISCONTINUED | OUTPATIENT
Start: 2020-01-01 | End: 2020-01-01 | Stop reason: HOSPADM

## 2020-01-01 RX ORDER — MIDAZOLAM HYDROCHLORIDE 2 MG/2ML
INJECTION, SOLUTION INTRAMUSCULAR; INTRAVENOUS CODE/TRAUMA/SEDATION MEDICATION
Status: COMPLETED | OUTPATIENT
Start: 2020-01-01 | End: 2020-01-01

## 2020-01-01 RX ORDER — FENTANYL CITRATE 50 UG/ML
INJECTION, SOLUTION INTRAMUSCULAR; INTRAVENOUS CODE/TRAUMA/SEDATION MEDICATION
Status: COMPLETED | OUTPATIENT
Start: 2020-01-01 | End: 2020-01-01

## 2020-01-01 RX ORDER — ASPIRIN 81 MG/1
81 TABLET, CHEWABLE ORAL DAILY
Status: DISCONTINUED | OUTPATIENT
Start: 2020-01-01 | End: 2020-01-01 | Stop reason: HOSPADM

## 2020-01-01 RX ORDER — ALBUTEROL SULFATE 2.5 MG/3ML
1 SOLUTION RESPIRATORY (INHALATION) ONCE
Status: COMPLETED | OUTPATIENT
Start: 2020-01-01 | End: 2020-01-01

## 2020-01-01 RX ORDER — LIDOCAINE HYDROCHLORIDE 10 MG/ML
INJECTION, SOLUTION EPIDURAL; INFILTRATION; INTRACAUDAL; PERINEURAL AS NEEDED
Status: DISCONTINUED | OUTPATIENT
Start: 2020-01-01 | End: 2020-01-01

## 2020-01-01 RX ORDER — SODIUM CHLORIDE 9 MG/ML
125 INJECTION, SOLUTION INTRAVENOUS CONTINUOUS
Status: DISCONTINUED | OUTPATIENT
Start: 2020-01-01 | End: 2020-01-01

## 2020-01-01 RX ORDER — ATORVASTATIN CALCIUM 40 MG/1
TABLET, FILM COATED ORAL
Qty: 90 TABLET | Refills: 1 | Status: SHIPPED | OUTPATIENT
Start: 2020-01-01

## 2020-01-01 RX ADMIN — LISINOPRIL 10 MG: 10 TABLET ORAL at 08:05

## 2020-01-01 RX ADMIN — PANTOPRAZOLE SODIUM 40 MG: 40 INJECTION, POWDER, FOR SOLUTION INTRAVENOUS at 22:01

## 2020-01-01 RX ADMIN — LIDOCAINE HYDROCHLORIDE 7 ML: 10 INJECTION, SOLUTION EPIDURAL; INFILTRATION; INTRACAUDAL at 11:01

## 2020-01-01 RX ADMIN — MIDAZOLAM HYDROCHLORIDE 1 MG: 1 INJECTION, SOLUTION INTRAMUSCULAR; INTRAVENOUS at 10:59

## 2020-01-01 RX ADMIN — INFLUENZA A VIRUS A/HAWAII/70/2019 (H1N1) RECOMBINANT HEMAGGLUTININ ANTIGEN, INFLUENZA A VIRUS A/MINNESOTA/41/2019 (H3N2) RECOMBINANT HEMAGGLUTININ ANTIGEN, INFLUENZA B VIRUS B/WASHINGTON/02/2019 RECOMBINANT HEMAGGLUTININ ANTIGEN, AND INFLUENZA B VIRUS B/PHUKET/3073/2013 RECOMBINANT HEMAGGLUTININ ANTIGEN 0.5 ML: 45; 45; 45; 45 INJECTION INTRAMUSCULAR at 14:28

## 2020-01-01 RX ADMIN — METOROPROLOL TARTRATE 5 MG: 5 INJECTION, SOLUTION INTRAVENOUS at 16:53

## 2020-01-01 RX ADMIN — FAMOTIDINE 40 MG: 20 TABLET ORAL at 15:29

## 2020-01-01 RX ADMIN — METOPROLOL TARTRATE 25 MG: 25 TABLET, FILM COATED ORAL at 08:20

## 2020-01-01 RX ADMIN — ATORVASTATIN CALCIUM 40 MG: 40 TABLET, FILM COATED ORAL at 17:32

## 2020-01-01 RX ADMIN — METOPROLOL TARTRATE 25 MG: 25 TABLET, FILM COATED ORAL at 21:04

## 2020-01-01 RX ADMIN — METOPROLOL TARTRATE 50 MG: 50 TABLET, FILM COATED ORAL at 10:20

## 2020-01-01 RX ADMIN — DOCUSATE SODIUM 100 MG: 100 CAPSULE, LIQUID FILLED ORAL at 17:18

## 2020-01-01 RX ADMIN — PROPOFOL 100 MCG/KG/MIN: 10 INJECTION, EMULSION INTRAVENOUS at 12:33

## 2020-01-01 RX ADMIN — PANTOPRAZOLE SODIUM 40 MG: 40 INJECTION, POWDER, FOR SOLUTION INTRAVENOUS at 21:03

## 2020-01-01 RX ADMIN — POLYETHYLENE GLYCOL 3350, SODIUM SULFATE ANHYDROUS, SODIUM BICARBONATE, SODIUM CHLORIDE, POTASSIUM CHLORIDE 4000 ML: 236; 22.74; 6.74; 5.86; 2.97 POWDER, FOR SOLUTION ORAL at 12:07

## 2020-01-01 RX ADMIN — LISINOPRIL 10 MG: 10 TABLET ORAL at 09:11

## 2020-01-01 RX ADMIN — ATORVASTATIN CALCIUM 40 MG: 40 TABLET, FILM COATED ORAL at 17:16

## 2020-01-01 RX ADMIN — PHENYLEPHRINE HYDROCHLORIDE 200 MCG: 10 INJECTION INTRAVENOUS at 12:55

## 2020-01-01 RX ADMIN — INSULIN LISPRO 1 UNITS: 100 INJECTION, SOLUTION INTRAVENOUS; SUBCUTANEOUS at 21:30

## 2020-01-01 RX ADMIN — PANTOPRAZOLE SODIUM 40 MG: 40 INJECTION, POWDER, FOR SOLUTION INTRAVENOUS at 09:02

## 2020-01-01 RX ADMIN — FAMOTIDINE 40 MG: 20 TABLET ORAL at 08:05

## 2020-01-01 RX ADMIN — ASPIRIN 81 MG CHEWABLE TABLET 162 MG: 81 TABLET CHEWABLE at 09:11

## 2020-01-01 RX ADMIN — PANTOPRAZOLE SODIUM 40 MG: 40 INJECTION, POWDER, FOR SOLUTION INTRAVENOUS at 21:21

## 2020-01-01 RX ADMIN — ATORVASTATIN CALCIUM 40 MG: 40 TABLET, FILM COATED ORAL at 18:07

## 2020-01-01 RX ADMIN — ENOXAPARIN SODIUM 40 MG: 40 INJECTION SUBCUTANEOUS at 09:11

## 2020-01-01 RX ADMIN — HEPARIN SODIUM 12 UNITS/KG/HR: 10000 INJECTION, SOLUTION INTRAVENOUS at 11:25

## 2020-01-01 RX ADMIN — PHENYLEPHRINE HYDROCHLORIDE 200 MCG: 10 INJECTION INTRAVENOUS at 12:42

## 2020-01-01 RX ADMIN — IOHEXOL 50 ML: 240 INJECTION, SOLUTION INTRATHECAL; INTRAVASCULAR; INTRAVENOUS; ORAL at 07:15

## 2020-01-01 RX ADMIN — INSULIN LISPRO 1 UNITS: 100 INJECTION, SOLUTION INTRAVENOUS; SUBCUTANEOUS at 18:06

## 2020-01-01 RX ADMIN — LISINOPRIL 10 MG: 10 TABLET ORAL at 08:04

## 2020-01-01 RX ADMIN — SODIUM CHLORIDE 125 ML/HR: 0.9 INJECTION, SOLUTION INTRAVENOUS at 08:20

## 2020-01-01 RX ADMIN — SODIUM CHLORIDE 250 ML: 0.9 INJECTION, SOLUTION INTRAVENOUS at 07:13

## 2020-01-01 RX ADMIN — METOPROLOL TARTRATE 50 MG: 50 TABLET, FILM COATED ORAL at 09:11

## 2020-01-01 RX ADMIN — ASPIRIN 81 MG CHEWABLE TABLET 162 MG: 81 TABLET CHEWABLE at 08:04

## 2020-01-01 RX ADMIN — ATORVASTATIN CALCIUM 40 MG: 40 TABLET, FILM COATED ORAL at 17:41

## 2020-01-01 RX ADMIN — FENTANYL CITRATE 25 MCG: 50 INJECTION, SOLUTION INTRAMUSCULAR; INTRAVENOUS at 11:15

## 2020-01-01 RX ADMIN — METOPROLOL TARTRATE 25 MG: 25 TABLET, FILM COATED ORAL at 08:04

## 2020-01-01 RX ADMIN — PANTOPRAZOLE SODIUM 40 MG: 40 INJECTION, POWDER, FOR SOLUTION INTRAVENOUS at 10:20

## 2020-01-01 RX ADMIN — FENTANYL CITRATE 25 MCG: 50 INJECTION, SOLUTION INTRAMUSCULAR; INTRAVENOUS at 10:59

## 2020-01-01 RX ADMIN — LIDOCAINE HYDROCHLORIDE 50 MG: 10 INJECTION, SOLUTION EPIDURAL; INFILTRATION; INTRACAUDAL at 12:33

## 2020-01-01 RX ADMIN — HEPARIN SODIUM 10 UNITS/KG/HR: 10000 INJECTION, SOLUTION INTRAVENOUS at 04:11

## 2020-01-01 RX ADMIN — ASPIRIN 81 MG CHEWABLE TABLET 162 MG: 81 TABLET CHEWABLE at 12:03

## 2020-01-01 RX ADMIN — INSULIN LISPRO 1 UNITS: 100 INJECTION, SOLUTION INTRAVENOUS; SUBCUTANEOUS at 12:08

## 2020-01-01 RX ADMIN — PANTOPRAZOLE SODIUM 40 MG: 40 INJECTION, POWDER, FOR SOLUTION INTRAVENOUS at 08:04

## 2020-01-01 RX ADMIN — LISINOPRIL 10 MG: 10 TABLET ORAL at 10:21

## 2020-01-01 RX ADMIN — PROPOFOL 80 MG: 10 INJECTION, EMULSION INTRAVENOUS at 12:33

## 2020-01-01 RX ADMIN — HEPARIN SODIUM 3300 UNITS: 1000 INJECTION INTRAVENOUS; SUBCUTANEOUS at 11:26

## 2020-01-01 RX ADMIN — METOPROLOL TARTRATE 50 MG: 50 TABLET, FILM COATED ORAL at 17:42

## 2020-01-01 RX ADMIN — LISINOPRIL 10 MG: 10 TABLET ORAL at 12:05

## 2020-01-01 RX ADMIN — METOPROLOL TARTRATE 25 MG: 25 TABLET, FILM COATED ORAL at 23:22

## 2020-01-01 RX ADMIN — FAMOTIDINE 40 MG: 20 TABLET ORAL at 08:21

## 2020-01-01 RX ADMIN — DOCUSATE SODIUM 100 MG: 100 CAPSULE, LIQUID FILLED ORAL at 08:21

## 2020-01-01 RX ADMIN — ASPIRIN 81 MG CHEWABLE TABLET 162 MG: 81 TABLET CHEWABLE at 09:03

## 2020-01-01 RX ADMIN — METOPROLOL TARTRATE 50 MG: 50 TABLET, FILM COATED ORAL at 12:04

## 2020-01-01 RX ADMIN — SODIUM CHLORIDE, SODIUM LACTATE, POTASSIUM CHLORIDE, AND CALCIUM CHLORIDE: .6; .31; .03; .02 INJECTION, SOLUTION INTRAVENOUS at 12:25

## 2020-01-01 RX ADMIN — ATORVASTATIN CALCIUM 40 MG: 40 TABLET, FILM COATED ORAL at 18:33

## 2020-01-01 RX ADMIN — PANTOPRAZOLE SODIUM 40 MG: 40 INJECTION, POWDER, FOR SOLUTION INTRAVENOUS at 21:27

## 2020-01-01 RX ADMIN — ASPIRIN 162 MG: 81 TABLET, COATED ORAL at 08:00

## 2020-01-01 RX ADMIN — PROPOFOL 30 MG: 10 INJECTION, EMULSION INTRAVENOUS at 12:38

## 2020-01-01 RX ADMIN — METOPROLOL TARTRATE 25 MG: 25 TABLET, FILM COATED ORAL at 17:18

## 2020-01-01 RX ADMIN — IOHEXOL 100 ML: 350 INJECTION, SOLUTION INTRAVENOUS at 09:20

## 2020-01-01 RX ADMIN — PANTOPRAZOLE SODIUM 40 MG: 40 INJECTION, POWDER, FOR SOLUTION INTRAVENOUS at 09:11

## 2020-01-01 RX ADMIN — ATORVASTATIN CALCIUM 40 MG: 40 TABLET, FILM COATED ORAL at 08:21

## 2020-01-01 RX ADMIN — PANTOPRAZOLE SODIUM 40 MG: 40 INJECTION, POWDER, FOR SOLUTION INTRAVENOUS at 12:06

## 2020-01-01 RX ADMIN — PANTOPRAZOLE SODIUM 40 MG: 40 INJECTION, POWDER, FOR SOLUTION INTRAVENOUS at 12:18

## 2020-01-01 RX ADMIN — LISINOPRIL 10 MG: 10 TABLET ORAL at 08:21

## 2020-01-01 RX ADMIN — ASPIRIN 81 MG 81 MG: 81 TABLET ORAL at 08:21

## 2020-01-01 RX ADMIN — ENOXAPARIN SODIUM 40 MG: 40 INJECTION SUBCUTANEOUS at 12:02

## 2020-01-01 RX ADMIN — SODIUM CHLORIDE 50 ML/HR: 9 INJECTION, SOLUTION INTRAVENOUS at 10:59

## 2020-01-01 RX ADMIN — PHENYLEPHRINE HYDROCHLORIDE 200 MCG: 10 INJECTION INTRAVENOUS at 13:02

## 2020-01-01 RX ADMIN — ATORVASTATIN CALCIUM 40 MG: 40 TABLET, FILM COATED ORAL at 21:03

## 2020-01-01 RX ADMIN — PHENYLEPHRINE HYDROCHLORIDE 100 MCG: 10 INJECTION INTRAVENOUS at 12:50

## 2020-01-01 RX ADMIN — METOPROLOL TARTRATE 50 MG: 50 TABLET, FILM COATED ORAL at 17:32

## 2020-01-01 RX ADMIN — PANTOPRAZOLE SODIUM 40 MG: 40 INJECTION, POWDER, FOR SOLUTION INTRAVENOUS at 21:04

## 2020-01-06 DIAGNOSIS — I10 BENIGN ESSENTIAL HYPERTENSION: ICD-10-CM

## 2020-01-06 DIAGNOSIS — R11.0 NAUSEA: ICD-10-CM

## 2020-01-06 DIAGNOSIS — R10.9 ABDOMINAL CRAMPING: ICD-10-CM

## 2020-01-06 RX ORDER — FAMOTIDINE 40 MG/1
40 TABLET, FILM COATED ORAL DAILY
Qty: 90 TABLET | Refills: 1 | Status: SHIPPED | OUTPATIENT
Start: 2020-01-06 | End: 2020-01-01 | Stop reason: SDUPTHER

## 2020-02-14 ENCOUNTER — TRANSCRIBE ORDERS (OUTPATIENT)
Dept: LAB | Facility: HOSPITAL | Age: 65
End: 2020-02-14

## 2020-02-14 DIAGNOSIS — E11.65 TYPE II DIABETES MELLITUS WITH HYPEROSMOLARITY, UNCONTROLLED (HCC): Primary | ICD-10-CM

## 2020-02-14 DIAGNOSIS — E11.00 TYPE II DIABETES MELLITUS WITH HYPEROSMOLARITY, UNCONTROLLED (HCC): Primary | ICD-10-CM

## 2020-02-21 ENCOUNTER — TELEPHONE (OUTPATIENT)
Dept: FAMILY MEDICINE CLINIC | Facility: OTHER | Age: 65
End: 2020-02-21

## 2020-02-21 ENCOUNTER — APPOINTMENT (OUTPATIENT)
Dept: LAB | Facility: HOSPITAL | Age: 65
End: 2020-02-21
Attending: FAMILY MEDICINE
Payer: COMMERCIAL

## 2020-02-21 DIAGNOSIS — E11.00 TYPE II DIABETES MELLITUS WITH HYPEROSMOLARITY, UNCONTROLLED (HCC): ICD-10-CM

## 2020-02-21 DIAGNOSIS — E11.65 UNCONTROLLED TYPE 2 DIABETES MELLITUS WITH HYPERGLYCEMIA (HCC): ICD-10-CM

## 2020-02-21 DIAGNOSIS — E78.2 MIXED HYPERLIPIDEMIA: ICD-10-CM

## 2020-02-21 DIAGNOSIS — I25.10 ARTERIOSCLEROTIC CARDIOVASCULAR DISEASE (ASCVD): ICD-10-CM

## 2020-02-21 DIAGNOSIS — E11.65 TYPE II DIABETES MELLITUS WITH HYPEROSMOLARITY, UNCONTROLLED (HCC): ICD-10-CM

## 2020-02-21 DIAGNOSIS — I10 BENIGN ESSENTIAL HYPERTENSION: ICD-10-CM

## 2020-02-21 LAB
ALBUMIN SERPL BCP-MCNC: 3.5 G/DL (ref 3.5–5)
ALP SERPL-CCNC: 181 U/L (ref 46–116)
ALT SERPL W P-5'-P-CCNC: 38 U/L (ref 12–78)
ANION GAP SERPL CALCULATED.3IONS-SCNC: 7 MMOL/L (ref 4–13)
AST SERPL W P-5'-P-CCNC: 52 U/L (ref 5–45)
BILIRUB SERPL-MCNC: 0.29 MG/DL (ref 0.2–1)
BUN SERPL-MCNC: 15 MG/DL (ref 5–25)
CALCIUM SERPL-MCNC: 9 MG/DL (ref 8.3–10.1)
CHLORIDE SERPL-SCNC: 107 MMOL/L (ref 100–108)
CHOLEST SERPL-MCNC: 150 MG/DL (ref 50–200)
CO2 SERPL-SCNC: 27 MMOL/L (ref 21–32)
CREAT SERPL-MCNC: 0.94 MG/DL (ref 0.6–1.3)
CREAT UR-MCNC: 122 MG/DL
EST. AVERAGE GLUCOSE BLD GHB EST-MCNC: 134 MG/DL
GFR SERPL CREATININE-BSD FRML MDRD: 86 ML/MIN/1.73SQ M
GLUCOSE SERPL-MCNC: 107 MG/DL (ref 65–140)
HBA1C MFR BLD: 6.3 %
HDLC SERPL-MCNC: 51 MG/DL
LDLC SERPL CALC-MCNC: 75 MG/DL (ref 0–100)
MICROALBUMIN UR-MCNC: 50.2 MG/L (ref 0–20)
MICROALBUMIN/CREAT 24H UR: 41 MG/G CREATININE (ref 0–30)
NONHDLC SERPL-MCNC: 99 MG/DL
POTASSIUM SERPL-SCNC: 5 MMOL/L (ref 3.5–5.3)
PROT SERPL-MCNC: 8.1 G/DL (ref 6.4–8.2)
SODIUM SERPL-SCNC: 141 MMOL/L (ref 136–145)
TRIGL SERPL-MCNC: 120 MG/DL
VENIPUNCTURE: NORMAL

## 2020-02-21 PROCEDURE — 80061 LIPID PANEL: CPT

## 2020-02-21 PROCEDURE — 83036 HEMOGLOBIN GLYCOSYLATED A1C: CPT

## 2020-02-21 PROCEDURE — 82043 UR ALBUMIN QUANTITATIVE: CPT

## 2020-02-21 PROCEDURE — 80053 COMPREHEN METABOLIC PANEL: CPT

## 2020-02-21 PROCEDURE — 36415 COLL VENOUS BLD VENIPUNCTURE: CPT

## 2020-02-21 PROCEDURE — 3060F POS MICROALBUMINURIA REV: CPT | Performed by: FAMILY MEDICINE

## 2020-02-21 PROCEDURE — 82570 ASSAY OF URINE CREATININE: CPT

## 2020-02-21 NOTE — TELEPHONE ENCOUNTER
Patient notified    ----- Message from [id number] sent at 2/21/2020  2:36 PM EST -----  Can we let Mr Belkis Haq know that his blood work came back showing pretty good cholesterol numbers and well controlled diabetes  Mild, stable elevation of two liver/bone enzymes, not a concern at this time  Can discuss further at upcoming follow-up office visit with Dr Sonia Sykes

## 2020-02-24 DIAGNOSIS — E11.9 TYPE 2 DIABETES MELLITUS TREATED WITHOUT INSULIN (HCC): ICD-10-CM

## 2020-02-24 DIAGNOSIS — I10 BENIGN ESSENTIAL HYPERTENSION: ICD-10-CM

## 2020-02-24 RX ORDER — LISINOPRIL 10 MG/1
10 TABLET ORAL DAILY
Qty: 90 TABLET | Refills: 1 | Status: SHIPPED | OUTPATIENT
Start: 2020-02-24 | End: 2020-01-01

## 2020-03-02 ENCOUNTER — OFFICE VISIT (OUTPATIENT)
Dept: FAMILY MEDICINE CLINIC | Facility: OTHER | Age: 65
End: 2020-03-02
Payer: COMMERCIAL

## 2020-03-02 ENCOUNTER — TELEPHONE (OUTPATIENT)
Dept: ADMINISTRATIVE | Facility: OTHER | Age: 65
End: 2020-03-02

## 2020-03-02 VITALS
DIASTOLIC BLOOD PRESSURE: 70 MMHG | HEIGHT: 60 IN | TEMPERATURE: 97.5 F | HEART RATE: 83 BPM | WEIGHT: 150.13 LBS | SYSTOLIC BLOOD PRESSURE: 124 MMHG | BODY MASS INDEX: 29.48 KG/M2 | OXYGEN SATURATION: 98 %

## 2020-03-02 DIAGNOSIS — E11.9 TYPE 2 DIABETES MELLITUS TREATED WITHOUT INSULIN (HCC): ICD-10-CM

## 2020-03-02 DIAGNOSIS — R79.89 ABNORMAL LFTS: ICD-10-CM

## 2020-03-02 DIAGNOSIS — E78.2 MIXED HYPERLIPIDEMIA: Primary | ICD-10-CM

## 2020-03-02 DIAGNOSIS — I10 BENIGN ESSENTIAL HYPERTENSION: ICD-10-CM

## 2020-03-02 PROCEDURE — 3060F POS MICROALBUMINURIA REV: CPT | Performed by: FAMILY MEDICINE

## 2020-03-02 PROCEDURE — 3066F NEPHROPATHY DOC TX: CPT | Performed by: FAMILY MEDICINE

## 2020-03-02 PROCEDURE — 1036F TOBACCO NON-USER: CPT | Performed by: FAMILY MEDICINE

## 2020-03-02 PROCEDURE — 99214 OFFICE O/P EST MOD 30 MIN: CPT | Performed by: FAMILY MEDICINE

## 2020-03-02 PROCEDURE — 3044F HG A1C LEVEL LT 7.0%: CPT | Performed by: FAMILY MEDICINE

## 2020-03-02 PROCEDURE — 3078F DIAST BP <80 MM HG: CPT | Performed by: FAMILY MEDICINE

## 2020-03-02 PROCEDURE — 3008F BODY MASS INDEX DOCD: CPT | Performed by: FAMILY MEDICINE

## 2020-03-02 PROCEDURE — 2022F DILAT RTA XM EVC RTNOPTHY: CPT | Performed by: FAMILY MEDICINE

## 2020-03-02 PROCEDURE — 3074F SYST BP LT 130 MM HG: CPT | Performed by: FAMILY MEDICINE

## 2020-03-02 NOTE — TELEPHONE ENCOUNTER
Upon review of the In Basket request we were able to locate, review, and update the patient chart as requested for CRC: FIT/FOBT (3)  Any additional questions or concerns should be emailed to the Practice Liaisons via [email or web address] email, please do not reply via In Basket      Thank you  Deepti Williamson

## 2020-03-02 NOTE — PROGRESS NOTES
Assessment/Plan:  Patient is doing well on current regimen  Recommended to take low-fat low-salt and low carb diet also regular activity as tolerated  Medications refilled as needed today  Precautions advised  Blood work is ordered as well patient will follow-up with PCP and 6 months  Problem List Items Addressed This Visit        Cardiovascular and Mediastinum    Benign essential hypertension    Relevant Medications    metoprolol tartrate (LOPRESSOR) 25 mg tablet    Other Relevant Orders    Comprehensive metabolic panel    HEMOGLOBIN A1C W/ EAG ESTIMATION       Other    Hyperlipidemia - Primary    Relevant Orders    Comprehensive metabolic panel      Other Visit Diagnoses     Type 2 diabetes mellitus treated without insulin (HCC)        Abnormal LFTs        Relevant Orders    Comprehensive metabolic panel    HEMOGLOBIN A1C W/ EAG ESTIMATION            Subjective:      Patient ID: Haydee Pérez is a 62 y o  male  Diabetes Mellitus  Patient presents for follow up of diabetes  Current symptoms include: none  Symptoms have been well-controlled  Patient denies foot ulcerations, hypoglycemia , paresthesia of the feet and weight loss  Evaluation to date has included: fasting blood sugar, fasting lipid panel, hemoglobin A1C and microalbuminuria  Home sugars: BGs consistently in an acceptable range  Current treatment: Continued metformin which has been effective  Last dilated eye exam: UTD  Hypertension  Patient is here for follow-up of elevated blood pressure  He is exercising and is adherent to a low-salt diet  Blood pressure is well controlled at home  Cardiac symptoms: none  Patient denies chest pain, irregular heart beat, lower extremity edema and palpitations  Cardiovascular risk factors: advanced age (older than 54 for men, 72 for women), diabetes mellitus, dyslipidemia, hypertension, male gender, microalbuminuria and obesity (BMI >= 30 kg/m2)  Use of agents associated with hypertension: none  History of target organ damage: chronic kidney disease and CAD  Hyperlipidemia  Ab Emmanuel is a 62 y o  male who presents for follow-up of dyslipidemia  A repeat fasting lipid profile was done  The patient does not use medications that may worsen dyslipidemias (corticosteroids, progestins, anabolic steroids, diuretics, beta-blockers, amiodarone, cyclosporine, olanzapine)  Exercise: intermittently  Previous history of cardiac disease includes: coronary artery disease  Review of labs shows slight elevation of the LFTs  For now since patient not symptomatic will recheck labs in 3-6 months  Advised him to keep hydrated  He doesn't have any myalgia symptoms from taking the statins  The following portions of the patient's history were reviewed and updated as appropriate: He  has a past medical history of Acute myocardial infarction of inferior wall ([facility] ) (11/2011), Chronic tension type headache, Developmental dysplasia of hip, Diabetes mellitus ([place] [address] ), Former smoker, Gout, Hemorrhage of anus and rectum, Hyperlipidemia, Hypertension, Internal hemorrhoids, Migraine, Old MI (myocardial infarction) (2011), and Renal insufficiency syndrome    He   Patient Active Problem List    Diagnosis Date Noted    Microalbuminuria due to type 2 diabetes mellitus ([place] [address] ) 08/27/2019    CKD (chronic kidney disease) stage 2, GFR 60-89 ml/min 08/27/2019    CAD S/P percutaneous coronary angioplasty 04/20/2016    History of acute inferior wall MI 04/20/2016    Diabetes mellitus type 2, uncontrolled ([place] [address] ) 04/12/2016    Hip pain, bilateral 08/04/2014    Hyperlipidemia 11/06/2012    Benign essential hypertension 07/04/2012    Colon, diverticulosis 07/04/2012    Osteoarthritis of knee 07/04/2012    Small stature 07/04/2012    Spinal stenosis 07/04/2012    Arteriosclerotic cardiovascular disease (ASCVD) 06/06/2012     He  has a past surgical history that includes Cardiac catheterization; Coronary angioplasty; Coronary angioplasty with stent; Colonoscopy (05/2012); Lumbar laminectomy (03/2010); and Leg Surgery (Bilateral)  His family history includes Diabetes in his mother; Hypertension in his mother; Kidney failure in his mother  He  reports that he quit smoking about 8 years ago  He has a 35 00 pack-year smoking history  He has never used smokeless tobacco  He reports that he does not drink alcohol or use drugs  Current Outpatient Medications   Medication Sig Dispense Refill    aspirin 81 MG tablet Take 2 tablets by mouth daily      atorvastatin (LIPITOR) 40 mg tablet Take 1 tablet (40 mg total) by mouth daily 90 tablet 1    famotidine (PEPCID) 40 MG tablet Take 1 tablet (40 mg total) by mouth daily 90 tablet 1    glucose blood test strip by In Vitro route daily      lisinopril (ZESTRIL) 10 mg tablet Take 1 tablet (10 mg total) by mouth daily 90 tablet 1    metFORMIN (GLUCOPHAGE) 500 mg tablet Take 1 tablet (500 mg total) by mouth 2 (two) times a day 180 tablet 1    metoprolol tartrate (LOPRESSOR) 25 mg tablet Take 1 tablet (25 mg total) by mouth 2 (two) times a day 180 tablet 1    ONETOUCH DELICA LANCETS 15U MISC by Does not apply route daily       No current facility-administered medications for this visit        Current Outpatient Medications on File Prior to Visit   Medication Sig    aspirin 81 MG tablet Take 2 tablets by mouth daily    atorvastatin (LIPITOR) 40 mg tablet Take 1 tablet (40 mg total) by mouth daily    famotidine (PEPCID) 40 MG tablet Take 1 tablet (40 mg total) by mouth daily    glucose blood test strip by In Vitro route daily    lisinopril (ZESTRIL) 10 mg tablet Take 1 tablet (10 mg total) by mouth daily    metFORMIN (GLUCOPHAGE) 500 mg tablet Take 1 tablet (500 mg total) by mouth 2 (two) times a day    ONETOUCH DELICA LANCETS 02E MISC by Does not apply route daily    [DISCONTINUED] metoprolol tartrate (LOPRESSOR) 25 mg tablet Take 1 tablet (25 mg total) by mouth 2 (two) times a day     No current facility-administered medications on file prior to visit  He has No Known Allergies       Review of Systems   Constitutional: Negative for appetite change, chills, fatigue and fever  HENT: Negative for congestion  Eyes: Negative for visual disturbance  Respiratory: Negative for cough and shortness of breath  Cardiovascular: Negative for chest pain and leg swelling  Gastrointestinal: Negative for abdominal pain, constipation and diarrhea  Endocrine: Negative for polydipsia, polyphagia and polyuria  Genitourinary: Negative for difficulty urinating, discharge and testicular pain  Musculoskeletal: Positive for arthralgias and myalgias  Skin: Negative for pallor and rash  Allergic/Immunologic: Negative for environmental allergies  Neurological: Negative for dizziness, weakness and headaches  Psychiatric/Behavioral: Negative for decreased concentration and sleep disturbance  The patient is not nervous/anxious  Objective:      /70 (BP Location: Right arm, Patient Position: Sitting, Cuff Size: Adult)   Pulse 83   Temp 97 5 °F (36 4 °C) (Tympanic)   Ht 4' 11" (1 499 m)   Wt 68 1 kg (150 lb 2 oz)   SpO2 98%   BMI 30 32 kg/m²          Physical Exam   Constitutional: He is oriented to person, place, and time  He appears well-developed and well-nourished  No distress  HENT:   Head: Normocephalic and atraumatic  Eyes: Right eye exhibits no discharge  Left eye exhibits no discharge  No scleral icterus  Cardiovascular: Normal rate, regular rhythm, normal heart sounds and intact distal pulses  Pulses are no weak pulses  No murmur heard  Pulses:       Dorsalis pedis pulses are 2+ on the right side, and 2+ on the left side  Posterior tibial pulses are 2+ on the right side, and 2+ on the left side  Pulmonary/Chest: Effort normal and breath sounds normal  No respiratory distress  He has no wheezes  Abdominal: Soft   Bowel sounds are normal  He exhibits no distension  There is no tenderness  Feet:   Right Foot:   Skin Integrity: Positive for dry skin  Left Foot:   Skin Integrity: Positive for dry skin  Neurological: He is alert and oriented to person, place, and time  Skin: He is not diaphoretic  Psychiatric: He has a normal mood and affect  His behavior is normal    Nursing note and vitals reviewed  Patient's shoes and socks removed  Right Foot/Ankle   Right Foot Inspection  Skin Exam: dry skin                            Sensory       Monofilament testing: intact  Vascular    The right DP pulse is 2+  The right PT pulse is 2+  Left Foot/Ankle  Left Foot Inspection  Skin Exam: dry skin                                         Sensory       Monofilament: intact  Vascular    The left DP pulse is 2+  The left PT pulse is 2+  Assign Risk Category:  No deformity present; No loss of protective sensation;  No weak pulses       Risk: 0

## 2020-03-10 DIAGNOSIS — E78.2 MIXED HYPERLIPIDEMIA: ICD-10-CM

## 2020-03-10 RX ORDER — ATORVASTATIN CALCIUM 40 MG/1
TABLET, FILM COATED ORAL
Qty: 90 TABLET | Refills: 1 | Status: SHIPPED | OUTPATIENT
Start: 2020-03-10 | End: 2020-01-01

## 2020-10-23 PROBLEM — Q68.5: Status: ACTIVE | Noted: 2020-01-01

## 2020-10-23 PROBLEM — J84.10 PULMONARY FIBROSIS (HCC): Status: ACTIVE | Noted: 2020-01-01

## 2020-10-23 PROBLEM — I10 HYPERTENSION: Status: ACTIVE | Noted: 2020-01-01

## 2020-10-23 PROBLEM — R06.00 DYSPNEA ON EXERTION: Status: ACTIVE | Noted: 2020-01-01

## 2020-10-23 PROBLEM — R74.8 ELEVATED LIVER ENZYMES: Status: ACTIVE | Noted: 2020-01-01

## 2020-11-10 PROBLEM — Z87.891 FORMER SMOKER: Status: ACTIVE | Noted: 2020-01-01

## 2020-11-10 PROBLEM — R63.4 WEIGHT LOSS: Status: ACTIVE | Noted: 2020-01-01

## 2020-11-10 PROBLEM — J96.11 CHRONIC RESPIRATORY FAILURE WITH HYPOXIA (HCC): Status: ACTIVE | Noted: 2020-01-01

## 2020-11-11 PROBLEM — R10.13 EPIGASTRIC PAIN: Status: ACTIVE | Noted: 2020-01-01

## 2020-11-11 PROBLEM — I21.4 NSTEMI (NON-ST ELEVATED MYOCARDIAL INFARCTION) (HCC): Status: ACTIVE | Noted: 2020-01-01

## 2020-11-14 PROBLEM — R63.4 UNINTENTIONAL WEIGHT LOSS: Status: ACTIVE | Noted: 2020-01-01

## 2020-11-14 PROBLEM — D64.9 ANEMIA: Status: ACTIVE | Noted: 2020-01-01

## 2021-01-01 ENCOUNTER — TRANSITIONAL CARE MANAGEMENT (OUTPATIENT)
Dept: FAMILY MEDICINE CLINIC | Facility: OTHER | Age: 66
End: 2021-01-01

## 2021-01-01 ENCOUNTER — NURSING HOME VISIT (OUTPATIENT)
Dept: GERIATRICS | Facility: OTHER | Age: 66
End: 2021-01-01
Payer: COMMERCIAL

## 2021-01-01 ENCOUNTER — HOSPITAL ENCOUNTER (INPATIENT)
Facility: HOSPITAL | Age: 66
LOS: 4 days | Discharge: NURSING, REHAB OR LONG-TERM CARE | DRG: 196 | End: 2021-02-08
Attending: EMERGENCY MEDICINE | Admitting: INTERNAL MEDICINE
Payer: COMMERCIAL

## 2021-01-01 ENCOUNTER — PATIENT OUTREACH (OUTPATIENT)
Dept: FAMILY MEDICINE CLINIC | Facility: OTHER | Age: 66
End: 2021-01-01

## 2021-01-01 ENCOUNTER — APPOINTMENT (EMERGENCY)
Dept: CT IMAGING | Facility: HOSPITAL | Age: 66
DRG: 196 | End: 2021-01-01
Payer: COMMERCIAL

## 2021-01-01 ENCOUNTER — APPOINTMENT (EMERGENCY)
Dept: RADIOLOGY | Facility: HOSPITAL | Age: 66
DRG: 189 | End: 2021-01-01
Payer: COMMERCIAL

## 2021-01-01 ENCOUNTER — HOSPITAL ENCOUNTER (INPATIENT)
Facility: HOSPITAL | Age: 66
LOS: 9 days | Discharge: HOSPICE | DRG: 189 | End: 2021-03-12
Attending: EMERGENCY MEDICINE | Admitting: INTERNAL MEDICINE
Payer: COMMERCIAL

## 2021-01-01 ENCOUNTER — TELEPHONE (OUTPATIENT)
Dept: GASTROENTEROLOGY | Facility: AMBULARY SURGERY CENTER | Age: 66
End: 2021-01-01

## 2021-01-01 ENCOUNTER — APPOINTMENT (INPATIENT)
Dept: RADIOLOGY | Facility: HOSPITAL | Age: 66
DRG: 189 | End: 2021-01-01
Payer: COMMERCIAL

## 2021-01-01 ENCOUNTER — TELEPHONE (OUTPATIENT)
Dept: GASTROENTEROLOGY | Facility: CLINIC | Age: 66
End: 2021-01-01

## 2021-01-01 ENCOUNTER — TELEPHONE (OUTPATIENT)
Dept: PALLIATIVE MEDICINE | Facility: CLINIC | Age: 66
End: 2021-01-01

## 2021-01-01 ENCOUNTER — APPOINTMENT (EMERGENCY)
Dept: RADIOLOGY | Facility: HOSPITAL | Age: 66
DRG: 196 | End: 2021-01-01
Payer: COMMERCIAL

## 2021-01-01 ENCOUNTER — APPOINTMENT (INPATIENT)
Dept: NON INVASIVE DIAGNOSTICS | Facility: HOSPITAL | Age: 66
DRG: 196 | End: 2021-01-01
Payer: COMMERCIAL

## 2021-01-01 ENCOUNTER — TELEMEDICINE (OUTPATIENT)
Dept: NEPHROLOGY | Facility: CLINIC | Age: 66
End: 2021-01-01
Payer: COMMERCIAL

## 2021-01-01 ENCOUNTER — TELEMEDICINE (OUTPATIENT)
Dept: FAMILY MEDICINE CLINIC | Facility: OTHER | Age: 66
End: 2021-01-01
Payer: COMMERCIAL

## 2021-01-01 ENCOUNTER — APPOINTMENT (EMERGENCY)
Dept: CT IMAGING | Facility: HOSPITAL | Age: 66
DRG: 189 | End: 2021-01-01
Payer: COMMERCIAL

## 2021-01-01 VITALS
HEIGHT: 60 IN | RESPIRATION RATE: 20 BRPM | OXYGEN SATURATION: 98 % | TEMPERATURE: 97 F | DIASTOLIC BLOOD PRESSURE: 65 MMHG | SYSTOLIC BLOOD PRESSURE: 100 MMHG | BODY MASS INDEX: 19.83 KG/M2 | WEIGHT: 101 LBS | HEART RATE: 91 BPM

## 2021-01-01 VITALS
RESPIRATION RATE: 16 BRPM | BODY MASS INDEX: 20.28 KG/M2 | DIASTOLIC BLOOD PRESSURE: 61 MMHG | HEART RATE: 90 BPM | TEMPERATURE: 97.8 F | SYSTOLIC BLOOD PRESSURE: 124 MMHG | WEIGHT: 103.84 LBS | OXYGEN SATURATION: 99 %

## 2021-01-01 VITALS
BODY MASS INDEX: 19.16 KG/M2 | HEIGHT: 60 IN | RESPIRATION RATE: 18 BRPM | HEART RATE: 93 BPM | WEIGHT: 97.6 LBS | SYSTOLIC BLOOD PRESSURE: 133 MMHG | TEMPERATURE: 97.5 F | DIASTOLIC BLOOD PRESSURE: 69 MMHG | OXYGEN SATURATION: 98 %

## 2021-01-01 VITALS — WEIGHT: 95 LBS | HEIGHT: 60 IN | BODY MASS INDEX: 18.65 KG/M2

## 2021-01-01 DIAGNOSIS — R94.31 ABNORMAL EKG: Primary | ICD-10-CM

## 2021-01-01 DIAGNOSIS — R63.4 UNEXPLAINED WEIGHT LOSS: ICD-10-CM

## 2021-01-01 DIAGNOSIS — I10 BENIGN ESSENTIAL HYPERTENSION: ICD-10-CM

## 2021-01-01 DIAGNOSIS — Z12.5 SCREENING PSA (PROSTATE SPECIFIC ANTIGEN): ICD-10-CM

## 2021-01-01 DIAGNOSIS — R06.00 DYSPNEA ON EXERTION: Primary | ICD-10-CM

## 2021-01-01 DIAGNOSIS — J96.11 CHRONIC RESPIRATORY FAILURE WITH HYPOXIA (HCC): Primary | ICD-10-CM

## 2021-01-01 DIAGNOSIS — N18.2 CKD (CHRONIC KIDNEY DISEASE) STAGE 2, GFR 60-89 ML/MIN: ICD-10-CM

## 2021-01-01 DIAGNOSIS — E43 SEVERE PROTEIN-CALORIE MALNUTRITION (HCC): ICD-10-CM

## 2021-01-01 DIAGNOSIS — R53.81 PHYSICAL DECONDITIONING: ICD-10-CM

## 2021-01-01 DIAGNOSIS — Z00.00 MEDICARE ANNUAL WELLNESS VISIT, SUBSEQUENT: Primary | ICD-10-CM

## 2021-01-01 DIAGNOSIS — R63.4 UNINTENTIONAL WEIGHT LOSS: ICD-10-CM

## 2021-01-01 DIAGNOSIS — R06.00 DYSPNEA: ICD-10-CM

## 2021-01-01 DIAGNOSIS — N17.9 ACUTE RENAL FAILURE SUPERIMPOSED ON STAGE 2 CHRONIC KIDNEY DISEASE, UNSPECIFIED ACUTE RENAL FAILURE TYPE (HCC): ICD-10-CM

## 2021-01-01 DIAGNOSIS — R26.2 AMBULATORY DYSFUNCTION: ICD-10-CM

## 2021-01-01 DIAGNOSIS — Z11.4 SCREENING FOR HIV (HUMAN IMMUNODEFICIENCY VIRUS): ICD-10-CM

## 2021-01-01 DIAGNOSIS — R53.1 WEAKNESS: ICD-10-CM

## 2021-01-01 DIAGNOSIS — J96.11 CHRONIC RESPIRATORY FAILURE WITH HYPOXIA (HCC): ICD-10-CM

## 2021-01-01 DIAGNOSIS — R11.0 NAUSEA: ICD-10-CM

## 2021-01-01 DIAGNOSIS — J84.10 PULMONARY FIBROSIS DETERMINED BY HIGH RESOLUTION COMPUTED TOMOGRAPHY (HCC): Primary | ICD-10-CM

## 2021-01-01 DIAGNOSIS — R80.9 MICROALBUMINURIA DUE TO TYPE 2 DIABETES MELLITUS (HCC): ICD-10-CM

## 2021-01-01 DIAGNOSIS — Z98.61 CAD S/P PERCUTANEOUS CORONARY ANGIOPLASTY: ICD-10-CM

## 2021-01-01 DIAGNOSIS — R79.89 ELEVATED D-DIMER: ICD-10-CM

## 2021-01-01 DIAGNOSIS — I25.10 CAD S/P PERCUTANEOUS CORONARY ANGIOPLASTY: ICD-10-CM

## 2021-01-01 DIAGNOSIS — J84.10 PULMONARY FIBROSIS DETERMINED BY HIGH RESOLUTION COMPUTED TOMOGRAPHY (HCC): ICD-10-CM

## 2021-01-01 DIAGNOSIS — K56.41 FECAL IMPACTION (HCC): ICD-10-CM

## 2021-01-01 DIAGNOSIS — N18.2 ACUTE RENAL FAILURE SUPERIMPOSED ON STAGE 2 CHRONIC KIDNEY DISEASE, UNSPECIFIED ACUTE RENAL FAILURE TYPE (HCC): ICD-10-CM

## 2021-01-01 DIAGNOSIS — Z51.5 HOSPICE CARE PATIENT: ICD-10-CM

## 2021-01-01 DIAGNOSIS — R09.02 HYPOXIA: ICD-10-CM

## 2021-01-01 DIAGNOSIS — R74.8 ELEVATED LIVER ENZYMES: ICD-10-CM

## 2021-01-01 DIAGNOSIS — R10.9 ABDOMINAL CRAMPING: ICD-10-CM

## 2021-01-01 DIAGNOSIS — N18.2 STAGE 2 CHRONIC KIDNEY DISEASE: ICD-10-CM

## 2021-01-01 DIAGNOSIS — R63.4 WEIGHT LOSS: ICD-10-CM

## 2021-01-01 DIAGNOSIS — M48.00 SPINAL STENOSIS, UNSPECIFIED SPINAL REGION: Primary | ICD-10-CM

## 2021-01-01 DIAGNOSIS — R62.52 SMALL STATURE: ICD-10-CM

## 2021-01-01 DIAGNOSIS — R77.8 ELEVATED TROPONIN I LEVEL: ICD-10-CM

## 2021-01-01 DIAGNOSIS — J96.21 ACUTE ON CHRONIC RESPIRATORY FAILURE WITH HYPOXIA (HCC): ICD-10-CM

## 2021-01-01 DIAGNOSIS — R79.89 ABNORMAL LFTS: ICD-10-CM

## 2021-01-01 DIAGNOSIS — M79.10 MYALGIA: ICD-10-CM

## 2021-01-01 DIAGNOSIS — J98.4 PULMONARY INSUFFICIENCY: Primary | ICD-10-CM

## 2021-01-01 DIAGNOSIS — E11.9 TYPE 2 DIABETES MELLITUS WITHOUT COMPLICATION, WITHOUT LONG-TERM CURRENT USE OF INSULIN (HCC): ICD-10-CM

## 2021-01-01 DIAGNOSIS — N18.2 BENIGN HYPERTENSION WITH CKD (CHRONIC KIDNEY DISEASE), STAGE II: Primary | ICD-10-CM

## 2021-01-01 DIAGNOSIS — I12.9 BENIGN HYPERTENSION WITH CKD (CHRONIC KIDNEY DISEASE), STAGE II: Primary | ICD-10-CM

## 2021-01-01 DIAGNOSIS — E87.1 HYPONATREMIA: ICD-10-CM

## 2021-01-01 DIAGNOSIS — K59.00 CONSTIPATION, UNSPECIFIED CONSTIPATION TYPE: ICD-10-CM

## 2021-01-01 DIAGNOSIS — I10 ESSENTIAL HYPERTENSION: ICD-10-CM

## 2021-01-01 DIAGNOSIS — E11.29 MICROALBUMINURIA DUE TO TYPE 2 DIABETES MELLITUS (HCC): ICD-10-CM

## 2021-01-01 DIAGNOSIS — N17.9 AKI (ACUTE KIDNEY INJURY) (HCC): ICD-10-CM

## 2021-01-01 DIAGNOSIS — E46 MALNUTRITION (HCC): ICD-10-CM

## 2021-01-01 DIAGNOSIS — Z78.9 NEEDS ASSISTANCE WITH COMMUNITY RESOURCES: Primary | ICD-10-CM

## 2021-01-01 DIAGNOSIS — E87.5 HYPERKALEMIA: ICD-10-CM

## 2021-01-01 DIAGNOSIS — R21 RASH: ICD-10-CM

## 2021-01-01 LAB
ALBUMIN SERPL BCP-MCNC: 2.9 G/DL (ref 3.5–5)
ALBUMIN SERPL BCP-MCNC: 3.2 G/DL (ref 3.5–5)
ALBUMIN SERPL BCP-MCNC: 3.7 G/DL (ref 3.5–5)
ALP SERPL-CCNC: 106 U/L (ref 46–116)
ALP SERPL-CCNC: 116 U/L (ref 46–116)
ALP SERPL-CCNC: 145 U/L (ref 46–116)
ALT SERPL W P-5'-P-CCNC: 136 U/L (ref 12–78)
ALT SERPL W P-5'-P-CCNC: 90 U/L (ref 12–78)
ALT SERPL W P-5'-P-CCNC: 99 U/L (ref 12–78)
ANION GAP SERPL CALCULATED.3IONS-SCNC: 10 MMOL/L (ref 4–13)
ANION GAP SERPL CALCULATED.3IONS-SCNC: 11 MMOL/L (ref 4–13)
ANION GAP SERPL CALCULATED.3IONS-SCNC: 11 MMOL/L (ref 4–13)
ANION GAP SERPL CALCULATED.3IONS-SCNC: 13 MMOL/L (ref 4–13)
ANION GAP SERPL CALCULATED.3IONS-SCNC: 2 MMOL/L (ref 4–13)
ANION GAP SERPL CALCULATED.3IONS-SCNC: 3 MMOL/L (ref 4–13)
ANION GAP SERPL CALCULATED.3IONS-SCNC: 4 MMOL/L (ref 4–13)
ANION GAP SERPL CALCULATED.3IONS-SCNC: 5 MMOL/L (ref 4–13)
ANION GAP SERPL CALCULATED.3IONS-SCNC: 6 MMOL/L (ref 4–13)
ANION GAP SERPL CALCULATED.3IONS-SCNC: 7 MMOL/L (ref 4–13)
ANION GAP SERPL CALCULATED.3IONS-SCNC: 7 MMOL/L (ref 4–13)
ANION GAP SERPL CALCULATED.3IONS-SCNC: 8 MMOL/L (ref 4–13)
AST SERPL W P-5'-P-CCNC: 116 U/L (ref 5–45)
AST SERPL W P-5'-P-CCNC: 143 U/L (ref 5–45)
AST SERPL W P-5'-P-CCNC: 156 U/L (ref 5–45)
ATRIAL RATE: 101 BPM
ATRIAL RATE: 105 BPM
ATRIAL RATE: 106 BPM
ATRIAL RATE: 85 BPM
ATRIAL RATE: 90 BPM
BACTERIA UR QL AUTO: ABNORMAL /HPF
BASE EX.OXY STD BLDV CALC-SCNC: 90.3 % (ref 60–80)
BASE EXCESS BLDA CALC-SCNC: 9.1 MMOL/L
BASE EXCESS BLDV CALC-SCNC: 9.4 MMOL/L
BASOPHILS # BLD AUTO: 0.03 THOUSANDS/ΜL (ref 0–0.1)
BASOPHILS # BLD AUTO: 0.05 THOUSANDS/ΜL (ref 0–0.1)
BASOPHILS NFR BLD AUTO: 0 % (ref 0–1)
BASOPHILS NFR BLD AUTO: 1 % (ref 0–1)
BILIRUB SERPL-MCNC: 0.43 MG/DL (ref 0.2–1)
BILIRUB SERPL-MCNC: 0.47 MG/DL (ref 0.2–1)
BILIRUB SERPL-MCNC: 0.47 MG/DL (ref 0.2–1)
BILIRUB UR QL STRIP: NEGATIVE
BUN SERPL-MCNC: 20 MG/DL (ref 5–25)
BUN SERPL-MCNC: 24 MG/DL (ref 5–25)
BUN SERPL-MCNC: 24 MG/DL (ref 5–25)
BUN SERPL-MCNC: 26 MG/DL (ref 5–25)
BUN SERPL-MCNC: 37 MG/DL (ref 5–25)
BUN SERPL-MCNC: 45 MG/DL (ref 5–25)
BUN SERPL-MCNC: 51 MG/DL (ref 5–25)
BUN SERPL-MCNC: 57 MG/DL (ref 5–25)
BUN SERPL-MCNC: 59 MG/DL (ref 5–25)
BUN SERPL-MCNC: 71 MG/DL (ref 5–25)
BUN SERPL-MCNC: 74 MG/DL (ref 5–25)
BUN SERPL-MCNC: 82 MG/DL (ref 5–25)
BUN SERPL-MCNC: 86 MG/DL (ref 5–25)
BUN SERPL-MCNC: 90 MG/DL (ref 5–25)
CALCIUM ALBUM COR SERPL-MCNC: 10.6 MG/DL (ref 8.3–10.1)
CALCIUM ALBUM COR SERPL-MCNC: 9.7 MG/DL (ref 8.3–10.1)
CALCIUM SERPL-MCNC: 10.5 MG/DL (ref 8.3–10.1)
CALCIUM SERPL-MCNC: 8.2 MG/DL (ref 8.3–10.1)
CALCIUM SERPL-MCNC: 8.3 MG/DL (ref 8.3–10.1)
CALCIUM SERPL-MCNC: 8.4 MG/DL (ref 8.3–10.1)
CALCIUM SERPL-MCNC: 9 MG/DL (ref 8.3–10.1)
CALCIUM SERPL-MCNC: 9.1 MG/DL (ref 8.3–10.1)
CALCIUM SERPL-MCNC: 9.1 MG/DL (ref 8.3–10.1)
CALCIUM SERPL-MCNC: 9.3 MG/DL (ref 8.3–10.1)
CALCIUM SERPL-MCNC: 9.4 MG/DL (ref 8.3–10.1)
CALCIUM SERPL-MCNC: 9.5 MG/DL (ref 8.3–10.1)
CALCIUM SERPL-MCNC: 9.7 MG/DL (ref 8.3–10.1)
CALCIUM SERPL-MCNC: 9.7 MG/DL (ref 8.3–10.1)
CHLORIDE SERPL-SCNC: 100 MMOL/L (ref 100–108)
CHLORIDE SERPL-SCNC: 95 MMOL/L (ref 100–108)
CHLORIDE SERPL-SCNC: 95 MMOL/L (ref 100–108)
CHLORIDE SERPL-SCNC: 96 MMOL/L (ref 100–108)
CHLORIDE SERPL-SCNC: 97 MMOL/L (ref 100–108)
CHLORIDE SERPL-SCNC: 97 MMOL/L (ref 100–108)
CHLORIDE SERPL-SCNC: 98 MMOL/L (ref 100–108)
CHLORIDE SERPL-SCNC: 99 MMOL/L (ref 100–108)
CLARITY UR: CLEAR
CO2 SERPL-SCNC: 25 MMOL/L (ref 21–32)
CO2 SERPL-SCNC: 26 MMOL/L (ref 21–32)
CO2 SERPL-SCNC: 27 MMOL/L (ref 21–32)
CO2 SERPL-SCNC: 28 MMOL/L (ref 21–32)
CO2 SERPL-SCNC: 29 MMOL/L (ref 21–32)
CO2 SERPL-SCNC: 31 MMOL/L (ref 21–32)
CO2 SERPL-SCNC: 34 MMOL/L (ref 21–32)
CO2 SERPL-SCNC: 36 MMOL/L (ref 21–32)
CO2 SERPL-SCNC: 38 MMOL/L (ref 21–32)
COLOR UR: YELLOW
CREAT SERPL-MCNC: 0.76 MG/DL (ref 0.6–1.3)
CREAT SERPL-MCNC: 0.8 MG/DL (ref 0.6–1.3)
CREAT SERPL-MCNC: 0.8 MG/DL (ref 0.6–1.3)
CREAT SERPL-MCNC: 0.83 MG/DL (ref 0.6–1.3)
CREAT SERPL-MCNC: 0.89 MG/DL (ref 0.6–1.3)
CREAT SERPL-MCNC: 0.92 MG/DL (ref 0.6–1.3)
CREAT SERPL-MCNC: 0.97 MG/DL (ref 0.6–1.3)
CREAT SERPL-MCNC: 1.13 MG/DL (ref 0.6–1.3)
CREAT SERPL-MCNC: 1.29 MG/DL (ref 0.6–1.3)
CREAT SERPL-MCNC: 1.93 MG/DL (ref 0.6–1.3)
CREAT SERPL-MCNC: 1.97 MG/DL (ref 0.6–1.3)
CREAT SERPL-MCNC: 2.44 MG/DL (ref 0.6–1.3)
CREAT SERPL-MCNC: 2.5 MG/DL (ref 0.6–1.3)
CREAT SERPL-MCNC: 2.54 MG/DL (ref 0.6–1.3)
D DIMER PPP FEU-MCNC: 0.93 UG/ML FEU
D DIMER PPP FEU-MCNC: 1.21 UG/ML FEU
EOSINOPHIL # BLD AUTO: 0.12 THOUSAND/ΜL (ref 0–0.61)
EOSINOPHIL # BLD AUTO: 0.18 THOUSAND/ΜL (ref 0–0.61)
EOSINOPHIL NFR BLD AUTO: 1 % (ref 0–6)
EOSINOPHIL NFR BLD AUTO: 2 % (ref 0–6)
ERYTHROCYTE [DISTWIDTH] IN BLOOD BY AUTOMATED COUNT: 14.8 % (ref 11.6–15.1)
ERYTHROCYTE [DISTWIDTH] IN BLOOD BY AUTOMATED COUNT: 15.1 % (ref 11.6–15.1)
ERYTHROCYTE [DISTWIDTH] IN BLOOD BY AUTOMATED COUNT: 15.1 % (ref 11.6–15.1)
ERYTHROCYTE [DISTWIDTH] IN BLOOD BY AUTOMATED COUNT: 15.2 % (ref 11.6–15.1)
ERYTHROCYTE [DISTWIDTH] IN BLOOD BY AUTOMATED COUNT: 15.2 % (ref 11.6–15.1)
ERYTHROCYTE [DISTWIDTH] IN BLOOD BY AUTOMATED COUNT: 15.5 % (ref 11.6–15.1)
ERYTHROCYTE [DISTWIDTH] IN BLOOD BY AUTOMATED COUNT: 15.7 % (ref 11.6–15.1)
ERYTHROCYTE [DISTWIDTH] IN BLOOD BY AUTOMATED COUNT: 15.7 % (ref 11.6–15.1)
ERYTHROCYTE [DISTWIDTH] IN BLOOD BY AUTOMATED COUNT: 15.8 % (ref 11.6–15.1)
FLUAV RNA RESP QL NAA+PROBE: NEGATIVE
FLUBV RNA RESP QL NAA+PROBE: NEGATIVE
GFR SERPL CREATININE-BSD FRML MDRD: 26 ML/MIN/1.73SQ M
GFR SERPL CREATININE-BSD FRML MDRD: 26 ML/MIN/1.73SQ M
GFR SERPL CREATININE-BSD FRML MDRD: 27 ML/MIN/1.73SQ M
GFR SERPL CREATININE-BSD FRML MDRD: 35 ML/MIN/1.73SQ M
GFR SERPL CREATININE-BSD FRML MDRD: 36 ML/MIN/1.73SQ M
GFR SERPL CREATININE-BSD FRML MDRD: 58 ML/MIN/1.73SQ M
GFR SERPL CREATININE-BSD FRML MDRD: 68 ML/MIN/1.73SQ M
GFR SERPL CREATININE-BSD FRML MDRD: 82 ML/MIN/1.73SQ M
GFR SERPL CREATININE-BSD FRML MDRD: 88 ML/MIN/1.73SQ M
GFR SERPL CREATININE-BSD FRML MDRD: 90 ML/MIN/1.73SQ M
GFR SERPL CREATININE-BSD FRML MDRD: 93 ML/MIN/1.73SQ M
GFR SERPL CREATININE-BSD FRML MDRD: 94 ML/MIN/1.73SQ M
GFR SERPL CREATININE-BSD FRML MDRD: 94 ML/MIN/1.73SQ M
GFR SERPL CREATININE-BSD FRML MDRD: 96 ML/MIN/1.73SQ M
GLUCOSE SERPL-MCNC: 100 MG/DL (ref 65–140)
GLUCOSE SERPL-MCNC: 110 MG/DL (ref 65–140)
GLUCOSE SERPL-MCNC: 112 MG/DL (ref 65–140)
GLUCOSE SERPL-MCNC: 118 MG/DL (ref 65–140)
GLUCOSE SERPL-MCNC: 121 MG/DL (ref 65–140)
GLUCOSE SERPL-MCNC: 121 MG/DL (ref 65–140)
GLUCOSE SERPL-MCNC: 122 MG/DL (ref 65–140)
GLUCOSE SERPL-MCNC: 123 MG/DL (ref 65–140)
GLUCOSE SERPL-MCNC: 124 MG/DL (ref 65–140)
GLUCOSE SERPL-MCNC: 125 MG/DL (ref 65–140)
GLUCOSE SERPL-MCNC: 126 MG/DL (ref 65–140)
GLUCOSE SERPL-MCNC: 127 MG/DL (ref 65–140)
GLUCOSE SERPL-MCNC: 127 MG/DL (ref 65–140)
GLUCOSE SERPL-MCNC: 130 MG/DL (ref 65–140)
GLUCOSE SERPL-MCNC: 130 MG/DL (ref 65–140)
GLUCOSE SERPL-MCNC: 131 MG/DL (ref 65–140)
GLUCOSE SERPL-MCNC: 132 MG/DL (ref 65–140)
GLUCOSE SERPL-MCNC: 132 MG/DL (ref 65–140)
GLUCOSE SERPL-MCNC: 133 MG/DL (ref 65–140)
GLUCOSE SERPL-MCNC: 135 MG/DL (ref 65–140)
GLUCOSE SERPL-MCNC: 136 MG/DL (ref 65–140)
GLUCOSE SERPL-MCNC: 138 MG/DL (ref 65–140)
GLUCOSE SERPL-MCNC: 139 MG/DL (ref 65–140)
GLUCOSE SERPL-MCNC: 144 MG/DL (ref 65–140)
GLUCOSE SERPL-MCNC: 145 MG/DL (ref 65–140)
GLUCOSE SERPL-MCNC: 147 MG/DL (ref 65–140)
GLUCOSE SERPL-MCNC: 150 MG/DL (ref 65–140)
GLUCOSE SERPL-MCNC: 152 MG/DL (ref 65–140)
GLUCOSE SERPL-MCNC: 157 MG/DL (ref 65–140)
GLUCOSE SERPL-MCNC: 159 MG/DL (ref 65–140)
GLUCOSE SERPL-MCNC: 163 MG/DL (ref 65–140)
GLUCOSE SERPL-MCNC: 165 MG/DL (ref 65–140)
GLUCOSE SERPL-MCNC: 167 MG/DL (ref 65–140)
GLUCOSE SERPL-MCNC: 167 MG/DL (ref 65–140)
GLUCOSE SERPL-MCNC: 169 MG/DL (ref 65–140)
GLUCOSE SERPL-MCNC: 171 MG/DL (ref 65–140)
GLUCOSE SERPL-MCNC: 172 MG/DL (ref 65–140)
GLUCOSE SERPL-MCNC: 174 MG/DL (ref 65–140)
GLUCOSE SERPL-MCNC: 174 MG/DL (ref 65–140)
GLUCOSE SERPL-MCNC: 175 MG/DL (ref 65–140)
GLUCOSE SERPL-MCNC: 182 MG/DL (ref 65–140)
GLUCOSE SERPL-MCNC: 185 MG/DL (ref 65–140)
GLUCOSE SERPL-MCNC: 187 MG/DL (ref 65–140)
GLUCOSE SERPL-MCNC: 189 MG/DL (ref 65–140)
GLUCOSE SERPL-MCNC: 194 MG/DL (ref 65–140)
GLUCOSE SERPL-MCNC: 196 MG/DL (ref 65–140)
GLUCOSE SERPL-MCNC: 208 MG/DL (ref 65–140)
GLUCOSE SERPL-MCNC: 210 MG/DL (ref 65–140)
GLUCOSE SERPL-MCNC: 218 MG/DL (ref 65–140)
GLUCOSE SERPL-MCNC: 221 MG/DL (ref 65–140)
GLUCOSE SERPL-MCNC: 249 MG/DL (ref 65–140)
GLUCOSE SERPL-MCNC: 265 MG/DL (ref 65–140)
GLUCOSE SERPL-MCNC: 277 MG/DL (ref 65–140)
GLUCOSE SERPL-MCNC: 280 MG/DL (ref 65–140)
GLUCOSE SERPL-MCNC: 282 MG/DL (ref 65–140)
GLUCOSE SERPL-MCNC: 295 MG/DL (ref 65–140)
GLUCOSE SERPL-MCNC: 316 MG/DL (ref 65–140)
GLUCOSE SERPL-MCNC: 369 MG/DL (ref 65–140)
GLUCOSE SERPL-MCNC: 82 MG/DL (ref 65–140)
GLUCOSE SERPL-MCNC: 83 MG/DL (ref 65–140)
GLUCOSE SERPL-MCNC: 88 MG/DL (ref 65–140)
GLUCOSE SERPL-MCNC: 89 MG/DL (ref 65–140)
GLUCOSE SERPL-MCNC: 93 MG/DL (ref 65–140)
GLUCOSE SERPL-MCNC: 94 MG/DL (ref 65–140)
GLUCOSE SERPL-MCNC: 95 MG/DL (ref 65–140)
GLUCOSE SERPL-MCNC: 97 MG/DL (ref 65–140)
GLUCOSE UR STRIP-MCNC: NEGATIVE MG/DL
HCO3 BLDA-SCNC: 34.3 MMOL/L (ref 22–28)
HCO3 BLDV-SCNC: 37.3 MMOL/L (ref 24–30)
HCT VFR BLD AUTO: 31.6 % (ref 36.5–49.3)
HCT VFR BLD AUTO: 31.7 % (ref 36.5–49.3)
HCT VFR BLD AUTO: 34.8 % (ref 36.5–49.3)
HCT VFR BLD AUTO: 36 % (ref 36.5–49.3)
HCT VFR BLD AUTO: 37.9 % (ref 36.5–49.3)
HCT VFR BLD AUTO: 38.1 % (ref 36.5–49.3)
HCT VFR BLD AUTO: 41.2 % (ref 36.5–49.3)
HCT VFR BLD AUTO: 43.4 % (ref 36.5–49.3)
HCT VFR BLD AUTO: 44.1 % (ref 36.5–49.3)
HGB BLD-MCNC: 10 G/DL (ref 12–17)
HGB BLD-MCNC: 10 G/DL (ref 12–17)
HGB BLD-MCNC: 10.9 G/DL (ref 12–17)
HGB BLD-MCNC: 11 G/DL (ref 12–17)
HGB BLD-MCNC: 11.7 G/DL (ref 12–17)
HGB BLD-MCNC: 12 G/DL (ref 12–17)
HGB BLD-MCNC: 12.7 G/DL (ref 12–17)
HGB BLD-MCNC: 13.2 G/DL (ref 12–17)
HGB BLD-MCNC: 13.3 G/DL (ref 12–17)
HGB UR QL STRIP.AUTO: ABNORMAL
IMM GRANULOCYTES # BLD AUTO: 0.02 THOUSAND/UL (ref 0–0.2)
IMM GRANULOCYTES # BLD AUTO: 0.03 THOUSAND/UL (ref 0–0.2)
IMM GRANULOCYTES NFR BLD AUTO: 0 % (ref 0–2)
IMM GRANULOCYTES NFR BLD AUTO: 0 % (ref 0–2)
KETONES UR STRIP-MCNC: NEGATIVE MG/DL
LEUKOCYTE ESTERASE UR QL STRIP: NEGATIVE
LYMPHOCYTES # BLD AUTO: 1.18 THOUSANDS/ΜL (ref 0.6–4.47)
LYMPHOCYTES # BLD AUTO: 1.53 THOUSANDS/ΜL (ref 0.6–4.47)
LYMPHOCYTES NFR BLD AUTO: 12 % (ref 14–44)
LYMPHOCYTES NFR BLD AUTO: 16 % (ref 14–44)
MAGNESIUM SERPL-MCNC: 1.7 MG/DL (ref 1.6–2.6)
MCH RBC QN AUTO: 26.5 PG (ref 26.8–34.3)
MCH RBC QN AUTO: 27.2 PG (ref 26.8–34.3)
MCH RBC QN AUTO: 27.2 PG (ref 26.8–34.3)
MCH RBC QN AUTO: 27.3 PG (ref 26.8–34.3)
MCH RBC QN AUTO: 27.4 PG (ref 26.8–34.3)
MCH RBC QN AUTO: 27.4 PG (ref 26.8–34.3)
MCH RBC QN AUTO: 27.5 PG (ref 26.8–34.3)
MCH RBC QN AUTO: 27.7 PG (ref 26.8–34.3)
MCH RBC QN AUTO: 27.9 PG (ref 26.8–34.3)
MCHC RBC AUTO-ENTMCNC: 29.9 G/DL (ref 31.4–37.4)
MCHC RBC AUTO-ENTMCNC: 30.6 G/DL (ref 31.4–37.4)
MCHC RBC AUTO-ENTMCNC: 30.6 G/DL (ref 31.4–37.4)
MCHC RBC AUTO-ENTMCNC: 30.7 G/DL (ref 31.4–37.4)
MCHC RBC AUTO-ENTMCNC: 30.8 G/DL (ref 31.4–37.4)
MCHC RBC AUTO-ENTMCNC: 31.3 G/DL (ref 31.4–37.4)
MCHC RBC AUTO-ENTMCNC: 31.5 G/DL (ref 31.4–37.4)
MCHC RBC AUTO-ENTMCNC: 31.6 G/DL (ref 31.4–37.4)
MCHC RBC AUTO-ENTMCNC: 31.7 G/DL (ref 31.4–37.4)
MCV RBC AUTO: 87 FL (ref 82–98)
MCV RBC AUTO: 87 FL (ref 82–98)
MCV RBC AUTO: 88 FL (ref 82–98)
MCV RBC AUTO: 89 FL (ref 82–98)
MCV RBC AUTO: 89 FL (ref 82–98)
MCV RBC AUTO: 90 FL (ref 82–98)
MONOCYTES # BLD AUTO: 0.39 THOUSAND/ΜL (ref 0.17–1.22)
MONOCYTES # BLD AUTO: 0.49 THOUSAND/ΜL (ref 0.17–1.22)
MONOCYTES NFR BLD AUTO: 4 % (ref 4–12)
MONOCYTES NFR BLD AUTO: 5 % (ref 4–12)
NEUTROPHILS # BLD AUTO: 7.51 THOUSANDS/ΜL (ref 1.85–7.62)
NEUTROPHILS # BLD AUTO: 8 THOUSANDS/ΜL (ref 1.85–7.62)
NEUTS SEG NFR BLD AUTO: 77 % (ref 43–75)
NEUTS SEG NFR BLD AUTO: 82 % (ref 43–75)
NITRITE UR QL STRIP: NEGATIVE
NON-SQ EPI CELLS URNS QL MICRO: ABNORMAL /HPF
NRBC BLD AUTO-RTO: 0 /100 WBCS
NRBC BLD AUTO-RTO: 0 /100 WBCS
NT-PROBNP SERPL-MCNC: 1194 PG/ML
NT-PROBNP SERPL-MCNC: 424 PG/ML
O2 CT BLDA-SCNC: 16.9 ML/DL (ref 16–23)
O2 CT BLDV-SCNC: 15.9 ML/DL
OXYHGB MFR BLDA: 98.7 % (ref 94–97)
P AXIS: 19 DEGREES
P AXIS: 22 DEGREES
P AXIS: 29 DEGREES
P AXIS: 44 DEGREES
P AXIS: 55 DEGREES
PCO2 BLDA: 49.4 MM HG (ref 36–44)
PCO2 BLDV: 67.5 MM HG (ref 42–50)
PH BLDA: 7.46 [PH] (ref 7.35–7.45)
PH BLDV: 7.36 [PH] (ref 7.3–7.4)
PH UR STRIP.AUTO: 5.5 [PH]
PLATELET # BLD AUTO: 252 THOUSANDS/UL (ref 149–390)
PLATELET # BLD AUTO: 259 THOUSANDS/UL (ref 149–390)
PLATELET # BLD AUTO: 276 THOUSANDS/UL (ref 149–390)
PLATELET # BLD AUTO: 282 THOUSANDS/UL (ref 149–390)
PLATELET # BLD AUTO: 289 THOUSANDS/UL (ref 149–390)
PLATELET # BLD AUTO: 314 THOUSANDS/UL (ref 149–390)
PLATELET # BLD AUTO: 318 THOUSANDS/UL (ref 149–390)
PLATELET # BLD AUTO: 340 THOUSANDS/UL (ref 149–390)
PLATELET # BLD AUTO: 366 THOUSANDS/UL (ref 149–390)
PMV BLD AUTO: 11.3 FL (ref 8.9–12.7)
PMV BLD AUTO: 11.4 FL (ref 8.9–12.7)
PMV BLD AUTO: 11.5 FL (ref 8.9–12.7)
PMV BLD AUTO: 11.5 FL (ref 8.9–12.7)
PMV BLD AUTO: 11.7 FL (ref 8.9–12.7)
PMV BLD AUTO: 11.9 FL (ref 8.9–12.7)
PMV BLD AUTO: 12.2 FL (ref 8.9–12.7)
PO2 BLDA: 152 MM HG (ref 75–129)
PO2 BLDV: 63.5 MM HG (ref 35–45)
POTASSIUM SERPL-SCNC: 3 MMOL/L (ref 3.5–5.3)
POTASSIUM SERPL-SCNC: 3.5 MMOL/L (ref 3.5–5.3)
POTASSIUM SERPL-SCNC: 4.1 MMOL/L (ref 3.5–5.3)
POTASSIUM SERPL-SCNC: 4.6 MMOL/L (ref 3.5–5.3)
POTASSIUM SERPL-SCNC: 4.7 MMOL/L (ref 3.5–5.3)
POTASSIUM SERPL-SCNC: 4.8 MMOL/L (ref 3.5–5.3)
POTASSIUM SERPL-SCNC: 4.9 MMOL/L (ref 3.5–5.3)
POTASSIUM SERPL-SCNC: 4.9 MMOL/L (ref 3.5–5.3)
POTASSIUM SERPL-SCNC: 5.1 MMOL/L (ref 3.5–5.3)
POTASSIUM SERPL-SCNC: 5.2 MMOL/L (ref 3.5–5.3)
POTASSIUM SERPL-SCNC: 5.2 MMOL/L (ref 3.5–5.3)
POTASSIUM SERPL-SCNC: 5.4 MMOL/L (ref 3.5–5.3)
POTASSIUM SERPL-SCNC: 5.7 MMOL/L (ref 3.5–5.3)
POTASSIUM SERPL-SCNC: 5.8 MMOL/L (ref 3.5–5.3)
POTASSIUM SERPL-SCNC: 6 MMOL/L (ref 3.5–5.3)
PR INTERVAL: 136 MS
PR INTERVAL: 136 MS
PR INTERVAL: 138 MS
PR INTERVAL: 138 MS
PR INTERVAL: 146 MS
PROT SERPL-MCNC: 7.7 G/DL (ref 6.4–8.2)
PROT SERPL-MCNC: 7.7 G/DL (ref 6.4–8.2)
PROT SERPL-MCNC: 9.6 G/DL (ref 6.4–8.2)
PROT UR STRIP-MCNC: NEGATIVE MG/DL
QRS AXIS: 106 DEGREES
QRS AXIS: 112 DEGREES
QRS AXIS: 116 DEGREES
QRS AXIS: 128 DEGREES
QRS AXIS: 140 DEGREES
QRSD INTERVAL: 74 MS
QRSD INTERVAL: 80 MS
QRSD INTERVAL: 80 MS
QRSD INTERVAL: 82 MS
QRSD INTERVAL: 84 MS
QT INTERVAL: 302 MS
QT INTERVAL: 304 MS
QT INTERVAL: 336 MS
QT INTERVAL: 342 MS
QT INTERVAL: 344 MS
QTC INTERVAL: 391 MS
QTC INTERVAL: 403 MS
QTC INTERVAL: 406 MS
QTC INTERVAL: 411 MS
QTC INTERVAL: 454 MS
RBC # BLD AUTO: 3.65 MILLION/UL (ref 3.88–5.62)
RBC # BLD AUTO: 3.66 MILLION/UL (ref 3.88–5.62)
RBC # BLD AUTO: 3.94 MILLION/UL (ref 3.88–5.62)
RBC # BLD AUTO: 4.05 MILLION/UL (ref 3.88–5.62)
RBC # BLD AUTO: 4.26 MILLION/UL (ref 3.88–5.62)
RBC # BLD AUTO: 4.3 MILLION/UL (ref 3.88–5.62)
RBC # BLD AUTO: 4.67 MILLION/UL (ref 3.88–5.62)
RBC # BLD AUTO: 4.85 MILLION/UL (ref 3.88–5.62)
RBC # BLD AUTO: 4.99 MILLION/UL (ref 3.88–5.62)
RBC #/AREA URNS AUTO: ABNORMAL /HPF
RSV RNA RESP QL NAA+PROBE: NEGATIVE
SARS-COV-2 RNA RESP QL NAA+PROBE: NEGATIVE
SODIUM SERPL-SCNC: 129 MMOL/L (ref 136–145)
SODIUM SERPL-SCNC: 131 MMOL/L (ref 136–145)
SODIUM SERPL-SCNC: 133 MMOL/L (ref 136–145)
SODIUM SERPL-SCNC: 135 MMOL/L (ref 136–145)
SODIUM SERPL-SCNC: 135 MMOL/L (ref 136–145)
SODIUM SERPL-SCNC: 136 MMOL/L (ref 136–145)
SODIUM SERPL-SCNC: 136 MMOL/L (ref 136–145)
SODIUM SERPL-SCNC: 137 MMOL/L (ref 136–145)
SODIUM SERPL-SCNC: 138 MMOL/L (ref 136–145)
SP GR UR STRIP.AUTO: 1.01 (ref 1–1.03)
SPECIMEN SOURCE: ABNORMAL
T WAVE AXIS: -6 DEGREES
T WAVE AXIS: -7 DEGREES
T WAVE AXIS: 20 DEGREES
T WAVE AXIS: 23 DEGREES
T WAVE AXIS: 37 DEGREES
TROPONIN I SERPL-MCNC: 0.05 NG/ML
TROPONIN I SERPL-MCNC: 0.06 NG/ML
TROPONIN I SERPL-MCNC: 0.06 NG/ML
TROPONIN I SERPL-MCNC: <0.02 NG/ML
TSH SERPL DL<=0.05 MIU/L-ACNC: 1.25 UIU/ML (ref 0.36–3.74)
UROBILINOGEN UR QL STRIP.AUTO: 0.2 E.U./DL
VENTRICULAR RATE: 101 BPM
VENTRICULAR RATE: 105 BPM
VENTRICULAR RATE: 106 BPM
VENTRICULAR RATE: 85 BPM
VENTRICULAR RATE: 90 BPM
WBC # BLD AUTO: 10.13 THOUSAND/UL (ref 4.31–10.16)
WBC # BLD AUTO: 10.24 THOUSAND/UL (ref 4.31–10.16)
WBC # BLD AUTO: 10.52 THOUSAND/UL (ref 4.31–10.16)
WBC # BLD AUTO: 10.63 THOUSAND/UL (ref 4.31–10.16)
WBC # BLD AUTO: 11.6 THOUSAND/UL (ref 4.31–10.16)
WBC # BLD AUTO: 13.33 THOUSAND/UL (ref 4.31–10.16)
WBC # BLD AUTO: 16.02 THOUSAND/UL (ref 4.31–10.16)
WBC # BLD AUTO: 9.69 THOUSAND/UL (ref 4.31–10.16)
WBC # BLD AUTO: 9.84 THOUSAND/UL (ref 4.31–10.16)
WBC #/AREA URNS AUTO: ABNORMAL /HPF

## 2021-01-01 PROCEDURE — 99214 OFFICE O/P EST MOD 30 MIN: CPT | Performed by: INTERNAL MEDICINE

## 2021-01-01 PROCEDURE — 84484 ASSAY OF TROPONIN QUANT: CPT | Performed by: EMERGENCY MEDICINE

## 2021-01-01 PROCEDURE — 0241U HB NFCT DS VIR RESP RNA 4 TRGT: CPT | Performed by: EMERGENCY MEDICINE

## 2021-01-01 PROCEDURE — 99309 SBSQ NF CARE MODERATE MDM 30: CPT | Performed by: NURSE PRACTITIONER

## 2021-01-01 PROCEDURE — 84484 ASSAY OF TROPONIN QUANT: CPT | Performed by: PHYSICIAN ASSISTANT

## 2021-01-01 PROCEDURE — 99232 SBSQ HOSP IP/OBS MODERATE 35: CPT | Performed by: INTERNAL MEDICINE

## 2021-01-01 PROCEDURE — 93005 ELECTROCARDIOGRAM TRACING: CPT

## 2021-01-01 PROCEDURE — 71045 X-RAY EXAM CHEST 1 VIEW: CPT

## 2021-01-01 PROCEDURE — 99232 SBSQ HOSP IP/OBS MODERATE 35: CPT | Performed by: PHYSICIAN ASSISTANT

## 2021-01-01 PROCEDURE — 80048 BASIC METABOLIC PNL TOTAL CA: CPT | Performed by: INTERNAL MEDICINE

## 2021-01-01 PROCEDURE — G1004 CDSM NDSC: HCPCS

## 2021-01-01 PROCEDURE — 1036F TOBACCO NON-USER: CPT | Performed by: INTERNAL MEDICINE

## 2021-01-01 PROCEDURE — 99285 EMERGENCY DEPT VISIT HI MDM: CPT

## 2021-01-01 PROCEDURE — 0241U HB NFCT DS VIR RESP RNA 4 TRGT: CPT | Performed by: PHYSICIAN ASSISTANT

## 2021-01-01 PROCEDURE — 82948 REAGENT STRIP/BLOOD GLUCOSE: CPT

## 2021-01-01 PROCEDURE — 94762 N-INVAS EAR/PLS OXIMTRY CONT: CPT

## 2021-01-01 PROCEDURE — 36600 WITHDRAWAL OF ARTERIAL BLOOD: CPT

## 2021-01-01 PROCEDURE — 85379 FIBRIN DEGRADATION QUANT: CPT | Performed by: EMERGENCY MEDICINE

## 2021-01-01 PROCEDURE — 94760 N-INVAS EAR/PLS OXIMETRY 1: CPT

## 2021-01-01 PROCEDURE — 99316 NF DSCHRG MGMT 30 MIN+: CPT | Performed by: NURSE PRACTITIONER

## 2021-01-01 PROCEDURE — 85027 COMPLETE CBC AUTOMATED: CPT | Performed by: STUDENT IN AN ORGANIZED HEALTH CARE EDUCATION/TRAINING PROGRAM

## 2021-01-01 PROCEDURE — 99232 SBSQ HOSP IP/OBS MODERATE 35: CPT | Performed by: NURSE PRACTITIONER

## 2021-01-01 PROCEDURE — 4010F ACE/ARB THERAPY RXD/TAKEN: CPT | Performed by: INTERNAL MEDICINE

## 2021-01-01 PROCEDURE — 99223 1ST HOSP IP/OBS HIGH 75: CPT | Performed by: INTERNAL MEDICINE

## 2021-01-01 PROCEDURE — 93010 ELECTROCARDIOGRAM REPORT: CPT | Performed by: INTERNAL MEDICINE

## 2021-01-01 PROCEDURE — 80053 COMPREHEN METABOLIC PANEL: CPT | Performed by: EMERGENCY MEDICINE

## 2021-01-01 PROCEDURE — 99239 HOSP IP/OBS DSCHRG MGMT >30: CPT | Performed by: INTERNAL MEDICINE

## 2021-01-01 PROCEDURE — 99496 TRANSJ CARE MGMT HIGH F2F 7D: CPT | Performed by: FAMILY MEDICINE

## 2021-01-01 PROCEDURE — 85025 COMPLETE CBC W/AUTO DIFF WBC: CPT | Performed by: EMERGENCY MEDICINE

## 2021-01-01 PROCEDURE — 99232 SBSQ HOSP IP/OBS MODERATE 35: CPT | Performed by: SURGERY

## 2021-01-01 PROCEDURE — 81001 URINALYSIS AUTO W/SCOPE: CPT | Performed by: NURSE PRACTITIONER

## 2021-01-01 PROCEDURE — NC001 PR NO CHARGE: Performed by: INTERNAL MEDICINE

## 2021-01-01 PROCEDURE — 93306 TTE W/DOPPLER COMPLETE: CPT

## 2021-01-01 PROCEDURE — 94664 DEMO&/EVAL PT USE INHALER: CPT

## 2021-01-01 PROCEDURE — 99233 SBSQ HOSP IP/OBS HIGH 50: CPT | Performed by: INTERNAL MEDICINE

## 2021-01-01 PROCEDURE — 3725F SCREEN DEPRESSION PERFORMED: CPT | Performed by: FAMILY MEDICINE

## 2021-01-01 PROCEDURE — 71260 CT THORAX DX C+: CPT

## 2021-01-01 PROCEDURE — 80053 COMPREHEN METABOLIC PANEL: CPT | Performed by: PHYSICIAN ASSISTANT

## 2021-01-01 PROCEDURE — 85027 COMPLETE CBC AUTOMATED: CPT | Performed by: INTERNAL MEDICINE

## 2021-01-01 PROCEDURE — 80048 BASIC METABOLIC PNL TOTAL CA: CPT | Performed by: PHYSICIAN ASSISTANT

## 2021-01-01 PROCEDURE — 93306 TTE W/DOPPLER COMPLETE: CPT | Performed by: INTERNAL MEDICINE

## 2021-01-01 PROCEDURE — 99285 EMERGENCY DEPT VISIT HI MDM: CPT | Performed by: EMERGENCY MEDICINE

## 2021-01-01 PROCEDURE — 97163 PT EVAL HIGH COMPLEX 45 MIN: CPT

## 2021-01-01 PROCEDURE — 82805 BLOOD GASES W/O2 SATURATION: CPT | Performed by: INTERNAL MEDICINE

## 2021-01-01 PROCEDURE — 83735 ASSAY OF MAGNESIUM: CPT | Performed by: PHYSICIAN ASSISTANT

## 2021-01-01 PROCEDURE — 3066F NEPHROPATHY DOC TX: CPT | Performed by: INTERNAL MEDICINE

## 2021-01-01 PROCEDURE — 74177 CT ABD & PELVIS W/CONTRAST: CPT

## 2021-01-01 PROCEDURE — 92610 EVALUATE SWALLOWING FUNCTION: CPT

## 2021-01-01 PROCEDURE — 36415 COLL VENOUS BLD VENIPUNCTURE: CPT | Performed by: EMERGENCY MEDICINE

## 2021-01-01 PROCEDURE — 84443 ASSAY THYROID STIM HORMONE: CPT | Performed by: PHYSICIAN ASSISTANT

## 2021-01-01 PROCEDURE — 99220 PR INITIAL OBSERVATION CARE/DAY 70 MINUTES: CPT | Performed by: INTERNAL MEDICINE

## 2021-01-01 PROCEDURE — G0439 PPPS, SUBSEQ VISIT: HCPCS | Performed by: FAMILY MEDICINE

## 2021-01-01 PROCEDURE — 94150 VITAL CAPACITY TEST: CPT

## 2021-01-01 PROCEDURE — 99232 SBSQ HOSP IP/OBS MODERATE 35: CPT | Performed by: STUDENT IN AN ORGANIZED HEALTH CARE EDUCATION/TRAINING PROGRAM

## 2021-01-01 PROCEDURE — 83880 ASSAY OF NATRIURETIC PEPTIDE: CPT | Performed by: EMERGENCY MEDICINE

## 2021-01-01 PROCEDURE — 3008F BODY MASS INDEX DOCD: CPT | Performed by: INTERNAL MEDICINE

## 2021-01-01 PROCEDURE — 85027 COMPLETE CBC AUTOMATED: CPT | Performed by: PHYSICIAN ASSISTANT

## 2021-01-01 PROCEDURE — 99306 1ST NF CARE HIGH MDM 50: CPT | Performed by: INTERNAL MEDICINE

## 2021-01-01 PROCEDURE — 1036F TOBACCO NON-USER: CPT | Performed by: FAMILY MEDICINE

## 2021-01-01 PROCEDURE — 99222 1ST HOSP IP/OBS MODERATE 55: CPT | Performed by: INTERNAL MEDICINE

## 2021-01-01 PROCEDURE — 3288F FALL RISK ASSESSMENT DOCD: CPT | Performed by: FAMILY MEDICINE

## 2021-01-01 PROCEDURE — 71275 CT ANGIOGRAPHY CHEST: CPT

## 2021-01-01 PROCEDURE — 84132 ASSAY OF SERUM POTASSIUM: CPT | Performed by: INTERNAL MEDICINE

## 2021-01-01 PROCEDURE — 99239 HOSP IP/OBS DSCHRG MGMT >30: CPT | Performed by: PHYSICIAN ASSISTANT

## 2021-01-01 RX ORDER — SODIUM CHLORIDE 9 MG/ML
100 INJECTION, SOLUTION INTRAVENOUS CONTINUOUS
Status: DISCONTINUED | OUTPATIENT
Start: 2021-01-01 | End: 2021-01-01

## 2021-01-01 RX ORDER — PREDNISONE 10 MG/1
30 TABLET ORAL DAILY
Qty: 9 TABLET | Refills: 0 | Status: CANCELLED | OUTPATIENT
Start: 2021-01-01 | End: 2021-01-01

## 2021-01-01 RX ORDER — LISINOPRIL 10 MG/1
5 TABLET ORAL DAILY
Qty: 90 TABLET | Refills: 1
Start: 2021-01-01

## 2021-01-01 RX ORDER — MIRTAZAPINE 7.5 MG/1
7.5 TABLET, FILM COATED ORAL
Qty: 30 TABLET | Refills: 0 | Status: SHIPPED | OUTPATIENT
Start: 2021-01-01 | End: 2021-01-01 | Stop reason: ALTCHOICE

## 2021-01-01 RX ORDER — ONDANSETRON 2 MG/ML
4 INJECTION INTRAMUSCULAR; INTRAVENOUS EVERY 6 HOURS PRN
Status: DISCONTINUED | OUTPATIENT
Start: 2021-01-01 | End: 2021-01-01 | Stop reason: HOSPADM

## 2021-01-01 RX ORDER — FAMOTIDINE 20 MG/1
40 TABLET, FILM COATED ORAL DAILY
Status: DISCONTINUED | OUTPATIENT
Start: 2021-01-01 | End: 2021-01-01 | Stop reason: HOSPADM

## 2021-01-01 RX ORDER — SODIUM CHLORIDE 9 MG/ML
75 INJECTION, SOLUTION INTRAVENOUS CONTINUOUS
Status: DISCONTINUED | OUTPATIENT
Start: 2021-01-01 | End: 2021-01-01

## 2021-01-01 RX ORDER — FUROSEMIDE 10 MG/ML
40 INJECTION INTRAMUSCULAR; INTRAVENOUS ONCE
Status: COMPLETED | OUTPATIENT
Start: 2021-01-01 | End: 2021-01-01

## 2021-01-01 RX ORDER — LACTULOSE 20 G/30ML
20 SOLUTION ORAL DAILY
Status: DISCONTINUED | OUTPATIENT
Start: 2021-01-01 | End: 2021-01-01 | Stop reason: HOSPADM

## 2021-01-01 RX ORDER — ECHINACEA PURPUREA EXTRACT 125 MG
1 TABLET ORAL
Status: DISCONTINUED | OUTPATIENT
Start: 2021-01-01 | End: 2021-01-01 | Stop reason: HOSPADM

## 2021-01-01 RX ORDER — ACETAMINOPHEN 325 MG/1
650 TABLET ORAL EVERY 6 HOURS PRN
Status: DISCONTINUED | OUTPATIENT
Start: 2021-01-01 | End: 2021-01-01 | Stop reason: HOSPADM

## 2021-01-01 RX ORDER — LISINOPRIL 5 MG/1
5 TABLET ORAL DAILY
Status: DISCONTINUED | OUTPATIENT
Start: 2021-01-01 | End: 2021-01-01

## 2021-01-01 RX ORDER — ALBUTEROL SULFATE 90 UG/1
2 AEROSOL, METERED RESPIRATORY (INHALATION) EVERY 4 HOURS PRN
Status: DISCONTINUED | OUTPATIENT
Start: 2021-01-01 | End: 2021-01-01 | Stop reason: HOSPADM

## 2021-01-01 RX ORDER — LISINOPRIL 10 MG/1
TABLET ORAL
Qty: 90 TABLET | Refills: 1 | OUTPATIENT
Start: 2021-01-01

## 2021-01-01 RX ORDER — HYDROCODONE POLISTIREX AND CHLORPHENIRAMINE POLISTIREX 10; 8 MG/5ML; MG/5ML
5 SUSPENSION, EXTENDED RELEASE ORAL EVERY 12 HOURS SCHEDULED
Qty: 115 ML | Refills: 0 | Status: SHIPPED | OUTPATIENT
Start: 2021-01-01

## 2021-01-01 RX ORDER — SENNOSIDES 8.6 MG
17.2 TABLET ORAL
Refills: 0
Start: 2021-01-01

## 2021-01-01 RX ORDER — METHYLPREDNISOLONE SODIUM SUCCINATE 40 MG/ML
40 INJECTION, POWDER, LYOPHILIZED, FOR SOLUTION INTRAMUSCULAR; INTRAVENOUS EVERY 12 HOURS SCHEDULED
Status: DISCONTINUED | OUTPATIENT
Start: 2021-01-01 | End: 2021-01-01

## 2021-01-01 RX ORDER — ACETAMINOPHEN 325 MG/1
650 TABLET ORAL EVERY 6 HOURS PRN
Status: CANCELLED | OUTPATIENT
Start: 2021-01-01

## 2021-01-01 RX ORDER — LORAZEPAM 0.5 MG/1
0.5 TABLET ORAL EVERY 6 HOURS PRN
Qty: 15 TABLET | Refills: 0 | Status: SHIPPED | OUTPATIENT
Start: 2021-01-01 | End: 2021-01-01

## 2021-01-01 RX ORDER — MIRTAZAPINE 15 MG/1
15 TABLET, FILM COATED ORAL
Status: DISCONTINUED | OUTPATIENT
Start: 2021-01-01 | End: 2021-01-01

## 2021-01-01 RX ORDER — ASPIRIN 81 MG/1
162 TABLET ORAL DAILY
Status: CANCELLED | OUTPATIENT
Start: 2021-01-01

## 2021-01-01 RX ORDER — SODIUM POLYSTYRENE SULFONATE 4.1 MEQ/G
15 POWDER, FOR SUSPENSION ORAL; RECTAL ONCE
Status: COMPLETED | OUTPATIENT
Start: 2021-01-01 | End: 2021-01-01

## 2021-01-01 RX ORDER — HYDROCODONE POLISTIREX AND CHLORPHENIRAMINE POLISTIREX 10; 8 MG/5ML; MG/5ML
5 SUSPENSION, EXTENDED RELEASE ORAL EVERY 12 HOURS SCHEDULED
Status: DISCONTINUED | OUTPATIENT
Start: 2021-01-01 | End: 2021-01-01 | Stop reason: HOSPADM

## 2021-01-01 RX ORDER — FAMOTIDINE 20 MG/1
40 TABLET, FILM COATED ORAL DAILY
Status: CANCELLED | OUTPATIENT
Start: 2021-01-01

## 2021-01-01 RX ORDER — NYSTATIN 100000 [USP'U]/G
POWDER TOPICAL 2 TIMES DAILY
Status: DISCONTINUED | OUTPATIENT
Start: 2021-01-01 | End: 2021-01-01 | Stop reason: HOSPADM

## 2021-01-01 RX ORDER — PREDNISONE 10 MG/1
10 TABLET ORAL DAILY
Status: DISCONTINUED | OUTPATIENT
Start: 2021-01-01 | End: 2021-01-01

## 2021-01-01 RX ORDER — ECHINACEA PURPUREA EXTRACT 125 MG
1 TABLET ORAL
Status: CANCELLED | OUTPATIENT
Start: 2021-01-01

## 2021-01-01 RX ORDER — POLYETHYLENE GLYCOL 3350 17 G/17G
17 POWDER, FOR SOLUTION ORAL 2 TIMES DAILY
Status: CANCELLED | OUTPATIENT
Start: 2021-01-01

## 2021-01-01 RX ORDER — ALBUTEROL SULFATE 90 UG/1
2 AEROSOL, METERED RESPIRATORY (INHALATION) EVERY 4 HOURS PRN
Status: CANCELLED | OUTPATIENT
Start: 2021-01-01

## 2021-01-01 RX ORDER — PREDNISONE 10 MG/1
50 TABLET ORAL DAILY
Qty: 5 TABLET | Refills: 0 | Status: CANCELLED | OUTPATIENT
Start: 2021-01-01 | End: 2021-01-01

## 2021-01-01 RX ORDER — SENNOSIDES 8.6 MG
17.2 TABLET ORAL
Status: DISCONTINUED | OUTPATIENT
Start: 2021-01-01 | End: 2021-01-01 | Stop reason: HOSPADM

## 2021-01-01 RX ORDER — POTASSIUM CHLORIDE 20 MEQ/1
40 TABLET, EXTENDED RELEASE ORAL ONCE
Status: COMPLETED | OUTPATIENT
Start: 2021-01-01 | End: 2021-01-01

## 2021-01-01 RX ORDER — LACTULOSE 20 G/30ML
20 SOLUTION ORAL DAILY
Status: CANCELLED | OUTPATIENT
Start: 2021-01-01

## 2021-01-01 RX ORDER — PREDNISONE 10 MG/1
10 TABLET ORAL DAILY
Qty: 3 TABLET | Refills: 0 | Status: CANCELLED | OUTPATIENT
Start: 2021-01-01 | End: 2021-01-01

## 2021-01-01 RX ORDER — ASPIRIN 81 MG/1
162 TABLET ORAL DAILY
Status: DISCONTINUED | OUTPATIENT
Start: 2021-01-01 | End: 2021-01-01 | Stop reason: HOSPADM

## 2021-01-01 RX ORDER — FUROSEMIDE 10 MG/ML
80 INJECTION INTRAMUSCULAR; INTRAVENOUS ONCE
Status: COMPLETED | OUTPATIENT
Start: 2021-01-01 | End: 2021-01-01

## 2021-01-01 RX ORDER — BISACODYL 10 MG
10 SUPPOSITORY, RECTAL RECTAL DAILY PRN
Status: DISCONTINUED | OUTPATIENT
Start: 2021-01-01 | End: 2021-01-01 | Stop reason: HOSPADM

## 2021-01-01 RX ORDER — SENNOSIDES 8.6 MG
1 TABLET ORAL DAILY
Status: DISCONTINUED | OUTPATIENT
Start: 2021-01-01 | End: 2021-01-01

## 2021-01-01 RX ORDER — PANTOPRAZOLE SODIUM 40 MG/1
40 TABLET, DELAYED RELEASE ORAL
Status: DISCONTINUED | OUTPATIENT
Start: 2021-01-01 | End: 2021-01-01 | Stop reason: HOSPADM

## 2021-01-01 RX ORDER — POTASSIUM CHLORIDE 20 MEQ/1
20 TABLET, EXTENDED RELEASE ORAL ONCE
Status: COMPLETED | OUTPATIENT
Start: 2021-01-01 | End: 2021-01-01

## 2021-01-01 RX ORDER — NYSTATIN 100000 [USP'U]/G
POWDER TOPICAL 2 TIMES DAILY
Qty: 15 G | Refills: 0
Start: 2021-01-01

## 2021-01-01 RX ORDER — POLYETHYLENE GLYCOL 3350 17 G/17G
17 POWDER, FOR SOLUTION ORAL 2 TIMES DAILY
Refills: 0
Start: 2021-01-01

## 2021-01-01 RX ORDER — DOCUSATE SODIUM 100 MG/1
100 CAPSULE, LIQUID FILLED ORAL 2 TIMES DAILY
Status: CANCELLED | OUTPATIENT
Start: 2021-01-01

## 2021-01-01 RX ORDER — POLYETHYLENE GLYCOL 3350 17 G/17G
17 POWDER, FOR SOLUTION ORAL DAILY
Status: DISCONTINUED | OUTPATIENT
Start: 2021-01-01 | End: 2021-01-01

## 2021-01-01 RX ORDER — SENNOSIDES 8.6 MG
2 TABLET ORAL
Status: DISCONTINUED | OUTPATIENT
Start: 2021-01-01 | End: 2021-01-01 | Stop reason: HOSPADM

## 2021-01-01 RX ORDER — ALBUTEROL SULFATE 90 UG/1
2 AEROSOL, METERED RESPIRATORY (INHALATION) 4 TIMES DAILY
Status: DISCONTINUED | OUTPATIENT
Start: 2021-01-01 | End: 2021-01-01

## 2021-01-01 RX ORDER — OXYCODONE HYDROCHLORIDE 5 MG/1
TABLET ORAL
Qty: 15 TABLET | Refills: 0 | Status: SHIPPED | OUTPATIENT
Start: 2021-01-01

## 2021-01-01 RX ORDER — PREDNISONE 20 MG/1
20 TABLET ORAL DAILY
Status: DISCONTINUED | OUTPATIENT
Start: 2021-01-01 | End: 2021-01-01

## 2021-01-01 RX ORDER — LISINOPRIL 10 MG/1
10 TABLET ORAL DAILY
Status: DISCONTINUED | OUTPATIENT
Start: 2021-01-01 | End: 2021-01-01 | Stop reason: HOSPADM

## 2021-01-01 RX ORDER — METHYLPREDNISOLONE SODIUM SUCCINATE 40 MG/ML
40 INJECTION, POWDER, LYOPHILIZED, FOR SOLUTION INTRAMUSCULAR; INTRAVENOUS DAILY
Status: DISCONTINUED | OUTPATIENT
Start: 2021-01-01 | End: 2021-01-01

## 2021-01-01 RX ORDER — ATORVASTATIN CALCIUM 40 MG/1
40 TABLET, FILM COATED ORAL DAILY
Status: DISCONTINUED | OUTPATIENT
Start: 2021-01-01 | End: 2021-01-01 | Stop reason: HOSPADM

## 2021-01-01 RX ORDER — ATORVASTATIN CALCIUM 40 MG/1
40 TABLET, FILM COATED ORAL DAILY
Status: CANCELLED | OUTPATIENT
Start: 2021-01-01

## 2021-01-01 RX ORDER — PREDNISONE 20 MG/1
40 TABLET ORAL DAILY
Status: DISCONTINUED | OUTPATIENT
Start: 2021-01-01 | End: 2021-01-01 | Stop reason: HOSPADM

## 2021-01-01 RX ORDER — DOCUSATE SODIUM 100 MG/1
100 CAPSULE, LIQUID FILLED ORAL 2 TIMES DAILY
Status: DISCONTINUED | OUTPATIENT
Start: 2021-01-01 | End: 2021-01-01 | Stop reason: HOSPADM

## 2021-01-01 RX ORDER — LISINOPRIL 10 MG/1
10 TABLET ORAL DAILY
Status: DISCONTINUED | OUTPATIENT
Start: 2021-01-01 | End: 2021-01-01

## 2021-01-01 RX ORDER — LISINOPRIL 10 MG/1
10 TABLET ORAL DAILY
Status: CANCELLED | OUTPATIENT
Start: 2021-01-01

## 2021-01-01 RX ORDER — METHYLPREDNISOLONE SODIUM SUCCINATE 40 MG/ML
40 INJECTION, POWDER, LYOPHILIZED, FOR SOLUTION INTRAMUSCULAR; INTRAVENOUS ONCE
Status: COMPLETED | OUTPATIENT
Start: 2021-01-01 | End: 2021-01-01

## 2021-01-01 RX ORDER — POTASSIUM CHLORIDE 20 MEQ/1
20 TABLET, EXTENDED RELEASE ORAL ONCE
Status: DISCONTINUED | OUTPATIENT
Start: 2021-01-01 | End: 2021-01-01

## 2021-01-01 RX ORDER — ONDANSETRON 2 MG/ML
4 INJECTION INTRAMUSCULAR; INTRAVENOUS EVERY 6 HOURS PRN
Status: CANCELLED | OUTPATIENT
Start: 2021-01-01

## 2021-01-01 RX ORDER — POLYETHYLENE GLYCOL 3350 17 G/17G
17 POWDER, FOR SOLUTION ORAL 2 TIMES DAILY
Status: DISCONTINUED | OUTPATIENT
Start: 2021-01-01 | End: 2021-01-01 | Stop reason: HOSPADM

## 2021-01-01 RX ORDER — NYSTATIN 100000 [USP'U]/G
POWDER TOPICAL 2 TIMES DAILY
Status: CANCELLED | OUTPATIENT
Start: 2021-01-01

## 2021-01-01 RX ORDER — MIRTAZAPINE 15 MG/1
7.5 TABLET, FILM COATED ORAL
Status: DISCONTINUED | OUTPATIENT
Start: 2021-01-01 | End: 2021-01-01 | Stop reason: HOSPADM

## 2021-01-01 RX ORDER — PANTOPRAZOLE SODIUM 40 MG/1
40 TABLET, DELAYED RELEASE ORAL
Status: CANCELLED | OUTPATIENT
Start: 2021-01-01

## 2021-01-01 RX ORDER — PREDNISONE 20 MG/1
40 TABLET ORAL DAILY
Status: DISCONTINUED | OUTPATIENT
Start: 2021-01-01 | End: 2021-01-01

## 2021-01-01 RX ORDER — BISACODYL 10 MG
10 SUPPOSITORY, RECTAL RECTAL DAILY PRN
Status: CANCELLED | OUTPATIENT
Start: 2021-01-01

## 2021-01-01 RX ORDER — FUROSEMIDE 10 MG/ML
20 SYRINGE (ML) INJECTION CONTINUOUS
Status: DISCONTINUED | OUTPATIENT
Start: 2021-01-01 | End: 2021-01-01

## 2021-01-01 RX ORDER — ASPIRIN 81 MG/1
81 TABLET, CHEWABLE ORAL DAILY
Status: DISCONTINUED | OUTPATIENT
Start: 2021-01-01 | End: 2021-01-01 | Stop reason: HOSPADM

## 2021-01-01 RX ORDER — PREDNISONE 20 MG/1
20 TABLET ORAL DAILY
Status: DISCONTINUED | OUTPATIENT
Start: 2021-01-01 | End: 2021-01-01 | Stop reason: HOSPADM

## 2021-01-01 RX ORDER — PREDNISONE 20 MG/1
20 TABLET ORAL DAILY
Qty: 3 TABLET | Refills: 0 | Status: CANCELLED | OUTPATIENT
Start: 2021-01-01 | End: 2021-01-01

## 2021-01-01 RX ORDER — FAMOTIDINE 40 MG/1
40 TABLET, FILM COATED ORAL DAILY
Qty: 90 TABLET | Refills: 1 | Status: SHIPPED | OUTPATIENT
Start: 2021-01-01

## 2021-01-01 RX ORDER — PREDNISONE 10 MG/1
40 TABLET ORAL SEE ADMIN INSTRUCTIONS
Qty: 60 TABLET | Refills: 0 | Status: SHIPPED | OUTPATIENT
Start: 2021-01-01

## 2021-01-01 RX ORDER — SENNOSIDES 8.6 MG
2 TABLET ORAL
Status: CANCELLED | OUTPATIENT
Start: 2021-01-01

## 2021-01-01 RX ORDER — SENNOSIDES 8.6 MG
1 TABLET ORAL
Status: DISCONTINUED | OUTPATIENT
Start: 2021-01-01 | End: 2021-01-01

## 2021-01-01 RX ORDER — PANTOPRAZOLE SODIUM 40 MG/1
40 TABLET, DELAYED RELEASE ORAL
Refills: 0
Start: 2021-01-01 | End: 2021-01-01 | Stop reason: ALTCHOICE

## 2021-01-01 RX ORDER — HEPARIN SODIUM 5000 [USP'U]/ML
5000 INJECTION, SOLUTION INTRAVENOUS; SUBCUTANEOUS EVERY 8 HOURS SCHEDULED
Status: DISCONTINUED | OUTPATIENT
Start: 2021-01-01 | End: 2021-01-01 | Stop reason: HOSPADM

## 2021-01-01 RX ORDER — DOCUSATE SODIUM 100 MG/1
100 CAPSULE, LIQUID FILLED ORAL 2 TIMES DAILY
Qty: 60 CAPSULE | Refills: 1 | Status: SHIPPED | OUTPATIENT
Start: 2021-01-01

## 2021-01-01 RX ORDER — PREDNISONE 10 MG/1
10 TABLET ORAL DAILY
Status: DISCONTINUED | OUTPATIENT
Start: 2021-01-01 | End: 2021-01-01 | Stop reason: HOSPADM

## 2021-01-01 RX ADMIN — HEPARIN SODIUM 5000 UNITS: 5000 INJECTION INTRAVENOUS; SUBCUTANEOUS at 13:52

## 2021-01-01 RX ADMIN — HYDROCODONE POLISTIREX AND CHLORPHENIRAMINE POLISTIREX 5 ML: 10; 8 SUSPENSION, EXTENDED RELEASE ORAL at 09:03

## 2021-01-01 RX ADMIN — DOCUSATE SODIUM 100 MG: 100 CAPSULE, LIQUID FILLED ORAL at 08:00

## 2021-01-01 RX ADMIN — LACTULOSE 20 G: 10 SOLUTION ORAL at 09:26

## 2021-01-01 RX ADMIN — FAMOTIDINE 40 MG: 20 TABLET ORAL at 09:26

## 2021-01-01 RX ADMIN — MIRTAZAPINE 7.5 MG: 15 TABLET, FILM COATED ORAL at 21:19

## 2021-01-01 RX ADMIN — Medication 12.5 MG: at 09:00

## 2021-01-01 RX ADMIN — METHYLPREDNISOLONE SODIUM SUCCINATE 40 MG: 40 INJECTION, POWDER, FOR SOLUTION INTRAMUSCULAR; INTRAVENOUS at 21:18

## 2021-01-01 RX ADMIN — Medication 12.5 MG: at 08:00

## 2021-01-01 RX ADMIN — FAMOTIDINE 40 MG: 20 TABLET ORAL at 09:47

## 2021-01-01 RX ADMIN — HYDROCODONE POLISTIREX AND CHLORPHENIRAMINE POLISTIREX 5 ML: 10; 8 SUSPENSION, EXTENDED RELEASE ORAL at 21:31

## 2021-01-01 RX ADMIN — SENNOSIDES 17.2 MG: 8.6 TABLET ORAL at 22:34

## 2021-01-01 RX ADMIN — ASPIRIN 81 MG: 81 TABLET, CHEWABLE ORAL at 08:17

## 2021-01-01 RX ADMIN — HYDROCODONE POLISTIREX AND CHLORPHENIRAMINE POLISTIREX 5 ML: 10; 8 SUSPENSION, EXTENDED RELEASE ORAL at 09:23

## 2021-01-01 RX ADMIN — CARBAMIDE PEROXIDE 6.5% 5 DROP: 6.5 LIQUID AURICULAR (OTIC) at 09:46

## 2021-01-01 RX ADMIN — METOPROLOL TARTRATE 25 MG: 25 TABLET, FILM COATED ORAL at 09:27

## 2021-01-01 RX ADMIN — HEPARIN SODIUM 5000 UNITS: 5000 INJECTION INTRAVENOUS; SUBCUTANEOUS at 05:00

## 2021-01-01 RX ADMIN — INSULIN LISPRO 1 UNITS: 100 INJECTION, SOLUTION INTRAVENOUS; SUBCUTANEOUS at 12:41

## 2021-01-01 RX ADMIN — SENNOSIDES 17.2 MG: 8.6 TABLET ORAL at 21:20

## 2021-01-01 RX ADMIN — HEPARIN SODIUM 5000 UNITS: 5000 INJECTION INTRAVENOUS; SUBCUTANEOUS at 13:04

## 2021-01-01 RX ADMIN — DESMOPRESSIN ACETATE 40 MG: 0.2 TABLET ORAL at 09:02

## 2021-01-01 RX ADMIN — INSULIN LISPRO 1 UNITS: 100 INJECTION, SOLUTION INTRAVENOUS; SUBCUTANEOUS at 13:58

## 2021-01-01 RX ADMIN — Medication 12.5 MG: at 09:23

## 2021-01-01 RX ADMIN — NYSTATIN: 100000 POWDER TOPICAL at 09:07

## 2021-01-01 RX ADMIN — MIRTAZAPINE 7.5 MG: 15 TABLET, FILM COATED ORAL at 21:40

## 2021-01-01 RX ADMIN — LISINOPRIL 10 MG: 10 TABLET ORAL at 09:27

## 2021-01-01 RX ADMIN — FUROSEMIDE 40 MG: 10 INJECTION, SOLUTION INTRAMUSCULAR; INTRAVENOUS at 09:26

## 2021-01-01 RX ADMIN — NYSTATIN: 100000 POWDER TOPICAL at 08:19

## 2021-01-01 RX ADMIN — ALBUTEROL SULFATE 2 PUFF: 90 AEROSOL, METERED RESPIRATORY (INHALATION) at 13:12

## 2021-01-01 RX ADMIN — CARBAMIDE PEROXIDE 6.5% 5 DROP: 6.5 LIQUID AURICULAR (OTIC) at 09:32

## 2021-01-01 RX ADMIN — CARBAMIDE PEROXIDE 6.5% 5 DROP: 6.5 LIQUID AURICULAR (OTIC) at 09:08

## 2021-01-01 RX ADMIN — NYSTATIN: 100000 POWDER TOPICAL at 17:33

## 2021-01-01 RX ADMIN — ASPIRIN 81 MG: 81 TABLET, CHEWABLE ORAL at 09:22

## 2021-01-01 RX ADMIN — ASPIRIN 81 MG: 81 TABLET, CHEWABLE ORAL at 08:00

## 2021-01-01 RX ADMIN — IOHEXOL 85 ML: 350 INJECTION, SOLUTION INTRAVENOUS at 12:43

## 2021-01-01 RX ADMIN — FUROSEMIDE 80 MG: 10 INJECTION, SOLUTION INTRAMUSCULAR; INTRAVENOUS at 12:24

## 2021-01-01 RX ADMIN — PANTOPRAZOLE SODIUM 40 MG: 40 TABLET, DELAYED RELEASE ORAL at 05:00

## 2021-01-01 RX ADMIN — Medication 12.5 MG: at 09:29

## 2021-01-01 RX ADMIN — DOCUSATE SODIUM 100 MG: 100 CAPSULE, LIQUID FILLED ORAL at 18:31

## 2021-01-01 RX ADMIN — POLYETHYLENE GLYCOL 3350 17 G: 17 POWDER, FOR SOLUTION ORAL at 09:26

## 2021-01-01 RX ADMIN — HEPARIN SODIUM 5000 UNITS: 5000 INJECTION INTRAVENOUS; SUBCUTANEOUS at 21:40

## 2021-01-01 RX ADMIN — METOPROLOL TARTRATE 25 MG: 25 TABLET, FILM COATED ORAL at 22:07

## 2021-01-01 RX ADMIN — HEPARIN SODIUM 5000 UNITS: 5000 INJECTION INTRAVENOUS; SUBCUTANEOUS at 21:55

## 2021-01-01 RX ADMIN — HEPARIN SODIUM 5000 UNITS: 5000 INJECTION INTRAVENOUS; SUBCUTANEOUS at 05:06

## 2021-01-01 RX ADMIN — SENNOSIDES 17.2 MG: 8.6 TABLET ORAL at 23:21

## 2021-01-01 RX ADMIN — METOPROLOL TARTRATE 25 MG: 25 TABLET, FILM COATED ORAL at 20:54

## 2021-01-01 RX ADMIN — DESMOPRESSIN ACETATE 40 MG: 0.2 TABLET ORAL at 09:21

## 2021-01-01 RX ADMIN — FAMOTIDINE 40 MG: 20 TABLET, FILM COATED ORAL at 08:00

## 2021-01-01 RX ADMIN — HYDROCODONE POLISTIREX AND CHLORPHENIRAMINE POLISTIREX 5 ML: 10; 8 SUSPENSION, EXTENDED RELEASE ORAL at 13:03

## 2021-01-01 RX ADMIN — DOCUSATE SODIUM 100 MG: 100 CAPSULE, LIQUID FILLED ORAL at 17:32

## 2021-01-01 RX ADMIN — FAMOTIDINE 40 MG: 20 TABLET, FILM COATED ORAL at 09:21

## 2021-01-01 RX ADMIN — METHYLPREDNISOLONE SODIUM SUCCINATE 40 MG: 40 INJECTION, POWDER, FOR SOLUTION INTRAMUSCULAR; INTRAVENOUS at 11:58

## 2021-01-01 RX ADMIN — HEPARIN SODIUM 5000 UNITS: 5000 INJECTION INTRAVENOUS; SUBCUTANEOUS at 13:57

## 2021-01-01 RX ADMIN — INSULIN LISPRO 3 UNITS: 100 INJECTION, SOLUTION INTRAVENOUS; SUBCUTANEOUS at 21:55

## 2021-01-01 RX ADMIN — HEPARIN SODIUM 5000 UNITS: 5000 INJECTION INTRAVENOUS; SUBCUTANEOUS at 05:42

## 2021-01-01 RX ADMIN — DOCUSATE SODIUM 100 MG: 100 CAPSULE, LIQUID FILLED ORAL at 09:28

## 2021-01-01 RX ADMIN — ALBUTEROL SULFATE 2 PUFF: 90 AEROSOL, METERED RESPIRATORY (INHALATION) at 09:00

## 2021-01-01 RX ADMIN — ASPIRIN 162 MG: 81 TABLET ORAL at 09:25

## 2021-01-01 RX ADMIN — INSULIN LISPRO 1 UNITS: 100 INJECTION, SOLUTION INTRAVENOUS; SUBCUTANEOUS at 17:32

## 2021-01-01 RX ADMIN — IOHEXOL 85 ML: 350 INJECTION, SOLUTION INTRAVENOUS at 15:20

## 2021-01-01 RX ADMIN — ALBUTEROL SULFATE 2 PUFF: 90 AEROSOL, METERED RESPIRATORY (INHALATION) at 09:21

## 2021-01-01 RX ADMIN — ASPIRIN 81 MG: 81 TABLET, CHEWABLE ORAL at 09:21

## 2021-01-01 RX ADMIN — HEPARIN SODIUM 5000 UNITS: 5000 INJECTION INTRAVENOUS; SUBCUTANEOUS at 17:32

## 2021-01-01 RX ADMIN — DOCUSATE SODIUM 100 MG: 100 CAPSULE, LIQUID FILLED ORAL at 18:38

## 2021-01-01 RX ADMIN — CARBAMIDE PEROXIDE 6.5% 5 DROP: 6.5 LIQUID AURICULAR (OTIC) at 08:19

## 2021-01-01 RX ADMIN — INSULIN LISPRO 1 UNITS: 100 INJECTION, SOLUTION INTRAVENOUS; SUBCUTANEOUS at 12:32

## 2021-01-01 RX ADMIN — FAMOTIDINE 40 MG: 20 TABLET, FILM COATED ORAL at 09:23

## 2021-01-01 RX ADMIN — METOPROLOL TARTRATE 25 MG: 25 TABLET, FILM COATED ORAL at 21:15

## 2021-01-01 RX ADMIN — INSULIN LISPRO 2 UNITS: 100 INJECTION, SOLUTION INTRAVENOUS; SUBCUTANEOUS at 18:32

## 2021-01-01 RX ADMIN — Medication 20 MG/HR: at 11:21

## 2021-01-01 RX ADMIN — SODIUM POLYSTYRENE SULFONATE 15 G: 1 POWDER ORAL; RECTAL at 10:38

## 2021-01-01 RX ADMIN — HEPARIN SODIUM 5000 UNITS: 5000 INJECTION INTRAVENOUS; SUBCUTANEOUS at 13:03

## 2021-01-01 RX ADMIN — METHYLPREDNISOLONE SODIUM SUCCINATE 40 MG: 40 INJECTION, POWDER, FOR SOLUTION INTRAMUSCULAR; INTRAVENOUS at 22:09

## 2021-01-01 RX ADMIN — METHYLPREDNISOLONE SODIUM SUCCINATE 40 MG: 40 INJECTION, POWDER, FOR SOLUTION INTRAMUSCULAR; INTRAVENOUS at 08:00

## 2021-01-01 RX ADMIN — SENNOSIDES 17.2 MG: 8.6 TABLET ORAL at 21:34

## 2021-01-01 RX ADMIN — DOCUSATE SODIUM 100 MG: 100 CAPSULE ORAL at 09:20

## 2021-01-01 RX ADMIN — LACTULOSE 20 G: 10 SOLUTION ORAL at 09:01

## 2021-01-01 RX ADMIN — METOPROLOL TARTRATE 25 MG: 25 TABLET, FILM COATED ORAL at 09:20

## 2021-01-01 RX ADMIN — MIRTAZAPINE 7.5 MG: 15 TABLET, FILM COATED ORAL at 21:41

## 2021-01-01 RX ADMIN — DOCUSATE SODIUM 100 MG: 100 CAPSULE ORAL at 09:47

## 2021-01-01 RX ADMIN — HEPARIN SODIUM 5000 UNITS: 5000 INJECTION INTRAVENOUS; SUBCUTANEOUS at 21:35

## 2021-01-01 RX ADMIN — DESMOPRESSIN ACETATE 40 MG: 0.2 TABLET ORAL at 08:46

## 2021-01-01 RX ADMIN — DESMOPRESSIN ACETATE 40 MG: 0.2 TABLET ORAL at 09:28

## 2021-01-01 RX ADMIN — SODIUM CHLORIDE 75 ML/HR: 0.9 INJECTION, SOLUTION INTRAVENOUS at 02:42

## 2021-01-01 RX ADMIN — ATORVASTATIN CALCIUM 40 MG: 40 TABLET, FILM COATED ORAL at 09:26

## 2021-01-01 RX ADMIN — HEPARIN SODIUM 5000 UNITS: 5000 INJECTION INTRAVENOUS; SUBCUTANEOUS at 05:44

## 2021-01-01 RX ADMIN — METOPROLOL TARTRATE 25 MG: 25 TABLET, FILM COATED ORAL at 09:48

## 2021-01-01 RX ADMIN — ASPIRIN 81 MG: 81 TABLET, CHEWABLE ORAL at 09:28

## 2021-01-01 RX ADMIN — PANTOPRAZOLE SODIUM 40 MG: 40 TABLET, DELAYED RELEASE ORAL at 05:25

## 2021-01-01 RX ADMIN — METOPROLOL TARTRATE 25 MG: 25 TABLET, FILM COATED ORAL at 23:23

## 2021-01-01 RX ADMIN — INSULIN LISPRO 2 UNITS: 100 INJECTION, SOLUTION INTRAVENOUS; SUBCUTANEOUS at 17:28

## 2021-01-01 RX ADMIN — CARBAMIDE PEROXIDE 6.5% 5 DROP: 6.5 LIQUID AURICULAR (OTIC) at 08:01

## 2021-01-01 RX ADMIN — HEPARIN SODIUM 5000 UNITS: 5000 INJECTION INTRAVENOUS; SUBCUTANEOUS at 05:43

## 2021-01-01 RX ADMIN — HEPARIN SODIUM 5000 UNITS: 5000 INJECTION INTRAVENOUS; SUBCUTANEOUS at 13:58

## 2021-01-01 RX ADMIN — INSULIN LISPRO 1 UNITS: 100 INJECTION, SOLUTION INTRAVENOUS; SUBCUTANEOUS at 21:39

## 2021-01-01 RX ADMIN — FAMOTIDINE 40 MG: 20 TABLET, FILM COATED ORAL at 09:27

## 2021-01-01 RX ADMIN — ENOXAPARIN SODIUM 40 MG: 40 INJECTION SUBCUTANEOUS at 09:26

## 2021-01-01 RX ADMIN — POLYETHYLENE GLYCOL 3350 17 G: 17 POWDER, FOR SOLUTION ORAL at 21:34

## 2021-01-01 RX ADMIN — DOCUSATE SODIUM 100 MG: 100 CAPSULE, LIQUID FILLED ORAL at 09:03

## 2021-01-01 RX ADMIN — SENNOSIDES 17.2 MG: 8.6 TABLET ORAL at 21:31

## 2021-01-01 RX ADMIN — HEPARIN SODIUM 5000 UNITS: 5000 INJECTION INTRAVENOUS; SUBCUTANEOUS at 14:38

## 2021-01-01 RX ADMIN — CARBAMIDE PEROXIDE 6.5% 5 DROP: 6.5 LIQUID AURICULAR (OTIC) at 17:25

## 2021-01-01 RX ADMIN — FAMOTIDINE 40 MG: 20 TABLET, FILM COATED ORAL at 08:18

## 2021-01-01 RX ADMIN — SODIUM CHLORIDE 500 ML: 0.9 INJECTION, SOLUTION INTRAVENOUS at 20:54

## 2021-01-01 RX ADMIN — PANTOPRAZOLE SODIUM 40 MG: 40 TABLET, DELAYED RELEASE ORAL at 08:46

## 2021-01-01 RX ADMIN — CARBAMIDE PEROXIDE 6.5% 5 DROP: 6.5 LIQUID AURICULAR (OTIC) at 18:35

## 2021-01-01 RX ADMIN — CARBAMIDE PEROXIDE 6.5% 5 DROP: 6.5 LIQUID AURICULAR (OTIC) at 09:22

## 2021-01-01 RX ADMIN — ASPIRIN 162 MG: 81 TABLET ORAL at 09:26

## 2021-01-01 RX ADMIN — SODIUM CHLORIDE 100 ML/HR: 0.9 INJECTION, SOLUTION INTRAVENOUS at 13:52

## 2021-01-01 RX ADMIN — ENOXAPARIN SODIUM 40 MG: 40 INJECTION SUBCUTANEOUS at 09:20

## 2021-01-01 RX ADMIN — DESMOPRESSIN ACETATE 40 MG: 0.2 TABLET ORAL at 08:17

## 2021-01-01 RX ADMIN — HEPARIN SODIUM 5000 UNITS: 5000 INJECTION INTRAVENOUS; SUBCUTANEOUS at 05:45

## 2021-01-01 RX ADMIN — DOCUSATE SODIUM 100 MG: 100 CAPSULE ORAL at 17:34

## 2021-01-01 RX ADMIN — METOPROLOL TARTRATE 25 MG: 25 TABLET, FILM COATED ORAL at 22:34

## 2021-01-01 RX ADMIN — ASPIRIN 162 MG: 81 TABLET ORAL at 09:47

## 2021-01-01 RX ADMIN — METHYLPREDNISOLONE SODIUM SUCCINATE 40 MG: 40 INJECTION, POWDER, FOR SOLUTION INTRAMUSCULAR; INTRAVENOUS at 08:19

## 2021-01-01 RX ADMIN — HEPARIN SODIUM 5000 UNITS: 5000 INJECTION INTRAVENOUS; SUBCUTANEOUS at 21:18

## 2021-01-01 RX ADMIN — POTASSIUM CHLORIDE 20 MEQ: 1500 TABLET, EXTENDED RELEASE ORAL at 12:43

## 2021-01-01 RX ADMIN — PREDNISONE 50 MG: 20 TABLET ORAL at 09:29

## 2021-01-01 RX ADMIN — CARBAMIDE PEROXIDE 6.5% 5 DROP: 6.5 LIQUID AURICULAR (OTIC) at 18:09

## 2021-01-01 RX ADMIN — NYSTATIN: 100000 POWDER TOPICAL at 17:05

## 2021-01-01 RX ADMIN — NYSTATIN: 100000 POWDER TOPICAL at 17:07

## 2021-01-01 RX ADMIN — ASPIRIN 81 MG: 81 TABLET, CHEWABLE ORAL at 09:03

## 2021-01-01 RX ADMIN — CARBAMIDE PEROXIDE 6.5% 5 DROP: 6.5 LIQUID AURICULAR (OTIC) at 16:52

## 2021-01-01 RX ADMIN — INSULIN LISPRO 2 UNITS: 100 INJECTION, SOLUTION INTRAVENOUS; SUBCUTANEOUS at 18:35

## 2021-01-01 RX ADMIN — FAMOTIDINE 40 MG: 20 TABLET, FILM COATED ORAL at 08:46

## 2021-01-01 RX ADMIN — PREDNISONE 50 MG: 20 TABLET ORAL at 09:22

## 2021-01-01 RX ADMIN — HEPARIN SODIUM 5000 UNITS: 5000 INJECTION INTRAVENOUS; SUBCUTANEOUS at 21:31

## 2021-01-01 RX ADMIN — HEPARIN SODIUM 5000 UNITS: 5000 INJECTION INTRAVENOUS; SUBCUTANEOUS at 05:47

## 2021-01-01 RX ADMIN — CARBAMIDE PEROXIDE 6.5% 5 DROP: 6.5 LIQUID AURICULAR (OTIC) at 09:01

## 2021-01-01 RX ADMIN — ENOXAPARIN SODIUM 40 MG: 40 INJECTION SUBCUTANEOUS at 09:47

## 2021-01-01 RX ADMIN — INSULIN LISPRO 1 UNITS: 100 INJECTION, SOLUTION INTRAVENOUS; SUBCUTANEOUS at 12:38

## 2021-01-01 RX ADMIN — DOCUSATE SODIUM 100 MG: 100 CAPSULE, LIQUID FILLED ORAL at 17:25

## 2021-01-01 RX ADMIN — INSULIN LISPRO 1 UNITS: 100 INJECTION, SOLUTION INTRAVENOUS; SUBCUTANEOUS at 21:34

## 2021-01-01 RX ADMIN — METHYLPREDNISOLONE SODIUM SUCCINATE 40 MG: 40 INJECTION, POWDER, FOR SOLUTION INTRAMUSCULAR; INTRAVENOUS at 21:13

## 2021-01-01 RX ADMIN — DOCUSATE SODIUM 100 MG: 100 CAPSULE ORAL at 18:32

## 2021-01-01 RX ADMIN — INSULIN LISPRO 1 UNITS: 100 INJECTION, SOLUTION INTRAVENOUS; SUBCUTANEOUS at 13:12

## 2021-01-01 RX ADMIN — PREDNISONE 40 MG: 20 TABLET ORAL at 09:00

## 2021-01-01 RX ADMIN — METHYLPREDNISOLONE SODIUM SUCCINATE 40 MG: 40 INJECTION, POWDER, FOR SOLUTION INTRAMUSCULAR; INTRAVENOUS at 09:46

## 2021-01-01 RX ADMIN — POLYETHYLENE GLYCOL 3350 17 G: 17 POWDER, FOR SOLUTION ORAL at 08:01

## 2021-01-01 RX ADMIN — ALBUTEROL SULFATE 2 PUFF: 90 AEROSOL, METERED RESPIRATORY (INHALATION) at 21:48

## 2021-01-01 RX ADMIN — DOCUSATE SODIUM 100 MG: 100 CAPSULE, LIQUID FILLED ORAL at 09:45

## 2021-01-01 RX ADMIN — CARBAMIDE PEROXIDE 6.5% 5 DROP: 6.5 LIQUID AURICULAR (OTIC) at 08:47

## 2021-01-01 RX ADMIN — ALBUTEROL SULFATE 2 PUFF: 90 AEROSOL, METERED RESPIRATORY (INHALATION) at 09:50

## 2021-01-01 RX ADMIN — INSULIN LISPRO 2 UNITS: 100 INJECTION, SOLUTION INTRAVENOUS; SUBCUTANEOUS at 17:03

## 2021-01-01 RX ADMIN — CARBAMIDE PEROXIDE 6.5% 5 DROP: 6.5 LIQUID AURICULAR (OTIC) at 17:32

## 2021-01-01 RX ADMIN — INSULIN LISPRO 1 UNITS: 100 INJECTION, SOLUTION INTRAVENOUS; SUBCUTANEOUS at 11:40

## 2021-01-01 RX ADMIN — HEPARIN SODIUM 5000 UNITS: 5000 INJECTION INTRAVENOUS; SUBCUTANEOUS at 17:24

## 2021-01-01 RX ADMIN — ATORVASTATIN CALCIUM 40 MG: 40 TABLET, FILM COATED ORAL at 09:47

## 2021-01-01 RX ADMIN — ALBUTEROL SULFATE 2 PUFF: 90 AEROSOL, METERED RESPIRATORY (INHALATION) at 17:28

## 2021-01-01 RX ADMIN — Medication 12.5 MG: at 09:03

## 2021-01-01 RX ADMIN — Medication 12.5 MG: at 09:21

## 2021-01-01 RX ADMIN — DOCUSATE SODIUM 100 MG: 100 CAPSULE, LIQUID FILLED ORAL at 18:35

## 2021-01-01 RX ADMIN — HEPARIN SODIUM 5000 UNITS: 5000 INJECTION INTRAVENOUS; SUBCUTANEOUS at 05:01

## 2021-01-01 RX ADMIN — Medication 12.5 MG: at 21:40

## 2021-01-01 RX ADMIN — DESMOPRESSIN ACETATE 40 MG: 0.2 TABLET ORAL at 09:00

## 2021-01-01 RX ADMIN — Medication 12.5 MG: at 21:19

## 2021-01-01 RX ADMIN — Medication 12.5 MG: at 21:52

## 2021-01-01 RX ADMIN — PANTOPRAZOLE SODIUM 40 MG: 40 TABLET, DELAYED RELEASE ORAL at 05:07

## 2021-01-01 RX ADMIN — POTASSIUM CHLORIDE 40 MEQ: 1500 TABLET, EXTENDED RELEASE ORAL at 09:21

## 2021-01-01 RX ADMIN — HEPARIN SODIUM 5000 UNITS: 5000 INJECTION INTRAVENOUS; SUBCUTANEOUS at 21:14

## 2021-01-01 RX ADMIN — DOCUSATE SODIUM 100 MG: 100 CAPSULE ORAL at 09:01

## 2021-01-01 RX ADMIN — PANTOPRAZOLE SODIUM 40 MG: 40 TABLET, DELAYED RELEASE ORAL at 05:23

## 2021-01-01 RX ADMIN — NYSTATIN: 100000 POWDER TOPICAL at 08:47

## 2021-01-01 RX ADMIN — FAMOTIDINE 40 MG: 20 TABLET, FILM COATED ORAL at 09:02

## 2021-01-01 RX ADMIN — METOPROLOL TARTRATE 25 MG: 25 TABLET, FILM COATED ORAL at 09:26

## 2021-01-01 RX ADMIN — MIRTAZAPINE 7.5 MG: 15 TABLET, FILM COATED ORAL at 21:14

## 2021-01-01 RX ADMIN — ONDANSETRON 4 MG: 2 INJECTION INTRAMUSCULAR; INTRAVENOUS at 01:42

## 2021-01-01 RX ADMIN — Medication 12.5 MG: at 21:32

## 2021-01-01 RX ADMIN — HEPARIN SODIUM 5000 UNITS: 5000 INJECTION INTRAVENOUS; SUBCUTANEOUS at 21:39

## 2021-01-01 RX ADMIN — DOCUSATE SODIUM 100 MG: 100 CAPSULE, LIQUID FILLED ORAL at 08:18

## 2021-01-01 RX ADMIN — FAMOTIDINE 40 MG: 20 TABLET ORAL at 09:02

## 2021-01-01 RX ADMIN — INSULIN LISPRO 1 UNITS: 100 INJECTION, SOLUTION INTRAVENOUS; SUBCUTANEOUS at 22:37

## 2021-01-01 RX ADMIN — PANTOPRAZOLE SODIUM 40 MG: 40 TABLET, DELAYED RELEASE ORAL at 05:44

## 2021-01-01 RX ADMIN — HYDROCODONE POLISTIREX AND CHLORPHENIRAMINE POLISTIREX 5 ML: 10; 8 SUSPENSION, EXTENDED RELEASE ORAL at 21:54

## 2021-01-01 RX ADMIN — POLYETHYLENE GLYCOL 3350 17 G: 17 POWDER, FOR SOLUTION ORAL at 09:46

## 2021-01-01 RX ADMIN — ALBUTEROL SULFATE 2 PUFF: 90 AEROSOL, METERED RESPIRATORY (INHALATION) at 12:42

## 2021-01-01 RX ADMIN — CARBAMIDE PEROXIDE 6.5% 5 DROP: 6.5 LIQUID AURICULAR (OTIC) at 18:31

## 2021-01-01 RX ADMIN — ATORVASTATIN CALCIUM 40 MG: 40 TABLET, FILM COATED ORAL at 09:20

## 2021-01-01 RX ADMIN — LISINOPRIL 10 MG: 10 TABLET ORAL at 09:20

## 2021-01-01 RX ADMIN — METHYLPREDNISOLONE SODIUM SUCCINATE 40 MG: 40 INJECTION, POWDER, FOR SOLUTION INTRAMUSCULAR; INTRAVENOUS at 21:14

## 2021-01-01 RX ADMIN — PANTOPRAZOLE SODIUM 40 MG: 40 TABLET, DELAYED RELEASE ORAL at 13:17

## 2021-01-01 RX ADMIN — FUROSEMIDE 80 MG: 10 INJECTION, SOLUTION INTRAMUSCULAR; INTRAVENOUS at 18:29

## 2021-01-01 RX ADMIN — ENOXAPARIN SODIUM 40 MG: 40 INJECTION SUBCUTANEOUS at 09:00

## 2021-01-01 RX ADMIN — PANTOPRAZOLE SODIUM 40 MG: 40 TABLET, DELAYED RELEASE ORAL at 05:33

## 2021-01-01 RX ADMIN — INSULIN LISPRO 1 UNITS: 100 INJECTION, SOLUTION INTRAVENOUS; SUBCUTANEOUS at 21:24

## 2021-01-01 RX ADMIN — INSULIN LISPRO 1 UNITS: 100 INJECTION, SOLUTION INTRAVENOUS; SUBCUTANEOUS at 12:50

## 2021-01-01 RX ADMIN — SODIUM POLYSTYRENE SULFONATE 15 G: 1 POWDER ORAL; RECTAL at 13:53

## 2021-01-01 RX ADMIN — DOCUSATE SODIUM 100 MG: 100 CAPSULE, LIQUID FILLED ORAL at 08:59

## 2021-01-01 RX ADMIN — NYSTATIN: 100000 POWDER TOPICAL at 09:50

## 2021-01-01 RX ADMIN — DOCUSATE SODIUM 100 MG: 100 CAPSULE, LIQUID FILLED ORAL at 09:21

## 2021-01-01 RX ADMIN — FAMOTIDINE 40 MG: 20 TABLET, FILM COATED ORAL at 08:27

## 2021-01-01 RX ADMIN — FAMOTIDINE 40 MG: 20 TABLET, FILM COATED ORAL at 09:45

## 2021-01-01 RX ADMIN — HEPARIN SODIUM 5000 UNITS: 5000 INJECTION INTRAVENOUS; SUBCUTANEOUS at 13:21

## 2021-01-01 RX ADMIN — ALBUTEROL SULFATE 2 PUFF: 90 AEROSOL, METERED RESPIRATORY (INHALATION) at 18:12

## 2021-01-01 RX ADMIN — FAMOTIDINE 40 MG: 20 TABLET ORAL at 09:20

## 2021-01-01 RX ADMIN — ASPIRIN 162 MG: 81 TABLET ORAL at 09:01

## 2021-01-01 RX ADMIN — DESMOPRESSIN ACETATE 40 MG: 0.2 TABLET ORAL at 08:28

## 2021-01-01 RX ADMIN — PANTOPRAZOLE SODIUM 40 MG: 40 TABLET, DELAYED RELEASE ORAL at 05:43

## 2021-01-01 RX ADMIN — METHYLPREDNISOLONE SODIUM SUCCINATE 40 MG: 40 INJECTION, POWDER, FOR SOLUTION INTRAMUSCULAR; INTRAVENOUS at 12:42

## 2021-01-01 RX ADMIN — POLYETHYLENE GLYCOL 3350 17 G: 17 POWDER, FOR SOLUTION ORAL at 09:00

## 2021-01-01 RX ADMIN — NYSTATIN: 100000 POWDER TOPICAL at 09:22

## 2021-01-01 RX ADMIN — DOCUSATE SODIUM 100 MG: 100 CAPSULE ORAL at 17:28

## 2021-01-01 RX ADMIN — ALBUTEROL SULFATE 2 PUFF: 90 AEROSOL, METERED RESPIRATORY (INHALATION) at 09:59

## 2021-01-01 RX ADMIN — SODIUM POLYSTYRENE SULFONATE 15 G: 1 POWDER ORAL; RECTAL at 10:59

## 2021-01-01 RX ADMIN — INSULIN LISPRO 2 UNITS: 100 INJECTION, SOLUTION INTRAVENOUS; SUBCUTANEOUS at 21:42

## 2021-01-01 RX ADMIN — ASPIRIN 81 MG: 81 TABLET, CHEWABLE ORAL at 09:45

## 2021-01-01 RX ADMIN — Medication 12.5 MG: at 09:45

## 2021-01-01 RX ADMIN — INSULIN LISPRO 1 UNITS: 100 INJECTION, SOLUTION INTRAVENOUS; SUBCUTANEOUS at 12:43

## 2021-01-01 RX ADMIN — MIRTAZAPINE 7.5 MG: 15 TABLET, FILM COATED ORAL at 21:35

## 2021-01-01 RX ADMIN — NYSTATIN: 100000 POWDER TOPICAL at 09:02

## 2021-01-01 RX ADMIN — SENNOSIDES 17.2 MG: 8.6 TABLET ORAL at 21:40

## 2021-01-01 RX ADMIN — METHYLPREDNISOLONE SODIUM SUCCINATE 40 MG: 40 INJECTION, POWDER, FOR SOLUTION INTRAMUSCULAR; INTRAVENOUS at 09:26

## 2021-01-01 RX ADMIN — ASPIRIN 81 MG: 81 TABLET, CHEWABLE ORAL at 08:28

## 2021-01-01 RX ADMIN — PANTOPRAZOLE SODIUM 40 MG: 40 TABLET, DELAYED RELEASE ORAL at 05:50

## 2021-01-01 RX ADMIN — DESMOPRESSIN ACETATE 40 MG: 0.2 TABLET ORAL at 09:45

## 2021-01-01 RX ADMIN — PREDNISONE 40 MG: 20 TABLET ORAL at 09:20

## 2021-01-01 RX ADMIN — Medication 20 MG/HR: at 18:50

## 2021-01-01 RX ADMIN — HEPARIN SODIUM 5000 UNITS: 5000 INJECTION INTRAVENOUS; SUBCUTANEOUS at 07:00

## 2021-01-01 RX ADMIN — SENNOSIDES 17.2 MG: 8.6 TABLET ORAL at 21:51

## 2021-01-01 RX ADMIN — DOCUSATE SODIUM 100 MG: 100 CAPSULE ORAL at 18:00

## 2021-01-01 RX ADMIN — Medication 12.5 MG: at 21:35

## 2021-01-01 RX ADMIN — PANTOPRAZOLE SODIUM 40 MG: 40 TABLET, DELAYED RELEASE ORAL at 06:16

## 2021-01-01 RX ADMIN — DOCUSATE SODIUM 100 MG: 100 CAPSULE ORAL at 17:09

## 2021-01-01 RX ADMIN — NYSTATIN: 100000 POWDER TOPICAL at 17:25

## 2021-01-01 RX ADMIN — ASPIRIN 162 MG: 81 TABLET ORAL at 09:20

## 2021-01-01 RX ADMIN — ATORVASTATIN CALCIUM 40 MG: 40 TABLET, FILM COATED ORAL at 09:01

## 2021-01-01 RX ADMIN — SENNOSIDES 17.2 MG: 8.6 TABLET ORAL at 21:14

## 2021-01-01 RX ADMIN — INSULIN LISPRO 1 UNITS: 100 INJECTION, SOLUTION INTRAVENOUS; SUBCUTANEOUS at 17:34

## 2021-01-01 RX ADMIN — POLYETHYLENE GLYCOL 3350 17 G: 17 POWDER, FOR SOLUTION ORAL at 09:20

## 2021-01-01 RX ADMIN — INSULIN LISPRO 1 UNITS: 100 INJECTION, SOLUTION INTRAVENOUS; SUBCUTANEOUS at 13:22

## 2021-01-01 RX ADMIN — PANTOPRAZOLE SODIUM 40 MG: 40 TABLET, DELAYED RELEASE ORAL at 05:46

## 2021-01-01 RX ADMIN — CARBAMIDE PEROXIDE 6.5% 5 DROP: 6.5 LIQUID AURICULAR (OTIC) at 11:50

## 2021-01-01 RX ADMIN — SENNOSIDES 17.2 MG: 8.6 TABLET ORAL at 22:12

## 2021-01-01 RX ADMIN — INSULIN LISPRO 2 UNITS: 100 INJECTION, SOLUTION INTRAVENOUS; SUBCUTANEOUS at 16:42

## 2021-01-01 RX ADMIN — MIRTAZAPINE 7.5 MG: 15 TABLET, FILM COATED ORAL at 21:51

## 2021-01-01 RX ADMIN — POLYETHYLENE GLYCOL 3350 17 G: 17 POWDER, FOR SOLUTION ORAL at 08:19

## 2021-01-01 RX ADMIN — INSULIN LISPRO 2 UNITS: 100 INJECTION, SOLUTION INTRAVENOUS; SUBCUTANEOUS at 22:10

## 2021-01-01 RX ADMIN — INSULIN LISPRO 3 UNITS: 100 INJECTION, SOLUTION INTRAVENOUS; SUBCUTANEOUS at 21:24

## 2021-01-01 RX ADMIN — PANTOPRAZOLE SODIUM 40 MG: 40 TABLET, DELAYED RELEASE ORAL at 05:47

## 2021-01-01 RX ADMIN — METHYLPREDNISOLONE SODIUM SUCCINATE 40 MG: 40 INJECTION, POWDER, FOR SOLUTION INTRAMUSCULAR; INTRAVENOUS at 08:47

## 2021-01-01 RX ADMIN — MIRTAZAPINE 7.5 MG: 15 TABLET, FILM COATED ORAL at 21:32

## 2021-01-01 RX ADMIN — DOCUSATE SODIUM 100 MG: 100 CAPSULE ORAL at 09:27

## 2021-01-01 RX ADMIN — DESMOPRESSIN ACETATE 40 MG: 0.2 TABLET ORAL at 07:59

## 2021-01-01 RX ADMIN — DOCUSATE SODIUM 100 MG: 100 CAPSULE ORAL at 09:26

## 2021-01-01 RX ADMIN — METHYLPREDNISOLONE SODIUM SUCCINATE 40 MG: 40 INJECTION, POWDER, FOR SOLUTION INTRAMUSCULAR; INTRAVENOUS at 21:35

## 2021-01-01 RX ADMIN — HEPARIN SODIUM 5000 UNITS: 5000 INJECTION INTRAVENOUS; SUBCUTANEOUS at 22:07

## 2021-01-01 RX ADMIN — INSULIN LISPRO 1 UNITS: 100 INJECTION, SOLUTION INTRAVENOUS; SUBCUTANEOUS at 21:49

## 2021-01-01 RX ADMIN — HEPARIN SODIUM 5000 UNITS: 5000 INJECTION INTRAVENOUS; SUBCUTANEOUS at 05:23

## 2021-01-01 RX ADMIN — DOCUSATE SODIUM 100 MG: 100 CAPSULE, LIQUID FILLED ORAL at 09:22

## 2021-01-01 RX ADMIN — SENNOSIDES 8.6 MG: 8.6 TABLET, FILM COATED ORAL at 21:48

## 2021-01-01 RX ADMIN — PREDNISONE 50 MG: 20 TABLET ORAL at 09:03

## 2021-01-01 RX ADMIN — DESMOPRESSIN ACETATE 40 MG: 0.2 TABLET ORAL at 09:22

## 2021-01-01 RX ADMIN — HEPARIN SODIUM 5000 UNITS: 5000 INJECTION INTRAVENOUS; SUBCUTANEOUS at 23:45

## 2021-01-01 RX ADMIN — METOPROLOL TARTRATE 25 MG: 25 TABLET, FILM COATED ORAL at 09:02

## 2021-01-01 RX ADMIN — NYSTATIN: 100000 POWDER TOPICAL at 09:01

## 2021-01-01 RX ADMIN — CARBAMIDE PEROXIDE 6.5% 5 DROP: 6.5 LIQUID AURICULAR (OTIC) at 17:05

## 2021-01-01 RX ADMIN — SODIUM CHLORIDE 100 ML/HR: 0.9 INJECTION, SOLUTION INTRAVENOUS at 12:24

## 2021-01-01 RX ADMIN — ASPIRIN 81 MG: 81 TABLET, CHEWABLE ORAL at 09:00

## 2021-01-01 RX ADMIN — PANTOPRAZOLE SODIUM 40 MG: 40 TABLET, DELAYED RELEASE ORAL at 05:42

## 2021-01-01 RX ADMIN — ALBUTEROL SULFATE 2 PUFF: 90 AEROSOL, METERED RESPIRATORY (INHALATION) at 09:26

## 2021-01-01 RX ADMIN — ASPIRIN 81 MG: 81 TABLET, CHEWABLE ORAL at 08:46

## 2021-01-01 RX ADMIN — FAMOTIDINE 40 MG: 20 TABLET, FILM COATED ORAL at 08:59

## 2021-01-04 NOTE — PROGRESS NOTES
Virtual AWV Consent    Reason for visit is AWV    Encounter provider Shyann Wing DO    Provider located at [address] [place] [address] [address] [address]  [facility] [id number]      Recent Visits  Date Type Provider Dept   01/04/21 Telemedicine Radha Valdez DO Pg Riky Gardiner   Showing recent visits within past 7 days and meeting all other requirements     Future Appointments  No visits were found meeting these conditions  Showing future appointments within next 150 days and meeting all other requirements        After connecting through [employer], the patient was identified by name and date of birth  Evangelist Swain was informed that this is a telemedicine visit and that the visit is being conducted through [employer] and patient was informed that this is a secure, HIPAA-compliant platform  He agrees to proceed  My office door was closed  No one else was in the room  He acknowledged consent and understanding of privacy and security of the video platform  The patient has agreed to participate and understands they can discontinue the visit at any time    Patient is aware this is a billable service     Assessment and Plan:     Problem List Items Addressed This Visit     None      Visit Diagnoses     Medicare annual wellness visit, subsequent    -  Primary    Unexplained weight loss        Relevant Orders    PSA, total and free    Screening PSA (prostate specific antigen)        Relevant Orders    PSA, total and free    Nausea        Relevant Medications    famotidine (PEPCID) 40 MG tablet    Abdominal cramping        Relevant Medications    famotidine (PEPCID) 40 MG tablet    Constipation, unspecified constipation type        Relevant Medications    docusate sodium (COLACE) 100 mg capsule    Abnormal LFTs        Followed by GI    Relevant Orders    Comprehensive metabolic panel           Preventive health issues were discussed with patient, and age appropriate screening tests were ordered as noted in patient's After Visit Summary  Personalized health advice and appropriate referrals for health education or preventive services given if needed, as noted in patient's After Visit Summary       History of Present Illness:     Patient presents for Medicare Annual Wellness visit    Patient Care Team:  Wu Walsh DO as PCP - General  Wu Walsh DO as PCP - [address] [place], [place] [id number] [facility] ([facility])  Wu Walsh DO     Problem List:     Patient Active Problem List   Diagnosis    Arteriosclerotic cardiovascular disease (ASCVD)    Benign essential hypertension    CAD S/P percutaneous coronary angioplasty    Colon, diverticulosis    Diabetes mellitus type 2, uncontrolled ([address] )    Hip pain, bilateral    History of acute inferior wall MI    Hyperlipidemia    Osteoarthritis of knee    Small stature    Spinal stenosis    Microalbuminuria due to type 2 diabetes mellitus ([facility] )    CKD (chronic kidney disease) stage 2, GFR 60-89 ml/min    Pulmonary fibrosis determined by high resolution computed tomography (HCC)    Hypertension    Congenital bowing legs    Elevated liver enzymes    Chronic respiratory failure with hypoxia ([facility] )    Former smoker    Weight loss    NSTEMI (non-ST elevated myocardial infarction) ([facility] )    Epigastric pain    Anemia    Unintentional weight loss      Past Medical and Surgical History:     Past Medical History:   Diagnosis Date    Acute myocardial infarction of inferior wall ([address] ) 11/2011    inferiorwall; drug-eluting stent RCA    Chronic tension type headache     last assessed: 7/4/2012    Colon polyp     Developmental dysplasia of hip     bilateral    Diabetes mellitus ([address] )     Former smoker     quit 2011     Gout     last assessed: 8/31/2012    Hemorrhage of anus and rectum     last assessed: 8/12/2013    Hyperlipidemia     Hypertension     Internal hemorrhoids     Migraine     last assessed: 11/25/2013    Old MI (myocardial infarction) 2011    Renal insufficiency syndrome     last assessed: 10/22/2014     Past Surgical History:   Procedure Laterality Date    CARDIAC CATHETERIZATION      COLONOSCOPY  2012    int hem, dirverticulosis Dr Granger Purpura Bilateral      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    LUMBAR LAMINECTOMY  2010    decompressive more than 2 lumbar segments L4-S1       Family History:     Family History   Problem Relation Age of Onset    Kidney failure Mother     Diabetes Mother     Hypertension Mother       Social History:        Social History     Socioeconomic History    Marital status: /Civil Union     Spouse name: Not on file    Number of children: 2    Years of education: 7 years completed    Highest education level: Not on file   Occupational History    Occupation: castillo   Social Needs    Financial resource strain: Not on file    Food insecurity     Worry: Not on file     Inability: Not on file   [employer] needs     Medical: Not on file     Non-medical: Not on file   Tobacco Use    Smoking status: Former Smoker     Packs/day: 1 00     Years: 35 00     Pack years: 35 00     Quit date: 2011     Years since quittin 1    Smokeless tobacco: Never Used   Substance and Sexual Activity    Alcohol use: No    Drug use: No    Sexual activity: Not on file   Lifestyle    Physical activity     Days per week: Not on file     Minutes per session: Not on file    Stress: Not on file   Relationships    Social connections     Talks on phone: Not on file     Gets together: Not on file     Attends Worship service: Not on file     Active member of club or organization: Not on file     Attends meetings of clubs or organizations: Not on file     Relationship status: Not on file    Intimate partner violence     Fear of current or ex partner: Not on file     Emotionally abused: Not on file     Physically abused: Not on file     Forced sexual activity: Not on file   Other Topics Concern    Not on file   Social History Narrative    Not on file      Medications and Allergies:     Current Outpatient Medications   Medication Sig Dispense Refill    aspirin 81 MG tablet Take 2 tablets by mouth daily      atorvastatin (LIPITOR) 40 mg tablet TAKE 1 TABLET BY MOUTH EVERY DAY 90 tablet 1    famotidine (PEPCID) 40 MG tablet Take 1 tablet (40 mg total) by mouth daily 90 tablet 1    glucose blood test strip by In Vitro route daily      lisinopril (ZESTRIL) 10 mg tablet TAKE 1 TABLET BY MOUTH EVERY DAY 90 tablet 1    metFORMIN (GLUCOPHAGE) 500 mg tablet TAKE 1 TABLET BY MOUTH TWICE A  tablet 1    metoprolol tartrate (LOPRESSOR) 25 mg tablet Take 2 tablets (50 mg total) by mouth 2 (two) times a day 180 tablet 0    ONETOUCH DELICA LANCETS 68U MISC by Does not apply route daily      docusate sodium (COLACE) 100 mg capsule Take 1 capsule (100 mg total) by mouth 2 (two) times a day 60 capsule 1     No current facility-administered medications for this visit  No Known Allergies   Immunizations:     Immunization History   Administered Date(s) Administered    Influenza Quadrivalent Preservative Free 3 years and older IM 10/22/2014, 12/15/2017    Influenza Split High Dose Preservative Free IM 11/04/2016    Influenza, recombinant, quadrivalent,injectable, preservative free 01/14/2019, 11/04/2019, 10/23/2020    Influenza, seasonal, injectable 11/07/2011, 11/19/2012, 02/14/2014    Pneumococcal Polysaccharide PPV23 05/10/2018      Health Maintenance:         Topic Date Due    HIV Screening  03/21/1970    Colonoscopy Surveillance  02/16/2021    Colorectal Cancer Screening  11/16/2030    Hepatitis C Screening  Completed         Topic Date Due    DTaP,Tdap,and Td Vaccines (1 - Tdap) 03/21/1977      Medicare Health Risk Assessment:     There were no vitals taken for this visit       Gissel Singh is here for his Subsequent Wellness visit  Last Medicare Wellness visit information reviewed, patient interviewed and updates made to the record today  Health Risk Assessment:   Patient rates overall health as poor  Patient feels that their physical health rating is slightly worse  Eyesight was rated as same  Hearing was rated as same  Patient feels that their emotional and mental health rating is same  Pain experienced in the last 7 days has been some  Patient's pain rating has been 6/10  Patient states that he has experienced weight loss or gain in last 6 months  Currently being worked up -- following with Pulmonology, Cardiology, GI    Depression Screening:   PHQ-2 Score: 1      Fall Risk Screening: In the past year, patient has experienced: history of falling in past year    Number of falls: 1  Injured during fall?: No    Feels unsteady when standing or walking?: Yes    Worried about falling?: Yes      Home Safety:  Patient has trouble with stairs inside or outside of their home  Patient has working smoke alarms and has working carbon monoxide detector  Home safety hazards include: none  Declines PT/OT evaluation    Nutrition:   Current diet is Regular  Medications:   Patient is not currently taking any over-the-counter supplements  Patient is able to manage medications  Activities of Daily Living (ADLs)/Instrumental Activities of Daily Living (IADLs):   Walk and transfer into and out of bed and chair?: No  Dress and groom yourself?: Yes    Bathe or shower yourself?: Yes    Feed yourself?  Yes  Do your laundry/housekeeping?: No  Manage your money, pay your bills and track your expenses?: Yes  Make your own meals?: Yes    Do your own shopping?: No    ADL comments: Declines PT/OT evaluation    Previous Hospitalizations:   Any hospitalizations or ED visits within the last 12 months?: Yes    How many hospitalizations have you had in the last year?: 1-2    Advance Care Planning:   Living will: No    Durable POA for healthcare: No    Advanced directive: Yes      Cognitive Screening:   Provider or family/friend/caregiver concerned regarding cognition?: No    PREVENTIVE SCREENINGS      Cardiovascular Screening:    General: Screening Not Indicated and History Lipid Disorder      Diabetes Screening:     General: Screening Not Indicated and History Diabetes      Colorectal Cancer Screening:     General: Screening Current      Prostate Cancer Screening:      Due for: PSA      Abdominal Aortic Aneurysm (AAA) Screening:    Risk factors include: tobacco use        General: Screening Not Indicated      Lung Cancer Screening:     General: Screening Current      Hepatitis C Screening:    General: Screening Current    Other Counseling Topics:   Alcohol use counseling, car/seat belt/driving safety, skin self-exam, sunscreen and calcium and vitamin D intake and regular weightbearing exercise  Physical Exam  Constitutional:       General: He is not in acute distress  Appearance: He is well-developed  He is ill-appearing (frail)  HENT:      Head: Normocephalic and atraumatic  Eyes:      General: No scleral icterus  Right eye: No discharge  Left eye: No discharge  Conjunctiva/sclera: Conjunctivae normal       Pupils: Pupils are equal, round, and reactive to light  Pulmonary:      Effort: Pulmonary effort is normal  No respiratory distress  Comments: Supplemental oxygen via nasal cannula  Skin:     Coloration: Skin is not jaundiced or pale  Findings: No erythema  Neurological:      Mental Status: He is alert and oriented to person, place, and time  Cranial Nerves: No cranial nerve deficit        Coordination: Coordination normal    Psychiatric:         Mood and Affect: Mood normal          Behavior: Behavior normal            Radha Alonzo DO

## 2021-01-04 NOTE — PATIENT INSTRUCTIONS
Medicare Preventive Visit Patient Instructions  Thank you for completing your Welcome to Medicare Visit or Medicare Annual Wellness Visit today  Your next wellness visit will be due in one year (1/4/2022)  The screening/preventive services that you may require over the next 5-10 years are detailed below  Some tests may not apply to you based off risk factors and/or age  Screening tests ordered at today's visit but not completed yet may show as past due  Also, please note that scanned in results may not display below  Preventive Screenings:  Service Recommendations Previous Testing/Comments   Colorectal Cancer Screening  · Colonoscopy    · Fecal Occult Blood Test (FOBT)/Fecal Immunochemical Test (FIT)  · Fecal DNA/Cologuard Test  · Flexible Sigmoidoscopy Age: 54-65 years old   Colonoscopy: every 10 years (May be performed more frequently if at higher risk)  OR  FOBT/FIT: every 1 year  OR  Cologuard: every 3 years  OR  Sigmoidoscopy: every 5 years  Screening may be recommended earlier than age 48 if at higher risk for colorectal cancer  Also, an individualized decision between you and your healthcare provider will decide whether screening between the ages of 74-80 would be appropriate  Colonoscopy: 11/16/2020  FOBT/FIT: 01/21/2020  Cologuard: Not on file  Sigmoidoscopy: Not on file         Prostate Cancer Screening Individualized decision between patient and health care provider in men between ages of 53-78   Medicare will cover every 12 months beginning on the day after your 50th birthday PSA: 2 3 ng/mL          Hepatitis C Screening Once for adults born between 1945 and 1965  More frequently in patients at high risk for Hepatitis C Hep C Antibody: 11/14/2020       Diabetes Screening 1-2 times per year if you're at risk for diabetes or have pre-diabetes Fasting glucose: 116 mg/dL   A1C: 5 9 %       Cholesterol Screening Once every 5 years if you don't have a lipid disorder   May order more often based on risk factors  Lipid panel: 11/12/2020          Other Preventive Screenings Covered by Medicare:  1  Abdominal Aortic Aneurysm (AAA) Screening: covered once if your at risk  You're considered to be at risk if you have a family history of AAA or a male between the age of 73-68 who smoking at least 100 cigarettes in your lifetime  2  Lung Cancer Screening: covers low dose CT scan once per year if you meet all of the following conditions: (1) Age 50-69; (2) No signs or symptoms of lung cancer; (3) Current smoker or have quit smoking within the last 15 years; (4) You have a tobacco smoking history of at least 30 pack years (packs per day x number of years you smoked); (5) You get a written order from a healthcare provider  3  Glaucoma Screening: covered annually if you're considered high risk: (1) You have diabetes OR (2) Family history of glaucoma OR (3)  aged 48 and older OR (3)  American aged 72 and older  3  Osteoporosis Screening: covered every 2 years if you meet one of the following conditions: (1) Have a vertebral abnormality; (2) On glucocorticoid therapy for more than 3 months; (3) Have primary hyperparathyroidism; (4) On osteoporosis medications and need to assess response to drug therapy  5  HIV Screening: covered annually if you're between the age of 12-76  Also covered annually if you are younger than 13 and older than 72 with risk factors for HIV infection  For pregnant patients, it is covered up to 3 times per pregnancy  Immunizations:  Immunization Recommendations   Influenza Vaccine Annual influenza vaccination during flu season is recommended for all persons aged >= 6 months who do not have contraindications   Pneumococcal Vaccine (Prevnar and Pneumovax)  * Prevnar = PCV13  * Pneumovax = PPSV23 Adults 25-60 years old: 1-3 doses may be recommended based on certain risk factors  Adults 72 years old: Prevnar (PCV13) vaccine recommended followed by Pneumovax (PPSV23) vaccine   If already received PPSV23 since turning 65, then PCV13 recommended at least one year after PPSV23 dose  Hepatitis B Vaccine 3 dose series if at intermediate or high risk (ex: diabetes, end stage renal disease, liver disease)   Tetanus (Td) Vaccine - COST NOT COVERED BY MEDICARE PART B Following completion of primary series, a booster dose should be given every 10 years to maintain immunity against tetanus  Td may also be given as tetanus wound prophylaxis  Tdap Vaccine - COST NOT COVERED BY MEDICARE PART B Recommended at least once for all adults  For pregnant patients, recommended with each pregnancy  Shingles Vaccine (Shingrix) - COST NOT COVERED BY MEDICARE PART B  2 shot series recommended in those aged 48 and above     Health Maintenance Due:      Topic Date Due    HIV Screening  03/21/1970    Colonoscopy Surveillance  02/16/2021    Colorectal Cancer Screening  11/16/2030    Hepatitis C Screening  Completed     Immunizations Due:      Topic Date Due    DTaP,Tdap,and Td Vaccines (1 - Tdap) 03/21/1977     Advance Directives   What are advance directives? Advance directives are legal documents that state your wishes and plans for medical care  These plans are made ahead of time in case you lose your ability to make decisions for yourself  Advance directives can apply to any medical decision, such as the treatments you want, and if you want to donate organs  What are the types of advance directives? There are many types of advance directives, and each state has rules about how to use them  You may choose a combination of any of the following:  · Living will: This is a written record of the treatment you want  You can also choose which treatments you do not want, which to limit, and which to stop at a certain time  This includes surgery, medicine, IV fluid, and tube feedings  · Durable power of  for healthcare [facility]):   This is a written record that states who you want to make healthcare choices for you when you are unable to make them for yourself  This person, called a proxy, is usually a family member or a friend  You may choose more than 1 proxy  · Do not resuscitate (DNR) order:  A DNR order is used in case your heart stops beating or you stop breathing  It is a request not to have certain forms of treatment, such as CPR  A DNR order may be included in other types of advance directives  · Medical directive: This covers the care that you want if you are in a coma, near death, or unable to make decisions for yourself  You can list the treatments you want for each condition  Treatment may include pain medicine, surgery, blood transfusions, dialysis, IV or tube feedings, and a ventilator (breathing machine)  · Values history: This document has questions about your views, beliefs, and how you feel and think about life  This information can help others choose the care that you would choose  Why are advance directives important? An advance directive helps you control your care  Although spoken wishes may be used, it is better to have your wishes written down  Spoken wishes can be misunderstood, or not followed  Treatments may be given even if you do not want them  An advance directive may make it easier for your family to make difficult choices about your care  © Copyright [employer] 2018 Information is for End User's use only and may not be sold, redistributed or otherwise used for commercial purposes   All illustrations and images included in CareNotes® are the copyrighted property of [employer]  or [address] [place] [address] [facility]

## 2021-01-20 NOTE — TELEPHONE ENCOUNTER
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

## 2021-01-20 NOTE — TELEPHONE ENCOUNTER
Patient called back     Relayed results; he canceled his apt due to lack of ride and he will contact office soon to make follow up

## 2021-02-03 PROBLEM — R26.2 AMBULATORY DYSFUNCTION: Status: ACTIVE | Noted: 2021-01-01

## 2021-02-03 PROBLEM — R06.00 DYSPNEA: Status: ACTIVE | Noted: 2021-01-01

## 2021-02-03 PROBLEM — K56.41 FECAL IMPACTION (HCC): Status: ACTIVE | Noted: 2021-01-01

## 2021-02-03 NOTE — ED PROVIDER NOTES
History  Chief Complaint   Patient presents with    Shortness of Breath     from home via EMS w/SOB on exertion  wears 2Loxygen @ home     41-year-old male with past medical history significant for pulmonary fibrosis likely secondary to an occupational exposure, presents with chief complaint of worsening shortness of breath  Patient normally wears 2 L of nasal cannula but has required more over the past 2 days  No fevers or chills  No new cough  Patient looks chronically ill and cachectic  His weight loss was noted during recent office visits as well  I reviewed his records and patient had a recent admission for non ST elevation MI  Patient denies chest pain currently  No diaphoresis, nausea or vomiting  He is also so weak now that he has been using a wheel chair at home (for the past month)  Wt Readings from Last 3 Encounters:  02/03/21 : 46 1 kg (101 lb 10 1 oz)  12/01/20 : 54 1 kg (119 lb 4 8 oz)  11/11/20 : 56 9 kg (125 lb 7 1 oz)        History provided by:  Patient   used: No    Shortness of Breath  Severity:  Moderate  Onset quality:  Gradual  Duration:  2 days  Timing:  Constant  Progression:  Worsening  Chronicity:  New  Relieved by:  Oxygen (increased oxygen)  Worsened by:  Nothing  Associated symptoms: no chest pain, no cough, no diaphoresis, no fever, no rash, no vomiting and no wheezing        Prior to Admission Medications   Prescriptions Last Dose Informant Patient Reported? Taking?    ONETOUCH DELICA LANCETS 10R MISC  Self Yes No   Sig: by Does not apply route daily   aspirin 81 MG tablet  Self Yes No   Sig: Take 2 tablets by mouth daily   atorvastatin (LIPITOR) 40 mg tablet  Self No No   Sig: TAKE 1 TABLET BY MOUTH EVERY DAY   docusate sodium (COLACE) 100 mg capsule   No No   Sig: Take 1 capsule (100 mg total) by mouth 2 (two) times a day   famotidine (PEPCID) 40 MG tablet   No No   Sig: Take 1 tablet (40 mg total) by mouth daily   glucose blood test strip  Self Yes No Sig: by In Vitro route daily   lisinopril (ZESTRIL) 10 mg tablet  Self No No   Sig: TAKE 1 TABLET BY MOUTH EVERY DAY   metFORMIN (GLUCOPHAGE) 500 mg tablet  Self No No   Sig: TAKE 1 TABLET BY MOUTH TWICE A DAY   metoprolol tartrate (LOPRESSOR) 25 mg tablet  Self No No   Sig: Take 2 tablets (50 mg total) by mouth 2 (two) times a day      Facility-Administered Medications: None       Past Medical History:   Diagnosis Date    Acute myocardial infarction of inferior wall ([facility] ) 2011    inferiorwall; drug-eluting stent RCA    Chronic tension type headache     last assessed: 2012    Colon polyp     Developmental dysplasia of hip     bilateral    [facility]     Former smoker     quit      Gout     last assessed: 2012    Hemorrhage of anus and rectum     last assessed: 2013    Hyperlipidemia     Hypertension     Internal hemorrhoids     Migraine     last assessed: 2013    Old MI (myocardial infarction)     Renal insufficiency syndrome     last assessed: 10/22/2014       Past Surgical History:   Procedure Laterality Date    CARDIAC CATHETERIZATION      COLONOSCOPY  2012    int hem, dirverticulosis Dr Yolande Hodgkin Bilateral      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    LUMBAR LAMINECTOMY  2010    decompressive more than 2 lumbar segments L4-S1        Family History   Problem Relation Age of Onset    Kidney failure Mother     Diabetes Mother     Hypertension Mother      I have reviewed and agree with the history as documented      E-Cigarette/Vaping     E-Cigarette/Vaping Substances     Social History     Tobacco Use    Smoking status: Former Smoker     Packs/day: 1 00     Years: 35 00     Pack years: 35 00     Quit date: 2011     Years since quittin 2    Smokeless tobacco: Never Used   Substance Use Topics    Alcohol use: No    Drug use: No       Review of Systems   Constitutional: Positive for unexpected weight change  Negative for chills, diaphoresis, fatigue and fever  Respiratory: Positive for shortness of breath  Negative for cough and wheezing  Cardiovascular: Negative for chest pain and palpitations  Gastrointestinal: Negative for diarrhea, nausea and vomiting  Genitourinary: Negative for dysuria and frequency  Skin: Negative for rash  Neurological: Negative for weakness  All other systems reviewed and are negative  Physical Exam  Physical Exam  Vitals signs and nursing note reviewed  Constitutional:       General: He is in acute distress  Appearance: He is well-developed  He is ill-appearing  Comments: Patient is malnourished as evidenced by facial muscle atrophy, prominent clavicle and rib bones and significant weight loss (16 pounds below ideal body weight)   HENT:      Head: Normocephalic and atraumatic  Eyes:      Pupils: Pupils are equal, round, and reactive to light  Neck:      Musculoskeletal: Normal range of motion  Vascular: No JVD  Cardiovascular:      Rate and Rhythm: Normal rate and regular rhythm  Heart sounds: Normal heart sounds  No murmur  No friction rub  No gallop  Pulmonary:      Effort: Pulmonary effort is normal  No respiratory distress  Breath sounds: Normal breath sounds  No wheezing or rales  Chest:      Chest wall: No tenderness  Musculoskeletal: Normal range of motion  General: No tenderness  Skin:     General: Skin is warm and dry  Neurological:      General: No focal deficit present  Mental Status: He is alert and oriented to person, place, and time  Psychiatric:         Behavior: Behavior normal          Thought Content:  Thought content normal          Judgment: Judgment normal          Vital Signs  ED Triage Vitals [02/03/21 0952]   Temperature Pulse Respirations Blood Pressure SpO2   97 6 °F (36 4 °C) 88 20 123/64 100 %      Temp Source Heart Rate Source Patient Position - Orthostatic VS BP Location FiO2 (%)   Oral Monitor Lying Left arm --      Pain Score       No Pain           Vitals:    02/03/21 0952 02/03/21 1000 02/03/21 1045 02/03/21 1130   BP: 123/64 133/70  110/62   Pulse: 88 92 80 94   Patient Position - Orthostatic VS: Lying            Visual Acuity      ED Medications  Medications   iohexol (OMNIPAQUE) 350 MG/ML injection (SINGLE-DOSE) 85 mL (85 mL Intravenous Given 2/3/21 1243)       Diagnostic Studies  Results Reviewed     Procedure Component Value Units Date/Time    COVID19, Influenza A/B, RSV PCR, [facility] [[id number]]  (Normal) Collected: 02/03/21 1128    Lab Status: Final result Specimen: Nasopharyngeal Swab Updated: 02/03/21 1215     SARS-CoV-2 Negative     INFLUENZA A PCR Negative     INFLUENZA B PCR Negative     RSV PCR Negative    Narrative: This test has been authorized by FDA under an EUA (Emergency Use Assay) for use by authorized laboratories  Clinical caution and judgement should be used with the interpretation of these results with consideration of the clinical impression and other laboratory testing  Testing reported as "Positive" or "Negative" has been proven to be accurate according to standard laboratory validation requirements  All testing is performed with control materials showing appropriate reactivity at standard intervals      D-dimer, quantitative [[id number]]  (Abnormal) Collected: 02/03/21 1010    Lab Status: Final result Specimen: Blood from Arm, Right Updated: 02/03/21 1059     D-Dimer, Quant 0 93 ug/ml FEU     Comprehensive metabolic panel [[id number]]  (Abnormal) Collected: 02/03/21 1005    Lab Status: Final result Specimen: Blood from Arm, Right Updated: 02/03/21 1040     Sodium 138 mmol/L      Potassium 5 4 mmol/L      Chloride 99 mmol/L      CO2 28 mmol/L      ANION GAP 11 mmol/L      BUN 26 mg/dL      Creatinine 0 80 mg/dL      Glucose 94 mg/dL      Calcium 9 1 mg/dL      Corrected Calcium 9 7 mg/dL       U/L      ALT 90 U/L      Alkaline Phosphatase 106 U/L      Total Protein 7 7 g/dL      Albumin 3 2 g/dL      Total Bilirubin 0 47 mg/dL      eGFR 94 ml/min/1 73sq m     Narrative:      [place] guidelines for Chronic Kidney Disease (CKD):     Stage 1 with normal or high GFR (GFR > 90 mL/min/1 73 square meters)    Stage 2 Mild CKD (GFR = 60-89 mL/min/1 73 square meters)    Stage 3A Moderate CKD (GFR = 45-59 mL/min/1 73 square meters)    Stage 3B Moderate CKD (GFR = 30-44 mL/min/1 73 square meters)    Stage 4 Severe CKD (GFR = 15-29 mL/min/1 73 square meters)    Stage 5 End Stage CKD (GFR <15 mL/min/1 73 square meters)  Note: GFR calculation is accurate only with a steady state creatinine    NT-BNP PRO [[id number]]  (Abnormal) Collected: 02/03/21 1005    Lab Status: Final result Specimen: Blood from Arm, Right Updated: 02/03/21 1040     NT-proBNP 1,194 pg/mL     Troponin I [[id number]]  (Abnormal) Collected: 02/03/21 1005    Lab Status: Final result Specimen: Blood from Arm, Right Updated: 02/03/21 1035     Troponin I 0 06 ng/mL     CBC and differential [[id number]]  (Abnormal) Collected: 02/03/21 1005    Lab Status: Final result Specimen: Blood from Arm, Right Updated: 02/03/21 1016     WBC 9 69 Thousand/uL      RBC 4 99 Million/uL      Hemoglobin 13 2 g/dL      Hematocrit 44 1 %      MCV 88 fL      MCH 26 5 pg      MCHC 29 9 g/dL      RDW 15 7 %      MPV 11 3 fL      Platelets 791 Thousands/uL      nRBC 0 /100 WBCs      Neutrophils Relative 77 %      Immat GRANS % 0 %      Lymphocytes Relative 16 %      Monocytes Relative 4 %      Eosinophils Relative 2 %      Basophils Relative 1 %      Neutrophils Absolute 7 51 Thousands/µL      Immature Grans Absolute 0 03 Thousand/uL      Lymphocytes Absolute 1 53 Thousands/µL      Monocytes Absolute 0 39 Thousand/µL      Eosinophils Absolute 0 18 Thousand/µL      Basophils Absolute 0 05 Thousands/µL                  CT chest abdomen pelvis w contrast   Final Result by Wilmer Schmidt DO (02/03 1312)      No acute inflammatory process or abnormal soft tissue mass  Chronic fibrotic interstitial lung disease, similar appearance to prior CT  Fecal impaction at the rectum  Colonic diverticulosis without evidence of acute diverticulitis  Workstation performed: [id number]         XR chest 1 view portable   ED Interpretation by Marquis De Oliveira MD (02/03 1107)   This film was interpreted independently by me  Interstitial lung disease, decreased lung volumes compared to  film (possibly due to depth of inhalation during film)  Final Result by Reji Leung MD (02/03 1035)      Findings in keeping with patient's known UIP pattern chronic interstitial lung disease  No new airspace consolidation                    Workstation performed: [id number]                    Procedures  ECG 12 Lead Documentation Only    Date/Time: 2/3/2021 11:12 AM  Performed by: Marquis De Oliveira MD  Authorized by: Marquis De Oliveira MD     Indications / Diagnosis:  Shortness of breath  ECG reviewed by me, the ED Provider: yes    Patient location:  ED  Previous ECG:     Previous ECG:  Compared to current    Comparison ECG info:  11/11/2020    Similarity:  Changes noted (no longer tachycardic, st depression in I and aVL no longer present)  Interpretation:     Interpretation: abnormal    Rate:     ECG rate:  90    ECG rate assessment: normal    Rhythm:     Rhythm: sinus tachycardia    QRS:     QRS axis:  Normal  Conduction:     Conduction: abnormal      Abnormal conduction: LPFB    ST segments:     ST segments:  Normal  T waves:     T waves: normal               ED Course                                           MDM  Number of Diagnoses or Management Options  Dyspnea on exertion: new and requires workup  Elevated d-dimer: new and requires workup  Elevated troponin I level: new and requires workup  Malnutrition ([address] ): new and requires workup  Weakness: new and requires workup  Weight loss: new and requires workup  Diagnosis management comments: Background: 60 y o  male presents with shortness of breath    Differential DX includes but is not limited to: worsening pulmonary fibrosis, pneumonia, covid-19, acs/cad, anemia, occult cancer    Plan: cardiac workup, review records possible imaging to rule out occult neoplasm         Amount and/or Complexity of Data Reviewed  Clinical lab tests: ordered and reviewed  Tests in the radiology section of CPT®: ordered and reviewed    Risk of Complications, Morbidity, and/or Mortality  Presenting problems: high  Diagnostic procedures: high  Management options: high    Patient Progress  Patient progress: stable      Disposition  Final diagnoses:   Dyspnea on exertion   Elevated troponin I level   Elevated d-dimer   Weakness   Weight loss   Malnutrition ([facility] )     Time reflects when diagnosis was documented in both MDM as applicable and the Disposition within this note     Time User Action Codes Description Comment    2/3/2021  2:06 PM Abbi Hughs B Add [R06 00] Dyspnea on exertion     2/3/2021  2:06 PM Abbi Hughs B Add [R77 8] Elevated troponin I level     2/3/2021  2:06 PM Nancyann [place] Add [R79 89] Elevated d-dimer     2/3/2021  2:06 PM Abbi Hughs B Add [R53 1] Weakness     2/3/2021  2:06 PM Abbi Hughs B Add [R63 4] Weight loss     2/3/2021  2:07 PM Abbi Hughs B Add [E46] Malnutrition [facility])       ED Disposition     ED Disposition Condition Date/Time Comment    Admit Stable Wed Feb 3, 2021  2:06 PM Case was discussed with Dr Forest Boggs and the patient's admission status was agreed to be Admission Status: inpatient status to the service of Dr Forest Boggs   Follow-up Information    None         Patient's Medications   Discharge Prescriptions    No medications on file     No discharge procedures on file      PDMP Review       Value Time User    PDMP Reviewed  Yes 11/11/2020 12:39 PM Lena Dunn MD ED Provider  Electronically Signed by           Anna Washburn MD  02/03/21 0390

## 2021-02-03 NOTE — ASSESSMENT & PLAN NOTE
· Related to worsening of underlying pulmonary fibrosis generalized deconditioning related to weight loss  · Not appear to be acutely volume overloaded  · He has no wheezing on exam   · CT scan shows chronic fibrotic interstitial lung disease  · Consult Pulmonary as well as palliative care given patient's progressive dyspnea and underlying pulmonary fibrosis  · Pulmonary function test were unable to be performed as an outpatient and given patient's worsening dyspnea unclear when these will be able to be performed  · D-dimer is elevated  He is not significantly tachypneic and has no lower extremity edema or erythema to suggest DVT  Do not suspect pulmonary embolism  · Troponin on admission slightly elevated  Do not believe that this is anginal equivalent however will trend troponin x3    · Will discuss potential trial of steroids with pulm

## 2021-02-03 NOTE — H&P
[address] [facility]  H&P- Mejia Ni 1955, 60 y o  male MRN: [id number]  Unit/Bed#: ED 11 Encounter: [id number]  Primary Care Provider: Lorena Denny DO   Date and time admitted to hospital: 2/3/2021  9:46 AM    * Dyspnea  Assessment & Plan  · Related to worsening of underlying pulmonary fibrosis generalized deconditioning related to weight loss  · Not appear to be acutely volume overloaded  · He has no wheezing on exam   · CT scan shows chronic fibrotic interstitial lung disease  · Consult Pulmonary as well as palliative care given patient's progressive dyspnea and underlying pulmonary fibrosis  · Pulmonary function test were unable to be performed as an outpatient and given patient's worsening dyspnea unclear when these will be able to be performed  · D-dimer is elevated  He is not significantly tachypneic and has no lower extremity edema or erythema to suggest DVT  Do not suspect pulmonary embolism  · Troponin on admission slightly elevated  Do not believe that this is anginal equivalent however will trend troponin x3  · Will discuss potential trial of steroids with pulm    Pulmonary fibrosis determined by high resolution computed tomography [facility])  Assessment & Plan  · Follows as outpatient with Pulmonary  · 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 [address] )  Assessment & Plan  · He was wearing 2 L nasal cannula at home      · Previously when seen by Pulmonary in November he was noted to be on 4 L nasal cannula but he states his O2 needs have always been at 2L NC at rest since discharge  · Increased to 3L last 3 weeks for symptom control - not for hypoxia    Ambulatory dysfunction  Assessment & Plan  · Patient was noted to fall recently and since that time has been using a wheelchair as needed  · Consult physical therapy for evaluation    Unintentional weight loss  Assessment & Plan  · The patient underwent EGD and colonoscopy November which did not reveal any significant abnormalities aside from mild gastritis  Colonoscopy was a poor prep and was recommended for repeat in 3 months  · TSH last checked in October and was within normal limits  Will repeat  · May be related to worsening pulmonary status given patient feels that he is too short of breath to eat  · Consult palliative care  May consider steroids  · Has lost 18 lbs in last 2 months    Wt Readings from Last 3 Encounters:   02/03/21 46 1 kg (101 lb 10 1 oz)   12/01/20 54 1 kg (119 lb 4 8 oz)   11/11/20 56 9 kg (125 lb 7 1 oz)   11/2019 146 lbs      Fecal impaction (HCC)  Assessment & Plan  · Will attempt disimpaction  · Colace BID  · Senna once disimpacted    Hypertension  Assessment & Plan  · Continue metoprolol and lisinopril    CKD (chronic kidney disease) stage 2, GFR 60-89 ml/min  Assessment & Plan  Lab Results   Component Value Date    EGFR 94 02/03/2021    EGFR 96 12/18/2020    EGFR 92 11/16/2020    CREATININE 0 80 02/03/2021    CREATININE 0 77 12/18/2020    CREATININE 0 86 11/16/2020     · At baseline creatinine    Type 2 diabetes mellitus without complication, without long-term current use of insulin (HCC)  Assessment & Plan  Lab Results   Component Value Date    HGBA1C 5 9 (H) 11/12/2020       No results for input(s): POCGLU in the last 72 hours  Blood Sugar Average: Last 72 hrs:  Regular diet - do not restrict carbs given poor intake  Would stop metformin on discharge   SSI ACHS    Elevated liver enzymes  Assessment & Plan  · This is chronic  · Has been evaluated by GI previously  · Continue outpatient follow-up    CAD S/P percutaneous coronary angioplasty  Assessment & Plan  · Follows with cardiology    · Recent non STEMI 11/2020  · Continue ASA, BB, ACE, statin    VTE Pharmacologic Prophylaxis: Lovenox, SCDs  Code Status: Level 3 - DNAR and DNI   Discussion with family: patient    Anticipated Length of Stay: Patient will be admitted on an observation basis with an anticipated length of stay of less than 2 midnights secondary to dyspnea  Total Time for Visit, including Counseling / Coordination of Care: 90 minutes  Greater than 50% of this total time spent on direct patient counseling and coordination of care  Chief Complaint:   SOB    History of Present Illness:    Yvette Bergman is a 60 y o  male with a PMH of coronary artery disease, pulmonary fibrosis, chronic hypoxic respiratory failure, type 2 diabetes, hypertension, hyperlipidemia, spinal stenosis, chronic kidney disease stage 2, unintentional weight loss who presents with worsening shortness of breath  The patient was noted to be recently admitted in October of 2020 secondary to increasing shortness of breath felt to be related to pulmonary fibrosis  This was felt to be related to an occupational exposure given that 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  He was discharged with 3-4 L of oxygen with ambulation  He was then admitted in November secondary to non STEMI  He was noted to be seen by Cardiology and received IV heparin  Echocardiogram showed new left ventricular dysfunction and regional wall motion abnormalities  He underwent a cardiac catheterization on November 13th which revealed chronically occluded RCA with no evidence of acute occlusion  No revascularization was done and patient was continued on medical management  Patient had epigastric pain was recommended follow-up as an outpatient with GI for gastric emptying study and repeat colonoscopy  He also underwent an EGD and colonoscopy on November 16th post were essentially unremarkable  He was seen by the Cardiology team on 12/1/2020 in his weight was noted to be 119 lb    He was known to be seen by his PCP via telemedicine on 1/4/2021 and was started on Colace  He presented to the emergency department this afternoon with increasing shortness of breath on exertion  He was typically requiring 2 L nasal cannula however was requiring increasing amounts over the last 3 weeks  He has not been hypoxic however increase his oxygen secondary to worsening symptoms  He reports that he is no longer able to walk to the bathroom without developing such severe shortness of breath  He cannot get into his bed without shortness of breath and therefore has been sleeping on a chair or couch  He reports that is always worse when he lays flat and this is unchanged  He typically will go several days without eating  When he will eat he will try to drink an Ensure  He feels that he gets too short of breath when he eats to be able to continue oral intake  He denied any fever chills  No increasing cough  He is also having increasing weakness that he is using a wheelchair at home for the past month ever since a fall  His weight has dropped another 18 lb in the last 2 months  Review of Systems:    Review of Systems   Constitutional: Positive for activity change, appetite change and fever  Negative for chills, diaphoresis, fatigue and unexpected weight change  HENT: Negative for sore throat  Respiratory: Positive for shortness of breath  Negative for cough and chest tightness  Cardiovascular: Negative for chest pain, palpitations and leg swelling  Gastrointestinal: Positive for constipation  Negative for abdominal pain, diarrhea, nausea and vomiting  Genitourinary: Negative for dysuria  Musculoskeletal: Negative for myalgias  Neurological: Positive for weakness  Negative for dizziness, syncope, numbness and headaches  Psychiatric/Behavioral: Negative for confusion  All other systems reviewed and are negative        Past Medical and Surgical History:     Past Medical History:   Diagnosis Date    Acute myocardial infarction of inferior wall ([facility] [address] ) 11/2011    inferiorwall; drug-eluting stent RCA    Chronic tension type headache     last assessed: 7/4/2012    Colon polyp     Developmental dysplasia of hip     bilateral    Diabetes mellitus ([facility] [address] )     Former smoker     quit 2011     Gout     last assessed: 8/31/2012    Hemorrhage of anus and rectum     last assessed: 8/12/2013    Hyperlipidemia     Hypertension     Internal hemorrhoids     Migraine     last assessed: 11/25/2013    Old MI (myocardial infarction) 2011    Renal insufficiency syndrome     last assessed: 10/22/2014       Past Surgical History:   Procedure Laterality Date    CARDIAC CATHETERIZATION      COLONOSCOPY  05/2012    int hem, dirverticulosis Dr Monty Valente Bilateral      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  Authorizing Provider   aspirin 81 MG tablet Take 2 tablets by mouth daily 6/6/12   Historical Provider, MD   atorvastatin (LIPITOR) 40 mg tablet TAKE 1 TABLET BY MOUTH EVERY DAY 9/25/20   Brenda Patel MD   docusate sodium (COLACE) 100 mg capsule Take 1 capsule (100 mg total) by mouth 2 (two) times a day 1/4/21   Radha Alonzo, DO   famotidine (PEPCID) 40 MG tablet Take 1 tablet (40 mg total) by mouth daily 1/4/21   Radha Alonzo DO   glucose blood test strip by In Vitro route daily 5/17/16   Historical Provider, MD   lisinopril (ZESTRIL) 10 mg tablet TAKE 1 TABLET BY MOUTH EVERY DAY 8/21/20   Radha Alonzo DO   metFORMIN (GLUCOPHAGE) 500 mg tablet TAKE 1 TABLET BY MOUTH TWICE A DAY 8/21/20   Radha Alonzo DO   metoprolol tartrate (LOPRESSOR) 25 mg tablet Take 2 tablets (50 mg total) by mouth 2 (two) times a day 11/17/20 EMPERATRIZ LinaresICA LANCETS 57V MISC by Does not apply route daily 16   Historical Provider, MD     I have reviewed home medications with patient personally      Allergies: No Known Allergies    Social History:     Marital Status: /Civil Union   Occupation: retired  Patient Pre-hospital Living Situation: at home with wife  Patient Pre-hospital Level of Mobility: now using a wheelchair  Patient Pre-hospital Diet Restrictions:  no restrictions  Substance Use History:   Social History     Substance and Sexual Activity   Alcohol Use No     Social History     Tobacco Use   Smoking Status Former Smoker    Packs/day: 1 00    Years: 35 00    Pack years: 35 00    Quit date: 2011    Years since quittin 2   Smokeless Tobacco Never Used     Social History     Substance and Sexual Activity   Drug Use No       Family History:    Family History   Problem Relation Age of Onset    Kidney failure Mother     Diabetes Mother     Hypertension Mother        Physical Exam:     Vitals:   Blood Pressure: 109/58 (21 1500)  Pulse: 98 (21 1500)  Temperature: 97 6 °F (36 4 °C) (21)  Temp Source: Oral (21)  Respirations: 16 (21 1500)  Weight - Scale: 46 1 kg (101 lb 10 1 oz) (21)  SpO2: 94 % (21 1500)    Physical Exam     Additional Data:     Lab Results:  Results from last 7 days   Lab Units 21  1005   WBC Thousand/uL 9 69   HEMOGLOBIN g/dL 13 2   HEMATOCRIT % 44 1   PLATELETS Thousands/uL 282   NEUTROS PCT % 77*   LYMPHS PCT % 16   MONOS PCT % 4   EOS PCT % 2     Results from last 7 days   Lab Units 21  1005   SODIUM mmol/L 138   POTASSIUM mmol/L 5 4*   CHLORIDE mmol/L 99*   CO2 mmol/L 28   BUN mg/dL 26*   CREATININE mg/dL 0 80   ANION GAP mmol/L 11   CALCIUM mg/dL 9 1   ALBUMIN g/dL 3 2*   TOTAL BILIRUBIN mg/dL 0 47   ALK PHOS U/L 106   ALT U/L 90*   AST U/L 143*   GLUCOSE RANDOM mg/dL 94         Imaging: Reviewed radiology reports from this admission including: chest CT scan  CT chest abdomen pelvis w contrast   Final Result by Iav Morales DO (02/03 1312)      No acute inflammatory process or abnormal soft tissue mass  Chronic fibrotic interstitial lung disease, similar appearance to prior CT  Fecal impaction at the rectum  Colonic diverticulosis without evidence of acute diverticulitis  Workstation performed: [id number]         XR chest 1 view portable   ED Interpretation by Zara Moore MD (02/03 1107)   This film was interpreted independently by me  Interstitial lung disease, decreased lung volumes compared to  film (possibly due to depth of inhalation during film)  Final Result by Jazzy Shultz MD (02/03 1035)      Findings in keeping with patient's known UIP pattern chronic interstitial lung disease  No new airspace consolidation  Workstation performed: [id number]             EKG and Other Studies Reviewed on Admission:   · EKG: EKG Results: NSR  HR 90 bpm  V1 TWI     ** Please Note: This note has been constructed using a voice recognition system   **

## 2021-02-03 NOTE — ASSESSMENT & PLAN NOTE
Lab Results   Component Value Date    HGBA1C 5 9 (H) 11/12/2020       No results for input(s): POCGLU in the last 72 hours      Blood Sugar Average: Last 72 hrs:  Regular diet - do not restrict carbs given poor intake  Would stop metformin on discharge   SSI ACHS

## 2021-02-03 NOTE — ASSESSMENT & PLAN NOTE
· Follows with cardiology    · Recent non STEMI 11/2020  · Continue ASA, BB, ACE, statin H Plasty Text: Given the location of the defect, shape of the defect and the proximity to free margins a H-plasty was deemed most appropriate for repair.  Using a sterile surgical marker, the appropriate advancement arms of the H-plasty were drawn incorporating the defect and placing the expected incisions within the relaxed skin tension lines where possible. The area thus outlined was incised deep to adipose tissue with a #15 scalpel blade. The skin margins were undermined to an appropriate distance in all directions utilizing iris scissors.  The opposing advancement arms were then advanced into place in opposite direction and anchored with interrupted buried subcutaneous sutures.

## 2021-02-03 NOTE — ASSESSMENT & PLAN NOTE
Lab Results   Component Value Date    EGFR 94 02/03/2021    EGFR 96 12/18/2020    EGFR 92 11/16/2020    CREATININE 0 80 02/03/2021    CREATININE 0 77 12/18/2020    CREATININE 0 86 11/16/2020     · At baseline creatinine

## 2021-02-03 NOTE — ASSESSMENT & PLAN NOTE
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

## 2021-02-03 NOTE — ASSESSMENT & PLAN NOTE
· He was wearing 2 L nasal cannula at home      · Previously when seen by Pulmonary in November he was noted to be on 4 L nasal cannula but he states his O2 needs have always been at 2L NC at rest since discharge  · Increased to 3L last 3 weeks for symptom control - not for hypoxia

## 2021-02-03 NOTE — ASSESSMENT & PLAN NOTE
· Patient was noted to fall recently and since that time has been using a wheelchair as needed    · Consult physical therapy for evaluation

## 2021-02-03 NOTE — ASSESSMENT & PLAN NOTE
· The patient underwent EGD and colonoscopy November which did not reveal any significant abnormalities aside from mild gastritis  Colonoscopy was a poor prep and was recommended for repeat in 3 months  · TSH last checked in October and was within normal limits  Will repeat  · May be related to worsening pulmonary status given patient feels that he is too short of breath to eat  · Consult palliative care   May consider steroids  · Has lost 18 lbs in last 2 months    Wt Readings from Last 3 Encounters:   02/03/21 46 1 kg (101 lb 10 1 oz)   12/01/20 54 1 kg (119 lb 4 8 oz)   11/11/20 56 9 kg (125 lb 7 1 oz)   11/2019 146 lbs

## 2021-02-04 PROBLEM — J98.4 PULMONARY INSUFFICIENCY: Status: ACTIVE | Noted: 2021-01-01

## 2021-02-04 NOTE — UTILIZATION REVIEW
Initial Clinical Review    Admission: Date/Time/Statement:  2/3/2021 1700 Observation AND CHANGED 2/4/2021 0953 INPATIENT RE: PATIENT NEEDS > 2 MIDNIGHT STAY WITH ONGOING DYSPNEA REQUIRING TRIAL OF IV STEROIDS  Start   Ordered   02/04/21 0954  Inpatient Admission Once     Question Answer Comment   Level of Care Med Surg    Estimated length of stay More than 2 Midnights    Certification I certify that inpatient services are medically necessary for this patient for a duration of greater than two midnights  See H&P and MD Progress Notes for additional information about the patient's course of treatment  02/04/21 0953       ED Arrival Information     Expected Arrival Acuity Means of Arrival Escorted By Service Admission Type    - 2/3/2021 09:45 Urgent Ambulance [facility] Hospitalist Urgent    Arrival Complaint    Respiratory Distress        Chief Complaint   Patient presents with    Shortness of Breath     from home via EMS w/SOB on exertion  wears 2Loxygen @ home     Assessment/Plan: this is a 60year old male from home to ED via ems admitted to observation  [address]  due to dyspnea with differential of worsening pulmonary fibrosis/ambulatory dysfucntion  Uses home oxygen 2 liters, increased to 3 liters last 3 weeks due to dyspnea  Presented due to worsening shortness of breath dangelo last 2 days  cachectic, unintentional weight loss of 18 lbs in last 2 months, increased weakness with recent fall  On exam malnourished, facial muscle atrophy, prominent clavicle and rib bones  16 lbs below ideal body weight  appears dyspneic with conversation  Lungs decreased bases  D dimer 0 93    ALT 90  NT-proBNP 1194  Troponin 0 06  Ct chest/abdomen showed fecal impaction  Chronic interstitial lung disease  Pulmonary and Palliative care consulted  Trend troponin, steroid trial      2/4/2021:  CHANGED TO INPATIENT:  Patient with ongoing dyspnea    On exam appears thin, frail, cachectic and malnourished  Lungs decreased effort, decreased at bases  Patient on continued steroid trial, continue on increased oxygen at 3 liters, nutritional supplements, ensure  2/4/2021 per Pulmonary- Patient has acute pulmonary insuffiencey on chronic hypoxic respiratory failure,  Has worsening bilateral fibrosis on CT and recommend IV Solu medrol and albuterol MDI, qid  Has possible acute CHF and trial IV lasix  Has severe deconditioning and protein calorie malnutrition 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  2/5/2021:  Patient feels shortness of breath at rest improved,  Has ongoing cough  On baseline oxygen  Per Pulmonary:  Patient does not want lung transplant and is poor candidate  Monitor off steroids       ED Triage Vitals [02/03/21 0952]   Temperature Pulse Respirations Blood Pressure SpO2   97 6 °F (36 4 °C) 88 20 123/64 100 %      Temp Source Heart Rate Source Patient Position - Orthostatic VS BP Location FiO2 (%)   Oral Monitor Lying Left arm --      Pain Score       No Pain          Wt Readings from Last 1 Encounters:   02/03/21 46 1 kg (101 lb 10 1 oz)     Additional Vital Signs:   02/04/21 0307  --  83  18  130/61  85  95 %  --  --  None (Room air)  Lying   02/04/21 0015  --  78  18  140/90  111  98 %  --  --  None (Room air)  --   02/03/21 2208  --  94  18  116/67  87  99 %  --  --  None (Room air)  Lying   02/03/21 2001  --  101  18  110/66  82  99 %  --  --  Nasal cannula  Lying   02/03/21 1500  --  98  16  109/58  78  94 %  32  3 L/min  Nasal cannula  Lying   02/03/21 1424  --  106Abnormal   16  134/72  --  98 %  32  3 L/min  Nasal cannula  Lying   02/03/21 1130  --  94  --  110/62  80  99 %  --  --  --  --   02/03/21 1045  --  80  --  --  --  100 %  --  --         02/05/21 1138  97 4 °F (36 3 °C)Abnormal   82  19  98/53  --  96 %  --  --  None (Room air)  --   02/05/21 0758  98 8 °F (37 1 °C)  80  18  128/63  --  98 %  --  --  Nasal cannula  Lying   02/04/21 2140  98 3 °F (36 8 °C) 87  18  101/60  --  97 %  28  2 L/min  Nasal cannula  Lying   02/04/21 1500  97 8 °F (36 6 °C)  86  18  99/52  --  96 %  28  2 L/min  Nasal cannula           Pertinent Labs/Diagnostic Test Results:   2/3/2021 CxR Findings in keeping with patient's known UIP pattern chronic interstitial lung disease  No new airspace consolidation  2/3/2021 Ct chest/abdomen No acute inflammatory process or abnormal soft tissue mass  Chronic fibrotic interstitial lung disease, similar appearance to prior CT  Fecal impaction at the rectum  Colonic diverticulosis without evidence of acute diverticulitis  2/3/2021 EKG - sinus tachycardia  LPFB  Normal ST and T    2/4/2021 ECHO -  LEFT VENTRICLE:Systolic function was normal  Ejection fraction was estimated to be 55 %  There were no regional wall motion abnormalities  There was hypokinesis of the basal-mid inferoseptal and basal inferior wall(s)  Doppler parameters were consistent with abnormal left ventricular relaxation (grade 1 diastolic dysfunction)  RIGHT VENTRICLE  The size was normal   Systolic function was reduced  RIGHT ATRIUM:Size was normal   Rdaha Isma was mild regurgitation    TRICUSPID VALVE:There was mild regurgitation  The tricuspid jet envelope definition was inadequate for estimation of RV systolic pressure    PULMONIC VALVEThere was trace regurgitation    PULMONARY ARTERIES:Inadequate doppler to assess for puomonary arterial systolic notching    Results from last 7 days   Lab Units 02/03/21  1128   SARS-COV-2  Negative      Results from last 7 days   Lab Units 02/05/212  0604 02/03/21  1005   WBC Thousand/uL 10 52 9 69   HEMOGLOBIN g/dL 12 7 13 2   HEMATOCRIT % 41 2 44 1   PLATELETS Thousands/uL 276 282   NEUTROS ABS Thousands/µL  7 51      Results from last 7 days   Lab Units 02/05/21  0604 02/03/21  1005   SODIUM mmol/L 137 138   POTASSIUM mmol/L 4 9 5 4*   CHLORIDE mmol/L 100 99*   CO2 mmol/L 29 28   ANION GAP mmol/L 8 11   BUN mg/dL 24 26* CREATININE mg/dL 0 92 0 80   EGFR ml/min/1 73sq m 88 94   CALCIUM mg/dL 9 9 1     Results from last 7 days   Lab Units 02/03/21  1005   AST U/L 143*   ALT U/L 90*   ALK PHOS U/L 106   TOTAL PROTEIN g/dL 7 7   ALBUMIN g/dL 3 2*   TOTAL BILIRUBIN mg/dL 0 47     Results from last 7 days   Lab Units 02/03/21  2203 02/03/21  1725   POC GLUCOSE mg/dl 83 88     Results from last 7 days   Lab Units 02/03/21  1005   GLUCOSE RANDOM mg/dL 94      Ref   Range 2/4/2021 16:53 2/4/2021 20:47 2/5/2021 06:04 2/5/2021 07:57 2/5/2021 10:40   POC Glucose Latest Ref Range: 65 - 140 mg/dl 196 (H) 175 (H)  97 182 (H)     Results from last 7 days   Lab Units 02/03/21  2000 02/03/21  1751 02/03/21  1005   TROPONIN I ng/mL 0 05* 0 06* 0 06*     Results from last 7 days   Lab Units 02/03/21  1010   D-DIMER QUANTITATIVE ug/ml FEU 0 93*     Results from last 7 days   Lab Units 02/03/21  1005   TSH 3RD GENERATON uIU/mL 1 249     Results from last 7 days   Lab Units 02/03/21  1005   NT-PRO BNP pg/mL 1,194*     Results from last 7 days   Lab Units 02/03/21  1128   INFLUENZA A PCR  Negative   INFLUENZA B PCR  Negative   RSV PCR  Negative     ED Treatment:   Medication Administration from 02/03/2021 0945 to 02/04/2021 1753       Date/Time Order Dose Route Action Comments     02/03/2021 1832 docusate sodium (COLACE) capsule 100 mg 100 mg Oral Given      02/03/2021 2207 metoprolol tartrate (LOPRESSOR) tablet 25 mg 25 mg Oral Given         Past Medical History:   Diagnosis Date    Acute myocardial infarction of inferior wall ([facility] [address] ) 11/2011    inferiorwall; drug-eluting stent RCA    Chronic tension type headache     last assessed: 7/4/2012    Colon polyp     Developmental dysplasia of hip     bilateral    Diabetes mellitus ([facility] [address] )     Former smoker     quit 2011     Gout     last assessed: 8/31/2012    Hemorrhage of anus and rectum     last assessed: 8/12/2013    Hyperlipidemia     Hypertension     Internal hemorrhoids     Migraine     last assessed: 11/25/2013    Old MI (myocardial infarction) 2011    Renal insufficiency syndrome     last assessed: 10/22/2014     Present on Admission:   Unintentional weight loss   Chronic respiratory failure with hypoxia (HCC)   Pulmonary fibrosis determined by high resolution computed tomography (HCC)   Hypertension   CKD (chronic kidney disease) stage 2, GFR 60-89 ml/min   Elevated liver enzymes   Type 2 diabetes mellitus without complication, without long-term current use of insulin (HCC)      Admitting Diagnosis: SOB (shortness of breath) [R06 02]  Age/Sex: 60 y o  male  Admission Orders:  Scheduled Medications:  albuterol, 2 puff, Inhalation, 4x Daily  aspirin, 162 mg, Oral, Daily  atorvastatin, 40 mg, Oral, Daily  docusate sodium, 100 mg, Oral, BID  enoxaparin, 40 mg, Subcutaneous, Daily  famotidine, 40 mg, Oral, Daily  insulin lispro, 1-5 Units, Subcutaneous, TID AC  insulin lispro, 1-5 Units, Subcutaneous, HS  lisinopril, 10 mg, Oral, Daily  methylPREDNISolone sodium succinate, 40 mg, Intravenous, Q12H HERNANDEZ  metoprolol tartrate, 25 mg, Oral, Q12H HERNANDEZ  polyethylene glycol, 17 g, Oral, Daily  senna, 1 tablet, Oral, HS      Continuous IV Infusions: none     PRN Meds: not used   acetaminophen, 650 mg, Oral, Q6H PRN  ondansetron, 4 mg, Intravenous, Q6H PRN        IP CONSULT TO PULMONOLOGY  IP CONSULT TO PALLIATIVE CARE    Network Utilization Review Department  ATTENTION: Please call with any questions or concerns to [phone] and carefully listen to the prompts so that you are directed to the right person  All voicemails are confidential   Aleja Noguera all requests for admission clinical reviews, approved or denied determinations and any other requests to dedicated fax number below belonging to the campus where the patient is receiving treatment   List of dedicated fax numbers for the Facilities:  [address] [place] [address] DENIALS (Administrative/Medical Necessity) [phone]   [address] [place] [address] (Maternity/NICU/Pediatrics) [address] [facility] [facility] 40 125 [facility]  [phone]   [address]yle Minaya [address] (  Krista Balderrama "[address]" 103) [address] [address] [address] [phone]   [address] [phone]

## 2021-02-04 NOTE — ASSESSMENT & PLAN NOTE
· The patient underwent EGD and colonoscopy November which did not reveal any significant abnormalities aside from mild gastritis  Colonoscopy was a poor prep and was recommended for repeat in 3 months  · TSH within normal limits  · May be related to worsening pulmonary status given patient feels that he is too short of breath to eat  · Started on IV steroids from pulmonary standpoint however this may help oral intake as well  · Palliative Care evaluated  Patient's lung disease likely to worsen and therefore can continue to follow-up with them as an outpatient for symptom management  · Has lost 18 lbs in last 2 months  · Continue Ensure supplementation      Wt Readings from Last 3 Encounters:   02/03/21 46 1 kg (101 lb 10 1 oz)   12/01/20 54 1 kg (119 lb 4 8 oz)   11/11/20 56 9 kg (125 lb 7 1 oz)   11/2019 146 lbs

## 2021-02-04 NOTE — PROGRESS NOTES
[facility]  Progress Note - Ab Emmanuel 1955, 60 y o  male MRN: [id number]  Unit/Bed#: S -01 Encounter: [id number]  Primary Care Provider: Wu Walsh DO   Date and time admitted to hospital: 2/3/2021  9:46 AM    * Pulmonary insufficiency  Assessment & Plan  · Related to worsening of underlying pulmonary fibrosis generalized deconditioning related to weight loss  · Not appear to be acutely volume overloaded  Lasix x 1 dose given by pulmonary today  · Trial steroids IV 40 mg BID   · CT scan shows chronic fibrotic interstitial lung disease  · Pulmonary function test were unable to be performed as an outpatient and given patient's worsening dyspnea unclear when these will be able to be performed  · D-dimer is elevated  He is not significantly tachypneic and has no lower extremity edema or erythema to suggest DVT  Do not suspect pulmonary embolism  · Troponin x 3 flat at 0 5-0 6  Pulmonary fibrosis determined by high resolution computed tomography [facility])  Assessment & Plan  · Follows as outpatient with Pulmonary  · 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  · Diagnosed in October of 2021 was hospitalized with shortness of breath  · Was to complete PFTs 12/22 and 1/20 but unable given he thinks he is too SOB to get to the clinic  · Pulmonary evaluating  · Unlikely to be transplant candidate however attending will discuss with Pulmonary team    Chronic respiratory failure with hypoxia ([address] )  Assessment & Plan  · He was wearing 2 L nasal cannula at home      · Previously when seen by Pulmonary in November he was noted to be on 4 L nasal cannula but he states his O2 needs have always been at 2L NC at rest since discharge  · Increased to 3L last 3 weeks for symptom control - not for hypoxia    Ambulatory dysfunction  Assessment & Plan  · Patient was noted to fall recently and since that time has been using a wheelchair as needed  · Physical therapy recommending patient go to rehab    Unintentional weight loss  Assessment & Plan  · The patient underwent EGD and colonoscopy November which did not reveal any significant abnormalities aside from mild gastritis  Colonoscopy was a poor prep and was recommended for repeat in 3 months  · TSH within normal limits  · May be related to worsening pulmonary status given patient feels that he is too short of breath to eat  · Started on IV steroids from pulmonary standpoint however this may help oral intake as well  · Palliative Care evaluated  Patient's lung disease likely to worsen and therefore can continue to follow-up with them as an outpatient for symptom management  · Has lost 18 lbs in last 2 months  · Continue Ensure supplementation      Wt Readings from Last 3 Encounters:   02/03/21 46 1 kg (101 lb 10 1 oz)   12/01/20 54 1 kg (119 lb 4 8 oz)   11/11/20 56 9 kg (125 lb 7 1 oz)   11/2019 146 lbs      Fecal impaction (HCC)  Assessment & Plan  · Will attempt disimpaction  · Colace BID  · Senna once disimpacted    Hypertension  Assessment & Plan  · Continue metoprolol and lisinopril  · 130/61    CKD (chronic kidney disease) stage 2, GFR 60-89 ml/min  Assessment & Plan  Lab Results   Component Value Date    EGFR 94 02/03/2021    EGFR 96 12/18/2020    EGFR 92 11/16/2020    CREATININE 0 80 02/03/2021    CREATININE 0 77 12/18/2020    CREATININE 0 86 11/16/2020     · At baseline creatinine    Type 2 diabetes mellitus without complication, without long-term current use of insulin [facility])  Assessment & Plan  Lab Results   Component Value Date    HGBA1C 5 9 (H) 11/12/2020       Recent Labs     02/03/21  1725 02/03/21  2203 02/04/21  0922   POCGLU 88 83 121       Blood Sugar Average: Last 72 hrs:  (P) 46 [id number]   Regular diet - do not restrict carbs given poor intake  Would stop metformin on discharge   SSI ACHS    Elevated liver enzymes  Assessment & Plan  · This is chronic  · Has been evaluated by GI previously  · Continue outpatient follow-up    CAD S/P percutaneous coronary angioplasty  Assessment & Plan  · Follows with cardiology  · Recent non STEMI 11/2020  · Continue ASA, BB, ACE, statin      VTE Pharmacologic Prophylaxis: lovenox    Patient Centered Rounds: I have performed bedside rounds with nursing staff today  Discussions with Specialists or Other Care Team Provider: nursing, pulm    Education and Discussions with Family / Patient: Updated  (wife) via phone  Time Spent for Care: 30 minutes  More than 50% of total time spent on counseling and coordination of care as described above  Current Length of Stay: 0 day(s)  Current Patient Status: Inpatient   Certification Statement: The patient, admitted on an observation basis, will now require > 2 midnight hospital stay due to need for IV steroids  Discharge Plan: Anticipate discharge in 48 hrs to rehab facility  Code Status: Level 3 - DNAR and DNI    Subjective:   Feels a little better  Notes still dyspneic  No chest pain  Eating breakfast    Objective:     Vitals:   No data recorded  HR:  [] 83  Resp:  [16-18] 18  BP: (109-140)/(58-90) 130/61  SpO2:  [94 %-99 %] 95 %  Body mass index is 19 85 kg/m²  Input and Output Summary (last 24 hours):   No intake or output data in the 24 hours ending 02/04/21 1122    Physical Exam:   Physical Exam  Vitals signs and nursing note reviewed  Constitutional:       General: He is not in acute distress  Appearance: He is ill-appearing  He is not diaphoretic  Comments: Appears thin, frail, cachectic and malnourished   HENT:      Head: Normocephalic and atraumatic  Mouth/Throat:      Mouth: Mucous membranes are dry  Cardiovascular:      Rate and Rhythm: Normal rate and regular rhythm  Pulmonary:      Breath sounds: No stridor  No wheezing, rhonchi or rales  Comments: Decreased effort  Decreased at bases    No wheezes rales or rhonchi  Abdominal: General: Bowel sounds are normal       Palpations: Abdomen is soft  There is no mass  Tenderness: There is no abdominal tenderness  There is no guarding  Musculoskeletal:      Right lower leg: No edema  Left lower leg: No edema  Skin:     General: Skin is warm and dry  Coloration: Skin is pale  Neurological:      Mental Status: He is alert  Psychiatric:         Mood and Affect: Mood normal          Behavior: Behavior normal         Additional Data:    Labs:  Results from last 7 days   Lab Units 02/03/21  1005   WBC Thousand/uL 9 69   HEMOGLOBIN g/dL 13 2   HEMATOCRIT % 44 1   PLATELETS Thousands/uL 282   NEUTROS PCT % 77*   LYMPHS PCT % 16   MONOS PCT % 4   EOS PCT % 2     Results from last 7 days   Lab Units 02/03/21  1005   SODIUM mmol/L 138   POTASSIUM mmol/L 5 4*   CHLORIDE mmol/L 99*   CO2 mmol/L 28   BUN mg/dL 26*   CREATININE mg/dL 0 80   ANION GAP mmol/L 11   CALCIUM mg/dL 9 1   ALBUMIN g/dL 3 2*   TOTAL BILIRUBIN mg/dL 0 47   ALK PHOS U/L 106   ALT U/L 90*   AST U/L 143*   GLUCOSE RANDOM mg/dL 94         Results from last 7 days   Lab Units 02/04/21  0922 02/03/21  2203 02/03/21  1725   POC GLUCOSE mg/dl 121 83 88               Lines/Drains:  Invasive Devices     Peripheral Intravenous Line            Peripheral IV 02/03/21 Right Antecubital 1 day                Telemetry:        Imaging: No pertinent imaging reviewed      Recent Cultures (last 7 days):         Last 24 Hours Medication List:   Current Facility-Administered Medications   Medication Dose Route Frequency Provider Last Rate    acetaminophen  650 mg Oral Q6H PRN Eb Dockery PA-C      albuterol  2 puff Inhalation 4x Daily Ariadna Mac, CRNP      aspirin  162 mg Oral Daily Phyllis Armenta PA-C      atorvastatin  40 mg Oral Daily Phyllis Armenta PA-C      bisacodyl  10 mg Rectal Daily PRN Vasu Ley MD      docusate sodium  100 mg Oral BID Phyllis Armenta PA-C      enoxaparin  40 mg Subcutaneous Daily Gracie Armenta PA-C      famotidine  40 mg Oral Daily Phyllis Armenta PA-C      insulin lispro  1-5 Units Subcutaneous TID AC Phyllis Armenta PA-C      insulin lispro  1-5 Units Subcutaneous HS Phyllis Armenta PA-C      lisinopril  10 mg Oral Daily Phyllis Armenta PA-C      methylPREDNISolone sodium succinate  40 mg Intravenous Q12H [address] CRISTOBAL Macias      metoprolol tartrate  25 mg Oral Q12H [address] Phyllis Armenta PA-C      ondansetron  4 mg Intravenous Q6H PRN Phyllis Armenta PA-C      polyethylene glycol  17 g Oral Daily Phyllis Armenta PA-C      senna  1 tablet Oral HS Gloria Dowd PA-C          Today, Patient Was Seen By: Gloria Dowd PA-C    ** Please Note: Dictation voice to text software may have been used in the creation of this document   **

## 2021-02-04 NOTE — CONSULTS
Consultation - Palliative and Supportive Care   Mey Jhaveri 60 y o  male [id number]    Assessment:      Patient Active Problem List   Diagnosis    Arteriosclerotic cardiovascular disease (ASCVD)    Benign essential hypertension    CAD S/P percutaneous coronary angioplasty    Colon, diverticulosis    Type 2 diabetes mellitus without complication, without long-term current use of insulin ([facility])    Hip pain, bilateral    History of acute inferior wall MI    Hyperlipidemia    Osteoarthritis of knee    Small stature    Spinal stenosis    Microalbuminuria due to type 2 diabetes mellitus ([facility])    CKD (chronic kidney disease) stage 2, GFR 60-89 ml/min    Pulmonary fibrosis determined by high resolution computed tomography ([facility])    Hypertension    Congenital bowing legs    Elevated liver enzymes    Chronic respiratory failure with hypoxia ([facility]    Former smoker    NSTEMI (non-ST elevated myocardial infarction) ([facility])    Epigastric pain    Anemia    Unintentional weight loss    Dyspnea    Ambulatory dysfunction    Fecal impaction ([facility] )           Plan:  1  Symptom management -    - Agree with steroids  Appreciate Pulmonary recs  - [facility] to engage with patient to optimize outpatient resources  - agree with senna/miralax/colace  If no relief, would trial PRN bisacodyl suppository  - Will order ensure shakes with meals to promote increased protein/calories intake  2  Goals - Discussed at length with patient  Endorses a level 3 code status but remains focused on improving function  Agrees to PT/OT  Pulmonary rehab would likely be most beneficial to patient and he agrees to this disposition if covered by his insurance  Patient does not have any written directives but is open to filling out a POLST/ Five Wishes  He would request his spouse be present if possible  I have communicated this to [facility]           Code Status: level 3   Decisional apparatus:  Patient is competent on my exam today  If competence is lost, patient's substitute decision maker would default to spouse by [place]  Advance Directive / Living Will / POLST:  none     I have reviewed the patient's controlled substance dispensing history in the Prescription Drug Monitoring Program in compliance with the [facility] regulations before prescribing any controlled substances  We appreciate the invitation to be involved in this patient's care  We will continue to follow  Please do not hesitate to reach our on call provider through our clinic answering service at  should you have acute symptom control concerns  Paloma Perez MD  Palliative and Supportive Care  Clinic/Answering Service: [phone]  You can find me on [employer]! IDENTIFICATION:  Inpatient consult to Palliative Care  Consult performed by: Paloma Perez MD  Consult ordered by: Clay Frey PA-C        Physician Requesting Consult: Griselda Palacios MD  Reason for Consult / Principal Problem: goals of care  Hx and PE limited by: none    HISTORY OF PRESENT ILLNESS:       Mary Schulte is a 60 y o  male with history of hypoxic respiratory failure with 2L O2 at baseline and pulmonary fibrosis who presents with worsening shortness of breath and progressive ambulatory dysfunction/weight loss  Patient recently admitted in October for the same with imaging suggestive of underlying fibrosis  PFTs not able to be done as outpt given functional decline  Patient reports a fairly dramatic decline at home over the past few months  He lives in a mobile home in [facility] and requires a wheelchair to move from couch to bathroom  He has support from his spouse and occasionally his son  He reports significant weight loss and notes his legs have gotten especially thin/weak  He drinks 1 ensure/day but notes these are difficult to afford and otherwise doesn't have much of an appetite  Denies pain  Feels chronically short of breath  Denies nausea  Has ongoing issues with constiaption and has not moved bowels in several days  Goals of care also discusssed as above  Review of Systems   Constitution: Positive for decreased appetite, malaise/fatigue and weight loss  Cardiovascular: Positive for dyspnea on exertion  Negative for chest pain  Respiratory: Positive for shortness of breath and sleep disturbances due to breathing  Musculoskeletal: Positive for muscle weakness  Gastrointestinal: Positive for constipation  Negative for abdominal pain and nausea  Genitourinary: Negative for bladder incontinence  Neurological: Positive for weakness  Psychiatric/Behavioral: Negative for depression  The patient does not have insomnia and is not nervous/anxious  All other systems reviewed and are negative        Past Medical History:   Diagnosis Date    Acute myocardial infarction of inferior wall ([facility] ) 11/2011    inferiorwall; drug-eluting stent RCA    Chronic tension type headache     last assessed: 7/4/2012    Colon polyp     Developmental dysplasia of hip     bilateral    Diabetes mellitus ([facility] )     Former smoker     quit 2011     Gout     last assessed: 8/31/2012    Hemorrhage of anus and rectum     last assessed: 8/12/2013    Hyperlipidemia     Hypertension     Internal hemorrhoids     Migraine     last assessed: 11/25/2013    Old MI (myocardial infarction) 2011    Renal insufficiency syndrome     last assessed: 10/22/2014     Past Surgical History:   Procedure Laterality Date    CARDIAC CATHETERIZATION      COLONOSCOPY  05/2012    int hem, dirverticulosis Dr Samia Leslie Bilateral      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    LUMBAR LAMINECTOMY  03/2010    decompressive more than 2 lumbar segments L4-S1      Social History     Socioeconomic History    Marital status: /Civil Union     Spouse name: Not on file    Number of children: 2    Years of education: 7 years completed    Highest education level: Not on file   Occupational History    Occupation: castillo   Social Needs    Financial resource strain: Not on file    Food insecurity     Worry: Not on file     Inability: Not on file   [employer] needs     Medical: Not on file     Non-medical: Not on file   Tobacco Use    Smoking status: Former Smoker     Packs/day: 1 00     Years: 35 00     Pack years: 35 00     Quit date: 2011     Years since quittin 2    Smokeless tobacco: Never Used   Substance and Sexual Activity    Alcohol use: No    Drug use: No    Sexual activity: Not on file   Lifestyle    Physical activity     Days per week: Not on file     Minutes per session: Not on file    Stress: Not on file   Relationships    Social connections     Talks on phone: Not on file     Gets together: Not on file     Attends Christianity service: Not on file     Active member of club or organization: Not on file     Attends meetings of clubs or organizations: Not on file     Relationship status: Not on file    Intimate partner violence     Fear of current or ex partner: Not on file     Emotionally abused: Not on file     Physically abused: Not on file     Forced sexual activity: Not on file   Other Topics Concern    Not on file   Social History Narrative    Not on file     Family History   Problem Relation Age of Onset    Kidney failure Mother     Diabetes Mother     Hypertension Mother        MEDICATIONS / ALLERGIES:    all current active meds have been reviewed and current meds:   Current Facility-Administered Medications   Medication Dose Route Frequency    acetaminophen (TYLENOL) tablet 650 mg  650 mg Oral Q6H PRN    albuterol (PROVENTIL HFA,VENTOLIN HFA) inhaler 2 puff  2 puff Inhalation 4x Daily    aspirin (ECOTRIN LOW STRENGTH) EC tablet 162 mg  162 mg Oral Daily    atorvastatin (LIPITOR) tablet 40 mg  40 mg Oral Daily    bisacodyl (DULCOLAX) rectal suppository 10 mg  10 mg Rectal Daily PRN    docusate sodium (COLACE) capsule 100 mg  100 mg Oral BID    enoxaparin (LOVENOX) subcutaneous injection 40 mg  40 mg Subcutaneous Daily    famotidine (PEPCID) tablet 40 mg  40 mg Oral Daily    insulin lispro (HumaLOG) 100 units/mL subcutaneous injection 1-5 Units  1-5 Units Subcutaneous TID AC    insulin lispro (HumaLOG) 100 units/mL subcutaneous injection 1-5 Units  1-5 Units Subcutaneous HS    lisinopril (ZESTRIL) tablet 10 mg  10 mg Oral Daily    methylPREDNISolone sodium succinate (Solu-MEDROL) injection 40 mg  40 mg Intravenous Q12H [facility]    metoprolol tartrate (LOPRESSOR) tablet 25 mg  25 mg Oral Q12H HERNANDEZ    ondansetron (ZOFRAN) injection 4 mg  4 mg Intravenous Q6H PRN    polyethylene glycol (MIRALAX) packet 17 g  17 g Oral Daily    senna (SENOKOT) tablet 8 6 mg  1 tablet Oral HS       No Known Allergies    OBJECTIVE:    Physical Exam  Physical Exam  Vitals signs and nursing note reviewed  Constitutional:       General: He is not in acute distress  Appearance: He is ill-appearing  He is not toxic-appearing or diaphoretic  HENT:      Head: Atraumatic  Right Ear: External ear normal       Left Ear: External ear normal       Nose:      Comments: O2 via NC     Mouth/Throat:      Mouth: Mucous membranes are moist    Eyes:      General: No scleral icterus  Right eye: No discharge  Left eye: No discharge  Cardiovascular:      Rate and Rhythm: Normal rate  Pulmonary:      Comments: Conversational labored breathing/tachypnea  Abdominal:      General: There is no distension  Musculoskeletal:         General: Deformity (diffuse muscle wasting, calves, forearms, temporal muscles) present  No swelling  Skin:     General: Skin is dry  Coloration: Skin is not jaundiced or pale  Comments: Tattoos noted   Neurological:      General: No focal deficit present        Mental Status: He is alert and oriented to person, place, and time  Cranial Nerves: No cranial nerve deficit  Motor: Weakness present  Psychiatric:         Mood and Affect: Mood normal          Behavior: Behavior normal          Thought Content: Thought content normal          Judgment: Judgment normal          Lab Results: I have personally reviewed pertinent labs  , CBC: No results found for: WBC, HGB, HCT, MCV, PLT, ADJUSTEDWBC, MCH, MCHC, RDW, MPV, NRBC, CMP: No results found for: SODIUM, K, CL, CO2, ANIONGAP, BUN, CREATININE, GLUCOSE, CALCIUM, AST, ALT, ALKPHOS, PROT, BILITOT, EGFR, BMP:No results found for: SODIUM, K, CL, CO2, BUN, CREATININE, GLUC, GLUF, CALCIUM, AGAP, EGFR  Imaging Studies: I personally reviewed relevant reports  EKG, Pathology, and Other Studies: I personally reviewed relevant reports    Counseling / Coordination of Care    Total floor / unit time spent today 65+ minutes  Greater than 50% of total time was spent with the patient and / or family counseling and / or coordination of care  A description of the counseling / coordination of care: symptom assessment, med review, med changes, psychosocial support, confirmation fo code status, goals of care, advanced care planning, chart review

## 2021-02-04 NOTE — ASSESSMENT & PLAN NOTE
· Patient was noted to fall recently and since that time has been using a wheelchair as needed    · Physical therapy recommending patient go to rehab

## 2021-02-04 NOTE — PLAN OF CARE
Problem: PHYSICAL THERAPY ADULT  Goal: Performs mobility at highest level of function for planned discharge setting  See evaluation for individualized goals  Description: Treatment/Interventions: Functional transfer training, LE strengthening/ROM, Therapeutic exercise, Endurance training, Patient/family training, Equipment eval/education, Bed mobility  Equipment Recommended: Mat Prader, Wheelchair(pending progress with mobility)       See flowsheet documentation for full assessment, interventions and recommendations  Note: Prognosis: Fair  Problem List: Decreased strength, Decreased endurance, Impaired balance, Decreased mobility, Decreased safety awareness  Assessment: Pt is a 60 y o  male seen for PT evaluation s/p admit to [facility] on 2/3/2021 w/ Dyspnea  Order placed for PT  Comorbidities affecting pt's physical performance at time of assessment listed above  Personal factors affecting pt at time of IE include: limited home support, inability to perform IADLs, inability to perform ADLs, inability to ambulate household distances and recent fall(s)  Prior to admission, pt was was independent w/ all functional mobility w/ WC, lived in one floor environment, lived with wife, daughter, and son in law and has a ramped entrance  Upon evaluation: Pt requires max A x1 for supine to sit EOB  Limited assessment due to patient height vs stretcher height and pt not wanting to attempt sit to stand transfer onto step stool  (Please find full objective findings from PT assessment regarding body systems outlined above)  Impairments and limitations also listed above, especially due to  weakness, impaired balance, decreased endurance, decreased activity tolerance, decreased safety awareness, fall risk and SOB upon exertion  The following objective measures performed on IE also reveal limitations: Barthel Index 35/100   Pt's clinical presentation is currently unstable/unpredictable seen in pt's presentation of decreased safety awareness, fall risk, and significant decline in functional mobility compared to baseline  Pt to benefit from continued skilled PT tx while in hospital and upon DC to address deficits as defined above and maximize level of functional mobility  From PT/mobility standpoint, recommendation at time of d/c would be inpatient rehab pending progress in order to maximize pt's functional independence and consistency w/ mobility in order to facilitate return to PLOF  Recommend PT to see for further mobility assessment  PT Discharge Recommendation: Post-Acute Rehabilitation Services          See flowsheet documentation for full assessment

## 2021-02-04 NOTE — ASSESSMENT & PLAN NOTE
Lab Results   Component Value Date    HGBA1C 5 9 (H) 11/12/2020       Recent Labs     02/03/21  1725 02/03/21  2203 02/04/21  0922   POCGLU 88 83 121       Blood Sugar Average: Last 72 hrs:  (P) 68 [id number]   Regular diet - do not restrict carbs given poor intake  Would stop metformin on discharge   SSI ACHS

## 2021-02-04 NOTE — QUICK NOTE
Please see physical exam from H&P, date 2/3/21 and time 1608  General Appearance: Alert, no acute distress  Appears chronically ill and cachetic  Sunken cheeks  Pale  On 3L NC   HEENT: MM dry, sclera anicteric  Skin: Pale  warm, dry and intact  No rashes/lesions noted  Lungs: appears dyspneic with prolonged conversation  Lungs decreased at bases but otherwise no wheezes/rales/rhonchihi  On 3L NC  Cardiac: normal S1/S2, regular rate and rhythm  No murmur/rubs/gallops  Abdomen: soft, nontender, nondistended, active bowel sounds  Sunken abdomen  Extremities: +2/+2 pulses radial and pedal  No edema  No cyanosis  Bowing notes  Neuro: A&O x3   No focal deficits  Psych: normal affect and behavior

## 2021-02-04 NOTE — PLAN OF CARE
Problem: Prexisting or High Potential for Compromised Skin Integrity  Goal: Skin integrity is maintained or improved  Description: INTERVENTIONS:  - Identify patients at risk for skin breakdown  - Assess and monitor skin integrity  - Assess and monitor nutrition and hydration status  - Monitor labs   - Assess for incontinence   - Turn and reposition patient  - Assist with mobility/ambulation  - Relieve pressure over bony prominences  - Avoid friction and shearing  - Provide appropriate hygiene as needed including keeping skin clean and dry  - Evaluate need for skin moisturizer/barrier cream  - Collaborate with interdisciplinary team   - Patient/family teaching  - Consider wound care consult   Outcome: Progressing     Problem: Nutrition/Hydration-ADULT  Goal: Nutrient/Hydration intake appropriate for improving, restoring or maintaining nutritional needs  Description: Monitor and assess patient's nutrition/hydration status for malnutrition  Collaborate with interdisciplinary team and initiate plan and interventions as ordered  Monitor patient's weight and dietary intake as ordered or per policy  Utilize nutrition screening tool and intervene as necessary  Determine patient's food preferences and provide high-protein, high-caloric foods as appropriate       INTERVENTIONS:  - Monitor oral intake, urinary output, labs, and treatment plans  - Assess nutrition and hydration status and recommend course of action  - Evaluate amount of meals eaten  - Assist patient with eating if necessary   - Allow adequate time for meals  - Recommend/ encourage appropriate diets, oral nutritional supplements, and vitamin/mineral supplements  - Order, calculate, and assess calorie counts as needed  - Recommend, monitor, and adjust tube feedings and TPN/PPN based on assessed needs  - Assess need for intravenous fluids  - Provide specific nutrition/hydration education as appropriate  - Include patient/family/caregiver in decisions related to nutrition  Outcome: Progressing     Problem: PAIN - ADULT  Goal: Verbalizes/displays adequate comfort level or baseline comfort level  Description: Interventions:  - Encourage patient to monitor pain and request assistance  - Assess pain using appropriate pain scale  - Administer analgesics based on type and severity of pain and evaluate response  - Implement non-pharmacological measures as appropriate and evaluate response  - Consider cultural and social influences on pain and pain management  - Notify physician/advanced practitioner if interventions unsuccessful or patient reports new pain  Outcome: Progressing     Problem: INFECTION - ADULT  Goal: Absence or prevention of progression during hospitalization  Description: INTERVENTIONS:  - Assess and monitor for signs and symptoms of infection  - Monitor lab/diagnostic results  - Monitor all insertion sites, i e  indwelling lines, tubes, and drains  - Monitor endotracheal if appropriate and nasal secretions for changes in amount and color  - Sieper appropriate cooling/warming therapies per order  - Administer medications as ordered  - Instruct and encourage patient and family to use good hand hygiene technique  - Identify and instruct in appropriate isolation precautions for identified infection/condition  Outcome: Progressing     Problem: SAFETY ADULT  Goal: Patient will remain free of falls  Description: INTERVENTIONS:  - Assess patient frequently for physical needs  -  Identify cognitive and physical deficits and behaviors that affect risk of falls    -  Sieper fall precautions as indicated by assessment   - Educate patient/family on patient safety including physical limitations  - Instruct patient to call for assistance with activity based on assessment  - Modify environment to reduce risk of injury  - Consider OT/PT consult to assist with strengthening/mobility  Outcome: Progressing  Goal: Maintain or return to baseline ADL function  Description: INTERVENTIONS:  -  Assess patient's ability to carry out ADLs; assess patient's baseline for ADL function and identify physical deficits which impact ability to perform ADLs (bathing, care of mouth/teeth, toileting, grooming, dressing, etc )  - Assess/evaluate cause of self-care deficits   - Assess range of motion  - Assess patient's mobility; develop plan if impaired  - Assess patient's need for assistive devices and provide as appropriate  - Encourage maximum independence but intervene and supervise when necessary  - Involve family in performance of ADLs  - Assess for home care needs following discharge   - Consider OT consult to assist with ADL evaluation and planning for discharge  - Provide patient education as appropriate  Outcome: Progressing  Goal: Maintain or return mobility status to optimal level  Description: INTERVENTIONS:  - Assess patient's baseline mobility status (ambulation, transfers, stairs, etc )    - Identify cognitive and physical deficits and behaviors that affect mobility  - Identify mobility aids required to assist with transfers and/or ambulation (gait belt, sit-to-stand, lift, walker, cane, etc )  - [place] fall precautions as indicated by assessment  - Record patient progress and toleration of activity level on Mobility SBAR; progress patient to next Phase/Stage  - Instruct patient to call for assistance with activity based on assessment  - Consider rehabilitation consult to assist with strengthening/weightbearing, etc   Outcome: Progressing     Problem: DISCHARGE PLANNING  Goal: Discharge to home or other facility with appropriate resources  Description: INTERVENTIONS:  - Identify barriers to discharge w/patient and caregiver  - Arrange for needed discharge resources and transportation as appropriate  - Identify discharge learning needs (meds, wound care, etc )  - Arrange for interpretive services to assist at discharge as needed  - Refer to Case Management Department for coordinating discharge planning if the patient needs post-hospital services based on physician/advanced practitioner order or complex needs related to functional status, cognitive ability, or social support system  Outcome: Progressing     Problem: RESPIRATORY - ADULT  Goal: Achieves optimal ventilation and oxygenation  Description: INTERVENTIONS:  - Assess for changes in respiratory status  - Assess for changes in mentation and behavior  - Position to facilitate oxygenation and minimize respiratory effort  - Oxygen administered by appropriate delivery if ordered  - Initiate smoking cessation education as indicated  - Encourage broncho-pulmonary hygiene including cough, deep breathe, Incentive Spirometry  - Assess the need for suctioning and aspirate as needed  - Assess and instruct to report SOB or any respiratory difficulty  - Respiratory Therapy support as indicated  Outcome: Progressing

## 2021-02-04 NOTE — PHYSICAL THERAPY NOTE
PHYSICAL THERAPY EVALUATION  NAME: Ronan Fair  AGE:   60 y o  MRN:  [id number]  ADMIT DX: SOB (shortness of breath) [R06 02]    PMH:   Past Medical History:   Diagnosis Date    Acute myocardial infarction of inferior wall ([facility] [address] ) 11/2011    inferiorwall; drug-eluting stent RCA    Chronic tension type headache     last assessed: 7/4/2012    Colon polyp     Developmental dysplasia of hip     bilateral    [facility]     Former smoker     quit 2011     Gout     last assessed: 8/31/2012    Hemorrhage of anus and rectum     last assessed: 8/12/2013    Hyperlipidemia     Hypertension     Internal hemorrhoids     Migraine     last assessed: 11/25/2013    Old MI (myocardial infarction) 2011    Renal insufficiency syndrome     last assessed: 10/22/2014     LENGTH OF STAY: 0       02/04/21 0909   PT Last Visit   PT Visit Date 02/04/21   Note Type   Note type Evaluation   Pain Assessment   Pain Assessment Tool Pain Assessment not indicated - pt denies pain   Pain Score No Pain   Home Living   Type of [address] [place] [address] One level;Ramped entrance  (ramp in back entrance)   [facility]   Additional Comments At baseline, pt was independent with ambulation  Pt reports that he began to use the [facility] about a month ago after having a fall  Pt was able to self propel WC and perform SPT indepenently  Prior Function   Level of [place] Independent with ADLs and functional mobility   Lives With Spouse; Family; Other (Comment)  (Daughter and son in law (work))   Receives Help From [facility] [address]   IADLs Needs assistance   Falls in the last 6 months 1 to 4   Comments on 2 L O2 NC at home   Restrictions/Precautions   Weight Bearing Precautions Per Order No   Other Precautions Multiple lines;Telemetry;O2;Fall Risk  (2 L O2 NC)   General   Family/Caregiver Present No   Cognition   Overall Cognitive Status [facility]   Arousal/Participation Cooperative   Orientation Level Oriented X4   Memory Decreased recall of recent events   Following Commands Follows one step commands without difficulty   Comments Pt identified by name and   RLE Assessment   RLE Assessment X   Strength RLE   RLE Overall Strength 3+/5  (grossly)   LLE Assessment   LLE Assessment X   Strength LLE   LLE Overall Strength 2/5  (grossly)   Bed Mobility   Supine to Sit 2  Maximal assistance   Additional items Assist x 1;HOB elevated; Increased time required;Verbal cues;LE management   Sit to Supine 3  Moderate assistance   Additional items Assist x 1; Increased time required;Verbal cues;LE management   Transfers   Sit to Stand Unable to assess  (see below for details)   Additional Comments Pt unable to perform sit to stand due to pt height, LE weakness, and height of stretcher in ED  Pt's feet were ~1` off of floor when seated at EOB  PT gained access to a step stool to attempt sit to stand transfer, however pt then refusing to stand due to fatigue  Balance   Static Sitting Fair  (with BUE support)   Dynamic Sitting Fair -   Endurance Deficit   Endurance Deficit Yes   Endurance Deficit Description limited sitting tolerance, SpO2 decreased from 99% to 93% when sitting EOB on 2 L O2 NC   Activity Tolerance   Activity Tolerance Patient limited by fatigue; Other (Comment)  (SOB)   Nurse Made Aware Per RN, pt appropriate to evaluate   Assessment   Prognosis Fair   Problem List Decreased strength;Decreased endurance; Impaired balance;Decreased mobility; Decreased safety awareness   Goals   Patient Goals to go home   STG Expiration Date 21   Short Term Goal #1 Pt will be able to: (1) perform bed mobility with mod A to promote upright posture and OOB mobility (2) PT to see for sit to stand trial and further mobility status (3) increase sitting balance by 1 grade to decrease risk of falls    PT Treatment Day 0   Plan   Treatment/Interventions Functional transfer training;LE strengthening/ROM; Therapeutic exercise; Endurance training;Patient/family training;Equipment eval/education; Bed mobility   PT Frequency Other (Comment)  (3-5x/week)   Recommendation   PT Discharge Recommendation Post-Acute Rehabilitation Services   Equipment Recommended Walker; Wheelchair  (pending progress with mobility)   AM-PAC Basic Mobility Inpatient   Turning in Bed Without Bedrails 3   Lying on Back to Sitting on Edge of Flat Bed 2   Moving Bed to Chair 2   Standing Up From Chair 2   Walk in Room 1   Climb 3-5 Stairs 1   Basic Mobility Inpatient Raw Score 11   Basic Mobility Standardized Score 30 25   Barthel Index   Feeding 5   Bathing 0   Grooming Score 5   Dressing Score 5   Bladder Score 10   Bowels Score 10   Toilet Use Score 0   Transfers (Bed/Chair) Score 0   Mobility (Level Surface) Score 0   Stairs Score 0   Barthel Index Score 35       Assessment: Pt is a 60 y o  male seen for PT evaluation s/p admit to [address] [place] [address] on 2/3/2021 w/ Dyspnea  Order placed for PT  Comorbidities affecting pt's physical performance at time of assessment listed above  Personal factors affecting pt at time of IE include: limited home support, inability to perform IADLs, inability to perform ADLs, inability to ambulate household distances and recent fall(s)  Prior to admission, pt was was independent w/ all functional mobility w/ WC, lived in one floor environment, lived with wife, daughter, and son in law and has a ramped entrance  Upon evaluation: Pt requires max A x1 for supine to sit EOB  Limited assessment due to patient height vs stretcher height and pt not wanting to attempt sit to stand transfer onto step stool  (Please find full objective findings from PT assessment regarding body systems outlined above)  Impairments and limitations also listed above, especially due to  weakness, impaired balance, decreased endurance, decreased activity tolerance, decreased safety awareness, fall risk and SOB upon exertion   The following objective measures performed on IE also reveal limitations: Barthel Index 35/100  Pt's clinical presentation is currently unstable/unpredictable seen in pt's presentation of decreased safety awareness, fall risk, and significant decline in functional mobility compared to baseline  Pt to benefit from continued skilled PT tx while in hospital and upon DC to address deficits as defined above and maximize level of functional mobility  From PT/mobility standpoint, recommendation at time of d/c would be inpatient rehab pending progress in order to maximize pt's functional independence and consistency w/ mobility in order to facilitate return to PLOF  Recommend PT to see for further mobility assessment        Melia Khanna, PT,DPT

## 2021-02-04 NOTE — ASSESSMENT & PLAN NOTE
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

## 2021-02-04 NOTE — CONSULTS
Consultation - Pulmonary Medicine   Gemini Pradhan 60 y o  male MRN: [id number]  Unit/Bed#: ED 11 Encounter: [id number]      Assessment/Plan:    1  Acute pulmonary insufficiency on chronic hypoxic respiratory failure       -  patient currently now has transition to room air-95%, home O2 requirements 3-4 L        -  will maintain saturations greater than 89%       -  pulmonary toileting:  Deep breathing cough, OOB as tolerated, IS Q 1 hr, will order flutter    2  ILD with noted traction bronchiectasis       -  CT scan shows slightly worsening bilateral fibrosis, w/ B/L LL honeycombing       -  Inpatient:  Trial IV Solu-Medrol 40 mg b i d , albuterol MDI q i d        -  likely secondary to occupational exposure       -  PFTs have been ordered, difficult for him to obtain given SOB       -  patient may be a candidate for anti fibrotic's    3  Possible acute on Chronic grade 1 diastolic CHF with suspected PHTN in the setting of ILD       -  Echo 11/2020- EF 40%         proBNP  1194       -  will update echo       -  trial 1 dose of IV Lasix 40 mg    4  Protein calorie malnutrition suspected secondary to pulmonary cachexia       -  50 lb weight loss in 2 months       -  malignancy cannot be ruled out- will need to verify all cancer markers are up-to-date- recent colonoscopy and EGD unremarkable for malignancy       -  recommend nutrition consult with protein supplements    5  Severe deconditioning       -  patient needs evaluation by PT OT       -  would likely benefit from pulmonary rehab    History of Present Illness   Physician Requesting Consult: Ying Coombs MD  Reason for Consult / Principal Problem: ILD  Hx and PE limited by: Nothing  Chief Complaint: "I just feel so short of breath"  HPI: Gemini Pradhan is a 60 y o   male who presented to [address] [place] [address] with complaints of shortness of breath    Patient has past medical history CAD, pulmonary fibrosis, chronic hypertension, diabetes, spinal stenosis, chronic kidney disease, and non STEMI  Patient reports that over the last 3 weeks he has noticed that his shortness of breath and dyspnea have increased in severity  Patient does report that he usually uses 2 L of oxygen therapy throughout the day however given worsening symptoms he has increased his oxygen therapy from 2-4 L  He reports his symptoms have become so severe his ADLs have been compromise and he is having a difficult time even walking to the bathroom  Patient reports that his appetite has as he has undergone severe weight loss as he goes days without eating     Patient reports an approximate 50 lb weight loss in 2 months  Upon ED admission, imaging was concerning for some slightly worsening pulmonary fibrosis  Pulmonary was consulted for worsening fibrotic lung disease  Upon examination patient appeared very weak  He reports that is difficult for him to arrive at any office visits given to his severe deconditioning and shortness of breath  Upon auscultation patient had significant bilateral crackles  No significant overnight events reported  Patient currently denies any fever, chills, hemoptysis, headaches, night sweats, pleuritic chest pain, or palpitations  Pulmonary standpoint, patient follows with Dr Feroz Cornelius for his suspected interstitial lung disease  Patient does report a 1 pack per day 35 year smoking history  Patient reports he quit smoking in 2011  Patient was seen in the emergency room in October 2020 for shortness of breath  At that time imaging was concerning for new pulmonary fibrosis  Patient has had significant occupational exposures as 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  Patient is not maintained on any inhaled maintenance therapies  Patient does wear chronic 3-4 L throughout the day  Patient does report history of GERD in which he is maintained on Pepcid  Patient denies any symptoms of VON, seasonal allergies, or postnasal drip  Patient denies any dysphagia  Patient denies any recent acute exposures to dust, mold, asbestos, or silica  Inpatient consult to Pulmonology  Consult performed by: CRISTOBAL Duarte  Consult ordered by: Eb Dockery PA-C          Review of Systems   Constitutional: Positive for unexpected weight change  Negative for activity change and appetite change  HENT: Negative for congestion and postnasal drip  Respiratory: Positive for shortness of breath  Negative for apnea, cough, choking, chest tightness, wheezing and stridor  Cardiovascular: Negative for chest pain, palpitations and leg swelling  Gastrointestinal: Negative for abdominal distention and abdominal pain  Genitourinary: Negative for difficulty urinating and dysuria  Musculoskeletal: Negative for arthralgias and back pain  Neurological: Negative for dizziness and facial asymmetry  Psychiatric/Behavioral: Negative for agitation and behavioral problems         Historical Information   Past Medical History:   Diagnosis Date    [facility] ) 11/2011    inferiorwall; drug-eluting stent RCA    Chronic tension type headache     last assessed: 7/4/2012    Colon polyp     Developmental dysplasia of hip     bilateral    [facility]     Former smoker     quit 2011     Gout     last assessed: 8/31/2012    Hemorrhage of anus and rectum     last assessed: 8/12/2013    Hyperlipidemia     Hypertension     Internal hemorrhoids     Migraine     last assessed: 11/25/2013    Old MI (myocardial infarction) 2011    Renal insufficiency syndrome     last assessed: 10/22/2014     Past Surgical History:   Procedure Laterality Date    CARDIAC CATHETERIZATION      COLONOSCOPY  05/2012    int hem, dirverticulosis Dr Kristen Cohen Bilateral      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    LUMBAR LAMINECTOMY  2010    decompressive more than 2 lumbar segments L4-S1      Social History   Social History     Substance and Sexual Activity   Alcohol Use No     Social History     Substance and Sexual Activity   Drug Use No     Social History     Tobacco Use   Smoking Status Former Smoker    Packs/day: 1 00    Years: 35 00    Pack years: 35 00    Quit date: 2011    Years since quittin 2   Smokeless Tobacco Never Used     E-Cigarette/Vaping     E-Cigarette/Vaping Substances     Occupational History: [employer]    Family History:   Family History   Problem Relation Age of Onset    Kidney failure Mother     Diabetes Mother     Hypertension Mother        Meds/Allergies   pertinent pulmonary meds have been reviewed    No Known Allergies    Objective   Vitals: Blood pressure 130/61, pulse 83, temperature 97 6 °F (36 4 °C), temperature source Oral, resp  rate 18, weight 46 1 kg (101 lb 10 1 oz), SpO2 95 %  RA,Body mass index is 19 85 kg/m²  No intake or output data in the 24 hours ending 21 0643  Invasive Devices     Peripheral Intravenous Line            Peripheral IV 21 Right Antecubital less than 1 day                Physical Exam  Constitutional:       General: He is not in acute distress  Appearance: Normal appearance  He is normal weight  He is not ill-appearing  HENT:      Head: Normocephalic and atraumatic  Nose: Nose normal  No congestion or rhinorrhea  Mouth/Throat:      Mouth: Mucous membranes are dry  Pharynx: No oropharyngeal exudate or posterior oropharyngeal erythema  Neck:      Musculoskeletal: Normal range of motion and neck supple  No neck rigidity or muscular tenderness  Cardiovascular:      Rate and Rhythm: Normal rate and regular rhythm  Pulses: Normal pulses  Heart sounds: Normal heart sounds  No murmur  No friction rub  No gallop      Pulmonary:      Effort: Pulmonary effort is normal  No tachypnea, bradypnea, accessory muscle usage or respiratory distress  Breath sounds: No stridor, decreased air movement or transmitted upper airway sounds  Decreased breath sounds and rales present  No wheezing or rhonchi  Chest:      Chest wall: No tenderness  Abdominal:      General: Abdomen is flat  Bowel sounds are normal  There is no distension  Palpations: Abdomen is soft  There is no mass  Musculoskeletal: Normal range of motion  General: No swelling or tenderness  Skin:     General: Skin is warm and dry  Coloration: Skin is not jaundiced or pale  Neurological:      General: No focal deficit present  Mental Status: He is alert and oriented to person, place, and time  Mental status is at baseline  Psychiatric:         Mood and Affect: Mood normal          Behavior: Behavior normal          Lab Results:   I have personally reviewed pertinent lab results  CBC:   Lab Results   Component Value Date    WBC 9 69 02/03/2021    HGB 13 2 02/03/2021    HCT 44 1 02/03/2021    MCV 88 02/03/2021     02/03/2021    MCH 26 5 (L) 02/03/2021    MCHC 29 9 (L) 02/03/2021    RDW 15 7 (H) 02/03/2021    MPV 11 3 02/03/2021    NRBC 0 02/03/2021   , CMP:   Lab Results   Component Value Date    SODIUM 138 02/03/2021    K 5 4 (H) 02/03/2021    CL 99 (L) 02/03/2021    CO2 28 02/03/2021    BUN 26 (H) 02/03/2021    CREATININE 0 80 02/03/2021    CALCIUM 9 1 02/03/2021     (H) 02/03/2021    ALT 90 (H) 02/03/2021    ALKPHOS 106 02/03/2021    EGFR 94 02/03/2021   , PT/INR: No results found for: PT, INR, Troponin:   Lab Results   Component Value Date    TROPONINI 0 05 (H) 02/03/2021           Imaging Studies: I have personally reviewed pertinent films in PACS     Chest CT 02/03/2021- chronic fibrotic interstitial lung fecal impaction    EKG, Pathology, and Other Studies: I have personally reviewed pertinent films in PACS     Echo 11/11/2020 EF 40% with grade 1 diastolic dysfunction       Pulmonary Results (PFTs, PSG):  I have personally reviewed pertinent films in PACS     No PFTs recorded       VTE Prophylaxis: Sequential compression device (Venodyne)     Code Status: Level 3 - DNAR and DNI    None    Portions of the record may have been created with voice recognition software  Occasional wrong word or "sound a like" substitutions may have occurred due to the inherent limitations of voice recognition software  Read the chart carefully and recognize, using context, where substitutions have occurred

## 2021-02-05 NOTE — ASSESSMENT & PLAN NOTE
Lab Results   Component Value Date    HGBA1C 5 9 (H) 11/12/2020       Recent Labs     02/04/21  1653 02/04/21  2047 02/05/21  0757 02/05/21  1040   POCGLU 196* 175* 97 182*       Blood Sugar Average: Last 72 hrs:  (P) 144   Regular diet - do not restrict carbs given poor intake  Would stop metformin on discharge   SSI ACHS

## 2021-02-05 NOTE — ASSESSMENT & PLAN NOTE
· Related to worsening of underlying pulmonary fibrosis generalized deconditioning related to weight loss  · Not appear to be acutely volume overloaded  Lasix x 1 dose given by pulmonary 2/4  · Given lack of ground-glass opacities on imaging, felt no indication for steroids at this time  One dose was given prior to discontinuation  · CT scan shows chronic fibrotic interstitial lung disease  · Pulmonary function test were unable to be performed as an outpatient and given patient's worsening dyspnea unclear when these will be able to be performed  · D-dimer is elevated  He is not significantly tachypneic and has no lower extremity edema or erythema to suggest DVT  Do not suspect pulmonary embolism  · Troponin x 3 flat at 0 5-0 6

## 2021-02-05 NOTE — ASSESSMENT & PLAN NOTE
· Disimpacted with small amount of brown stool removed  · Colace BID  · Increase senna  · Daily miralax

## 2021-02-05 NOTE — CASE MANAGEMENT
Cm met with pt who stated he does not want to go to rehab however he would like to go home with VNA instead  He requested a referral be made to [facility]  Referral has been made  Per SHARYN Nolan), pt will be discharged home tomorrow so family is available to assist him

## 2021-02-05 NOTE — CASE MANAGEMENT
LOS 1   NOT A BUNDLE;  NOT A READMISSION;  GREEN UNPLANNED READMISSION RISK  Cm met with pt to review role and introduce self  Pt lives in one level home with a ramp to the back entrance  He lives with his wife, daughter and [place]  Pt used to be independent with ambulation and ADLs however he had leg surgery a couple weeks ago and fell so he got a WC  Pt has a Rw and cane as well  He has O2 through [facility] and wears 2L of O2 at home  Pt has had VNA in the past however he has not been to rehab  Pt receives SSD at this time however in March this will switch over to [facility]  He is able to afford his medications with this income and RX coverage  [facility] for medications  PCP is Jamie Haddad DO  Pt does not have a POA/LW however his wife would make decisions for him  Pt does not drive however his family would be able to transport him home at DC if needed  Cm reviewed with pt the care team recommendations for rehab  [place] of choice was reviewed  Pt would like to go to rehab and is requesting referrals to those facilities that contract with his insurance  Referrals have been made  Auth will be needed in order to DC to rehab  CM reviewed the availability of treatment team to discuss questions or concerns patient and/or family may have regarding  understanding medications and recognizing signs and symptoms at discharge  CM also encouraged patient to follow up with all recommended appointments after discharge  CM reviewed the information that will be provided to pt/family on the discharge instructions  Patient advised of importance for patient and family to participate in managing patient's medical well being

## 2021-02-05 NOTE — MALNUTRITION/BMI
This medical record reflects one or more clinical indicators suggestive of malnutrition and/or morbid obesity  Malnutrition Findings:   Adult Malnutrition type: Acute illness(in the setting of chronic illness)  Adult Degree of Malnutrition: Other severe protein calorie malnutrition(related to inadequate oral intake)  Malnutrition Characteristics: Fat loss, Muscle loss, Weight loss(as evidenced by temporal indetation, loss of fat and muscle extremities, 11 8% weight decrease x 2 months  Currently treated with nutrition consult, oral supplementation )    BMI Findings: Body mass index is 20 62 kg/m²  See Nutrition note dated 2/5/2021 for additional details  Completed nutrition assessment is viewable in the nutrition documentation

## 2021-02-05 NOTE — PROGRESS NOTES
Progress Note - Pulmonary   Wolff Deed 60 y o  male MRN: [id number]  Unit/Bed#: S -01 Encounter: [id number]    Assessment/Plan:    Acute on chronic hypoxic respiratory failure, multifactorial due to below   Baseline oxygen requirement is 3L NC  OOB as tolerated  Ambulatory POX prior to D/C  ILD with significant reticulation and traction bronchiectasis with suspected underlying pHTN   Echo inadequate to assess PAP  Patient does not wish to undergo transplant, nor would he likely be a good candidate given immobility  Monitor off steroids  Continue albuterol only as needed  Chronic systolic and diastolic CHF   S/P Lasix yesterday with mild improvement in symptoms  Monitor I/O and daily weights  Monitor BMP  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 with severe protein calorie malnutrition   Ensure supplements with nutrition following  Further work-up for other weight loss etiologies per PCP/primary team     Severe deconditioning with underlying ambulatory dysfunction due to bowed legs   Agreeable to PT  May benefit from outpatient pulmonary rehab thereafter  Goals of care   Palliative care is following  Consult noted from yesterday  He is Level 3 DNR/DNI  POLST/5 wishes to be completed  Former smoker   Quit 2011  Outpatient follow-up as per D/C instructions  Chief Complaint:    "I feel better "    Subjective:    Rafael Salgado reports he is feeling better since admission  He has not been OOB yet this morning, but SOB at rest is improved  He is at his baseline oxygen requirement  He has an occasional dry cough, which is baseline for him  No other complaints  Objective:    Vitals: Blood pressure 128/63, pulse 80, temperature 98 8 °F (37 1 °C), temperature source Oral, resp  rate 18, weight 47 9 kg (105 lb 9 6 oz), SpO2 98 %  2L NC,Body mass index is 20 62 kg/m²        Intake/Output Summary (Last 24 hours) at 2/5/2021 0813  Last data filed at 2/4/2021 2140  Gross per 24 hour   Intake 780 ml   Output 575 ml   Net 205 ml       Invasive Devices     Peripheral Intravenous Line            Peripheral IV 02/03/21 Right Antecubital 1 day                Physical Exam:     Physical Exam  Vitals signs reviewed  Constitutional:       General: He is not in acute distress  Appearance: He is well-developed  He is not toxic-appearing or diaphoretic  Interventions: Nasal cannula in place  HENT:      Head: Normocephalic and atraumatic  Eyes:      General: No scleral icterus  Neck:      Musculoskeletal: Neck supple  Vascular: No JVD  Trachea: No tracheal deviation  Cardiovascular:      Rate and Rhythm: Normal rate and regular rhythm  Heart sounds: S1 normal and S2 normal  No murmur  No friction rub  No gallop  Pulmonary:      Effort: Pulmonary effort is normal  No tachypnea, accessory muscle usage or respiratory distress  Breath sounds: No stridor  Rales present  No decreased breath sounds, wheezing or rhonchi  Chest:      Chest wall: No tenderness  Abdominal:      General: Bowel sounds are normal  There is no distension  Palpations: Abdomen is soft  Tenderness: There is no abdominal tenderness  There is no guarding or rebound  Musculoskeletal:         General: No tenderness  Skin:     General: Skin is warm and dry  Findings: No rash  Neurological:      Mental Status: He is alert and oriented to person, place, and time  GCS: GCS eye subscore is 4  GCS verbal subscore is 5  GCS motor subscore is 6  Psychiatric:         Speech: Speech normal          Behavior: Behavior normal  Behavior is cooperative         Labs:   CBC:   Lab Results   Component Value Date    WBC 10 52 (H) 02/05/2021    HGB 12 7 02/05/2021    HCT 41 2 02/05/2021    MCV 88 02/05/2021     02/05/2021    MCH 27 2 02/05/2021    MCHC 30 8 (L) 02/05/2021    RDW 15 8 (H) 02/05/2021    MPV 11 9 02/05/2021   , CMP:   Lab Results   Component Value Date    SODIUM 137 02/04/2021    K 4 7 02/04/2021    CL 98 (L) 02/04/2021    CO2 31 02/04/2021    BUN 20 02/04/2021    CREATININE 0 83 02/04/2021    CALCIUM 9 3 02/04/2021    EGFR 93 02/04/2021     Imaging and other studies: I have personally reviewed pertinent reports  and Echocardiogram shows EF 55% with grade one diastolic dysfunction

## 2021-02-05 NOTE — ASSESSMENT & PLAN NOTE
· Patient was noted to fall recently and since that time has been using a wheelchair as needed    · Physical therapy recommending patient go to rehab and he is agreeable  · CM aware and searching for bed

## 2021-02-05 NOTE — ASSESSMENT & PLAN NOTE
· Continue metoprolol and lisinopril with hold parameters  · Repeat blood pressure being obtained now

## 2021-02-05 NOTE — UTILIZATION REVIEW
Notification of Inpatient Admission/Inpatient Authorization Request   This is a Notification of Inpatient Admission for [address]  Be advised that this patient was admitted to our facility under Inpatient Status  Contact Julieta Vaughn at [phone] for additional admission information  [facility] DE[facility]  DEDICATED [phone]  Patient Name:   Deondre Villasenor   YOB: 1955       [address]   [address]:   [address]  Tax ID: [id number]  NPI: [id number] Attending Provider/NPI:  Address:  Phone: Marcin Lozoya Md [[id number]]  Same as the facility  [phone]   Place of Service Code: 24 Place of Service Name:  [address] [place] [address]   Start Date: 2/4/21 5562     Discharge Date & Time: No discharge date for patient encounter  Type of Admission: Inpatient Status Discharge Disposition   (if discharged): Home/Self Care   Patient Diagnoses: Malnutrition ([address] ) [E46]  Weakness [R53 1]  Dyspnea [R06 00]  SOB (shortness of breath) [R06 02]  Dyspnea on exertion [R06 00]  Weight loss [R63 4]  Unintentional weight loss [R63 4]  Chronic respiratory failure with hypoxia (HCC) [J96 11]  Elevated troponin I level [R77 8]  Elevated d-dimer [R79 89]     Orders: Admission Orders (From admission, onward)     Ordered        02/04/21 0953  Inpatient Admission  Once         02/03/21 1700  Place in Observation  Once         02/03/21 1407  Inpatient Admission  Once,   Status:  Canceled                    Assigned Utilization Review Contact: Julieta Vaughn  Utilization   Network Utilization Review Department  Phone: [phone]; Fax [phone]  Email: [email or web address]   ATTENTION PAYERS: Please call the assigned Utilization  directly with any questions or concerns ALL voicemails in the department are confidential  Send all requests for admission clinical reviews, approved or denied determinations and any other requests to dedicated fax number belonging to the campus where the patient is receiving treatment

## 2021-02-05 NOTE — SOCIAL WORK
Palliative LSW saw patient at the bedside today  LSW appreciates the opportunity to provide patient/family with inpatient emotional support and guidance while patient continues to receive medical attention from the medical team      Topics discussed:   -LSW introduced self and role to pt  -Pt expressed he is doing better  -Pt wishes for his wife to make medical decisions for him if he is ever unable to make these decisions for himself  Pt wishes to be DNR/DNI and is already a Level 3  Pt has discussed this with his wife and is not interested in filling out a 5-Wishes document  -Pt has 2 adult daughters, his 1 daughter lives with him and his wife and his other daughter lives in [id number] [place] [address] lives with his wife, daughter, granddaughter, and [place]    Areas that need follow-up: N/A  Resources given: None  Others present:  None    LSW provided emotional support and will follow if possible

## 2021-02-05 NOTE — ASSESSMENT & PLAN NOTE
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

## 2021-02-05 NOTE — ASSESSMENT & PLAN NOTE
· The patient underwent EGD and colonoscopy November which did not reveal any significant abnormalities aside from mild gastritis  Colonoscopy was a poor prep and was recommended for repeat in 3 months  · TSH within normal limits  · May be related to worsening pulmonary status given patient feels that he is too short of breath to eat  · Started on IV steroids from pulmonary standpoint however this may help oral intake as well  · Palliative Care evaluated  Patient's lung disease likely to worsen and therefore can continue to follow-up with them as an outpatient for symptom management  · Has lost 18 lbs in last 2 months  · Continue Ensure supplementation    · Oral intake vastly improved since admission - eating and drinking with all meals    Wt Readings from Last 3 Encounters:   02/05/21 47 9 kg (105 lb 9 6 oz)   12/01/20 54 1 kg (119 lb 4 8 oz)   11/11/20 56 9 kg (125 lb 7 1 oz)   11/2019 146 lbs

## 2021-02-05 NOTE — PROGRESS NOTES
[facility]  Progress Note - Merry Wrightland 1955, 60 y o  male MRN: [id number]  Unit/Bed#: S -01 Encounter: [id number]  Primary Care Provider: Yesica Thompson DO   Date and time admitted to hospital: 2/3/2021  9:46 AM    * Pulmonary insufficiency  Assessment & Plan  · Related to worsening of underlying pulmonary fibrosis generalized deconditioning related to weight loss  · Not appear to be acutely volume overloaded  Lasix x 1 dose given by pulmonary 2/4  · Given lack of ground-glass opacities on imaging, felt no indication for steroids at this time  One dose was given prior to discontinuation  · CT scan shows chronic fibrotic interstitial lung disease  · Pulmonary function test were unable to be performed as an outpatient and given patient's worsening dyspnea unclear when these will be able to be performed  · D-dimer is elevated  He is not significantly tachypneic and has no lower extremity edema or erythema to suggest DVT  Do not suspect pulmonary embolism  · Troponin x 3 flat at 0 5-0 6  Pulmonary fibrosis determined by high resolution computed tomography [facility])  Assessment & Plan  · Follows as outpatient with Pulmonary  · 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  · Diagnosed in October of 2021 was hospitalized with shortness of breath  · Was to complete PFTs 12/22 and 1/20 but unable given he thinks he is too SOB to get to the clinic  · Pulmonary evaluated patient  This is likely going to be progressive  · Patient would not want transplant  Chronic respiratory failure with hypoxia (HCC)  Assessment & Plan  · He was wearing 2 L nasal cannula at home      · Previously when seen by Pulmonary in November he was noted to be on 4 L nasal cannula but he states his O2 needs have always been at 2L NC at rest since discharge  · Increased to 3L last 3 weeks for symptom control - not for hypoxia    Ambulatory dysfunction  Assessment & Plan  · Patient was noted to fall recently and since that time has been using a wheelchair as needed  · Physical therapy recommending patient go to rehab and he is agreeable  · CM aware and searching for bed    Unintentional weight loss  Assessment & Plan  · The patient underwent EGD and colonoscopy November which did not reveal any significant abnormalities aside from mild gastritis  Colonoscopy was a poor prep and was recommended for repeat in 3 months  · TSH within normal limits  · May be related to worsening pulmonary status given patient feels that he is too short of breath to eat  · Started on IV steroids from pulmonary standpoint however this may help oral intake as well  · Palliative Care evaluated  Patient's lung disease likely to worsen and therefore can continue to follow-up with them as an outpatient for symptom management  · Has lost 18 lbs in last 2 months  · Continue Ensure supplementation  · Oral intake vastly improved since admission - eating and drinking with all meals    Wt Readings from Last 3 Encounters:   02/05/21 47 9 kg (105 lb 9 6 oz)   12/01/20 54 1 kg (119 lb 4 8 oz)   11/11/20 56 9 kg (125 lb 7 1 oz)   11/2019 146 lbs      Fecal impaction (HCC)  Assessment & Plan  · Disimpacted with small amount of brown stool removed  · Colace BID  · Increase senna  · Daily miralax    Hypertension  Assessment & Plan  · Continue metoprolol and lisinopril with hold parameters  · Repeat blood pressure being obtained now      CKD (chronic kidney disease) stage 2, GFR 60-89 ml/min  Assessment & Plan  Lab Results   Component Value Date    EGFR 88 02/05/2021    EGFR 93 02/04/2021    EGFR 94 02/03/2021    CREATININE 0 92 02/05/2021    CREATININE 0 83 02/04/2021    CREATININE 0 80 02/03/2021     · At baseline creatinine    Type 2 diabetes mellitus without complication, without long-term current use of insulin [facility])  Assessment & Plan  Lab Results   Component Value Date    HGBA1C 5 9 (H) 11/12/2020 Recent Labs     21  1653 21  2047 21  0757 21  1040   POCGLU 196* 175* 97 182*       Blood Sugar Average: Last 72 hrs:  (P) 144   Regular diet - do not restrict carbs given poor intake  Would stop metformin on discharge   SSI ACHS    Elevated liver enzymes  Assessment & Plan  · This is chronic  · Has been evaluated by GI previously  · Continue outpatient follow-up    CAD S/P percutaneous coronary angioplasty  Assessment & Plan  · Follows with cardiology  · Recent non STEMI 2020  · Continue ASA, BB, ACE, statin    VTE Pharmacologic Prophylaxis: lovenox    Patient Centered Rounds: I have performed bedside rounds with nursing staff today  Discussions with Specialists or Other Care Team Provider: nursing, pulm    Education and Discussions with Family / Patient: Updated  (wife) via phone  1440    Time Spent for Care: 30 minutes  More than 50% of total time spent on counseling and coordination of care as described above  Current Length of Stay: 1 day(s)  Current Patient Status: Inpatient   Certification Statement: The patient will continue to require additional inpatient hospital stay due to awaiting rehab  Discharge Plan: awaiting rehab     Code Status: Level 3 - DNAR and DNI    Subjective:   Feeling better  Eating more - ensure, juice, some cheeseburger  No chest pain    Objective:     Vitals:   Temp (24hrs), Av 1 °F (36 7 °C), Min:97 4 °F (36 3 °C), Max:98 8 °F (37 1 °C)    Temp:  [97 4 °F (36 3 °C)-98 8 °F (37 1 °C)] 98 °F (36 7 °C)  HR:  [80-94] 94  Resp:  [18-19] 19  BP: ()/(52-63) 86/52  SpO2:  [92 %-98 %] 92 %  Body mass index is 20 62 kg/m²  Input and Output Summary (last 24 hours): Intake/Output Summary (Last 24 hours) at 2021 1441  Last data filed at 2021 1215  Gross per 24 hour   Intake 600 ml   Output 275 ml   Net 325 ml       Physical Exam:   Physical Exam  Vitals signs and nursing note reviewed     Constitutional:       General: He is not in acute distress  Appearance: Normal appearance  He is ill-appearing  He is not diaphoretic  Comments: cachetic   HENT:      Head: Normocephalic and atraumatic  Mouth/Throat:      Mouth: Mucous membranes are moist    Cardiovascular:      Rate and Rhythm: Normal rate and regular rhythm  Heart sounds: No murmur  Pulmonary:      Effort: Pulmonary effort is normal       Breath sounds: Normal breath sounds  No stridor  No wheezing, rhonchi or rales  Comments: Decreased at bases  Abdominal:      General: Bowel sounds are normal       Palpations: Abdomen is soft  There is no mass  Tenderness: There is no abdominal tenderness  There is no guarding  Musculoskeletal:      Right lower leg: No edema  Left lower leg: No edema  Skin:     General: Skin is warm and dry  Coloration: Skin is pale  Neurological:      Mental Status: He is alert  Psychiatric:         Mood and Affect: Mood normal          Behavior: Behavior normal         Additional Data:    Labs:  Results from last 7 days   Lab Units 02/05/21  0604 02/03/21  1005   WBC Thousand/uL 10 52* 9 69   HEMOGLOBIN g/dL 12 7 13 2   HEMATOCRIT % 41 2 44 1   PLATELETS Thousands/uL 276 282   NEUTROS PCT %  --  77*   LYMPHS PCT %  --  16   MONOS PCT %  --  4   EOS PCT %  --  2     Results from last 7 days   Lab Units 02/05/21  0604  02/03/21  1005   SODIUM mmol/L 137   < > 138   POTASSIUM mmol/L 4 9   < > 5 4*   CHLORIDE mmol/L 100   < > 99*   CO2 mmol/L 29   < > 28   BUN mg/dL 24   < > 26*   CREATININE mg/dL 0 92   < > 0 80   ANION GAP mmol/L 8   < > 11   CALCIUM mg/dL 9 0   < > 9 1   ALBUMIN g/dL  --   --  3 2*   TOTAL BILIRUBIN mg/dL  --   --  0 47   ALK PHOS U/L  --   --  106   ALT U/L  --   --  90*   AST U/L  --   --  143*   GLUCOSE RANDOM mg/dL 125   < > 94    < > = values in this interval not displayed           Results from last 7 days   Lab Units 02/05/21  1040 02/05/21  0757 02/04/21 2047 02/04/21  9611 02/04/21  1149 02/04/21  0922 02/03/21  2203 02/03/21  1725   POC GLUCOSE mg/dl 182* 97 175* 196* 210* 121 83 88       Lines/Drains:  Invasive Devices     Peripheral Intravenous Line            Peripheral IV 02/03/21 Right Antecubital 2 days              Telemetry:      Imaging: No pertinent imaging reviewed  Recent Cultures (last 7 days):     Last 24 Hours Medication List:   Current Facility-Administered Medications   Medication Dose Route Frequency Provider Last Rate    acetaminophen  650 mg Oral Q6H PRN Rene Mccurdy PA-C      albuterol  2 puff Inhalation Q4H PRN CRISTOBAL Wilkinson      aspirin  162 mg Oral Daily Phyllis Diasio, EMPERATRIZ      atorvastatin  40 mg Oral Daily Phyllis DiasioEMPERATRIZ      bisacodyl  10 mg Rectal Daily PRN Bishnu Priest MD      docusate sodium  100 mg Oral BID Phyllis DiasioEMPERATRIZ      enoxaparin  40 mg Subcutaneous Daily Phyllis DiasioEMPERATRIZ      famotidine  40 mg Oral Daily Phyllis DiasioEMPERATRIZ      insulin lispro  1-5 Units Subcutaneous TID AC Phyllis BoswellsioEMPERATRIZ      insulin lispro  1-5 Units Subcutaneous HS Phyllis Armenta PA-C      [START ON 2/6/2021] lisinopril  10 mg Oral Daily Phyllis DiasioEMPERATRIZ      metoprolol tartrate  25 mg Oral Q12H [facility] Phyllis Armenta PA-C      ondansetron  4 mg Intravenous Q6H PRN Phyllis Armenta PA-C      pantoprazole  40 mg Oral Early Morning Phyllis Armenta PA-C      polyethylene glycol  17 g Oral Daily Phyllis Diasio, EMPERATRIZ      senna  2 tablet Oral HS Phyllis Armenta PA-C          Today, Patient Was Seen By: Rene Mccurdy PA-C    ** Please Note: Dictation voice to text software may have been used in the creation of this document   **

## 2021-02-05 NOTE — PLAN OF CARE
Problem: Prexisting or High Potential for Compromised Skin Integrity  Goal: Skin integrity is maintained or improved  Description: INTERVENTIONS:  - Identify patients at risk for skin breakdown  - Assess and monitor skin integrity  - Assess and monitor nutrition and hydration status  - Monitor labs   - Assess for incontinence   - Turn and reposition patient  - Assist with mobility/ambulation  - Relieve pressure over bony prominences  - Avoid friction and shearing  - Provide appropriate hygiene as needed including keeping skin clean and dry  - Evaluate need for skin moisturizer/barrier cream  - Collaborate with interdisciplinary team   - Patient/family teaching  - Consider wound care consult   Outcome: Progressing     Problem: Nutrition/Hydration-ADULT  Goal: Nutrient/Hydration intake appropriate for improving, restoring or maintaining nutritional needs  Description: Monitor and assess patient's nutrition/hydration status for malnutrition  Collaborate with interdisciplinary team and initiate plan and interventions as ordered  Monitor patient's weight and dietary intake as ordered or per policy  Utilize nutrition screening tool and intervene as necessary  Determine patient's food preferences and provide high-protein, high-caloric foods as appropriate       INTERVENTIONS:  - Monitor oral intake, urinary output, labs, and treatment plans  - Assess nutrition and hydration status and recommend course of action  - Evaluate amount of meals eaten  - Assist patient with eating if necessary   - Allow adequate time for meals  - Recommend/ encourage appropriate diets, oral nutritional supplements, and vitamin/mineral supplements  - Order, calculate, and assess calorie counts as needed  - Recommend, monitor, and adjust tube feedings and TPN/PPN based on assessed needs  - Assess need for intravenous fluids  - Provide specific nutrition/hydration education as appropriate  - Include patient/family/caregiver in decisions related to nutrition  Outcome: Progressing     Problem: PAIN - ADULT  Goal: Verbalizes/displays adequate comfort level or baseline comfort level  Description: Interventions:  - Encourage patient to monitor pain and request assistance  - Assess pain using appropriate pain scale  - Administer analgesics based on type and severity of pain and evaluate response  - Implement non-pharmacological measures as appropriate and evaluate response  - Consider cultural and social influences on pain and pain management  - Notify physician/advanced practitioner if interventions unsuccessful or patient reports new pain  Outcome: Progressing     Problem: INFECTION - ADULT  Goal: Absence or prevention of progression during hospitalization  Description: INTERVENTIONS:  - Assess and monitor for signs and symptoms of infection  - Monitor lab/diagnostic results  - Monitor all insertion sites, i e  indwelling lines, tubes, and drains  - Monitor endotracheal if appropriate and nasal secretions for changes in amount and color  - [place] appropriate cooling/warming therapies per order  - Administer medications as ordered  - Instruct and encourage patient and family to use good hand hygiene technique  - Identify and instruct in appropriate isolation precautions for identified infection/condition  Outcome: Progressing     Problem: SAFETY ADULT  Goal: Patient will remain free of falls  Description: INTERVENTIONS:  - Assess patient frequently for physical needs  -  Identify cognitive and physical deficits and behaviors that affect risk of falls    -  [place] fall precautions as indicated by assessment   - Educate patient/family on patient safety including physical limitations  - Instruct patient to call for assistance with activity based on assessment  - Modify environment to reduce risk of injury  - Consider OT/PT consult to assist with strengthening/mobility  Outcome: Progressing  Goal: Maintain or return to baseline ADL function  Description: INTERVENTIONS:  -  Assess patient's ability to carry out ADLs; assess patient's baseline for ADL function and identify physical deficits which impact ability to perform ADLs (bathing, care of mouth/teeth, toileting, grooming, dressing, etc )  - Assess/evaluate cause of self-care deficits   - Assess range of motion  - Assess patient's mobility; develop plan if impaired  - Assess patient's need for assistive devices and provide as appropriate  - Encourage maximum independence but intervene and supervise when necessary  - Involve family in performance of ADLs  - Assess for home care needs following discharge   - Consider OT consult to assist with ADL evaluation and planning for discharge  - Provide patient education as appropriate  Outcome: Progressing  Goal: Maintain or return mobility status to optimal level  Description: INTERVENTIONS:  - Assess patient's baseline mobility status (ambulation, transfers, stairs, etc )    - Identify cognitive and physical deficits and behaviors that affect mobility  - Identify mobility aids required to assist with transfers and/or ambulation (gait belt, sit-to-stand, lift, walker, cane, etc )  - [place] fall precautions as indicated by assessment  - Record patient progress and toleration of activity level on Mobility SBAR; progress patient to next Phase/Stage  - Instruct patient to call for assistance with activity based on assessment  - Consider rehabilitation consult to assist with strengthening/weightbearing, etc   Outcome: Progressing     Problem: DISCHARGE PLANNING  Goal: Discharge to home or other facility with appropriate resources  Description: INTERVENTIONS:  - Identify barriers to discharge w/patient and caregiver  - Arrange for needed discharge resources and transportation as appropriate  - Identify discharge learning needs (meds, wound care, etc )  - Arrange for interpretive services to assist at discharge as needed  - Refer to Case Management Department for coordinating discharge planning if the patient needs post-hospital services based on physician/advanced practitioner order or complex needs related to functional status, cognitive ability, or social support system  Outcome: Progressing     Problem: RESPIRATORY - ADULT  Goal: Achieves optimal ventilation and oxygenation  Description: INTERVENTIONS:  - Assess for changes in respiratory status  - Assess for changes in mentation and behavior  - Position to facilitate oxygenation and minimize respiratory effort  - Oxygen administered by appropriate delivery if ordered  - Initiate smoking cessation education as indicated  - Encourage broncho-pulmonary hygiene including cough, deep breathe, Incentive Spirometry  - Assess the need for suctioning and aspirate as needed  - Assess and instruct to report SOB or any respiratory difficulty  - Respiratory Therapy support as indicated  Outcome: Progressing     Problem: Potential for Falls  Goal: Patient will remain free of falls  Description: INTERVENTIONS:  - Assess patient frequently for physical needs  -  Identify cognitive and physical deficits and behaviors that affect risk of falls    -  [place] fall precautions as indicated by assessment   - Educate patient/family on patient safety including physical limitations  - Instruct patient to call for assistance with activity based on assessment  - Modify environment to reduce risk of injury  - Consider OT/PT consult to assist with strengthening/mobility  Outcome: Progressing

## 2021-02-05 NOTE — ASSESSMENT & PLAN NOTE
Lab Results   Component Value Date    EGFR 88 02/05/2021    EGFR 93 02/04/2021    EGFR 94 02/03/2021    CREATININE 0 92 02/05/2021    CREATININE 0 83 02/04/2021    CREATININE 0 80 02/03/2021     · At baseline creatinine

## 2021-02-05 NOTE — CASE MANAGEMENT
Pt has decided he would like to go to rehab at [facility]  Shemar Pereira has been submitted from insurance co by [address] [place] [address] to follow up to obtain auth from them

## 2021-02-06 PROBLEM — E43 SEVERE PROTEIN-CALORIE MALNUTRITION (HCC): Status: ACTIVE | Noted: 2021-01-01

## 2021-02-06 NOTE — PROGRESS NOTES
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 with severe protein calorie malnutrition  6  Severe deconditioning    Plan:  Acute on chronic hypoxemic respiratory failure likely multifactorial secondary to progressive interstitial lung disease, chronic systolic and diastolic CHF, likely pulmonary hypertension  Patient currently on 2 L nasal cannula with sats in the mid 90s  Echocardiogram was inadequate to assess PAP, did receive Lasix with some mild improvement in his symptoms  Nutrition is following, will continue with Ensure supplements  Dr Kole Cali had discussion with patient yesterday, likely progression of his ILD over time  Patient has declined any evaluation for lung transplant  Does not need any bronchoscopy at this time  Plan is for discharge to rehab facility  He will follow-up with Dr Kole Cali upon discharge  Will sign off, please call with questions  Subjective:   Patient sitting up resting in bed  Continues to have some shortness of breath though overall improved from prior to admission  Objective:     Vitals: Blood pressure 104/61, pulse 96, temperature 97 5 °F (36 4 °C), temperature source Oral, resp  rate 18, weight 47 9 kg (105 lb 9 6 oz), SpO2 97 %  ,Body mass index is 20 62 kg/m²        Intake/Output Summary (Last 24 hours) at 2/6/2021 1638  Last data filed at 2/6/2021 1248  Gross per 24 hour   Intake --   Output 550 ml   Net -550 ml       Invasive Devices     Peripheral Intravenous Line            Peripheral IV 02/03/21 Right Antecubital 3 days                Physical Exam: /61 (BP Location: Left arm)   Pulse 96   Temp 97 5 °F (36 4 °C) (Oral)   Resp 18 Wt 47 9 kg (105 lb 9 6 oz)   SpO2 97%   BMI 20 62 kg/m²   General appearance: alert and oriented, in no acute distress and cachectic  Head: Normocephalic, without obvious abnormality, atraumatic  Eyes: PERRL  Lungs: crackles  Heart: regular rate and rhythm and S1, S2 normal  Abdomen: normal findings: soft, non-tender  Extremities: No edema  Skin: Warm and dry  Neurologic: Mental status: Alert, oriented, thought content appropriate     Labs: I have personally reviewed pertinent lab results  , CBC: No results found for: WBC, HGB, HCT, MCV, PLT, ADJUSTEDWBC, MCH, MCHC, RDW, MPV, NRBC, CMP: No results found for: SODIUM, K, CL, CO2, ANIONGAP, BUN, CREATININE, GLUCOSE, CALCIUM, AST, ALT, ALKPHOS, PROT, BILITOT, EGFR  Imaging and other studies: I have personally reviewed pertinent reports     and I have personally reviewed pertinent films in PACS

## 2021-02-06 NOTE — ASSESSMENT & PLAN NOTE
Malnutrition Findings:   Adult Malnutrition type: Acute illness(in the setting of chronic illness)  Adult Degree of Malnutrition: Other severe protein calorie malnutrition(related to inadequate oral intake)    BMI Findings: Body mass index is 20 62 kg/m²     · Recommend dietary and lifestyle modifications

## 2021-02-06 NOTE — PROGRESS NOTES
[facility]  Progress Note - Boni Briones 1955, 60 y o  male MRN: [id number]  Unit/Bed#: S -01 Encounter: [id number]  Primary Care Provider: Charline Richard DO   Date and time admitted to hospital: 2/3/2021  9:46 AM    * Pulmonary insufficiency  Assessment & Plan  · Related to worsening of underlying pulmonary fibrosis generalized deconditioning related to weight loss  · Not appear to be acutely volume overloaded  Lasix x 1 dose given by pulmonary 2/4  · Given lack of ground-glass opacities on imaging, felt no indication for steroids at this time  One dose was given prior to discontinuation  · CT scan shows chronic fibrotic interstitial lung disease  · Pulmonary function test were unable to be performed as an outpatient and given patient's worsening dyspnea unclear when these will be able to be performed  · D-dimer is elevated  He is not significantly tachypneic and has no lower extremity edema or erythema to suggest DVT  Do not suspect pulmonary embolism  · Troponin x 3 flat at 0 5-0 6  Pulmonary fibrosis determined by high resolution computed tomography [facility])  Assessment & Plan  · Follows as outpatient with Pulmonary  · 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  · Diagnosed in October of 2021 was hospitalized with shortness of breath  · Was to complete PFTs 12/22 and 1/20 but unable given he thinks he is too SOB to get to the clinic  · Pulmonary evaluated patient  This is likely going to be progressive  · Patient would not want transplant  Chronic respiratory failure with hypoxia (HCC)  Assessment & Plan  · He was wearing 2 L nasal cannula at home      · Previously when seen by Pulmonary in November he was noted to be on 4 L nasal cannula but he states his O2 needs have always been at 2L NC at rest since discharge  · Increased to 3L last 3 weeks for symptom control - not for hypoxia    Ambulatory dysfunction  Assessment & Plan  · Patient was noted to fall recently and since that time has been using a wheelchair as needed  · Physical therapy recommending patient go to rehab and he is agreeable  · CM aware and searching for bed    Unintentional weight loss  Assessment & Plan  · The patient underwent EGD and colonoscopy November which did not reveal any significant abnormalities aside from mild gastritis  Colonoscopy was a poor prep and was recommended for repeat in 3 months  · TSH within normal limits  · May be related to worsening pulmonary status given patient feels that he is too short of breath to eat  · Started on IV steroids from pulmonary standpoint however this may help oral intake as well  · Palliative Care evaluated  Patient's lung disease likely to worsen and therefore can continue to follow-up with them as an outpatient for symptom management  · Has lost 18 lbs in last 2 months  · Continue Ensure supplementation    · Oral intake vastly improved since admission - eating and drinking with all meals    Wt Readings from Last 3 Encounters:   02/05/21 47 9 kg (105 lb 9 6 oz)   12/01/20 54 1 kg (119 lb 4 8 oz)   11/11/20 56 9 kg (125 lb 7 1 oz)   11/2019 146 lbs      Fecal impaction (HCC)  Assessment & Plan  · Disimpacted with small amount of brown stool removed 2/4  · Colace BID  · Continue senna  · Increase miralax  · Lactulose x 1 dose now    Hypertension  Assessment & Plan  · Continue metoprolol and lisinopril with hold parameters    CKD (chronic kidney disease) stage 2, GFR 60-89 ml/min  Assessment & Plan  Lab Results   Component Value Date    EGFR 88 02/05/2021    EGFR 93 02/04/2021    EGFR 94 02/03/2021    CREATININE 0 92 02/05/2021    CREATININE 0 83 02/04/2021    CREATININE 0 80 02/03/2021     · At baseline creatinine    Type 2 diabetes mellitus without complication, without long-term current use of insulin [facility])  Assessment & Plan  Lab Results   Component Value Date    HGBA1C 5 9 (H) 11/12/2020       Recent Labs 21  1558 21  2048 21  0805 21  1128   POCGLU 175* 127 121 157*       Blood Sugar Average: Last 72 hrs:  (P) 912 [id number]   Regular diet - do not restrict carbs given poor intake  Would stop metformin on discharge   SSI ACHS    Elevated liver enzymes  Assessment & Plan  · This is chronic  · Has been evaluated by GI previously  · Continue outpatient follow-up    CAD S/P percutaneous coronary angioplasty  Assessment & Plan  · Follows with cardiology  · Recent non STEMI 2020  · Continue ASA, BB, ACE, statin    Severe protein-calorie malnutrition ([facility] [address] )  Assessment & Plan  Malnutrition Findings:   Adult Malnutrition type: Acute illness(in the setting of chronic illness)  Adult Degree of Malnutrition: Other severe protein calorie malnutrition(related to inadequate oral intake)    BMI Findings: Body mass index is 20 62 kg/m²  · Recommend dietary and lifestyle modifications       VTE Pharmacologic Prophylaxis: lovenox    Patient Centered Rounds: I have performed bedside rounds with nursing staff today  Discussions with Specialists or Other Care Team Provider: nursing    Education and Discussions with Family / Patient: Updated  (wife) via phone  [phone]    Time Spent for Care: 30 minutes  More than 50% of total time spent on counseling and coordination of care as described above  Current Length of Stay: 2 day(s)  Current Patient Status: Inpatient   Certification Statement: The patient will continue to require additional inpatient hospital stay due to awaiting bed  Discharge Plan: Anticipate discharge tomorrow to rehab facility      Code Status: Level 3 - DNAR and DNI    Subjective:   Feels okay - sob improved from prior to arrival  Eating meals  No nausea, vomiting    Objective:     Vitals:   Temp (24hrs), Av 8 °F (36 6 °C), Min:97 6 °F (36 4 °C), Max:98 °F (36 7 °C)    Temp:  [97 6 °F (36 4 °C)-98 °F (36 7 °C)] 97 7 °F (36 5 °C)  HR:  [] 112  Resp:  [18] 18  BP: ()/(52-67) 119/67  SpO2:  [92 %-96 %] 96 %  Body mass index is 20 62 kg/m²  Input and Output Summary (last 24 hours): Intake/Output Summary (Last 24 hours) at 2/6/2021 1413  Last data filed at 2/6/2021 1248  Gross per 24 hour   Intake --   Output 550 ml   Net -550 ml       Physical Exam:   Physical Exam  Vitals signs and nursing note reviewed  Constitutional:       General: He is not in acute distress  Appearance: Normal appearance  He is ill-appearing  He is not diaphoretic  Comments: Thin, frail, cachetic   HENT:      Head: Normocephalic and atraumatic  Mouth/Throat:      Mouth: Mucous membranes are moist    Cardiovascular:      Rate and Rhythm: Normal rate and regular rhythm  Pulmonary:      Effort: Pulmonary effort is normal       Breath sounds: Normal breath sounds  No wheezing or rales  Comments: Decreased at bases  Abdominal:      General: Bowel sounds are normal       Palpations: Abdomen is soft  There is no mass  Tenderness: There is no abdominal tenderness  There is no guarding  Musculoskeletal:      Right lower leg: No edema  Left lower leg: No edema  Skin:     General: Skin is warm and dry  Coloration: Skin is pale  Neurological:      Mental Status: He is alert and oriented to person, place, and time     Psychiatric:         Mood and Affect: Mood normal          Behavior: Behavior normal        Additional Data:    Labs:  Results from last 7 days   Lab Units 02/05/21  0604 02/03/21  1005   WBC Thousand/uL 10 52* 9 69   HEMOGLOBIN g/dL 12 7 13 2   HEMATOCRIT % 41 2 44 1   PLATELETS Thousands/uL 276 282   NEUTROS PCT %  --  77*   LYMPHS PCT %  --  16   MONOS PCT %  --  4   EOS PCT %  --  2     Results from last 7 days   Lab Units 02/05/21  0604  02/03/21  1005   SODIUM mmol/L 137   < > 138   POTASSIUM mmol/L 4 9   < > 5 4*   CHLORIDE mmol/L 100   < > 99*   CO2 mmol/L 29   < > 28   BUN mg/dL 24   < > 26*   CREATININE mg/dL 0 92   < > 0 80 ANION GAP mmol/L 8   < > 11   CALCIUM mg/dL 9 0   < > 9 1   ALBUMIN g/dL  --   --  3 2*   TOTAL BILIRUBIN mg/dL  --   --  0 47   ALK PHOS U/L  --   --  106   ALT U/L  --   --  90*   AST U/L  --   --  143*   GLUCOSE RANDOM mg/dL 125   < > 94    < > = values in this interval not displayed           Results from last 7 days   Lab Units 02/06/21  1128 02/06/21  0805 02/05/21  2048 02/05/21  1558 02/05/21  1040 02/05/21  0757 02/04/21  2047 02/04/21  1653 02/04/21  1149 02/04/21  0922 02/03/21  2203 02/03/21  1725   POC GLUCOSE mg/dl 157* 121 127 175* 182* 97 175* 196* 210* 121 83 88               Lines/Drains:  Invasive Devices     Peripheral Intravenous Line            Peripheral IV 02/03/21 Right Antecubital 3 days                Telemetry:        Imaging: Reviewed radiology reports from this admission including: chest xray    Recent Cultures (last 7 days):         Last 24 Hours Medication List:   Current Facility-Administered Medications   Medication Dose Route Frequency Provider Last Rate    acetaminophen  650 mg Oral Q6H PRN [place] Syed PA-C      albuterol  2 puff Inhalation Q4H PRN CRISTOBAL Garcia      aspirin  162 mg Oral Daily Phyllis Diasio, PA-DAYLIN      atorvastatin  40 mg Oral Daily Phyllis Diasio, PA-C      bisacodyl  10 mg Rectal Daily PRN Marbin Dale MD      docusate sodium  100 mg Oral BID Phyllis Diasio, EMPERATRIZ      enoxaparin  40 mg Subcutaneous Daily Phyllis Diasio, PA-C      famotidine  40 mg Oral Daily Phyllis Diasio, PA-C      insulin lispro  1-5 Units Subcutaneous TID AC Phyllis Diasio, PA-C      insulin lispro  1-5 Units Subcutaneous HS Phyllis Armenta, EMPERATRIZ      lactulose  20 g Oral Daily Phyllis Diasio, PA-C      lisinopril  10 mg Oral Daily Phyllis Diasio, PA-C      metoprolol tartrate  25 mg Oral Q12H [address] Phyllis Diasio, PA-DAYLIN      ondansetron  4 mg Intravenous Q6H PRN Phyllis Diasio, PA-C      pantoprazole  40 mg Oral Early Morning Phyllis Armenta PA-C      polyethylene glycol  17 g Oral BID Phyllis Armenta PA-C      senna  2 tablet Oral HS Phyllis Armenta PA-C      sodium chloride  1 spray Each Nare Q1H PRN Chito Delgado PA-C          Today, Patient Was Seen By: Chito Delgado PA-C    ** Please Note: Dictation voice to text software may have been used in the creation of this document   **

## 2021-02-06 NOTE — CASE MANAGEMENT
Per [address] [place] [address] [address], no [facility] has been received as yet for the Pt's admission to their facility

## 2021-02-06 NOTE — ASSESSMENT & PLAN NOTE
· Disimpacted with small amount of brown stool removed 2/4  · Colace BID  · Continue senna  · Increase miralax  · Lactulose x 1 dose now

## 2021-02-06 NOTE — CASE MANAGEMENT
CM called [address] and left a message, requesting a call back with an update on auth for the Pt's admission to their facility today  HARJIT will continue to follow

## 2021-02-06 NOTE — ASSESSMENT & PLAN NOTE
Lab Results   Component Value Date    HGBA1C 5 9 (H) 11/12/2020       Recent Labs     02/05/21  1558 02/05/21  2048 02/06/21  0805 02/06/21  1128   POCGLU 175* 127 121 157*       Blood Sugar Average: Last 72 hrs:  (P) 202 [id number]   Regular diet - do not restrict carbs given poor intake  Would stop metformin on discharge   SSI ACHS

## 2021-02-07 NOTE — ASSESSMENT & PLAN NOTE
· The patient underwent EGD and colonoscopy November which did not reveal any significant abnormalities aside from mild gastritis  Colonoscopy was a poor prep and was recommended for repeat in 3 months  · TSH within normal limits  · May be related to worsening pulmonary status given patient feels that he is too short of breath to eat  · Started on IV steroids from pulmonary standpoint however this may help oral intake as well  · Palliative Care evaluated  Patient's lung disease likely to worsen and therefore can continue to follow-up with them as an outpatient for symptom management  · Has lost 18 lbs in last 2 months  · Continue Ensure supplementation    · Oral intake vastly improved since admission - eating and drinking with all meals    Wt Readings from Last 3 Encounters:   02/07/21 47 5 kg (104 lb 11 5 oz)   12/01/20 54 1 kg (119 lb 4 8 oz)   11/11/20 56 9 kg (125 lb 7 1 oz)   11/2019 146 lbs

## 2021-02-07 NOTE — PROGRESS NOTES
[facility]  Progress Note - Edmon Dakin 1955, 60 y o  male MRN: [id number]  Unit/Bed#: S -01 Encounter: [id number]  Primary Care Provider: Stephenie Mariscal DO   Date and time admitted to hospital: 2/3/2021  9:46 AM    * Pulmonary insufficiency  Assessment & Plan  · Related to worsening of underlying pulmonary fibrosis generalized deconditioning related to weight loss  · Not appear to be acutely volume overloaded  Lasix x 1 dose given by pulmonary 2/4  · Given lack of ground-glass opacities on imaging, felt no indication for steroids at this time  One dose was given prior to discontinuation  · Patient reports that he believes the pulmonologist informed him that he would be going back on steroids  Will rediscuss with pulmonology  · CT scan shows chronic fibrotic interstitial lung disease  · Pulmonary function test were unable to be performed as an outpatient and given patient's worsening dyspnea unclear when these will be able to be performed  · D-dimer is elevated  He is not significantly tachypneic and has no lower extremity edema or erythema to suggest DVT  Do not suspect pulmonary embolism  · Troponin x 3 flat at 0 5-0 6  Pulmonary fibrosis determined by high resolution computed tomography [facility])  Assessment & Plan  · Follows as outpatient with Pulmonary  · 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  · Diagnosed in October of 2021 was hospitalized with shortness of breath  · Was to complete PFTs 12/22 and 1/20 but unable given he thinks he is too SOB to get to the clinic  · Pulmonary evaluated patient  This is likely going to be progressive  · Would not start anti fibrotic secondary to his cachexia  · Patient would not want transplant  Chronic respiratory failure with hypoxia (HCC)  Assessment & Plan  · He was wearing 2 L nasal cannula at home      · Previously when seen by Pulmonary in November he was noted to be on 4 L nasal cannula but he states his O2 needs have always been at 2L NC at rest since discharge  · Increased to 3L last 3 weeks for symptom control - not for hypoxia    Ambulatory dysfunction  Assessment & Plan  · Patient was noted to fall recently and since that time has been using a wheelchair as needed  · Physical therapy recommending patient go to rehab and he is agreeable  · CM aware and searching for bed    Unintentional weight loss  Assessment & Plan  · The patient underwent EGD and colonoscopy November which did not reveal any significant abnormalities aside from mild gastritis  Colonoscopy was a poor prep and was recommended for repeat in 3 months  · TSH within normal limits  · May be related to worsening pulmonary status given patient feels that he is too short of breath to eat  · Started on IV steroids from pulmonary standpoint however this may help oral intake as well  · Palliative Care evaluated  Patient's lung disease likely to worsen and therefore can continue to follow-up with them as an outpatient for symptom management  · Has lost 18 lbs in last 2 months  · Continue Ensure supplementation    · Oral intake vastly improved since admission - eating and drinking with all meals    Wt Readings from Last 3 Encounters:   02/07/21 47 5 kg (104 lb 11 5 oz)   12/01/20 54 1 kg (119 lb 4 8 oz)   11/11/20 56 9 kg (125 lb 7 1 oz)   11/2019 146 lbs      Fecal impaction (HCC)  Assessment & Plan  · Disimpacted with small amount of brown stool removed 2/4  · Large amount of bowel movements on to 2/7/2021  · Discontinue further lactulose    Hypertension  Assessment & Plan  · Continue metoprolol and lisinopril with hold parameters    CKD (chronic kidney disease) stage 2, GFR 60-89 ml/min  Assessment & Plan  Lab Results   Component Value Date    EGFR 88 02/05/2021    EGFR 93 02/04/2021    EGFR 94 02/03/2021    CREATININE 0 92 02/05/2021    CREATININE 0 83 02/04/2021    CREATININE 0 80 02/03/2021     · At baseline creatinine    Type 2 diabetes mellitus without complication, without long-term current use of insulin [facility])  Assessment & Plan  Lab Results   Component Value Date    HGBA1C 5 9 (H) 11/12/2020       Recent Labs     02/06/21  1128 02/06/21  1546 02/06/21  2217 02/07/21  0748   POCGLU 157* 144* 123 124       Blood Sugar Average: Last 72 hrs:  (P) 199 [id number]   Regular diet - do not restrict carbs given poor intake  Would stop metformin on discharge   SSI ACHS    Elevated liver enzymes  Assessment & Plan  · This is chronic  · Has been evaluated by GI previously  · Continue outpatient follow-up    CAD S/P percutaneous coronary angioplasty  Assessment & Plan  · Follows with cardiology  · Recent non STEMI 11/2020  · Continue ASA, BB, ACE, statin    Severe protein-calorie malnutrition ([address] )  Assessment & Plan  Malnutrition Findings:   Adult Malnutrition type: Acute illness(in the setting of chronic illness)  Adult Degree of Malnutrition: Other severe protein calorie malnutrition(related to inadequate oral intake)    BMI Findings: Body mass index is 20 45 kg/m²  · Recommend dietary and lifestyle modifications        VTE Pharmacologic Prophylaxis: VTE Score: 2 Moderate Risk (Score 3-4) - Pharmacological DVT Prophylaxis Ordered: Enoxaparin (Lovenox)  Patient Centered Rounds: I have performed bedside rounds with nursing staff today  Discussions with Specialists or Other Care Team Provider: nursing    Education and Discussions with Family / Patient: Updated  (wife) via phone  26 - patient's wife screaming at me via phone  Telling me that I will not pay at [facility]bin for him to go to rehab " and that "what the hell are these nurses doing they arent getting him out of bed and this is a hospital!" - discussed we will continue to work on mobility and that insurance auth still pending     Time Spent for Care: 30 minutes    More than 50% of total time spent on counseling and coordination of care as described above  Current Length of Stay: 3 day(s)  Current Patient Status: Inpatient   Certification Statement: The patient will continue to require additional inpatient hospital stay due to need for rehab - awaiting auth  Discharge Plan: Anticipate discharge tomorrow to rehab facility  Code Status: Level 3 - DNAR and DNI    Subjective:   No nausea or vomiting  No chest pain  Still with shortness of breath  Unchanged from before  Had large bowel movement yesterday  Objective:     Vitals:   Temp (24hrs), Av 6 °F (36 4 °C), Min:97 5 °F (36 4 °C), Max:97 7 °F (36 5 °C)    Temp:  [97 5 °F (36 4 °C)-97 7 °F (36 5 °C)] 97 5 °F (36 4 °C)  HR:  [] 81  Resp:  [18] 18  BP: (104-117)/(61-85) 114/63  SpO2:  [97 %-99 %] 99 %  Body mass index is 20 45 kg/m²  Input and Output Summary (last 24 hours): Intake/Output Summary (Last 24 hours) at 2021 1125  Last data filed at 2021 7998  Gross per 24 hour   Intake 200 ml   Output 600 ml   Net -400 ml     Physical Exam:     Physical Exam  Vitals signs and nursing note reviewed  Constitutional:       General: He is not in acute distress  Appearance: Normal appearance  He is not diaphoretic  HENT:      Head: Normocephalic and atraumatic  Mouth/Throat:      Mouth: Mucous membranes are moist    Cardiovascular:      Rate and Rhythm: Normal rate and regular rhythm  Pulmonary:      Effort: Pulmonary effort is normal       Breath sounds: Normal breath sounds  No stridor  No wheezing, rhonchi or rales  Abdominal:      General: Bowel sounds are normal       Palpations: Abdomen is soft  There is no mass  Tenderness: There is no abdominal tenderness  There is no guarding  Musculoskeletal:      Right lower leg: No edema  Left lower leg: No edema  Skin:     General: Skin is warm and dry  Neurological:      Mental Status: He is alert     Psychiatric:         Mood and Affect: Mood normal          Behavior: Behavior normal  Additional Data:     Labs:  Results from last 7 days   Lab Units 02/05/21  0604 02/03/21  1005   WBC Thousand/uL 10 52* 9 69   HEMOGLOBIN g/dL 12 7 13 2   HEMATOCRIT % 41 2 44 1   PLATELETS Thousands/uL 276 282   NEUTROS PCT %  --  77*   LYMPHS PCT %  --  16   MONOS PCT %  --  4   EOS PCT %  --  2     Results from last 7 days   Lab Units 02/05/21  0604  02/03/21  1005   SODIUM mmol/L 137   < > 138   POTASSIUM mmol/L 4 9   < > 5 4*   CHLORIDE mmol/L 100   < > 99*   CO2 mmol/L 29   < > 28   BUN mg/dL 24   < > 26*   CREATININE mg/dL 0 92   < > 0 80   ANION GAP mmol/L 8   < > 11   CALCIUM mg/dL 9 0   < > 9 1   ALBUMIN g/dL  --   --  3 2*   TOTAL BILIRUBIN mg/dL  --   --  0 47   ALK PHOS U/L  --   --  106   ALT U/L  --   --  90*   AST U/L  --   --  143*   GLUCOSE RANDOM mg/dL 125   < > 94    < > = values in this interval not displayed  Results from last 7 days   Lab Units 02/07/21  0748 02/06/21  2217 02/06/21  1546 02/06/21  1128 02/06/21  0805 02/05/21  2048 02/05/21  1558 02/05/21  1040 02/05/21  0757 02/04/21  2047 02/04/21  1653 02/04/21  1149   POC GLUCOSE mg/dl 124 123 144* 157* 121 127 175* 182* 97 175* 196* 210*               Lines/Drains:  Invasive Devices     Peripheral Intravenous Line            Peripheral IV 02/07/21 Left Antecubital less than 1 day                Telemetry:        Imaging: No pertinent imaging reviewed      Recent Cultures (last 7 days):         Last 24 Hours Medication List:   Current Facility-Administered Medications   Medication Dose Route Frequency Provider Last Rate    acetaminophen  650 mg Oral Q6H PRN Ty Gosselin, PA-C      albuterol  2 puff Inhalation Q4H PRN CRISTOBAL Torre      aspirin  162 mg Oral Daily Phyllis Armenta PA-C      atorvastatin  40 mg Oral Daily Phyllis Armenta PA-C      bisacodyl  10 mg Rectal Daily PRN Deven Wallace MD      docusate sodium  100 mg Oral BID Phyllis Diasio, PA-C      enoxaparin  40 mg Subcutaneous Daily Ty Gosselin, PA-C  famotidine  40 mg Oral Daily Phyllis Armenta PA-C      insulin lispro  1-5 Units Subcutaneous TID AC Phyllis Armenta PA-C      insulin lispro  1-5 Units Subcutaneous HS Phyllis Armenta PA-C      lactulose  20 g Oral Daily Phyllis Armenta PA-C      lisinopril  10 mg Oral Daily Phyllis DiaEMPERATRIZ pate      metoprolol tartrate  25 mg Oral Q12H [address] Phyllis Armenta PA-C      ondansetron  4 mg Intravenous Q6H PRN Phyllis Armenta PA-C      pantoprazole  40 mg Oral Early Morning Phyllis Armenta PA-C      polyethylene glycol  17 g Oral BID Phyllis Armenta PA-C      senna  2 tablet Oral HS Phyllis Armenta PA-C      sodium chloride  1 spray Each Nare Q1H PRN Francisca Hadley PA-C          Today, Patient Was Seen By: Francisca Hadley PA-C    ** Please Note: Dictation voice to text software may have been used in the creation of this document   **

## 2021-02-07 NOTE — ASSESSMENT & PLAN NOTE
Malnutrition Findings:   Adult Malnutrition type: Acute illness(in the setting of chronic illness)  Adult Degree of Malnutrition: Other severe protein calorie malnutrition(related to inadequate oral intake)    BMI Findings: Body mass index is 20 45 kg/m²     · Recommend dietary and lifestyle modifications

## 2021-02-07 NOTE — ASSESSMENT & PLAN NOTE
· Disimpacted with small amount of brown stool removed 2/4  · Large amount of bowel movements on to 2/7/2021  · Discontinue further lactulose

## 2021-02-07 NOTE — ASSESSMENT & PLAN NOTE
Lab Results   Component Value Date    HGBA1C 5 9 (H) 11/12/2020       Recent Labs     02/06/21  1128 02/06/21  1546 02/06/21  2217 02/07/21  0748   POCGLU 157* 144* 123 124       Blood Sugar Average: Last 72 hrs:  (P) 206 [id number]   Regular diet - do not restrict carbs given poor intake  Would stop metformin on discharge   SSI ACHS

## 2021-02-07 NOTE — ASSESSMENT & PLAN NOTE
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

## 2021-02-07 NOTE — ASSESSMENT & PLAN NOTE
· Related to worsening of underlying pulmonary fibrosis generalized deconditioning related to weight loss  · Not appear to be acutely volume overloaded  Lasix x 1 dose given by pulmonary 2/4  · Given lack of ground-glass opacities on imaging, felt no indication for steroids at this time  One dose was given prior to discontinuation  · Patient reports that he believes the pulmonologist informed him that he would be going back on steroids  Will rediscuss with pulmonology  · CT scan shows chronic fibrotic interstitial lung disease  · Pulmonary function test were unable to be performed as an outpatient and given patient's worsening dyspnea unclear when these will be able to be performed  · D-dimer is elevated  He is not significantly tachypneic and has no lower extremity edema or erythema to suggest DVT  Do not suspect pulmonary embolism  · Troponin x 3 flat at 0 5-0 6

## 2021-02-08 PROBLEM — K56.41 FECAL IMPACTION (HCC): Status: RESOLVED | Noted: 2021-01-01 | Resolved: 2021-01-01

## 2021-02-08 NOTE — ASSESSMENT & PLAN NOTE
· Related to worsening of underlying pulmonary fibrosis generalized deconditioning related to weight loss  · Not appear to be acutely volume overloaded  Lasix x 1 dose given by pulmonary 2/4  · Given lack of ground-glass opacities on imaging, felt no indication for steroids at this time  One dose was given prior to discontinuation  · Patient reports that he believes the pulmonologist informed him that he would be going back on steroids  This was rediscussed with our pulmonology Service, and no indication for steroids at this time upon discharge  · CT scan shows chronic fibrotic interstitial lung disease  · Pulmonary function test were unable to be performed as an outpatient and given patient's worsening dyspnea unclear when these will be able to be performed  · D-dimer is elevated  He is not significantly tachypneic and has no lower extremity edema or erythema to suggest DVT  Do not suspect pulmonary embolism  · Troponin x 3 flat at 0 5-0 6

## 2021-02-08 NOTE — UTILIZATION REVIEW
Notification of Discharge  This is a Notification of Discharge from our facility [address]  Please be advised that this patient has been discharge from our facility  Below you will find the admission and discharge date and time including the patients disposition  PRESENTATION DATE: 2/3/2021  9:46 AM  OBS ADMISSION DATE:   IP ADMISSION DATE: 2/4/21 0953   DISCHARGE DATE: 2/8/2021 12:20 PM  DISPOSITION: Released to SNF/TCU/SNU Facility Released to SNF/TCU/SNU Facility   Admission Orders listed below:  Admission Orders (From admission, onward)     Ordered        02/04/21 0953  Inpatient Admission  Once         02/03/21 1700  Place in Observation  Once         02/03/21 1407  Inpatient Admission  Once,   Status:  Canceled                   Please contact the UR Department if additional information is required to close this patient's authorization/case  [address] [facility] Utilization Review Department  Main: [phone] x carefully listen to the prompts  All voicemails are confidential   [email or web address]  Send all requests for admission clinical reviews, approved or denied determinations and any other requests to dedicated fax number below belonging to the campus where the patient is receiving treatment   List of dedicated fax numbers:  [address] [facility] [address] [facility] [address] DENIALS (Administrative/Medical Necessity) [phone]   [address] (Maternity/NICU/Pediatrics) [phone]   Jaclyn Mendoza [phone]   [facility] [facility] [phone]   [facility] [phone]   [address] [facility] [address] [facility] [phone]   Yvonna Reach [phone]   [address] [facility]  [address] [facility] [address] [facility] [phone]

## 2021-02-08 NOTE — DISCHARGE SUMMARY
[address] - Internal Medicine Service  Discharge- [facility] Dakin 1955, 60 y o  male MRN: [id number]  Unit/Bed#: S -01 Encounter: [id number]  Primary Care Provider: Stephenie Mariscal DO   Date and time admitted to hospital: 2/3/2021  9:46 AM    * Pulmonary insufficiency  Assessment & Plan  · Related to worsening of underlying pulmonary fibrosis generalized deconditioning related to weight loss  · Not appear to be acutely volume overloaded  Lasix x 1 dose given by pulmonary 2/4  · Given lack of ground-glass opacities on imaging, felt no indication for steroids at this time  One dose was given prior to discontinuation  · Patient reports that he believes the pulmonologist informed him that he would be going back on steroids  This was rediscussed with our pulmonology Service, and no indication for steroids at this time upon discharge  · CT scan shows chronic fibrotic interstitial lung disease  · Pulmonary function test were unable to be performed as an outpatient and given patient's worsening dyspnea unclear when these will be able to be performed  · D-dimer is elevated  He is not significantly tachypneic and has no lower extremity edema or erythema to suggest DVT  Do not suspect pulmonary embolism  · Troponin x 3 flat at 0 5-0 6  Severe protein-calorie malnutrition ([address] )  Assessment & Plan  Malnutrition Findings:   Adult Malnutrition type: Acute illness(in the setting of chronic illness)  Adult Degree of Malnutrition: Other severe protein calorie malnutrition(related to inadequate oral intake)    BMI Findings: Body mass index is 20 28 kg/m²  · Recommend dietary and lifestyle modifications     Ambulatory dysfunction  Assessment & Plan  · Patient was noted to fall recently and since that time has been using a wheelchair as needed    · Physical therapy recommending patient go to rehab and he is agreeable  · To [facility]    Unintentional weight loss  Assessment & Plan  · The patient underwent EGD and colonoscopy November which did not reveal any significant abnormalities aside from mild gastritis  Colonoscopy was a poor prep and was recommended for repeat in 3 months  · TSH within normal limits  · May be related to worsening pulmonary status given patient feels that he is too short of breath to eat  · Palliative Care evaluated  Patient's lung disease likely to worsen and therefore can continue to follow-up with them as an outpatient for symptom management  · Has lost 18 lbs in last 2 months  · Continue Ensure supplementation  · Oral intake vastly improved since admission - eating and drinking with all meals    Wt Readings from Last 3 Encounters:   02/08/21 47 1 kg (103 lb 13 4 oz)   12/01/20 54 1 kg (119 lb 4 8 oz)   11/11/20 56 9 kg (125 lb 7 1 oz)   11/2019 146 lbs      Chronic respiratory failure with hypoxia (HCC)  Assessment & Plan  · He was wearing 2 L nasal cannula at home  · Previously when seen by Pulmonary in November he was noted to be on 4 L nasal cannula but he states his O2 needs have always been at 2L NC at rest since discharge  · Now stable on 2 L NC  · Increased to 3L last 3 weeks for symptom control - not for hypoxia    Elevated liver enzymes  Assessment & Plan  · This is chronic  · Has been evaluated by GI previously  · Continue outpatient follow-up    Hypertension  Assessment & Plan  · Continue metoprolol and lisinopril with hold parameters    Pulmonary fibrosis determined by high resolution computed tomography [facility])  Assessment & Plan  · Follows as outpatient with Pulmonary  · 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  · Diagnosed in October of 2021 was hospitalized with shortness of breath  · Was to complete PFTs 12/22 and 1/20 but unable given he thinks he is too SOB to get to the clinic  · Pulmonary evaluated patient  This is likely going to be progressive    · Would not start anti fibrotic secondary to his cachexia  · Patient would not want transplant  CKD (chronic kidney disease) stage 2, GFR 60-89 ml/min  Assessment & Plan  Lab Results   Component Value Date    EGFR 88 02/05/2021    EGFR 93 02/04/2021    EGFR 94 02/03/2021    CREATININE 0 92 02/05/2021    CREATININE 0 83 02/04/2021    CREATININE 0 80 02/03/2021     · At baseline creatinine    Type 2 diabetes mellitus without complication, without long-term current use of insulin [facility])  Assessment & Plan  Lab Results   Component Value Date    HGBA1C 5 9 (H) 11/12/2020       Recent Labs     02/07/21  1122 02/07/21  1649 02/07/21  2130 02/08/21  0736   POCGLU 167* 100 171* 135       Blood Sugar Average: Last 72 hrs:  (P) 140 [id number]   Regular diet - do not restrict carbs given poor intake  Stop metformin  Encourage oral intake    CAD S/P percutaneous coronary angioplasty  Assessment & Plan  · Follows with cardiology  · Recent non STEMI 11/2020  · Continue ASA, BB, ACE, statin    Fecal impaction (HCC)-resolved as of 2/8/2021  Assessment & Plan  · Disimpacted with small amount of brown stool removed 2/4  · Large amount of bowel movements on to 2/7/2021  · Discontinue further lactulose  · Continue bowel regimen    Discharging Physician / Practitioner: Iona Mitchell PA-C  PCP: Sushma Caldera DO  Admission Date:   Admission Orders (From admission, onward)     Ordered        02/04/21 0953  Inpatient Admission  Once         02/03/21 1700  Place in Observation  Once         02/03/21 1407  Inpatient Admission  Once,   Status:  Canceled                   Discharge Date: 02/08/21    Disposition:      [address] [facility] SNF:   · [facility] Texted SNF Physician    Reason for Admission: ambulatory dysfunction, FTT, SOB    Discharge Diagnoses:     Please see assessment and plan section above for further details regarding discharge diagnoses       Resolved Problems  Date Reviewed: 2/8/2021 Resolved    Fecal impaction ([facility] [address] ) 2/8/2021     Resolved by  Abdiazzi Solomon PA-C          Consultations During Hospital Stay:  · Pulmonology  · Physical therapy/occupational therapy  · Palliative care    Procedures Performed:   · None     Medication Adjustments and Discharge Medications:  · Summary of Medication Adjustments made as a result of this hospitalization:  Discontinue metformin  Add nystatin for 14 days  · Medication Dosing Tapers - Please refer to Discharge Medication List for details on any medication dosing tapers (if applicable to patient)  · Medications being temporarily held (include recommended restart time):   · Discharge Medication List: See after visit summary for reconciled discharge medications  Wound Care Recommendations:  When applicable, please see wound care section of After Visit Summary  Diet Recommendations at Discharge:  Diet -        Diet Orders   (From admission, onward)             Start     Ordered    02/04/21 1315  Room Service  Once     Question:  Type of Service  Answer:  Room Service-Appropriate    02/04/21 1314    02/04/21 1011  Dietary nutrition supplements  Once     Question Answer Comment   Select Supplement: Ensure Enlive-Vanilla    Frequency Breakfast, Lunch, Dinner        02/04/21 1011    02/03/21 1646  Diet Regular; Regular House  Diet effective now     Question Answer Comment   Diet Type Regular    Regular Regular House    RD to adjust diet per protocol? Yes        02/03/21 1645                Instructions for any Catheters / Lines Present at Discharge (including removal date, if applicable):  None    Significant Findings / Test Results:   · Unintentional weight loss of 18 lb  · 2 L of oxygen upon discharge  · Chronic transaminitis, followed by GI in the outpatient setting    Incidental Findings:   · None     Test Results Pending at Discharge (will require follow up):    · None     Outpatient Tests Requested:  · None     Complications:  None Hospital Course:     Sanjay Hwang is a 60 y o  male patient who originally presented to the hospital on 2/3/2021 due to shortness of breath and ambulatory dysfunction  He presented with worsening shortness of breath, which had been progressive  The patient has a history of interstitial lung disease and has been on chronic oxygen, and declining  He has lost 18 lb in the last 2 months secondary to not eating and increased shortness of breath  Patient is on 2 L of oxygen at baseline  The patient has had difficulty with ambulation and weakness in his legs secondary to weight loss  He was seen in consultation by pulmonology, received 1 dose of IV Solu-Medrol, with discontinuation of steroids  His appetite had picked up while he was admitted to the hospital, seen in consultation by palliative care  Palliative Care recommends continued outpatient follow-up  He gained 2 lb throughout his stay in the hospital, and physical therapy recommending acute rehabilitation  Patient was encouraged to increase his oral intake, and work with physical therapy to regain some strength  He was educated that this is a chronic disease process, and likely will continue to decline from a pulmonary standpoint  The patient was also noted to have fecal impaction, was manually disimpacted and his bowel regimen was increased  His metformin was discontinued secondary to poor intake, and was encouraged to eat a regular diet  He will follow-up in the outpatient setting with palliative care  He will be discharged today to [facility]  Condition at Discharge: poor     Discharge Day Visit / Exam:     Subjective:  Feeling well, reports rash on his groin  SOB is stable  No events overnight  He is acceptable to rehab    Vitals: Blood Pressure: 124/61 (02/08/21 0719)  Pulse: 90 (02/08/21 0719)  Temperature: 97 8 °F (36 6 °C) (02/08/21 0719)  Temp Source: Oral (02/08/21 0719)  Respirations: 16 (02/08/21 0719)  Weight - Scale: 47 1 kg (103 lb 13 4 oz) (02/08/21 0600)  SpO2: 99 % (02/08/21 0719)  Exam:   Physical Exam  Vitals signs and nursing note reviewed  Constitutional:       General: He is not in acute distress  Appearance: He is cachectic  Interventions: Nasal cannula in place  HENT:      Head: Normocephalic  Mouth/Throat:      Mouth: Mucous membranes are moist    Eyes:      Pupils: Pupils are equal, round, and reactive to light  Neck:      Musculoskeletal: Normal range of motion  Cardiovascular:      Rate and Rhythm: Normal rate and regular rhythm  Heart sounds: No murmur  Pulmonary:      Effort: Pulmonary effort is normal  No respiratory distress  Breath sounds: Normal breath sounds  No wheezing, rhonchi or rales  Abdominal:      General: Bowel sounds are normal  There is no distension  Palpations: Abdomen is soft  Tenderness: There is no abdominal tenderness  There is no guarding  Musculoskeletal:         General: No deformity  Right lower leg: No edema  Left lower leg: No edema  Skin:     Capillary Refill: Capillary refill takes less than 2 seconds  Findings: Rash (bilateral groin, c/w fungal infection) present  Neurological:      General: No focal deficit present  Mental Status: He is alert and oriented to person, place, and time  Mental status is at baseline  Discussion with Family: called wife    Goals of Care Discussions:  · Code Status at Discharge: Level 3 - DNAR and DNI  · Were there any Goals of Care Discussions during Hospitalization?: Yes  · Results of any General Goals of Care Discussions:  Discussion with palliative Care, they will follow in the outpatient setting for symptom management  · POLST Completed: No   · If POLST Completed, Summary of POLST Agreement Provided Here:    · OK to Rehospitalize if Needed?  Yes    Discharge instructions/Information to patient and family:   See after visit summary section titled Discharge Instructions for information provided to patient and family  Planned Readmission: no      Discharge Statement:  I spent 35 minutes discharging the patient  This time was spent on the day of discharge  I had direct contact with the patient on the day of discharge  Greater than 50% of the total time was spent examining patient, answering all patient questions, arranging and discussing plan of care with patient as well as directly providing post-discharge instructions  Additional time then spent on discharge activities      ** Please Note: This note has been constructed using a voice recognition system **

## 2021-02-08 NOTE — ASSESSMENT & PLAN NOTE
Lab Results   Component Value Date    HGBA1C 5 9 (H) 11/12/2020       Recent Labs     02/07/21  1122 02/07/21  1649 02/07/21  2130 02/08/21  0736   POCGLU 167* 100 171* 135       Blood Sugar Average: Last 72 hrs:  (P) 091 [id number]   Regular diet - do not restrict carbs given poor intake  Stop metformin  Encourage oral intake

## 2021-02-08 NOTE — CASE MANAGEMENT
Pt will be picked up by ISAIAH PRINCE at 1200 today to be transferred to [address] [facility]  Nursing Saroj Mcdonald), wife and pt are aware of DC arrangements  SLIM MARGO Scott) has written DC  IMM reviewed with patient's caregiver  patient's caregiver agree with discharge determination

## 2021-02-08 NOTE — PLAN OF CARE
Problem: Prexisting or High Potential for Compromised Skin Integrity  Goal: Skin integrity is maintained or improved  Description: INTERVENTIONS:  - Identify patients at risk for skin breakdown  - Assess and monitor skin integrity  - Assess and monitor nutrition and hydration status  - Monitor labs   - Assess for incontinence   - Turn and reposition patient  - Assist with mobility/ambulation  - Relieve pressure over bony prominences  - Avoid friction and shearing  - Provide appropriate hygiene as needed including keeping skin clean and dry  - Evaluate need for skin moisturizer/barrier cream  - Collaborate with interdisciplinary team   - Patient/family teaching  - Consider wound care consult   Outcome: Progressing     Problem: Nutrition/Hydration-ADULT  Goal: Nutrient/Hydration intake appropriate for improving, restoring or maintaining nutritional needs  Description: Monitor and assess patient's nutrition/hydration status for malnutrition  Collaborate with interdisciplinary team and initiate plan and interventions as ordered  Monitor patient's weight and dietary intake as ordered or per policy  Utilize nutrition screening tool and intervene as necessary  Determine patient's food preferences and provide high-protein, high-caloric foods as appropriate       INTERVENTIONS:  - Monitor oral intake, urinary output, labs, and treatment plans  - Assess nutrition and hydration status and recommend course of action  - Evaluate amount of meals eaten  - Assist patient with eating if necessary   - Allow adequate time for meals  - Recommend/ encourage appropriate diets, oral nutritional supplements, and vitamin/mineral supplements  - Order, calculate, and assess calorie counts as needed  - Recommend, monitor, and adjust tube feedings and TPN/PPN based on assessed needs  - Assess need for intravenous fluids  - Provide specific nutrition/hydration education as appropriate  - Include patient/family/caregiver in decisions related to nutrition  Outcome: Progressing     Problem: PAIN - ADULT  Goal: Verbalizes/displays adequate comfort level or baseline comfort level  Description: Interventions:  - Encourage patient to monitor pain and request assistance  - Assess pain using appropriate pain scale  - Administer analgesics based on type and severity of pain and evaluate response  - Implement non-pharmacological measures as appropriate and evaluate response  - Consider cultural and social influences on pain and pain management  - Notify physician/advanced practitioner if interventions unsuccessful or patient reports new pain  Outcome: Progressing     Problem: INFECTION - ADULT  Goal: Absence or prevention of progression during hospitalization  Description: INTERVENTIONS:  - Assess and monitor for signs and symptoms of infection  - Monitor lab/diagnostic results  - Monitor all insertion sites, i e  indwelling lines, tubes, and drains  - Monitor endotracheal if appropriate and nasal secretions for changes in amount and color  - [place] appropriate cooling/warming therapies per order  - Administer medications as ordered  - Instruct and encourage patient and family to use good hand hygiene technique  - Identify and instruct in appropriate isolation precautions for identified infection/condition  Outcome: Progressing     Problem: SAFETY ADULT  Goal: Patient will remain free of falls  Description: INTERVENTIONS:  - Assess patient frequently for physical needs  -  Identify cognitive and physical deficits and behaviors that affect risk of falls    -  [place] fall precautions as indicated by assessment   - Educate patient/family on patient safety including physical limitations  - Instruct patient to call for assistance with activity based on assessment  - Modify environment to reduce risk of injury  - Consider OT/PT consult to assist with strengthening/mobility  Outcome: Progressing  Goal: Maintain or return to baseline ADL function  Description: INTERVENTIONS:  -  Assess patient's ability to carry out ADLs; assess patient's baseline for ADL function and identify physical deficits which impact ability to perform ADLs (bathing, care of mouth/teeth, toileting, grooming, dressing, etc )  - Assess/evaluate cause of self-care deficits   - Assess range of motion  - Assess patient's mobility; develop plan if impaired  - Assess patient's need for assistive devices and provide as appropriate  - Encourage maximum independence but intervene and supervise when necessary  - Involve family in performance of ADLs  - Assess for home care needs following discharge   - Consider OT consult to assist with ADL evaluation and planning for discharge  - Provide patient education as appropriate  Outcome: Progressing  Goal: Maintain or return mobility status to optimal level  Description: INTERVENTIONS:  - Assess patient's baseline mobility status (ambulation, transfers, stairs, etc )    - Identify cognitive and physical deficits and behaviors that affect mobility  - Identify mobility aids required to assist with transfers and/or ambulation (gait belt, sit-to-stand, lift, walker, cane, etc )  - [place] fall precautions as indicated by assessment  - Record patient progress and toleration of activity level on Mobility SBAR; progress patient to next Phase/Stage  - Instruct patient to call for assistance with activity based on assessment  - Consider rehabilitation consult to assist with strengthening/weightbearing, etc   Outcome: Progressing     Problem: DISCHARGE PLANNING  Goal: Discharge to home or other facility with appropriate resources  Description: INTERVENTIONS:  - Identify barriers to discharge w/patient and caregiver  - Arrange for needed discharge resources and transportation as appropriate  - Identify discharge learning needs (meds, wound care, etc )  - Arrange for interpretive services to assist at discharge as needed  - Refer to Case Management Department for coordinating discharge planning if the patient needs post-hospital services based on physician/advanced practitioner order or complex needs related to functional status, cognitive ability, or social support system  Outcome: Progressing     Problem: RESPIRATORY - ADULT  Goal: Achieves optimal ventilation and oxygenation  Description: INTERVENTIONS:  - Assess for changes in respiratory status  - Assess for changes in mentation and behavior  - Position to facilitate oxygenation and minimize respiratory effort  - Oxygen administered by appropriate delivery if ordered  - Initiate smoking cessation education as indicated  - Encourage broncho-pulmonary hygiene including cough, deep breathe, Incentive Spirometry  - Assess the need for suctioning and aspirate as needed  - Assess and instruct to report SOB or any respiratory difficulty  - Respiratory Therapy support as indicated  Outcome: Progressing     Problem: Potential for Falls  Goal: Patient will remain free of falls  Description: INTERVENTIONS:  - Assess patient frequently for physical needs  -  Identify cognitive and physical deficits and behaviors that affect risk of falls    -  [place] fall precautions as indicated by assessment   - Educate patient/family on patient safety including physical limitations  - Instruct patient to call for assistance with activity based on assessment  - Modify environment to reduce risk of injury  - Consider OT/PT consult to assist with strengthening/mobility  Outcome: Progressing

## 2021-02-08 NOTE — ASSESSMENT & PLAN NOTE
· The patient underwent EGD and colonoscopy November which did not reveal any significant abnormalities aside from mild gastritis  Colonoscopy was a poor prep and was recommended for repeat in 3 months  · TSH within normal limits  · May be related to worsening pulmonary status given patient feels that he is too short of breath to eat  · Palliative Care evaluated  Patient's lung disease likely to worsen and therefore can continue to follow-up with them as an outpatient for symptom management  · Has lost 18 lbs in last 2 months  · Continue Ensure supplementation    · Oral intake vastly improved since admission - eating and drinking with all meals    Wt Readings from Last 3 Encounters:   02/08/21 47 1 kg (103 lb 13 4 oz)   12/01/20 54 1 kg (119 lb 4 8 oz)   11/11/20 56 9 kg (125 lb 7 1 oz)   11/2019 146 lbs

## 2021-02-08 NOTE — ASSESSMENT & PLAN NOTE
Malnutrition Findings:   Adult Malnutrition type: Acute illness(in the setting of chronic illness)  Adult Degree of Malnutrition: Other severe protein calorie malnutrition(related to inadequate oral intake)    BMI Findings: Body mass index is 20 28 kg/m²     · Recommend dietary and lifestyle modifications

## 2021-02-08 NOTE — ASSESSMENT & PLAN NOTE
· Disimpacted with small amount of brown stool removed 2/4  · Large amount of bowel movements on to 2/7/2021  · Discontinue further lactulose  · Continue bowel regimen

## 2021-02-08 NOTE — ASSESSMENT & PLAN NOTE
· Patient was noted to fall recently and since that time has been using a wheelchair as needed    · Physical therapy recommending patient go to rehab and he is agreeable  · To [facility]

## 2021-02-08 NOTE — CASE MANAGEMENT
Pt is stable for DC and will be going to -E for rehab  Auth has been obtained by [address]  Pt is in need of transport  Cm made referral to [facility] via [address] requesting a 1200 BLS transport  Cm notified nursing of this request   Pia Goetz will continue to follow

## 2021-02-08 NOTE — PROGRESS NOTES
[facility] and attempted to give report but there was no answer  Will call and try again later       David Boogie RN

## 2021-02-10 NOTE — ASSESSMENT & PLAN NOTE
Lab Results   Component Value Date    EGFR 88 02/05/2021    EGFR 93 02/04/2021    EGFR 94 02/03/2021    CREATININE 0 92 02/05/2021    CREATININE 0 83 02/04/2021    CREATININE 0 80 02/03/2021   Patient with history of chronic renal failure stage 2 GFR is in the high 80s

## 2021-02-10 NOTE — ASSESSMENT & PLAN NOTE
Lab Results   Component Value Date    HGBA1C 5 9 (H) 11/12/2020   Patient's metformin had been discontinued last hemoglobin A1c was excellent at 5 9 recommend following dietary regimen

## 2021-02-10 NOTE — PATIENT INSTRUCTIONS
1 - patient will need physical and occupational therapy  2  -place parameters for his antihypertensive medication on his beta-blocker to hold for systolics less than 952  Hold beta-blocker for heart rate less than 60 and systolic less than 283     3  -monitor weight on a weekly basis        4 -bowel protocol

## 2021-02-10 NOTE — PROGRESS NOTES
[facility] Physical Examination 31    NAME: Karissa Barajas  AGE: 60 y o  SEX: male [id number]    DATE OF ENCOUNTER: 2/10/2021    Assessment and Plan     Problem List Items Addressed This Visit        Endocrine    Type 2 diabetes mellitus without complication, without long-term current use of insulin ([facility] [address] )       Lab Results   Component Value Date    HGBA1C 5 9 (H) 11/12/2020   Patient's metformin had been discontinued last hemoglobin A1c was excellent at 5 9 recommend following dietary regimen  Respiratory    Pulmonary fibrosis determined by high resolution computed tomography [facility])     Patient with history of pulmonary fibrosis documented by high-resolution CT scan will continue on oxygen supplementation  Chronic respiratory failure with hypoxia (HCC) - Primary     Patient with history of pulmonary fibrosis currently oxygen dependent  Cardiovascular and Mediastinum    Benign essential hypertension     Patient's hypertension appears to be well controlled at present currently on lisinopril 10 mg orally daily and metoprolol tartrate 25 mg orally twice daily Systolics running borderline low needs parameters for blood pressure medications         CAD S/P percutaneous coronary angioplasty     Patient with recent non STEMI apparently had coronary angioplasty this past November of the proximal right coronary artery            Genitourinary    CKD (chronic kidney disease) stage 2, GFR 60-89 ml/min     Lab Results   Component Value Date    EGFR 88 02/05/2021    EGFR 93 02/04/2021    EGFR 94 02/03/2021    CREATININE 0 92 02/05/2021    CREATININE 0 83 02/04/2021    CREATININE 0 80 02/03/2021   Patient with history of chronic renal failure stage 2 GFR is in the high 80s            Other    Ambulatory dysfunction     Patient with declining pulmonary function had been wheelchair-bound    Currently receiving physical therapy and occupational therapy to regain strength  All medications and routine orders were reviewed and updated as needed  Plan discussed with: Patient    Chief Complaint     No chief complaint on file  History of Present Illness     HPI Patient is a 52-year-old  male who was admitted to [facility] on February 3, 2021 secondary to dyspnea and ambulatory dysfunction  Patient apparently has a history of interstitial lung disease was on oxygen supplementation and had lost approximately 18 lb in the previous 60 days due to lack of appetite and increased shortness of breath  Patient has been evaluated by palliative care while in the hospital and apparently gained 2 lb  Patient was also noted to be constipated during hospitalization and needed fecal disimpaction and was placed on a bowel regimen  Patient also has a history of diabetes mellitus had been on metformin and this medication was discontinued  Patient has history of a recent non STEMI on November of 2020 apparently that time he had a percutaneous coronary angioplasty  Patient has been placed on aspirin beta-blocker Ace inhibitors and statins  I did try to communicate with the wife to get a baseline for the patient unfortunately she did not want to cooperate        HISTORY:  Past Surgical History:   Procedure Laterality Date    CARDIAC CATHETERIZATION      COLONOSCOPY  05/2012    int hem, dirverticulosis [facility] [employer] Bilateral      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    LUMBAR LAMINECTOMY  03/2010    decompressive more than 2 lumbar segments L4-S1       Past Medical History:   Diagnosis Date    Acute myocardial infarction of inferior wall ([address] ) 11/2011    inferiorwall; drug-eluting stent RCA    Chronic tension type headache     last assessed: 7/4/2012    Colon polyp     Developmental dysplasia of hip     bilateral    Diabetes mellitus [facility])     Former smoker     quit      Gout     last assessed: 2012    Hemorrhage of anus and rectum     last assessed: 2013    Hyperlipidemia     Hypertension     Internal hemorrhoids     Migraine     last assessed: 2013    Old MI (myocardial infarction)     Renal insufficiency syndrome     last assessed: 10/22/2014     Family History   Problem Relation Age of Onset    Kidney failure Mother     Diabetes Mother     Hypertension Mother      Social History     Socioeconomic History    Marital status: /Civil Union     Spouse name: None    Number of children: 2    Years of education: 7 years completed    Highest education level: None   Occupational History    Occupation: castillo   Social Needs    Financial resource strain: None    Food insecurity     Worry: None     Inability: None    Transportation needs     Medical: None     Non-medical: None   Tobacco Use    Smoking status: Former Smoker     Packs/day: 1 00     Years: 35 00     Pack years: 35 00     Quit date: 2011     Years since quittin 2    Smokeless tobacco: Never Used   Substance and Sexual Activity    Alcohol use: No    Drug use: No    Sexual activity: None   Lifestyle    Physical activity     Days per week: None     Minutes per session: None    Stress: None   Relationships    Social connections     Talks on phone: None     Gets together: None     Attends Congregational service: None     Active member of club or organization: None     Attends meetings of clubs or organizations: None     Relationship status: None    Intimate partner violence     Fear of current or ex partner: None     Emotionally abused: None     Physically abused: None     Forced sexual activity: None   Other Topics Concern    None   Social History Narrative    None       Allergies:  No Known Allergies    Review of Systems     Review of Systems   Constitutional: Negative  HENT: Negative  Eyes: Negative      Respiratory: Positive for shortness of breath  History of Pulmonary fibrosis   Cardiovascular: Negative  Gastrointestinal: Negative  Endocrine: Negative  Genitourinary: Negative  Musculoskeletal: Negative  Skin: Negative  Allergic/Immunologic: Negative  Neurological: Negative  Hematological: Negative  Psychiatric/Behavioral: Negative          PHQ-9 Depression Screening    PHQ-9:   Frequency of the following problems over the past two weeks:             Medications and orders       Current Outpatient Medications:     aspirin 81 MG tablet, Take 2 tablets by mouth daily, Disp: , Rfl:     atorvastatin (LIPITOR) 40 mg tablet, TAKE 1 TABLET BY MOUTH EVERY DAY, Disp: 90 tablet, Rfl: 1    docusate sodium (COLACE) 100 mg capsule, Take 1 capsule (100 mg total) by mouth 2 (two) times a day, Disp: 60 capsule, Rfl: 1    famotidine (PEPCID) 40 MG tablet, Take 1 tablet (40 mg total) by mouth daily, Disp: 90 tablet, Rfl: 1    glucose blood test strip, by In Vitro route daily, Disp: , Rfl:     lisinopril (ZESTRIL) 10 mg tablet, TAKE 1 TABLET BY MOUTH EVERY DAY, Disp: 90 tablet, Rfl: 1    metoprolol tartrate (LOPRESSOR) 25 mg tablet, Take 25 mg by mouth every 12 (twelve) hours, Disp: , Rfl:     nystatin (MYCOSTATIN) powder, Apply topically 2 (two) times a day, Disp: 15 g, Rfl: 0    ONETOUCH DELICA LANCETS 00Q MISC, by Does not apply route daily, Disp: , Rfl:     pantoprazole (PROTONIX) 40 mg tablet, Take 1 tablet (40 mg total) by mouth daily in the early morning, Disp:  , Rfl: 0    polyethylene glycol (MIRALAX) 17 g packet, Take 17 g by mouth 2 (two) times a day, Disp:  , Rfl: 0    senna (SENOKOT) 8 6 mg, Take 2 tablets (17 2 mg total) by mouth daily at bedtime, Disp:  , Rfl: 0       Objective     Vitals:   Vitals:    02/10/21 1419   BP: 100/65   Pulse: 91   Resp: 20   Temp: (!) 97 °F (36 1 °C)   SpO2: 98%   Weight: 45 8 kg (101 lb)   Height: 4' 11" (1 499 m)       Physical Exam  Constitutional: Appearance: Normal appearance  He is normal weight  HENT:      Head: Normocephalic and atraumatic  Right Ear: Tympanic membrane, ear canal and external ear normal       Left Ear: Tympanic membrane, ear canal and external ear normal       Nose: Nose normal       Mouth/Throat:      Mouth: Mucous membranes are moist       Pharynx: Oropharynx is clear  Eyes:      Extraocular Movements: Extraocular movements intact  Conjunctiva/sclera: Conjunctivae normal       Pupils: Pupils are equal, round, and reactive to light  Neck:      Musculoskeletal: Normal range of motion and neck supple  Cardiovascular:      Rate and Rhythm: Normal rate and regular rhythm  Pulses: Normal pulses  Heart sounds: Normal heart sounds  Pulmonary:      Effort: Pulmonary effort is normal       Comments: Patient with velcro rales bilaterally  Abdominal:      General: Abdomen is flat  Bowel sounds are normal       Palpations: Abdomen is soft  Musculoskeletal: Normal range of motion  Skin:     General: Skin is warm  Comments: Multiple tatoos in both arms   Neurological:      General: No focal deficit present  Mental Status: He is alert  Mental status is at baseline  He is disoriented  Psychiatric:         Mood and Affect: Mood normal          Behavior: Behavior normal          Thought Content: Thought content normal          Judgment: Judgment normal          Pertinent Laboratory/Diagnostic Studies: The following labs/studies were reviewed please see facility chart for details

## 2021-02-10 NOTE — ASSESSMENT & PLAN NOTE
Patient with history of pulmonary fibrosis documented by high-resolution CT scan will continue on oxygen supplementation

## 2021-02-10 NOTE — ASSESSMENT & PLAN NOTE
Patient's hypertension appears to be well controlled at present currently on lisinopril 10 mg orally daily and metoprolol tartrate 25 mg orally twice daily Systolics running borderline low needs parameters for blood pressure medications

## 2021-02-10 NOTE — ASSESSMENT & PLAN NOTE
Patient with declining pulmonary function had been wheelchair-bound  Currently receiving physical therapy and occupational therapy to regain strength

## 2021-02-10 NOTE — ASSESSMENT & PLAN NOTE
Patient with recent non STEMI apparently had coronary angioplasty this past November of the proximal right coronary artery

## 2021-02-12 NOTE — PROGRESS NOTES
[facility] care  POS: 32 (STR)  Progress Note    Chief Complaint/Reason for visit: STR follow up of  Pulmonary fibrosis; chronic respiratory failure with hypoxia; hypertension; severe protein- calorie malnutrition; physical deconditioning  History of Present Illness:  61-year-old male seen and examined for STR follow up  Patient found resting in bed and appears chronically ill  He becomes easily dyspneic on minimal exertion in setting of pulmonary fibrosis  No acute concerns reported by patient or from nursing  SBPs trending <110 on lisinopril,  will adjust dose, and monitor closely  Appetite has been poor with unintentional weight loss  Patient states that he recently spoke to dietitian regarding nutritional supplements  Past Medical History: unchanged from history and physical  Past Medical History:   Diagnosis Date    Acute myocardial infarction of inferior wall ([facility] [address] ) 11/2011    inferiorwall; drug-eluting stent RCA    Chronic tension type headache     last assessed: 7/4/2012    Colon polyp     Developmental dysplasia of hip     bilateral    Diabetes mellitus ([facility]     Former smoker     quit 2011     Gout     last assessed: 8/31/2012    Hemorrhage of anus and rectum     last assessed: 8/12/2013    Hyperlipidemia     Hypertension     Internal hemorrhoids     Migraine     last assessed: 11/25/2013    Old MI (myocardial infarction) 2011    Renal insufficiency syndrome     last assessed: 10/22/2014     Family History: unchanged from history and physical  Social History: unchanged from history and physical  Resident Since:   02/08/2021  Review of systems: Review of Systems   Constitutional: Positive for activity change, appetite change and fatigue  Negative for chills, diaphoresis and fever  Respiratory: Positive for cough and shortness of breath  Cardiovascular: Negative for chest pain and palpitations  Gastrointestinal: Negative for abdominal pain, constipation and diarrhea  Genitourinary: Negative  Musculoskeletal: Positive for gait problem  Neurological: Negative for dizziness, syncope, light-headedness and headaches  Psychiatric/Behavioral: Negative for behavioral problems, confusion and hallucinations  The patient is not nervous/anxious  All other systems reviewed and are negative  Medications: All medication and routine orders were reviewed and updated as needed  Allergies: NKDA  Consults reviewed: Other  Labs/Diagnostics (reviewed by this provider): [address]  lab results reviewed via epic  Imaging Reviewed:  None today    Physical Exam    Weight:   101 lb Temp:  97 5   BP: 110/62   Pulse: 100    Resp: 16 O2 Sat:  96% on oxygen via nasal cannula  Constitutional: Cachetic and Pallor  Orientation:Person and Place     Physical Exam  Vitals signs and nursing note reviewed  Constitutional:       General: He is not in acute distress  Appearance: He is ill-appearing  He is not toxic-appearing or diaphoretic  Comments: Frail, thin young elderly male who appears ill  HENT:      Head: Normocephalic and atraumatic  Mouth/Throat:      Mouth: Mucous membranes are moist       Pharynx: No oropharyngeal exudate  Eyes:      Extraocular Movements: Extraocular movements intact  Conjunctiva/sclera: Conjunctivae normal       Pupils: Pupils are equal, round, and reactive to light  Cardiovascular:      Rate and Rhythm: Normal rate and regular rhythm  Pulmonary:      Comments: Respirations mildly labored at rest   Becomes easily dyspneic with conversation  Oxygen on via nasal  Cannula  Abdominal:      General: Abdomen is flat  Bowel sounds are normal    Musculoskeletal:      Right lower leg: No edema  Left lower leg: No edema  Comments: Able to move all 4 extremities  Able to stand with assist   Unable to ambulate due to severe weakness  Neurological:      Mental Status: He is alert and oriented to person, place, and time        Motor: Weakness present  Gait: Gait abnormal    Psychiatric:         Mood and Affect: Mood normal          Behavior: Behavior normal          Thought Content: Thought content normal        Assessment/Plan:  79-year-old male with:    Pulmonary fibrosis determined by high resolution computed tomography ([facility])  - respirations normally labored at rest and becomes easily dyspneic on mild exertion and conversation  - continue oxygen supplementation    Chronic respiratory failure with hypoxia ([facility])  - in setting of pulmonary fibrosis  - requires continuous oxygen supplementation via nasal cannula    Benign essential hypertension  - SBPs trending 90s-110  -  decrease lisinopril to 5 mg daily with hold parameter and  Re-evaluate next week    Type 2 diabetes mellitus without complication, without long-term current use of insulin ([facility])    Lab Results   Component Value Date    HGBA1C 5 9 (H) 11/12/2020   - BG trending 100-170s  Inpatient  - metformin had been discontinued  -  Daily Accu-Cheks ordered  with parameters to notify provider,but no results noted  Will reorder and inform nursing    Ambulatory dysfunction  - multifactorial  - continue care and support at SNF for ADLs  - continue PT/OT  - continue fall precautions  - ensure adequate hydration and nutrition    Severe protein-calorie malnutrition ([facility] [address] )  Malnutrition Findings:  Unintentional weight loss, muscle and fat wasting  - offer nutritional supplements and regular diet     **Please note: This follow-up note was constructed using a voice recognition system  [address] [place]  1/22/104518:94 PM

## 2021-02-12 NOTE — ASSESSMENT & PLAN NOTE
Malnutrition Findings:  Unintentional weight loss, muscle and fat wasting  - offer nutritional supplements and regular diet

## 2021-02-12 NOTE — ASSESSMENT & PLAN NOTE
- SBPs trending 90s-110  -  decrease lisinopril to 5 mg daily with hold parameter and  Re-evaluate next week

## 2021-02-12 NOTE — ASSESSMENT & PLAN NOTE
Lab Results   Component Value Date    HGBA1C 5 9 (H) 11/12/2020   - BG trending 100-170s  Inpatient  - metformin had been discontinued  -  Daily Accu-Cheks ordered  with parameters to notify provider,but no results noted    Will reorder and inform nursing

## 2021-02-12 NOTE — ASSESSMENT & PLAN NOTE
- respirations normally labored at rest and becomes easily dyspneic on mild exertion and conversation  - continue oxygen supplementation

## 2021-02-12 NOTE — ASSESSMENT & PLAN NOTE
- multifactorial  - continue care and support at SNF for ADLs  - continue PT/OT  - continue fall precautions  - ensure adequate hydration and nutrition

## 2021-02-15 PROBLEM — R53.81 PHYSICAL DECONDITIONING: Status: ACTIVE | Noted: 2021-01-01

## 2021-02-15 NOTE — ASSESSMENT & PLAN NOTE
Lab Results   Component Value Date    HGBA1C 5 9 (H) 11/12/2020   - blood glucose trending 120s    Metformin had been discontinued  - continue daily Accu-Cheks  - currently not on any anti diabetic medications

## 2021-02-15 NOTE — ASSESSMENT & PLAN NOTE
- in setting of pulmonary fibrosis  - continue oxygen supplementation via nasal cannula   O2 sats >92% on oxygen

## 2021-02-15 NOTE — PROGRESS NOTES
[facility] care  POS: 32 (STR)  Progress Note    Chief Complaint/Reason for visit: STR follow up  Pulmonary fibrosis; chronic respiratory failure with hypoxia; hypertension; severe protein calorie malnutrition; physical deconditioning  History of Present Illness:  61-year-old male seen and examined for STR follow up  He was resting in bed and appears with chronic illness, but not in distress  He reports no acute concerns  No acute concerns reported by nursing  Appetite remains poor but he is making a conscious effort to eat and take nutritional supplements being offered to him  Denies shortness of breath at rest, chest pain, headache, dizziness, abdominal pain, nausea, vomiting, diarrhea, or constipation  History obtained from patient, nursing staff, and EMR  Past Medical History: unchanged from history and physical  Past Medical History:   Diagnosis Date    Acute myocardial infarction of inferior wall ([facility] ) 11/2011    inferiorwall; drug-eluting stent RCA    Chronic tension type headache     last assessed: 7/4/2012    Colon polyp     Developmental dysplasia of hip     bilateral    Diabetes mellitus ([facility] )     Former smoker     quit 2011     Gout     last assessed: 8/31/2012    Hemorrhage of anus and rectum     last assessed: 8/12/2013    Hyperlipidemia     Hypertension     Internal hemorrhoids     Migraine     last assessed: 11/25/2013    Old MI (myocardial infarction) 2011    Renal insufficiency syndrome     last assessed: 10/22/2014     Family History: unchanged from history and physical  Social History: unchanged from history and physical  Resident Since:  02/08/2021  Review of systems: Review of Systems   Constitutional: Positive for activity change, appetite change and fatigue  Negative for chills and diaphoresis  Fatigues easily  HENT: Negative  Respiratory: Positive for shortness of breath  Easily dyspneic on minimal exertion     Cardiovascular: Negative for chest pain and palpitations  Gastrointestinal: Negative  Genitourinary: Negative  Musculoskeletal: Positive for gait problem  Neurological: Negative for dizziness, syncope, speech difficulty, light-headedness and headaches  Psychiatric/Behavioral: Negative for agitation, behavioral problems, confusion and hallucinations  The patient is not nervous/anxious  All other systems reviewed and are negative  Medications: All medication and routine orders were reviewed  Allergies: NKDA  Consults reviewed: Other  Labs/Diagnostics (reviewed by this provider): Copy in Chart    Imaging Reviewed: None today    Physical Exam    Weight:  Temp:  97 2     BP:  112/70      Pulse: 107 Resp: 22 O2 Sat: 98% on oxygen  Constitutional: Normocephalic and Pallor  Orientation:Person, Place, Day and Date     Physical Exam  Vitals signs and nursing note reviewed  Constitutional:       General: He is not in acute distress  Appearance: He is not toxic-appearing or diaphoretic  Comments: [place] elderly male who appears with chronic illness  HENT:      Head: Normocephalic  Mouth/Throat:      Mouth: Mucous membranes are moist    Eyes:      Extraocular Movements: Extraocular movements intact  Conjunctiva/sclera: Conjunctivae normal    Cardiovascular:      Comments: Cardiopulmonary assessment with stethoscope not performed due to COVID-19 pandemic  Pulmonary:      Comments: No audible wheezing or rhonchi noted  Oxygen on via nasal cannula  Respiratory status appears stable  Respirations are chronically labored  Patient is not struggling to breathe  Abdominal:      General: There is no distension  Musculoskeletal:      Right lower leg: No edema  Left lower leg: No edema  Comments: Able to move all 4 extremities  Unable to ambulate due to weakness  Neurological:      Mental Status: He is alert  Motor: Weakness present        Gait: Gait abnormal    Psychiatric:         Mood and Affect: Mood normal  Behavior: Behavior normal          Thought Content: Thought content normal        Assessment/Plan:  78-year-old male with:     Pulmonary fibrosis determined by high resolution computed tomography [facility])  - respiratory status is stable  Patient requiring continuous oxygen      Chronic respiratory failure with hypoxia ([address] )  - in setting of pulmonary fibrosis  - continue oxygen supplementation via nasal cannula  O2 sats >92% on oxygen    Severe protein-calorie malnutrition (HCC)  - continue regular diet  - continue nutritional supplements  - monitor weekly weights      Type 2 diabetes mellitus without complication, without long-term current use of insulin ([facility])    Lab Results   Component Value Date    HGBA1C 5 9 (H) 11/12/2020   - blood glucose trending 120s  Metformin had been discontinued  - continue daily Accu-Cheks  - currently not on any anti diabetic medications    Physical deconditioning  - multifactorial  - continue care and support at SNF for ADLs  - continue PT/OT  - continue fall precautions  - ensure adequate hydration and nutrition    **Please note: This follow up note  was constructed using a voice recognition system  [address] [place] [address] [facility]  3/34/[id number]:47 PM

## 2021-02-18 NOTE — ASSESSMENT & PLAN NOTE
-patient was evaluated by palliative care inpatient, lung disease likely to worsen, and therefore continue follow-up with palliative care outpatient for symptom management  -continue oxygen support   -continue to monitor

## 2021-02-18 NOTE — ASSESSMENT & PLAN NOTE
-multifactorial in setting of disease process and poor oral intake  -patient lost 18 lb within 2 months time  -recent TSH normal  -continue regular diet and nutritional supplements

## 2021-02-18 NOTE — ASSESSMENT & PLAN NOTE
-multifactorial  -continue care and support at SNF for ADLs  -continue PT/OT  -continue fall precautions  -ensure adequate hydration and nutrition

## 2021-02-18 NOTE — PROGRESS NOTES
In [facility] care  POS: 32 (STR)  Progress Note    Chief Complaint/Reason for visit: STR follow up of pulmonary fibrosis; chronic respiratory failure with hypoxia; hypertension; severe protein calorie malnutrition; physical deconditioning; ambulatory dysfunction  Code status:  DNR, do not intubate  History of Present Illness:  71-year-old male seen and examined for STR follow up  Patient was resting in bed speaking to a family member on the phone  He becomes dyspneic with long conversations  C/o very dry nasal passages and has humidified oxygen along with saline nasal spray at bedside  Encouraged patient to use a nasal spray  No nose bleeds  Encourage patient to drink fluids  He states that his appetite is slowly improving  Denies pain or discomfort  No acute concerns reported by nursing  Mild elevation in WBC 10 5 (inpatient )>>11 0 without active signs of infection; will continue to monitor CBC weekly  Past Medical History: unchanged from history and physical  Past Medical History:   Diagnosis Date    Acute myocardial infarction of inferior wall ([facility] [address] ) 11/2011    inferiorwall; drug-eluting stent RCA    Chronic tension type headache     last assessed: 7/4/2012    Colon polyp     Developmental dysplasia of hip     bilateral    [facility]     Former smoker     quit 2011     Gout     last assessed: 8/31/2012    Hemorrhage of anus and rectum     last assessed: 8/12/2013    Hyperlipidemia     Hypertension     Internal hemorrhoids     Migraine     last assessed: 11/25/2013    Old MI (myocardial infarction) 2011    Renal insufficiency syndrome     last assessed: 10/22/2014     Family History: unchanged from history and physical  Social History: unchanged from history and physical  Resident Since:  02/08/2021  Review of systems: Review of Systems   Constitutional: Positive for fatigue  Negative for chills and diaphoresis  Appetite slowly improving     HENT:        Dry nasal passages due to oxygen use  Eyes: Negative  Respiratory: Positive for shortness of breath  SOB on minimal exertion  Cardiovascular: Negative for chest pain, palpitations and leg swelling  Gastrointestinal: Negative  Musculoskeletal: Positive for gait problem  Neurological: Negative for dizziness, syncope, numbness and headaches  Psychiatric/Behavioral: Negative for agitation, behavioral problems and hallucinations  The patient is not nervous/anxious  All other systems reviewed and are negative  Medications: All medication and routine orders were reviewed and updated  Allergies: NKDA  Consults reviewed: Other  Labs/Diagnostics (reviewed by this provider): Copy in Chart  CMP, CBC without diff reviewed from 02/16/2021; results stable for patient  Imaging Reviewed:  None today     Physical Exam  All vital signs were reviewed via [facility]  Weight:  Temp:  97 7 BP:  108/60  Pulse:  94  Resp: 20 O2 Sat:  98% with oxygen  Constitutional: Normocephalic and Pallor  Orientation:Person, Place, Day, Date, Month and Year     Physical Exam  Vitals signs and nursing note reviewed  Constitutional:       General: He is not in acute distress  Appearance: He is ill-appearing  He is not toxic-appearing or diaphoretic  Comments: Thin, frail young elderly male who appears older than stated age and chronically ill  HENT:      Head: Normocephalic and atraumatic  Nose: No congestion  Mouth/Throat:      Mouth: Mucous membranes are moist       Pharynx: No oropharyngeal exudate  Eyes:      General: No scleral icterus  Extraocular Movements: Extraocular movements intact  Conjunctiva/sclera: Conjunctivae normal       Pupils: Pupils are equal, round, and reactive to light  Cardiovascular:      Rate and Rhythm: Normal rate and regular rhythm  Pulmonary:      Comments: Chronically labored respirations  Dyspneic on exertion    Abdominal:      General: Bowel sounds are normal  There is no distension  Palpations: Abdomen is soft  Tenderness: There is no abdominal tenderness  There is no guarding  Musculoskeletal:      Comments: Able to move all 4 extremities with generalized weakness  Skin:     General: Skin is warm and dry  Neurological:      Mental Status: He is alert and oriented to person, place, and time  Motor: Weakness present  Gait: Gait abnormal    Psychiatric:         Mood and Affect: Mood normal          Behavior: Behavior normal          Thought Content: Thought content normal        Assessment/Plan:  62-year-old male with:    Pulmonary fibrosis determined by high resolution computed tomography [facility])  -patient was evaluated by palliative care inpatient, lung disease likely to worsen, and therefore continue follow-up with palliative care outpatient for symptom management  -continue oxygen support   -continue to monitor    Severe protein-calorie malnutrition ([address] )  -multifactorial in setting of disease process and poor oral intake  -patient lost 18 lb within 2 months time  -recent TSH normal  -continue regular diet and nutritional supplements    Chronic respiratory failure with hypoxia (HCC)  -in setting of pulmonary fibrosis  -continue oxygen supplementation via nasal cannula    Hypertension  -SBPs trending 100sto 130s  -continue metoprolol and lisinopril with hold parameters    Physical deconditioning  -multifactorial  -continue care and support at SNF for ADLs  -continue PT/OT  -continue fall precautions  -ensure adequate hydration and nutrition    Ambulatory dysfunction  -see above plan    **Please note: This follow-up note was constructed using a voice recognition system  [address] [place], [place]  1/75/38227:50 PM  Fariha

## 2021-02-22 NOTE — PROGRESS NOTES
[facility]  [address]  [phone]  DISCHARGE SUMMARY  POS:31 ([facility])  Facility:  [facility] care    NAME: Ed Single  AGE: 60 y o  SEX: male  DATE OF ADMISSION:  02/08/2021 DATE OF DISCHARGE:  02/23/2021 DISCHARGE DISPOSITION:  Home    Reason for admission: Patient was admitted from [facility] for rehabilitation following hospitalization for worsening shortness of breath with history of pulmonary fibrosis, ambulatory dysfunction, unintentional weight loss, protein calorie malnutrition    Admission Diagnoses:  Chronic respiratory failure with hypoxia; pulmonary fibrosis determined by high-resolution computed tomography; type 2 diabetes mellitus without complication; chronic kidney disease  Additional Problems:   Past Medical History:   Diagnosis Date    Acute myocardial infarction of inferior wall ([facility] [address] ) 11/2011    inferiorwall; drug-eluting stent RCA    Chronic tension type headache     last assessed: 7/4/2012    Colon polyp     Developmental dysplasia of hip     bilateral    Diabetes mellitus ([facility] [address] )     Former smoker     quit 2011     Gout     last assessed: 8/31/2012    Hemorrhage of anus and rectum     last assessed: 8/12/2013    Hyperlipidemia     Hypertension     Internal hemorrhoids     Migraine     last assessed: 11/25/2013    Old MI (myocardial infarction) 2011    Renal insufficiency syndrome     last assessed: 10/22/2014     Discharge Diagnoses:  See problem list follow-up recommendations below  Course of stay: Patient was admitted to [facility] care for rehabilitation following hospitalization for shortness of breath and ambulatory dysfunction with significant lung disease due to pulmonary fibrosis  During the resident's stay at [facility], he received skilled nursing care, PT, OT, social service support, nutritional support, and medical management for an overall improvement in his functional status    It was recommended that patient continue PT/OT to increase strength and endurance  Patient is scheduled to be discharged home on 02/23/2021  Labs and testing performed during stay: Covid-19 negative on 2/16/2021    Discharge Medications: See discharge medication list which was reviewed and signed  Status at time of discharge exam: Stable    Today's Visit: 2/22/20213:38 PM    Subjective: No concerns     Vitals:  Weight:  99 lb   BP: 111/62   HR: 90   Resp: 20   O2 Sat: 97% on oxygen via nasal cannula     Exam: Physical Exam  Vitals signs and nursing note reviewed  Constitutional:       General: He is not in acute distress  Appearance: He is not toxic-appearing or diaphoretic  Comments: Thin, frail young elderly male who appears older than stated age and chronically ill  HENT:      Head: Normocephalic and atraumatic  Nose: No congestion or rhinorrhea  Mouth/Throat:      Mouth: Mucous membranes are moist       Pharynx: No oropharyngeal exudate  Eyes:      General: No scleral icterus  Conjunctiva/sclera: Conjunctivae normal       Pupils: Pupils are equal, round, and reactive to light  Neck:      Musculoskeletal: Neck supple  Cardiovascular:      Rate and Rhythm: Normal rate and regular rhythm  Pulmonary:      Comments: Respirations chronically labored but not in distress  Lungs with velcro type rales due to pulmonary fibrosis  Abdominal:      General: Bowel sounds are normal  There is no distension  Palpations: Abdomen is soft  Tenderness: There is no abdominal tenderness  There is no guarding  Musculoskeletal:      Right lower leg: No edema  Left lower leg: No edema  Comments: Able to move all 4 extremities with generalized weakness  Skin:     General: Skin is warm and dry  Neurological:      Mental Status: He is alert and oriented to person, place, and time  Mental status is at baseline  Motor: Weakness present        Gait: Gait abnormal    Psychiatric: Behavior: Behavior normal          Thought Content: Thought content normal        Discussion with patient/family and further instructions:  -Fall precautions  -Aspiration precautions  -Bleeding precautions  -Monitor for signs/symptoms of infection  -Medication list was reviewed     Follow-up Recommendations: Please follow-up with your primary care physician within 7-10 days of discharge to review medication changes and current status  Problem List Follow-up Recommendations:  18-year-old male with:  Chronic respiratory failure with hypoxia (HCC)  -in setting of pulmonary fibrosis  -patient requires continuous oxygen supplementation via nasal cannula    Pulmonary fibrosis determined by high resolution computed tomography [facility])  -patient was evaluated by palliative Care inpatient, lung disease likely to worsen, and therefore continue follow-up with palliative care outpatient for symptom management  -continue oxygen support  -follow up with pulmonology    Type 2 diabetes mellitus without complication, without long-term current use of insulin ([facility])    Lab Results   Component Value Date    HGBA1C 5 9 (H) 11/12/2020   -blood glucose trending 120s    Metformin had been discontinued  -currently not on any anti diabetic medications  -follow-up with PCP    Severe protein-calorie malnutrition ([address] )  -multifactorial  -with unintentional weight loss 18 lb within 2 months time per record review, low weight and BMI  -continue regular diet and nutritional supplements    Hypertension  -continue metoprolol and lisinopril  -follow-up with PCP for management    Physical deconditioning  -multifactorial  -recommend that patient continue PT/OT to increase strength and endurance  -continue fall precautions    Elevated liver enzymes  -chronic per record review and patient has been evaluated by GI previously  -most recent , ALT 94  -follow-up with PCP    I have spent >30 minutes with patient today in which greater than 50% of this time was spent in counseling/coordination of care regarding Intructions for management, Patient and family education and Importance of tx compliance  PCP made aware of discharge summary via epic communication  **Please note: This discharge summary was constructed using a voice recognition system  [address] [place], [address] [facility]  3/57/26568:29 PM

## 2021-02-22 NOTE — ASSESSMENT & PLAN NOTE
-multifactorial  -recommend that patient continue PT/OT to increase strength and endurance  -continue fall precautions

## 2021-02-22 NOTE — LETTER
February 22, 2021     Lorena Denny DO  [address]  [facility] [address]    Patient: Roberto Nolasco   YOB: 1955   Date of Visit: 2/22/2021       Dear Dr Liao Him:    Thank you for referring Yessi Radford to me for evaluation  Below are my notes for this consultation  If you have questions, please do not hesitate to call me  I look forward to following your patient along with you  Sincerely,        CRISTOBAL Pascal        CC: No Recipients  [place], [address]  2/22/2021  3:38 PM  Incomplete  [address] [facility]  [address]  [phone]  DISCHARGE SUMMARY  POS:31 ([facility])  Facility:  [facility] care    NAME: Roberto Nolasco  AGE: 60 y o   SEX: male  DATE OF ADMISSION:  02/08/2021 DATE OF DISCHARGE:  02/23/2021 DISCHARGE DISPOSITION:  Home    Reason for admission: Patient was admitted from [facility] AT [facility] for rehabilitation following hospitalization for worsening shortness of breath with history of pulmonary fibrosis, ambulatory dysfunction, unintentional weight loss, protein calorie malnutrition    Admission Diagnoses:  Chronic respiratory failure with hypoxia; pulmonary fibrosis determined by high-resolution computed tomography; type 2 diabetes mellitus without complication; chronic kidney disease  Additional Problems:   Past Medical History:   Diagnosis Date    [facility]    inferiorwall; drug-eluting stent RCA    Chronic tension type headache     last assessed: 7/4/2012    Colon polyp     Developmental dysplasia of hip     bilateral    [facility] mellitus [facility] [address] )     Former smoker     quit 2011     Gout     last assessed: 8/31/2012    Hemorrhage of anus and rectum     last assessed: 8/12/2013    Hyperlipidemia     Hypertension     Internal hemorrhoids     Migraine     last assessed: 11/25/2013    Old MI (myocardial infarction) 2011    Renal insufficiency syndrome     last assessed: 10/22/2014     Discharge Diagnoses:  See problem list follow-up recommendations below  Course of stay: Patient was admitted to [facility] care for rehabilitation following hospitalization for shortness of breath and ambulatory dysfunction with significant lung disease due to pulmonary fibrosis  During the resident's stay at [facility], he received skilled nursing care, PT, OT, social service support, nutritional support, and medical management for an overall improvement in his functional status  It was recommended that patient continue PT/OT to increase strength and endurance  Patient is scheduled to be discharged home on 02/23/2021  Labs and testing performed during stay: Covid-19 negative on 2/16/2021    Discharge Medications: See discharge medication list which was reviewed and signed  Status at time of discharge exam: Stable    Today's Visit: 2/22/20213:38 PM    Subjective: No concerns     Vitals:  Weight:  99 lb   BP: 111/62   HR: 90   Resp: 20   O2 Sat: 97% on oxygen via nasal cannula     Exam: Physical Exam  Vitals signs and nursing note reviewed  Constitutional:       General: He is not in acute distress  Appearance: He is not toxic-appearing or diaphoretic  Comments: Thin, frail young elderly male who appears older than stated age and chronically ill  HENT:      Head: Normocephalic and atraumatic  Nose: No congestion or rhinorrhea  Mouth/Throat:      Mouth: Mucous membranes are moist       Pharynx: No oropharyngeal exudate  Eyes:      General: No scleral icterus  Conjunctiva/sclera: Conjunctivae normal       Pupils: Pupils are equal, round, and reactive to light  Neck:      Musculoskeletal: Neck supple  Cardiovascular:      Rate and Rhythm: Normal rate and regular rhythm  Pulmonary:      Comments: Respirations chronically labored but not in distress  Lungs with velcro type rales due to pulmonary fibrosis    Abdominal: General: Bowel sounds are normal  There is no distension  Palpations: Abdomen is soft  Tenderness: There is no abdominal tenderness  There is no guarding  Musculoskeletal:      Right lower leg: No edema  Left lower leg: No edema  Comments: Able to move all 4 extremities with generalized weakness  Skin:     General: Skin is warm and dry  Neurological:      Mental Status: He is alert and oriented to person, place, and time  Mental status is at baseline  Motor: Weakness present  Gait: Gait abnormal    Psychiatric:         Behavior: Behavior normal          Thought Content: Thought content normal        Discussion with patient/family and further instructions:  -Fall precautions  -Aspiration precautions  -Bleeding precautions  -Monitor for signs/symptoms of infection  -Medication list was reviewed     Follow-up Recommendations: Please follow-up with your primary care physician within 7-10 days of discharge to review medication changes and current status  Problem List Follow-up Recommendations:  78-year-old male with:  Chronic respiratory failure with hypoxia (HCC)  -in setting of pulmonary fibrosis  -patient requires continuous oxygen supplementation via nasal cannula    Pulmonary fibrosis determined by high resolution computed tomography [facility])  -patient was evaluated by palliative Care inpatient, lung disease likely to worsen, and therefore continue follow-up with palliative care outpatient for symptom management  -continue oxygen support  -follow up with pulmonology    Type 2 diabetes mellitus without complication, without long-term current use of insulin ([facility])    Lab Results   Component Value Date    HGBA1C 5 9 (H) 11/12/2020   -blood glucose trending 120s    Metformin had been discontinued  -currently not on any anti diabetic medications  -follow-up with PCP    Severe protein-calorie malnutrition ([address] )  -multifactorial  -with unintentional weight loss 18 lb within 2 months time per record review, low weight and BMI  -continue regular diet and nutritional supplements    Hypertension  -continue metoprolol and lisinopril  -follow-up with PCP for management    Physical deconditioning  -multifactorial  -recommend that patient continue PT/OT to increase strength and endurance  -continue fall precautions    Elevated liver enzymes  -chronic per record review and patient has been evaluated by GI previously  -most recent , ALT 94  -follow-up with PCP    I have spent >30 minutes with patient today in which greater than 50% of this time was spent in counseling/coordination of care regarding Intructions for management, Patient and family education and Importance of tx compliance  PCP made aware of discharge summary via epic communication  **Please note: This discharge summary was constructed using a voice recognition system  [address] [place] [address] [facility]  5/81/62036:10 PM

## 2021-02-22 NOTE — ASSESSMENT & PLAN NOTE
Lab Results   Component Value Date    HGBA1C 5 9 (H) 11/12/2020   -blood glucose trending 120s    Metformin had been discontinued  -currently not on any anti diabetic medications  -follow-up with PCP

## 2021-02-22 NOTE — ASSESSMENT & PLAN NOTE
-multifactorial  -with unintentional weight loss 18 lb within 2 months time per record review, low weight and BMI  -continue regular diet and nutritional supplements

## 2021-02-22 NOTE — ASSESSMENT & PLAN NOTE
-in setting of pulmonary fibrosis  -patient requires continuous oxygen supplementation via nasal cannula

## 2021-02-22 NOTE — ASSESSMENT & PLAN NOTE
-patient was evaluated by palliative Care inpatient, lung disease likely to worsen, and therefore continue follow-up with palliative care outpatient for symptom management  -continue oxygen support  -follow up with pulmonology

## 2021-02-22 NOTE — ASSESSMENT & PLAN NOTE
-chronic per record review and patient has been evaluated by GI previously  -most recent , ALT 94  -follow-up with PCP

## 2021-02-25 NOTE — PROGRESS NOTES
Received call from [facility] at [facility]  Pt was referred to [facility]

## 2021-02-25 NOTE — PROGRESS NOTES
Spoke with Wilfredo Ruiz at [facility], they are unable to schedule home visit until Tuesday  Notified that patients wife is receiving services already from [facility] AT Replaced by [facility] able to make home visit by Sunday  Wilfredo Ruiz will fax order and referral to [facility] at Fax # [phone]

## 2021-02-25 NOTE — PROGRESS NOTES
Received referral from PCP for complex care management  Pt discharged from [facility] 2/23/21  Family requesting home health care referral    Spoke with patients daughter John Gilliam, she reports her father was discharged from [facility] via wheelchair Tevin Tinsley  Pt lives in a mobile home with his wife, his daughter, her boyfriend and granddaughter  Ramp access to enter  Pt slaying on couch as he does not tolerate lying in bed  Oxygen dependent, wearing 3 liters via nasal canula  Supplier is [facility]  Walker, wheelchair, and shower bench in place  Pt does not ambulate, transfers to wheelchair  Daughter reports he is frequently short of breath and poor appetite  Has glucometer in home  Daughter is unaware of FBS today  Inquiring if patient was referred for home health services  She reviewed the [facility] paperwork and unaware of the agency if referred to

## 2021-02-25 NOTE — PROGRESS NOTES
Spoke with daughter John Gilliam  Aware that [facility] will be making a home visit over the weekend  John Gilliam is requesting a hospital bed  He is unable to reposition himself and requires the head of bed elevated for breathing  Request sent to PCP

## 2021-02-26 NOTE — PROGRESS NOTES
Assessment/Plan:        Problem List Items Addressed This Visit        Endocrine    Type 2 diabetes mellitus without complication, without long-term current use of insulin ([facility] )    Relevant Orders    HEMOGLOBIN A1C W/ EAG ESTIMATION       Respiratory    Pulmonary insufficiency - Primary    Relevant Orders    Ambulatory referral to Palliative Care       Other    Unintentional weight loss    Severe protein-calorie malnutrition ([facility] [address] )    Physical deconditioning    Relevant Orders    Ambulatory Referral to [address] [place] [address] Mohit Noe      Other Visit Diagnoses     Myalgia        Relevant Orders    Creatine Kinase, Total    Screening for HIV (human immunodeficiency virus)        Relevant Orders    HIV 1/2 ANTIGEN/ANTIBODY (4TH GENERATION) W REFLEX [facility]             Reason for visit is transitional care management  Encounter provider Swathi Chambers MD       Provider located at [address] [address]      Recent Visits  Date Type Provider Dept   02/26/21 Telemedicine MD Aman Winslow   Showing recent visits within past 7 days and meeting all other requirements     Future Appointments  No visits were found meeting these conditions  Showing future appointments within next 150 days and meeting all other requirements        After connecting through [employer], the patient was identified by name and date of birth  Demetri Yang was informed that this is a telemedicine visit and that the visit is being conducted through [facility] and patient was informed that this is a secure, HIPAA-compliant platform  He agrees to proceed     My office door was closed  No one else was in the room  He acknowledged consent and understanding of privacy and security of the video platform  The patient has agreed to participate and understands they can discontinue the visit at any time  Patient is aware this is a billable service        Subjective:     Patient ID: Demetri Yang is a 60 y o  male  Pt presents for follow up after hospitalization and inpatient rehab due to SOB, generalized weakness, and unintentional weight loss  He notes that he is now using 3 L of oxygen at home after leaving the hospital at 2 5 L  He reports a poor appetite for months but has been drinking Ensures since his hospitalization  He says he still feels very weak and does not think rehab helped much  He is currently unable to get to doctor's appointments but has virtual follow-ups scheduled  He does have home health nurses coming and is willing to see palliative care outpatient as he is able  His daughter mentions they are trying to get a hospital bed because he requires frequent changes in body position and elevation of the HOB to 30 degrees due to SOB and chronic pulmonary disease  Review of Systems   Constitutional: Positive for unexpected weight change  Negative for chills and fever  HENT: Negative for congestion, rhinorrhea and sore throat  Eyes: Negative for visual disturbance  Respiratory: Positive for cough, shortness of breath and wheezing  Cardiovascular: Negative for chest pain, palpitations and leg swelling  Gastrointestinal: Negative for abdominal pain, blood in stool, constipation, diarrhea, nausea and vomiting  Genitourinary: Negative for difficulty urinating, dysuria and hematuria  Musculoskeletal: Positive for myalgias  Negative for arthralgias  Skin: Negative for rash  Neurological: Positive for weakness  Negative for dizziness, light-headedness and headaches  Psychiatric/Behavioral: Negative for dysphoric mood  All other systems reviewed and are negative  Objective: There were no vitals filed for this visit  Physical Exam  Constitutional:       General: He is not in acute distress  Appearance: He is cachectic  He is ill-appearing  He is not diaphoretic  Interventions: Nasal cannula in place        Comments: Lying supine   HENT:      Head: Normocephalic and atraumatic  Mouth/Throat:      Mouth: Mucous membranes are moist       Pharynx: Oropharynx is clear  Eyes:      Extraocular Movements: Extraocular movements intact  Conjunctiva/sclera: Conjunctivae normal    Pulmonary:      Breath sounds: No stridor  Comments: Hoarse voice  Abdominal:      General: Abdomen is flat  Skin:     Findings: No rash  Neurological:      Mental Status: He is alert and oriented to person, place, and time  Psychiatric:         Mood and Affect: Mood normal          Behavior: Behavior normal              Transitional Care Management Review:  Isaiah Damon is a 60 y o  male here for TCM follow up  During the TCM phone call patient stated:    TCM Call (since 1/27/2021)     Date and time call was made  2/25/2021 10:55 AM    Hospital care reviewed  Records reviewed    Patient was hospitialized at  Other (comment) (Comment)  [facility]        Comment  [facility]    Date of Admission  02/08/21    Date of discharge  02/23/21    Diagnosis  rehab, worsening SOB Hx pulmonary fibrosis    Disposition  Home    Current Symptoms  Shortness of breath (Comment)  not eating since been home d/c 2/23/2021      TCM Call (since 1/27/2021)     Post hospital issues  Reduced activity    Scheduled for follow up? Yes    Not clinically warranted  [place] [facility] [place]    Did you obtain your prescribed medications  Yes    Do you need help managing your prescriptions or medications  No    Is transportation to your appointment needed  No    I have advised the patient to call PCP with any new or worsening symptoms  kim conway      Living Arrangements  Alone    Support System  None    Are you recieving home care services  -- (Comment)  would like visiting nurses to come to home, PT and OT  also needed per giovanni    Counseling  Caregiver          I spent 30 minutes with the patient during this visit      Verena Kebede MD

## 2021-03-02 PROBLEM — J96.21 ACUTE ON CHRONIC RESPIRATORY FAILURE WITH HYPOXIA (HCC): Status: ACTIVE | Noted: 2021-01-01

## 2021-03-02 PROBLEM — R06.02 SHORTNESS OF BREATH: Status: ACTIVE | Noted: 2021-01-01

## 2021-03-02 NOTE — ASSESSMENT & PLAN NOTE
Patient continues to complain of shortness of breath, recurrent worsening episodes, discussed with the pulmonology, to start patient on steroids from today  Continue supplemental oxygen  Continue to monitor inpatient

## 2021-03-02 NOTE — H&P
H&P - Junaid Cummins 1955, 60 y o  male MRN: [id number]     Unit/Bed#: ED 16 Encounter: [id number]     Primary Care Provider: Lisa Obrien DO   Date and time admitted to hospital: 3/2/2021 11:59 AM     * Acute on chronic respiratory failure with hypoxia ([address] )  Assessment & Plan  · Patient w/ worsening SOB over the last 1 week  Initially upon EMS arrival patient was w/ saturations in the 70s on baseline O2 requirements of 3L  Was placed on midlfow, and has been weaned back to his baseline requirements since being in the ED  · Unclear etiology of acute SOB/hypoxic episode as this resolved w/o intervention   · CTA PE study again shows known fibrosis, no signs/sx of volume overload  · Continue to closely monitor O2 requirements     Elevated liver enzymes  Assessment & Plan  · Chronic, w/o abdominal pain      Pulmonary fibrosis determined by high resolution computed tomography [facility])  Assessment & Plan  · Follows w/ Dr Iona Borden   · Recent admission for similar sx-- overall discussions had during that hospitalization entailed chronicity and progression of dz  He was not felt to be candidate for steroids at that time   He has not felt to be a candidate for anti-fibrotics either given cachexia   · He was seen and evaluated by palliative care medicine during that admission-- has not had OP f/u      CKD (chronic kidney disease) stage 2, GFR 60-89 ml/min  Assessment & Plan        Lab Results   Component Value Date     EGFR 90 03/02/2021     EGFR 88 02/05/2021     EGFR 93 02/04/2021     CREATININE 0 89 03/02/2021     CREATININE 0 92 02/05/2021     CREATININE 0 83 02/04/2021   · Cr at baseline  · Continue to monitor     CAD S/P percutaneous coronary angioplasty  Assessment & Plan  · S/P NEISHA to RCA in 2006  · Admitted in November 2020 for NSTEMI, no acute obstruction noted-- no intervention and medical management pursued  · EKG changes today: ST elevated lead II, V6; inverted T wave in III, aVF; large T waves I, aVL-- these are new compared to prior EKGs  · ED D/W cardiology-- no STEMI alert as patient is not having active CP  · Maintain telemetry  · Serial EKG and troponins (repeat EKG in ED unchanged from arrival EKG)         VTE Prophylaxis: Heparin  / sequential compression device   Code Status: level 3  POLST: POLST form is not discussed and not completed at this time  Discussion with family: family aware of admission to hospital     Anticipated Length of Stay:  Patient will be admitted on an Observation basis with an anticipated length of stay of  < 2 midnights  Justification for Hospital Stay: tx and eval of SOB, EKG changes     Total Time for Visit, including Counseling / Coordination of Care: 30 minutes  Greater than 50% of this total time spent on direct patient counseling and coordination of care      Chief Complaint:   Shortness of breath     History of Present Illness:     Mike Wilburn is a 60 y o  male with past medical history significant for pulmonary fibrosis with chronic respiratory failure, coronary artery disease, CKD stage 2, unintentional weight loss presents to the ED for evaluation of shortness breath x1 week  Patient was recently hospitalized for similar symptoms  He was discharged to rehab  Over the last 1 week, patient has reported increasing shortness of breath at rest   Denies cough, fever, chest pain  He has had intermittent chills  Patient called EMS today and was found to be hypoxic on 3 L at 70%  He was provided with mid flow nasal cannula oxygen by EMS, and was subsequently weaned back down to his baseline requirements of 3 L  Patient additionally reports no appetite for the last 1 week  He cites unintentional weight loss over the last few months    He has had a GI workup in the past which has been unremarkable and had identified organic cause for patient's lack of appetite/weight loss       Review of Systems:     Review of Systems   Constitutional: Positive for appetite change and chills  HENT: Negative  Eyes: Negative  Respiratory: Positive for shortness of breath  Cardiovascular: Negative  Gastrointestinal: Negative  Genitourinary: Negative  Musculoskeletal: Negative  Skin: Negative  Neurological: Negative  Hematological: Negative  Psychiatric/Behavioral: Negative           Past Medical and Surgical History:      Medical History        Past Medical History:   Diagnosis Date    Acute myocardial infarction of inferior wall ([facility] ) 11/2011     inferiorwall; drug-eluting stent RCA    Chronic tension type headache       last assessed: 7/4/2012    Colon polyp      Developmental dysplasia of hip       bilateral    Diabetes mellitus ([facility] )      Former smoker       quit 2011     Gout       last assessed: 8/31/2012    Hemorrhage of anus and rectum       last assessed: 8/12/2013    Hyperlipidemia      Hypertension      Internal hemorrhoids      Migraine       last assessed: 11/25/2013    Old MI (myocardial infarction) 2011    Pulmonary fibrosis ([place] [address] )      Renal insufficiency syndrome       last assessed: 10/22/2014           Surgical History         Past Surgical History:   Procedure Laterality Date    CARDIAC CATHETERIZATION        COLONOSCOPY   05/2012     int hem, dirverticulosis Dr Mane Kiser         PRoximal RCA    LEG SURGERY Bilateral        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  Authorizing Provider   aspirin 81 MG tablet Take 2 tablets by mouth daily 6/6/12   Yes Historical Provider, MD   atorvastatin (LIPITOR) 40 mg tablet TAKE 1 TABLET BY MOUTH EVERY DAY 9/25/20   Yes Koki Rodriguez MD   famotidine (PEPCID) 40 MG tablet Take 1 tablet (40 mg total) by mouth daily 1/4/21   Yes Radha Alonzo, DO   glucose blood test strip by In Vitro route daily 16   Yes Historical Provider, MD   lisinopril (ZESTRIL) 10 mg tablet Take 0 5 tablets (5 mg total) by mouth daily 21   Yes [address], CRNP   metoprolol tartrate (LOPRESSOR) 25 mg tablet Take 25 mg by mouth every 12 (twelve) hours     Yes Historical Provider, MD Moore Mast LANCETS [address] by Does not apply route daily 16   Yes Historical Provider, MD   polyethylene glycol (MIRALAX) 17 g packet Take 17 g by mouth 2 (two) times a day 21   Yes Torsten Bach PA-C   senna (SENOKOT) 8 6 mg Take 2 tablets (17 2 mg total) by mouth daily at bedtime 21   Yes Torsten Bach PA-C   docusate sodium (COLACE) 100 mg capsule Take 1 capsule (100 mg total) by mouth 2 (two) times a day 21     Radha Alonzo DO   nystatin (MYCOSTATIN) powder Apply topically 2 (two) times a day 21     Torsten Bach PA-C   pantoprazole (PROTONIX) 40 mg tablet Take 1 tablet (40 mg total) by mouth daily in the early morning 21     Torsten Bach PA-C      I have reviewed home medications with patient personally      Allergies: No Known Allergies     Social History:     Marital Status: /Civil Union   Occupation: retired  Patient Pre-hospital Living Situation: home w/ family  Patient Pre-hospital Level of Mobility: limited 2/2 medical conditions  Patient Pre-hospital Diet Restrictions: none  Substance Use History:   Social History          Substance and Sexual Activity   Alcohol Use No      Social History           Tobacco Use   Smoking Status Former Smoker    Packs/day: 1 00    Years: 35 00    Pack years: 35 00    Quit date: 2011    Years since quittin 3   Smokeless Tobacco Never Used      Social History          Substance and Sexual Activity   Drug Use No         Family History:           Family History   Problem Relation Age of Onset    Kidney failure Mother      Diabetes Mother      Hypertension Mother           Physical Exam:      Vitals:   Blood Pressure: 115/74 (03/02/21 1654)  Pulse: (!) 107 (03/02/21 1654)  Temperature: 98 2 °F (36 8 °C) (03/02/21 1240)  Temp Source: Oral (03/02/21 1240)  Respirations: 20 (03/02/21 1654)  Weight - Scale: 43 2 kg (95 lb 3 8 oz) (03/02/21 1204)  SpO2: 100 % (03/02/21 1654)     Physical Exam  Constitutional:       Appearance: He is ill-appearing  Comments: Cachectic    HENT:      Head: Normocephalic  Mouth/Throat:      Mouth: Mucous membranes are dry  Eyes:      Pupils: Pupils are equal, round, and reactive to light  Cardiovascular:      Rate and Rhythm: Regular rhythm  Tachycardia present  Heart sounds: No murmur  No friction rub  No gallop  Pulmonary:      Effort: Pulmonary effort is normal       Breath sounds: Rales (dry, mid to lower lung fields b/l) present  Abdominal:      General: Abdomen is flat  Palpations: Abdomen is soft  Tenderness: There is no abdominal tenderness  Musculoskeletal: Normal range of motion  Right lower leg: No edema  Left lower leg: No edema  Skin:     General: Skin is warm and dry  Coloration: Skin is pale  Neurological:      General: No focal deficit present  Mental Status: He is alert and oriented to person, place, and time     Psychiatric:         Mood and Affect: Mood normal             Additional Data:      Lab Results: I have personally reviewed pertinent reports             Results from last 7 days   Lab Units 03/02/21  1250   WBC Thousand/uL 9 84   HEMOGLOBIN g/dL 13 3   HEMATOCRIT % 43 4   PLATELETS Thousands/uL 366   NEUTROS PCT % 82*   LYMPHS PCT % 12*   MONOS PCT % 5   EOS PCT % 1           Results from last 7 days   Lab Units 03/02/21  1404   SODIUM mmol/L 135*   POTASSIUM mmol/L 5 2   CHLORIDE mmol/L 96*   CO2 mmol/L 26   BUN mg/dL 59*   CREATININE mg/dL 0 89   ANION GAP mmol/L 13   CALCIUM mg/dL 10 5*   ALBUMIN g/dL 3 7   TOTAL BILIRUBIN mg/dL 0 47   ALK PHOS U/L 145*   ALT U/L 136*   AST U/L 156*   GLUCOSE RANDOM mg/dL 118                         Imaging: I have personally reviewed pertinent reports        CTA ED chest PE study   Final Result by Terri Bond MD (03/02 1557)       No evidence for pulmonary embolism        Redemonstrated findings of interstitial lung disease                        Workstation performed: [id number]           XR chest 1 view portable   Final Result by Erlin Paul MD (03/02 9582)       Changes consistent with known history of interstitial pulmonary fibrosis, similar to prior study  Simulated pneumothorax on the left felt to be related to skinfold  Would recommend follow-up frontal and lateral views of the chest when clinically    feasible for confirmation         The examination demonstrates a significant  finding and was documented as such in Epic for liaison and referring practitioner immediate notification                        Workstation performed: [id number]                 EKG, Pathology, and Other Studies Reviewed on Admission:   · EKG: sinus, diffuse T wave changes not seen on previous EKG     Allscripts / Epic Records Reviewed: Yes      ** Please Note: This note has been constructed using a voice recognition system   **

## 2021-03-02 NOTE — ASSESSMENT & PLAN NOTE
· Follows w/ Dr Preston Reeder   · Recent admission for similar sx-- overall discussions had during that hospitalization entailed chronicity and progression of dz  He was not felt to be candidate for steroids at that time   He has not felt to be a candidate for anti-fibrotics either given cachexia   · He was seen and evaluated by palliative care medicine during that admission-- has not had OP f/u

## 2021-03-02 NOTE — ASSESSMENT & PLAN NOTE
Malnutrition Findings:           BMI Findings: Body mass index is 19 24 kg/m²     · Patient w/ unintentional weight loss-- appears to have lost nearly 20 lbs in the last 1 month   · Reports no appetite x1 week  · Has had GI work up recently w/ no organic cause of poor PO intake  · Continue w/ nutritional supplements  · May need to consider medication for appetite stimulation

## 2021-03-02 NOTE — ASSESSMENT & PLAN NOTE
· S/P NEISHA to RCA in 2006  · Admitted in November 2020 for NSTEMI, no acute obstruction noted-- no intervention and medical management pursued  · EKG changes today: ST elevated lead II, V6; inverted T wave in III, aVF; large T waves I, aVL-- these are new compared to prior EKGs  · ED D/W cardiology-- no STEMI alert as patient is not having active CP    · Maintain telemetry  · Serial EKG and troponins (repeat EKG in ED unchanged from arrival EKG)

## 2021-03-02 NOTE — PROGRESS NOTES
Progress Note - Dion Scheuermann 1955, 60 y o  male MRN: [id number]    Unit/Bed#: ED 16 Encounter: [id number]    Primary Care Provider: Marco Barajas DO   Date and time admitted to hospital: 3/2/2021 11:59 AM    * Acute on chronic respiratory failure with hypoxia ([address] )  Assessment & Plan  · Patient w/ worsening SOB over the last 1 week  Initially upon EMS arrival patient was w/ saturations in the 70s on baseline O2 requirements of 3L  Was placed on midlfow, and has been weaned back to his baseline requirements since being in the ED  · Unclear etiology of acute SOB/hypoxic episode as this resolved w/o intervention   · CTA PE study again shows known fibrosis, no signs/sx of volume overload  · Continue to closely monitor O2 requirements    Elevated liver enzymes  Assessment & Plan  · Chronic, w/o abdominal pain     Pulmonary fibrosis determined by high resolution computed tomography [facility])  Assessment & Plan  · Follows w/ Dr Farrah Vivas   · Recent admission for similar sx-- overall discussions had during that hospitalization entailed chronicity and progression of dz  He was not felt to be candidate for steroids at that time   He has not felt to be a candidate for anti-fibrotics either given cachexia   · He was seen and evaluated by palliative care medicine during that admission-- has not had OP f/u     CKD (chronic kidney disease) stage 2, GFR 60-89 ml/min  Assessment & Plan  Lab Results   Component Value Date    EGFR 90 03/02/2021    EGFR 88 02/05/2021    EGFR 93 02/04/2021    CREATININE 0 89 03/02/2021    CREATININE 0 92 02/05/2021    CREATININE 0 83 02/04/2021   · Cr at baseline  · Continue to monitor    CAD S/P percutaneous coronary angioplasty  Assessment & Plan  · S/P NEISHA to RCA in 2006  · Admitted in November 2020 for NSTEMI, no acute obstruction noted-- no intervention and medical management pursued  · EKG changes today: ST elevated lead II, V6; inverted T wave in III, aVF; large T waves I, aVL-- these are new compared to prior EKGs  · ED D/W cardiology-- no STEMI alert as patient is not having active CP  · Maintain telemetry  · Serial EKG and troponins (repeat EKG in ED unchanged from arrival EKG)       VTE Prophylaxis: Heparin  / sequential compression device   Code Status: level 3  POLST: POLST form is not discussed and not completed at this time  Discussion with family: family aware of admission to hospital    Anticipated Length of Stay:  Patient will be admitted on an Observation basis with an anticipated length of stay of  < 2 midnights  Justification for Hospital Stay: tx and eval of SOB, EKG changes    Total Time for Visit, including Counseling / Coordination of Care: 30 minutes  Greater than 50% of this total time spent on direct patient counseling and coordination of care  Chief Complaint:   Shortness of breath    History of Present Illness:    Ronan Wu is a 60 y o  male with past medical history significant for pulmonary fibrosis with chronic respiratory failure, coronary artery disease, CKD stage 2, unintentional weight loss presents to the ED for evaluation of shortness breath x1 week  Patient was recently hospitalized for similar symptoms  He was discharged to rehab  Over the last 1 week, patient has reported increasing shortness of breath at rest   Denies cough, fever, chest pain  He has had intermittent chills  Patient called EMS today and was found to be hypoxic on 3 L at 70%  He was provided with mid flow nasal cannula oxygen by EMS, and was subsequently weaned back down to his baseline requirements of 3 L  Patient additionally reports no appetite for the last 1 week  He cites unintentional weight loss over the last few months  He has had a GI workup in the past which has been unremarkable and had identified organic cause for patient's lack of appetite/weight loss  Review of Systems:    Review of Systems   Constitutional: Positive for appetite change and chills     HENT: Negative  Eyes: Negative  Respiratory: Positive for shortness of breath  Cardiovascular: Negative  Gastrointestinal: Negative  Genitourinary: Negative  Musculoskeletal: Negative  Skin: Negative  Neurological: Negative  Hematological: Negative  Psychiatric/Behavioral: Negative  Past Medical and Surgical History:     Past Medical History:   Diagnosis Date    Acute myocardial infarction of inferior wall ([facility] ) 11/2011    inferiorwall; drug-eluting stent RCA    Chronic tension type headache     last assessed: 7/4/2012    Colon polyp     Developmental dysplasia of hip     bilateral    Diabetes mellitus ([facility] )     Former smoker     quit 2011     Gout     last assessed: 8/31/2012    Hemorrhage of anus and rectum     last assessed: 8/12/2013    Hyperlipidemia     Hypertension     Internal hemorrhoids     Migraine     last assessed: 11/25/2013    Old MI (myocardial infarction) 2011    Pulmonary fibrosis ([facility] )     Renal insufficiency syndrome     last assessed: 10/22/2014       Past Surgical History:   Procedure Laterality Date    CARDIAC CATHETERIZATION      COLONOSCOPY  05/2012    int hem, dirverticulosis Dr Dora Hanna Bilateral      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  Authorizing Provider   aspirin 81 MG tablet Take 2 tablets by mouth daily 6/6/12  Yes Historical Provider, MD   atorvastatin (LIPITOR) 40 mg tablet TAKE 1 TABLET BY MOUTH EVERY DAY 9/25/20  Yes Marcin Zamora MD   famotidine (PEPCID) 40 MG tablet Take 1 tablet (40 mg total) by mouth daily 1/4/21  Yes Radha Alonzo, DO   glucose blood test strip by In Vitro route daily 5/17/16  Yes Historical Provider, MD lisinopril (ZESTRIL) 10 mg tablet Take 0 5 tablets (5 mg total) by mouth daily 21  Yes CRISTOBAL Morales   metoprolol tartrate (LOPRESSOR) 25 mg tablet Take 25 mg by mouth every 12 (twelve) hours   Yes Historical Provider, MD Nadja Cornelius [address] [facility] by Does not apply route daily 16  Yes Historical Provider, MD   polyethylene glycol (MIRALAX) 17 g packet Take 17 g by mouth 2 (two) times a day 21  Yes Carrol Cedeno, EMPERATRIZ   senna (SENOKOT) 8 6 mg Take 2 tablets (17 2 mg total) by mouth daily at bedtime 21  Yes Carrol Cedeno, EMPERATRIZ   docusate sodium (COLACE) 100 mg capsule Take 1 capsule (100 mg total) by mouth 2 (two) times a day 21   Vidya Bojorquez DO   nystatin (MYCOSTATIN) powder Apply topically 2 (two) times a day 21   Carrol Cedeno, EMPERATRIZ   pantoprazole (PROTONIX) 40 mg tablet Take 1 tablet (40 mg total) by mouth daily in the early morning 21   Carrol Cedeno, EMPERATRIZ     I have reviewed home medications with patient personally      Allergies: No Known Allergies    Social History:     Marital Status: /Civil Union   Occupation: retired  Patient Pre-hospital Living Situation: home w/ family  Patient Pre-hospital Level of Mobility: limited 2/2 medical conditions  Patient Pre-hospital Diet Restrictions: none  Substance Use History:   Social History     Substance and Sexual Activity   Alcohol Use No     Social History     Tobacco Use   Smoking Status Former Smoker    Packs/day: 1 00    Years: 35 00    Pack years: 35 00    Quit date: 2011    Years since quittin 3   Smokeless Tobacco Never Used     Social History     Substance and Sexual Activity   Drug Use No       Family History:    Family History   Problem Relation Age of Onset    Kidney failure Mother     Diabetes Mother     Hypertension Mother        Physical Exam:     Vitals:   Blood Pressure: 115/74 (21 1654)  Pulse: (!) 107 (21 1654)  Temperature: 98 2 °F (36 8 °C) (03/02/21 1240)  Temp Source: Oral (03/02/21 1240)  Respirations: 20 (03/02/21 1654)  Weight - Scale: 43 2 kg (95 lb 3 8 oz) (03/02/21 1204)  SpO2: 100 % (03/02/21 1654)    Physical Exam  Constitutional:       Appearance: He is ill-appearing  Comments: Cachectic    HENT:      Head: Normocephalic  Mouth/Throat:      Mouth: Mucous membranes are dry  Eyes:      Pupils: Pupils are equal, round, and reactive to light  Cardiovascular:      Rate and Rhythm: Regular rhythm  Tachycardia present  Heart sounds: No murmur  No friction rub  No gallop  Pulmonary:      Effort: Pulmonary effort is normal       Breath sounds: Rales (dry, mid to lower lung fields b/l) present  Abdominal:      General: Abdomen is flat  Palpations: Abdomen is soft  Tenderness: There is no abdominal tenderness  Musculoskeletal: Normal range of motion  Right lower leg: No edema  Left lower leg: No edema  Skin:     General: Skin is warm and dry  Coloration: Skin is pale  Neurological:      General: No focal deficit present  Mental Status: He is alert and oriented to person, place, and time  Psychiatric:         Mood and Affect: Mood normal          Additional Data:     Lab Results: I have personally reviewed pertinent reports  Results from last 7 days   Lab Units 03/02/21  1250   WBC Thousand/uL 9 84   HEMOGLOBIN g/dL 13 3   HEMATOCRIT % 43 4   PLATELETS Thousands/uL 366   NEUTROS PCT % 82*   LYMPHS PCT % 12*   MONOS PCT % 5   EOS PCT % 1     Results from last 7 days   Lab Units 03/02/21  1404   SODIUM mmol/L 135*   POTASSIUM mmol/L 5 2   CHLORIDE mmol/L 96*   CO2 mmol/L 26   BUN mg/dL 59*   CREATININE mg/dL 0 89   ANION GAP mmol/L 13   CALCIUM mg/dL 10 5*   ALBUMIN g/dL 3 7   TOTAL BILIRUBIN mg/dL 0 47   ALK PHOS U/L 145*   ALT U/L 136*   AST U/L 156*   GLUCOSE RANDOM mg/dL 118                       Imaging: I have personally reviewed pertinent reports        CTA ED chest PE study   Final Result by Hugo Coleman MD (03/02 2493)      No evidence for pulmonary embolism  Redemonstrated findings of interstitial lung disease  Workstation performed: [id number]         XR chest 1 view portable   Final Result by Kathie Giraldo MD (03/02 7616)      Changes consistent with known history of interstitial pulmonary fibrosis, similar to prior study  Simulated pneumothorax on the left felt to be related to skinfold  Would recommend follow-up frontal and lateral views of the chest when clinically    feasible for confirmation  The examination demonstrates a significant  finding and was documented as such in Epic for liaison and referring practitioner immediate notification  Workstation performed: [id number]             EKG, Pathology, and Other Studies Reviewed on Admission:   · EKG: sinus, diffuse T wave changes not seen on previous EKG    Allscripts / Epic Records Reviewed: Yes     ** Please Note: This note has been constructed using a voice recognition system   **

## 2021-03-02 NOTE — ASSESSMENT & PLAN NOTE
Lab Results   Component Value Date    EGFR 90 03/02/2021    EGFR 88 02/05/2021    EGFR 93 02/04/2021    CREATININE 0 89 03/02/2021    CREATININE 0 92 02/05/2021    CREATININE 0 83 02/04/2021   · Cr at baseline  · Continue to monitor

## 2021-03-03 NOTE — UTILIZATION REVIEW
Initial Clinical Review  OBSERVATION 3/2/21 @1643 CONVERTED TO INPATIENT ADMISSION 3/3/21 @1412 DUE TO CONTINUED STAY REQUIRED TO EVALUATE AND TREAT PATIENT WITH ACUTE ON CHRONIC HYPOXIC RESPIRATORY FAILURE WITH IV STEROIDS  Admission: Date/Time/Statement:   Admission Orders (From admission, onward)     Ordered        03/03/21 1412  Inpatient Admission  Once         03/02/21 1643  Place in Observation  Once                   Orders Placed This Encounter   Procedures    Inpatient Admission     Standing Status:   Standing     Number of Occurrences:   1     Order Specific Question:   Level of Care     Answer:   Med Surg [16]     Order Specific Question:   Estimated length of stay     Answer:   More than 2 Midnights     Order Specific Question:   Certification     Answer:   I certify that inpatient services are medically necessary for this patient for a duration of greater than two midnights  See H&P and MD Progress Notes for additional information about the patient's course of treatment  ED Arrival Information     Expected Arrival Acuity Means of Arrival Escorted By Service Admission Type    - 3/2/2021 11:59 Emergent Ambulance [facility] Hospitalist Emergency    Arrival Complaint    SOB        Chief Complaint   Patient presents with    Shortness of Breath     Pt comes from home with shortness of breath  Per EMSpt was sating 76% on 3 liters  EMS states that he was sating 100% Pt has history of Pulmonary Fibrosis  Assessment/Plan: 59 yo male with hx of pulmonary fibrosis with chronic respiratory failure, baseline O2 3L, coronary artery disease, CKD stage 2, unintentional weight loss presents to ED from home via ambulance with SOB x 1 week  On EMS arrival O2 sat=70 %on 3LO2  and pt was started on [address] O2  Pt was able to be weaned down to his baseline O2 3L    Pt has had a recent admission for similar symptoms-overall discussions had during that hospitalization entailed chronicity and progression of dz  He was not felt to be candidate for steroids at that time  He was not felt to be a candidate for anti-fibrotics either given cachexia   Pt seen by palliative care during prior admission , has not seen as Outpt  Pt reports decreased appetite, unintentional wt loss over several months  On exam, pt tachycardic, has dry mucous membranes,skin pale,  has rales bilat mid to lower lung fields  ECG showing ST elevations, inverted Twaves   Discussed with cardiology who feels no STEMI alert needed as pt not having chest pain  Repeat ECG unchanged from arrival   Labs show elevated LFT's that are chronic, pt w/o abdom pain  CTA chest shows known fibrosis  Pt admitted as OBS to telemetry with acute on chronic respiratory failure with hypoxia of unclear etiology, hx CAD, interstitial lung disease with progression  Plan is for telemetry , monitor O2 requirements, serial ECG's , monitor troponin levels, consider palliative care consult, PT/OTevals  3/3  Pt continues to feel SOB, recurrent worsening episodes, will stat on  IV steroids today after discussion with pulmonology  Pt continues on O2 @3L   Pt hyperkalemic, Kayexelate given and will repeat potassium @1600 today    ED Triage Vitals   Temperature Pulse Respirations Blood Pressure SpO2   03/02/21 1240 03/02/21 1204 03/02/21 1204 03/02/21 1204 03/02/21 1204   98 2 °F (36 8 °C) (!) 119 20 135/70 100 %      Temp Source Heart Rate Source Patient Position - Orthostatic VS BP Location FiO2 (%)   03/02/21 1240 03/02/21 1204 03/02/21 1204 03/02/21 1204 --   Oral Monitor Lying Right arm       Pain Score       03/03/21 0249       No Pain          Wt Readings from Last 1 Encounters:   03/02/21 43 2 kg (95 lb 3 8 oz)     Additional Vital Signs:   Date/Time  Temp  Pulse  Resp  BP  MAP (mmHg)  SpO2  Calculated FIO2 (%) - Nasal Cannula  O2 Flow Rate (L/min)  Nasal Cannula O2 Flow Rate (L/min)  O2 Device  O2 Interface Device    03/03/21 0700  97 8 °F (36 6 °C)  89  16  103/56  76  100 % --  --  --  Nasal cannula  --    03/03/21 0224  97 8 °F (36 6 °C)  87  18  121/60  83  100 %  32  --  3 L/min  Nasal cannula  --    03/02/21 2054  --  115Abnormal   --  117/69  --  --  --  --  --  --  --    03/02/21 2030  --  110Abnormal   18  117/69  87  100 %  --  --  --  None (Room air)  --    03/02/21 1900  --  94  --  106/58  74  100 %  --  --  --  --  --    03/02/21 1800  --  82  --  100/59  75  100 %  --  --  --  --  --    03/02/21 1700  --  110Abnormal   --  120/75  90  100 %  --  --  --  --  --    03/02/21 1654  --  107Abnormal   20  115/74  --  100 %  32  --  3 L/min  Mid flow nasal cannula  --    03/02/21 1600  --  92  --  --  --  99 %  --  --  --  --  --    03/02/21 1500  --  88  --  --  --  100 %  40  --  5 L/min  Nasal cannula  --    03/02/21 1412  --  97  24Abnormal   129/64  --  100 %  --  --  --  Mid flow nasal cannula  --    03/02/21 1400  --  94  --  --  --  100 %  --  --  --  --  --    03/02/21 1300  --  90  --  --  --  100 %  --  --  --  --  --    03/02/21 1240  98 2 °F (36 8 °C)  --  --  --  --  --  --  --  --  --  --    03/02/21 1217  --  --  --  --  --  --  --  8 L/min  --  Mid flow nasal cannula  --    03/02/21 1207  --  --  --  --  --  99 %  --  8 L/min  --  Mid flow nasal cannula  MFNC prongs        Pertinent Labs/Diagnostic Test Results:   3/2 CXR  Changes consistent with known history of interstitial pulmonary fibrosis, similar to prior study  Simulated pneumothorax on the left felt to be related to skinfold  Would recommend follow-up frontal and lateral views of the chest when clinically   feasible for confirmation  3/2 CTA chest PE study  No evidence for pulmonary embolism    Redemonstrated findings of interstitial lung disease    3/2 ECG-1  Sinus tachycardia  Left posterior fascicular block  Nonspecific ST and T wave abnormality  Abnormal ECG  3/2 ECG-2  Normal sinus rhythm  Left posterior fascicular block  Nonspecific ST abnormality  Possible Inferior infarct (cited on or before 02-MAR-2021)  Abnormal ECG  3/3 ECG  Sinus tachycardia with Premature atrial complexes with Aberrant conduction  Rightward axis  Nonspecific ST and T wave abnormality  Abnormal ECG  Results from last 7 days   Lab Units 03/02/21  1250   SARS-COV-2  Negative     Results from last 7 days   Lab Units 03/03/21  0527 03/02/21  1250   WBC Thousand/uL 10 63* 9 84   HEMOGLOBIN g/dL 11 7* 13 3   HEMATOCRIT % 38 1 43 4   PLATELETS Thousands/uL 289 366   NEUTROS ABS Thousands/µL  --  8 00*         Results from last 7 days   Lab Units 03/03/21  0527 03/02/21  1404   SODIUM mmol/L 135* 135*   POTASSIUM mmol/L 5 7* 5 2   CHLORIDE mmol/L 99* 96*   CO2 mmol/L 25 26   ANION GAP mmol/L 11 13   BUN mg/dL 51* 59*   CREATININE mg/dL 0 80 0 89   EGFR ml/min/1 73sq m 94 90   CALCIUM mg/dL 9 7 10 5*   MAGNESIUM mg/dL 1 7  --      Results from last 7 days   Lab Units 03/03/21  0527 03/02/21  1404   AST U/L 116* 156*   ALT U/L 99* 136*   ALK PHOS U/L 116 145*   TOTAL PROTEIN g/dL 7 7 9 6*   ALBUMIN g/dL 2 9* 3 7   TOTAL BILIRUBIN mg/dL 0 43 0 47         Results from last 7 days   Lab Units 03/03/21  0527 03/02/21  1404   GLUCOSE RANDOM mg/dL 93 118             Results from last 7 days   Lab Units 03/02/21  1250   TROPONIN I ng/mL <0 02     Results from last 7 days   Lab Units 03/02/21  1250   D-DIMER QUANTITATIVE ug/ml FEU 1 21*           Results from last 7 days   Lab Units 03/02/21  1404   NT-PRO BNP pg/mL 424*           Results from last 7 days   Lab Units 03/02/21  1250   INFLUENZA A PCR  Negative   INFLUENZA B PCR  Negative   RSV PCR  Negative             ED Treatment:   Medication Administration from 03/02/2021 1159 to 03/03/2021 0216       Date/Time Order Dose Route Action     03/02/2021 1520 iohexol (OMNIPAQUE) 350 MG/ML injection (MULTI-DOSE) 85 mL 85 mL Intravenous Given     03/02/2021 2054 metoprolol tartrate (LOPRESSOR) tablet 25 mg 25 mg Oral Given     03/02/2021 2054 sodium chloride 0 9 % bolus 500 mL 500 mL Intravenous New Bag 03/03/2021 0142 ondansetron (ZOFRAN) injection 4 mg 4 mg Intravenous Given     03/02/2021 2345 heparin (porcine) subcutaneous injection 5,000 Units 5,000 Units Subcutaneous Given        Past Medical History:   Diagnosis Date    Acute myocardial infarction of inferior wall ([facility] ) 11/2011    inferiorwall; drug-eluting stent RCA    Chronic tension type headache     last assessed: 7/4/2012    Colon polyp     Developmental dysplasia of hip     bilateral    Diabetes mellitus ([facility] )     Former smoker     quit 2011     Gout     last assessed: 8/31/2012    Hemorrhage of anus and rectum     last assessed: 8/12/2013    Hyperlipidemia     Hypertension     Internal hemorrhoids     Migraine     last assessed: 11/25/2013    Old MI (myocardial infarction) 2011    Pulmonary fibrosis ([facility] )     Renal insufficiency syndrome     last assessed: 10/22/2014     Present on Admission:   CKD (chronic kidney disease) stage 2, GFR 60-89 ml/min   Pulmonary fibrosis determined by high resolution computed tomography (HCC)   Elevated liver enzymes   Acute on chronic respiratory failure with hypoxia (HCC)   Severe protein-calorie malnutrition (HCC)      Admitting Diagnosis: SOB (shortness of breath) [R06 02]  Abnormal EKG [R94 31]  Hypoxia [R09 02]  Age/Sex: 60 y o  male  Admission Orders:  Scheduled Medications:  aspirin, 81 mg, Oral, Daily  atorvastatin, 40 mg, Oral, Daily  carbamide peroxide, 5 drop, Both Ears, BID  docusate sodium, 100 mg, Oral, BID  famotidine, 40 mg, Oral, Daily  heparin (porcine), 5,000 Units, Subcutaneous, Q8H HERNANDEZ  methylPREDNISolone sodium succinate, 40 mg, Intravenous, Q12H HERNANDEZ  metoprolol tartrate, 25 mg, Oral, Q12H HERNANDEZ  nystatin, , Topical, BID  pantoprazole, 40 mg, Oral, Early Morning  polyethylene glycol, 17 g, Oral, BID  senna, 17 2 mg, Oral, HS      Continuous IV Infusions:none     PRN Meds:  acetaminophen, 650 mg, Oral, Q6H PRN  albuterol, 2 puff, Inhalation, Q4H PRN  ondansetron, 4 mg, Intravenous, Q6H PRN      Telemetry   SCD's      Network Utilization Review Department  ATTENTION: Please call with any questions or concerns to [phone] and carefully listen to the prompts so that you are directed to the right person  All voicemails are confidential   [facility] all requests for admission clinical reviews, approved or denied determinations and any other requests to dedicated fax number below belonging to the campus where the patient is receiving treatment   List of dedicated fax numbers for the Facilities:  [address] DENIALS (Administrative/Medical Necessity) [phone]   [address] (Maternity/NICU/Pediatrics) [phone]   [address] [place] [address] Dr Sweetie Minaya [phone] (  Krista Balderrama "[address]" 103) [address] [place] [address] [address] [place] [address] [phone]

## 2021-03-03 NOTE — CASE MANAGEMENT
LOS: 0 DAYS  PATIENT IS NOT A BUNDLE  PATIENT IS A READMISSION  UNPLANNED RISK OF READMISSION: 22   OP CM: Eloise Angeles    CM met with the Patient at the bedside; Patient was alert and oriented, laying in bed  CM introduced self and role  Patient reports that he presented to the ED for increasing SOB and loss of appetite  On the last admission, Patient discharged to [address] [place] [address], where he received all of his medications and assistance with discharge recommendations  Patient has completed his follow up appointments via telehealth  Patient reports that he resides with his spouse, daughter, [place] and grandchild in a mobile home  There is a ramp to enter  Prior to admission, Patient was dependent with ADLs and uses a wc for mobility  Patient uses 3L chronic O2 that is supplied by [facility]  Patient reports that he has been very weak and feels that he is unable to leave his home  Patient is active with [facility]  Patient has only been to [facility] for STR  Patient utilizes [facility] in [place] and reports that he is able to afford all of his medications  Patient denies any MH/substance use history or concerns  Patient identifies his spouse, Edgardo Mccarty, as his health care representative  Patient reports that he does not have a POA or AD/LW  Patient declined CM offer for resources to obtain these documents  Patient has been seeing his PCP, Dr Sam Noble, via telehealth  Patient is retired and receives [employer] as his main source of income  Patient reports that he does not drive, and does not leave his house  Patient will likely need transportation on dc  CM discussed the Patient's preference for a safe discharge plan  Patient states that he will not return to a rehab facility and will plan to return home and resume care his his [facility]  CM explained that a ROSI referral would be completed for [facility]  Cm dept will continue to follow the Patient through dc and monitor/re-assess for needs       CM sent referral for ROSI to [facility] via [address] for SN/PT/OT and MSW/HHA if available  Agency confirmed referral and will see Patient upon dc  CM reviewed discharge planning process including the following: identifying caregivers at home, preference for d/c planning needs, availability of treatment team to discuss questions or concerns patient and/or family may have regarding diagnosis, plan of care, old or new medications and discharge planning   CM will continue to follow for care coordination and update assessment as appropriate

## 2021-03-03 NOTE — CONSULTS
Consultation - Pulmonary Medicine   Maria G Hardwick 60 y o  male MRN: [id number]  Unit/Bed#: S -01 Encounter: [id number]      Assessment:  Acute on chronic hypoxic respiratory failure   Pulmonary fibrosis   Abnormal chest CT  Cachexia  Former smoker    Plan:   Patient was noted to be hypoxic on his baseline 3 L, currently saturating well  Continue monitor SpO2  In maintain saturations greater than 88%  Reviewed CTA negative for PE showing stable fibrosis  No signs of active infection or volume overload  Will trial IV Solu-Medrol   Patient may use short-acting bronchodilators as needed  Declined lung transplant eval  Would benefit from palliative care follow-up    Outpatient pulmonary follow-up pending course      History of Present Illness   Physician Requesting Consult: Odalis Malhotra MD  Reason for Consult / Principal Problem:  Pulmonary fibrosis  Hx and PE limited by: none  HPI: Maria G Hardwick is a 60y o  year old male   With past medical history including  Interstitial lung disease, unintentional weight loss, diabeteswho presents with  Shortness of breath  Patient was found to be hypoxic 70% his baseline 3 L at, prompting call to 911 an ED presentation  CT was done which was negative for PE and showed stable pulmonary fibrosis  Patient has no signs of active infection  He is currently saturating well on his baseline 3 L and is feeling little better but still having significant shortness of breath  He also complains of voice hoarseness and chronic cough  From a pulmonary standpoint, patient follows with Dr Kerline Bobo for his suspected interstitial lung disease  Patient does report a 1 pack per day 35 year smoking history  Patient reports he quit smoking in 2011  Patient has had significant occupational exposures as 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 [employer]  Patient is not maintained on any inhaled maintenance therapies    Patient does wear chronic 3-4 L throughout the day   He has declined lung transplant evaluation  He has been monitored off anti fibrotic therapy and steroids  Inpatient consult to Pulmonology  Consult performed by: Gisella Beltran PA-C  Consult ordered by: Odalis Malhotra MD          Review of Systems   Constitutional: Positive for fatigue and unexpected weight change  Negative for fever  HENT: Positive for voice change  Respiratory: Positive for cough and shortness of breath  Cardiovascular: Negative for chest pain and leg swelling  All other systems reviewed and are negative        Historical Information   Past Medical History:   Diagnosis Date    [facility] 11/2011    inferiorwall; drug-eluting stent RCA    Chronic tension type headache     last assessed: 7/4/2012    Colon polyp     Developmental dysplasia of hip     bilateral    Diabetes [facility]     Former smoker     quit 2011     Gout     last assessed: 8/31/2012    Hemorrhage of anus and rectum     last assessed: 8/12/2013    Hyperlipidemia     Hypertension     Internal hemorrhoids     Migraine     last assessed: 11/25/2013    Old MI (myocardial infarction) 2011    Pulmonary fibrosis [facility] )     Renal insufficiency syndrome     last assessed: 10/22/2014     Past Surgical History:   Procedure Laterality Date    CARDIAC CATHETERIZATION      COLONOSCOPY  05/2012    int hem, dirverticulosis Dr Opal Mariscal Bilateral      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    LUMBAR LAMINECTOMY  03/2010    decompressive more than 2 lumbar segments L4-S1      Social History   Social History     Substance and Sexual Activity   Alcohol Use No     Social History     Substance and Sexual Activity   Drug Use No     E-Cigarette/Vaping     E-Cigarette/Vaping Substances     Social History     Tobacco Use   Smoking Status Former Smoker    Packs/day: 1 00    Years: 35 00    Pack years: 35 00    Quit date: 2011    Years since quittin 3   Smokeless Tobacco Never Used     Occupational History: worked at [employer] History:   Family History   Problem Relation Age of Onset    Kidney failure Mother     Diabetes Mother     Hypertension Mother        Meds/Allergies   all current active meds have been reviewed    No Known Allergies    Objective   Vitals: Blood pressure 103/56, pulse 89, temperature 97 8 °F (36 6 °C), temperature source Oral, resp  rate 16, weight 43 2 kg (95 lb 3 8 oz), SpO2 100 %  ,Body mass index is 19 24 kg/m²  Intake/Output Summary (Last 24 hours) at 3/3/2021 1142  Last data filed at 3/3/2021 1001  Gross per 24 hour   Intake 560 ml   Output 700 ml   Net -140 ml     Invasive Devices     Peripheral Intravenous Line            Peripheral IV 21 Left Antecubital less than 1 day                Physical Exam  Vitals signs and nursing note reviewed  Constitutional:       General: He is not in acute distress  Appearance: He is well-developed  He is not diaphoretic  HENT:      Head: Normocephalic and atraumatic  Right Ear: External ear normal       Left Ear: External ear normal       Nose: Nose normal    Eyes:      General: No scleral icterus  Right eye: No discharge  Left eye: No discharge  Conjunctiva/sclera: Conjunctivae normal    Neck:      Musculoskeletal: Normal range of motion and neck supple  Trachea: No tracheal deviation  Cardiovascular:      Rate and Rhythm: Normal rate and regular rhythm  Heart sounds: Normal heart sounds  No murmur  No friction rub  No gallop  Pulmonary:      Effort: Pulmonary effort is normal  No respiratory distress  Breath sounds: No stridor  Comments: Crackles  Abdominal:      General: There is no distension  Tenderness: There is no guarding  Musculoskeletal: Normal range of motion           General: No tenderness or deformity  Skin:     General: Skin is warm and dry  Coloration: Skin is not pale  Findings: No erythema or rash  Neurological:      Mental Status: He is alert and oriented to person, place, and time  Cranial Nerves: No cranial nerve deficit  Motor: Weakness present  No abnormal muscle tone  Psychiatric:         Behavior: Behavior normal          Thought Content: Thought content normal          Judgment: Judgment normal          Lab Results: I have personally reviewed pertinent lab results  Imaging Studies: I have personally reviewed pertinent reports  EKG, Pathology, and Other Studies: I have personally reviewed pertinent reports      VTE Prophylaxis: Heparin    Code Status: Level 3 - DNAR and DNI  Advance Directive and Living Will:      Power of :    POLST:

## 2021-03-03 NOTE — PLAN OF CARE
Problem: Potential for Falls  Goal: Patient will remain free of falls  Description: INTERVENTIONS:  - Assess patient frequently for physical needs  -  Identify cognitive and physical deficits and behaviors that affect risk of falls  -  [place] fall precautions as indicated by assessment   - Educate patient/family on patient safety including physical limitations  - Instruct patient to call for assistance with activity based on assessment  - Modify environment to reduce risk of injury  - Consider OT/PT consult to assist with strengthening/mobility  Outcome: Progressing     Problem: Prexisting or High Potential for Compromised Skin Integrity  Goal: Skin integrity is maintained or improved  Description: INTERVENTIONS:  - Identify patients at risk for skin breakdown  - Assess and monitor skin integrity  - Assess and monitor nutrition and hydration status  - Monitor labs   - Assess for incontinence   - Turn and reposition patient  - Assist with mobility/ambulation  - Relieve pressure over bony prominences  - Avoid friction and shearing  - Provide appropriate hygiene as needed including keeping skin clean and dry  - Evaluate need for skin moisturizer/barrier cream  - Collaborate with interdisciplinary team   - Patient/family teaching  - Consider wound care consult   Outcome: Progressing     Problem: Nutrition/Hydration-ADULT  Goal: Nutrient/Hydration intake appropriate for improving, restoring or maintaining nutritional needs  Description: Monitor and assess patient's nutrition/hydration status for malnutrition  Collaborate with interdisciplinary team and initiate plan and interventions as ordered  Monitor patient's weight and dietary intake as ordered or per policy  Utilize nutrition screening tool and intervene as necessary  Determine patient's food preferences and provide high-protein, high-caloric foods as appropriate       INTERVENTIONS:  - Monitor oral intake, urinary output, labs, and treatment plans  - Assess nutrition and hydration status and recommend course of action  - Evaluate amount of meals eaten  - Assist patient with eating if necessary   - Allow adequate time for meals  - Recommend/ encourage appropriate diets, oral nutritional supplements, and vitamin/mineral supplements  - Order, calculate, and assess calorie counts as needed  - Recommend, monitor, and adjust tube feedings and TPN/PPN based on assessed needs  - Assess need for intravenous fluids  - Provide specific nutrition/hydration education as appropriate  - Include patient/family/caregiver in decisions related to nutrition  Outcome: Progressing     Problem: PAIN - ADULT  Goal: Verbalizes/displays adequate comfort level or baseline comfort level  Description: Interventions:  - Encourage patient to monitor pain and request assistance  - Assess pain using appropriate pain scale  - Administer analgesics based on type and severity of pain and evaluate response  - Implement non-pharmacological measures as appropriate and evaluate response  - Consider cultural and social influences on pain and pain management  - Notify physician/advanced practitioner if interventions unsuccessful or patient reports new pain  Outcome: Progressing     Problem: INFECTION - ADULT  Goal: Absence or prevention of progression during hospitalization  Description: INTERVENTIONS:  - Assess and monitor for signs and symptoms of infection  - Monitor lab/diagnostic results  - Monitor all insertion sites, i e  indwelling lines, tubes, and drains  - Monitor endotracheal if appropriate and nasal secretions for changes in amount and color  - [place] appropriate cooling/warming therapies per order  - Administer medications as ordered  - Instruct and encourage patient and family to use good hand hygiene technique  - Identify and instruct in appropriate isolation precautions for identified infection/condition  Outcome: Progressing     Problem: SAFETY ADULT  Goal: Patient will remain free of falls  Description: INTERVENTIONS:  - Assess patient frequently for physical needs  -  Identify cognitive and physical deficits and behaviors that affect risk of falls    -  [place] fall precautions as indicated by assessment   - Educate patient/family on patient safety including physical limitations  - Instruct patient to call for assistance with activity based on assessment  - Modify environment to reduce risk of injury  - Consider OT/PT consult to assist with strengthening/mobility  Outcome: Progressing  Goal: Maintain or return to baseline ADL function  Description: INTERVENTIONS:  -  Assess patient's ability to carry out ADLs; assess patient's baseline for ADL function and identify physical deficits which impact ability to perform ADLs (bathing, care of mouth/teeth, toileting, grooming, dressing, etc )  - Assess/evaluate cause of self-care deficits   - Assess range of motion  - Assess patient's mobility; develop plan if impaired  - Assess patient's need for assistive devices and provide as appropriate  - Encourage maximum independence but intervene and supervise when necessary  - Involve family in performance of ADLs  - Assess for home care needs following discharge   - Consider OT consult to assist with ADL evaluation and planning for discharge  - Provide patient education as appropriate  Outcome: Progressing  Goal: Maintain or return mobility status to optimal level  Description: INTERVENTIONS:  - Assess patient's baseline mobility status (ambulation, transfers, stairs, etc )    - Identify cognitive and physical deficits and behaviors that affect mobility  - Identify mobility aids required to assist with transfers and/or ambulation (gait belt, sit-to-stand, lift, walker, cane, etc )  - [place] fall precautions as indicated by assessment  - Record patient progress and toleration of activity level on Mobility SBAR; progress patient to next Phase/Stage  - Instruct patient to call for assistance with activity based on assessment  - Consider rehabilitation consult to assist with strengthening/weightbearing, etc   Outcome: Progressing     Problem: DISCHARGE PLANNING  Goal: Discharge to home or other facility with appropriate resources  Description: INTERVENTIONS:  - Identify barriers to discharge w/patient and caregiver  - Arrange for needed discharge resources and transportation as appropriate  - Identify discharge learning needs (meds, wound care, etc )  - Arrange for interpretive services to assist at discharge as needed  - Refer to Case Management Department for coordinating discharge planning if the patient needs post-hospital services based on physician/advanced practitioner order or complex needs related to functional status, cognitive ability, or social support system  Outcome: Progressing     Problem: Knowledge Deficit  Goal: Patient/family/caregiver demonstrates understanding of disease process, treatment plan, medications, and discharge instructions  Description: Complete learning assessment and assess knowledge base    Interventions:  - Provide teaching at level of understanding  - Provide teaching via preferred learning methods  Outcome: Progressing

## 2021-03-03 NOTE — PROGRESS NOTES
Progress Note - Junaid Cummins 1955, 60 y o  male MRN: [id number]    Unit/Bed#: S -01 Encounter: [id number]    Primary Care Provider: Lisa Obrien DO   Date and time admitted to hospital: 3/2/2021 11:59 AM        * Acute on chronic respiratory failure with hypoxia [facility])  Assessment & Plan  Patient continues to complain of shortness of breath, recurrent worsening episodes, discussed with the pulmonology, to start patient on steroids from today  Continue supplemental oxygen  Continue to monitor inpatient  Severe protein-calorie malnutrition ([facility] )  Assessment & Plan  Malnutrition Findings:           BMI Findings: Body mass index is 19 24 kg/m²  · Patient w/ unintentional weight loss-- appears to have lost nearly 20 lbs in the last 1 month   · Reports no appetite x1 week  · Has had GI work up recently w/ no organic cause of poor PO intake  · Continue w/ nutritional supplements  · May need to consider medication for appetite stimulation    Elevated liver enzymes  Assessment & Plan  · Chronic, w/o abdominal pain     Pulmonary fibrosis determined by high resolution computed tomography [facility])  Assessment & Plan  · Follows w/ Dr Iona Borden   · Recent admission for similar sx-- overall discussions had during that hospitalization entailed chronicity and progression of dz  He was not felt to be candidate for steroids at that time   He has not felt to be a candidate for anti-fibrotics either given cachexia   · He was seen and evaluated by palliative care medicine during that admission-- has not had OP f/u     CKD (chronic kidney disease) stage 2, GFR 60-89 ml/min  Assessment & Plan  Lab Results   Component Value Date    EGFR 90 03/02/2021    EGFR 88 02/05/2021    EGFR 93 02/04/2021    CREATININE 0 89 03/02/2021    CREATININE 0 92 02/05/2021    CREATININE 0 83 02/04/2021   · Cr at baseline  · Continue to monitor    CAD S/P percutaneous coronary angioplasty  Assessment & Plan  · S/P NEISHA to RCA in   · Admitted in 2020 for NSTEMI, no acute obstruction noted-- no intervention and medical management pursued  · EKG changes today: ST elevated lead II, V6; inverted T wave in III, aVF; large T waves I, aVL-- these are new compared to prior EKGs  · ED D/W cardiology-- no STEMI alert as patient is not having active CP  · Maintain telemetry  · Serial EKG and troponins (repeat EKG in ED unchanged from arrival EKG)       Hyperkalemia-give Kayexalate, recheck potassium levels around 4:00 p m  VTE Pharmacologic Prophylaxis:   Pharmacologic: Enoxaparin (Lovenox)  Mechanical VTE Prophylaxis in Place: Yes    Patient Centered Rounds: I have performed bedside rounds with nursing staff today  Discussions with Specialists or Other Care Team Provider:  Pulmonology    Education and Discussions with Family / Patient:  Patient        Current Length of Stay: 0 day(s)    Current Patient Status: Inpatient   Certification Statement: The patient will continue to require additional inpatient hospital stay due to Acute on chronic hypoxic respiratory failure    Discharge Plan:  48 hours    Code Status: Level 3 - DNAR and DNI      Subjective:   Patient still complains of shortness of breath, denies any cough, fever, chills  Objective:     Vitals:   Temp (24hrs), Av 8 °F (36 6 °C), Min:97 8 °F (36 6 °C), Max:97 8 °F (36 6 °C)    Temp:  [97 8 °F (36 6 °C)] 97 8 °F (36 6 °C)  HR:  [] 89  Resp:  [16-20] 16  BP: (100-121)/(56-75) 103/56  SpO2:  [99 %-100 %] 100 %  Body mass index is 19 24 kg/m²  Input and Output Summary (last 24 hours):        Intake/Output Summary (Last 24 hours) at 3/3/2021 1412  Last data filed at 3/3/2021 1301  Gross per 24 hour   Intake 672 ml   Output 1100 ml   Net -428 ml       Physical Exam:     Physical Exam  (  General appearance:  Patient not in acute distress  Eyes:  Pupils equal reacting to light  ENT:  Moist oral mucous membranes  CVS:  S1-S2 heard, regular rate and rhythm, no pedal edema  Chest:  Bilateral air entry present, bilateral pulmonary  Abdomen:  Soft, nontender, bowel sounds present  CNS:  No focal neurological deficits  Genitourinary: deferred  Skin:  No acute rash   psychiatric:  No psychosis  Musculoskeletal:  No joint deformities     Additional Data:     Labs:    Results from last 7 days   Lab Units 03/03/21  0527 03/02/21  1250   WBC Thousand/uL 10 63* 9 84   HEMOGLOBIN g/dL 11 7* 13 3   HEMATOCRIT % 38 1 43 4   PLATELETS Thousands/uL 289 366   NEUTROS PCT %  --  82*   LYMPHS PCT %  --  12*   MONOS PCT %  --  5   EOS PCT %  --  1     Results from last 7 days   Lab Units 03/03/21  0527   SODIUM mmol/L 135*   POTASSIUM mmol/L 5 7*   CHLORIDE mmol/L 99*   CO2 mmol/L 25   BUN mg/dL 51*   CREATININE mg/dL 0 80   ANION GAP mmol/L 11   CALCIUM mg/dL 9 7   ALBUMIN g/dL 2 9*   TOTAL BILIRUBIN mg/dL 0 43   ALK PHOS U/L 116   ALT U/L 99*   AST U/L 116*   GLUCOSE RANDOM mg/dL 93                           * I Have Reviewed All Lab Data Listed Above  * Additional Pertinent Lab Tests Reviewed:  Raz Rivas Admission Reviewed    Imaging:      Imaging Personally Reviewed by Myself Includes:  Bilateral chronic lung changes    Recent Cultures (last 7 days):           Last 24 Hours Medication List:   Current Facility-Administered Medications   Medication Dose Route Frequency Provider Last Rate    acetaminophen  650 mg Oral Q6H PRN Patrizia Shah PA-C      albuterol  2 puff Inhalation Q4H PRN Lindsey Gage PA-C      aspirin  81 mg Oral Daily Patrizia Shah PA-C      atorvastatin  40 mg Oral Daily Patrizia Shah PA-C      carbamide peroxide  5 drop Both Ears BID Carolyne Toure MD      docusate sodium  100 mg Oral BID Patrizia Shah PA-C      famotidine  40 mg Oral Daily Patrizia Shah PA-C      heparin (porcine)  5,000 Units Subcutaneous Q8H [address] Patrizia Shah PA-C      methylPREDNISolone sodium succinate  40 mg Intravenous Q12H [address] Alise Mendez PA-C      metoprolol tartrate  25 mg Oral Q12H [address] Patrizia Shah PA-C      nystatin   Topical BID Patrizia Shah PA-C      ondansetron  4 mg Intravenous Q6H PRN Patrizia Shah PA-C      pantoprazole  40 mg Oral Early Morning Patrizia Shah PA-C      polyethylene glycol  17 g Oral BID Patrizia Shah PA-C      senna  17 2 mg Oral HS Patrizia Shah PA-C          Today, Patient Was Seen By: Keanu Iglesias MD    ** Please Note: Dictation voice to text software may have been used in the creation of this document   **

## 2021-03-04 PROBLEM — N17.9 AKI (ACUTE KIDNEY INJURY) (HCC): Status: ACTIVE | Noted: 2021-01-01

## 2021-03-04 NOTE — ASSESSMENT & PLAN NOTE
Lab Results   Component Value Date    EGFR 36 03/04/2021    EGFR 94 03/03/2021    EGFR 90 03/02/2021    CREATININE 1 93 (H) 03/04/2021    CREATININE 0 80 03/03/2021    CREATININE 0 89 03/02/2021   · Cr at baseline  · Continue to monitor

## 2021-03-04 NOTE — PROGRESS NOTES
Progress notes - Pulmonary Medicine   Soraida Cedillo 60 y o  male MRN: [id number]  Unit/Bed#: S -01      Assessment:  Acute on chronic hypoxic respiratory failure with underlying ILD secondary to occupational exposure  Has interstitial lung disease who presents with failure to thrive weight loss and shortness of breath for the last week  Patient was noted to be hypoxic on his baseline 3 L on admission    PFT was ordered outpatient was not completed    · Currently in 3L NC  · maintain saturations greater than 88%  · Continue solumedrol 40mg IV q12 to treat any acute pneumonitis, continue today and likely once day and taper  · Patient may use short-acting bronchodilators as needed  · Declined lung transplant eval  · Would benefit from palliative care follow-up    Interstitial Lung Disease  history for occupational exposures of graphite, worked in strip mine    Chest CT:No evidence for pulmonary embolism  Chronic findings of interstitial lung disease  · Declined lung transplant eval and not a good candidate  · IV steroids   · Continued smoking cessation  · Continue oxygen  · Consider antifibrotic    BJ  Recent Labs     03/02/21  1404 03/03/21  0527 03/04/21  0540   CREATININE 0 89 0 80 1 93*   EGFR 90 94 36     Likely prerenal vs contrast induced from recent CTA  · Manage by primary  · Recommend IV fluids or urine studies, relayed to primary team    Former smoker   Smoked 35 pack years and quit 2011    Plan discussed with primary team SLIM    Subjective/Objective       Subjective: patient reported improvement of breathing compared to when he came in but still not in his baseline    Objective:     Vitals: Blood pressure 100/51, pulse 92, temperature 97 5 °F (36 4 °C), temperature source Oral, resp  rate 19, weight 43 2 kg (95 lb 3 8 oz), SpO2 100 %  ,Body mass index is 19 24 kg/m²        Intake/Output Summary (Last 24 hours) at 3/4/2021 0930  Last data filed at 3/4/2021 0732  Gross per 24 hour   Intake 472 ml   Output 400 ml   Net 72 ml       Invasive Devices     Peripheral Intravenous Line            Peripheral IV 03/02/21 Left Antecubital 1 day              Physical Exam  Vitals signs and nursing note reviewed  Constitutional:       General: He is not in acute distress  Appearance: Normal appearance  He is well-developed  He is not diaphoretic  HENT:      Head: Normocephalic and atraumatic  Nose: Nose normal       Mouth/Throat:      Mouth: Mucous membranes are moist    Eyes:      General: No scleral icterus  Right eye: No discharge  Left eye: No discharge  Conjunctiva/sclera: Conjunctivae normal    Neck:      Musculoskeletal: Normal range of motion  Trachea: No tracheal deviation  Cardiovascular:      Rate and Rhythm: Normal rate and regular rhythm  Pulses: Normal pulses  Heart sounds: Normal heart sounds  No murmur  No friction rub  No gallop  Pulmonary:      Effort: Pulmonary effort is normal  No respiratory distress  Breath sounds: No stridor  Comments: Diminished lung sounds crackles in bases  Abdominal:      General: There is no distension  Tenderness: There is no guarding  Musculoskeletal: Normal range of motion  General: No tenderness, deformity or signs of injury  Right lower leg: No edema  Left lower leg: No edema  Skin:     General: Skin is warm and dry  Coloration: Skin is not pale  Findings: No erythema or rash  Neurological:      General: No focal deficit present  Mental Status: He is alert and oriented to person, place, and time  Mental status is at baseline  Cranial Nerves: No cranial nerve deficit  Motor: Weakness (chronic problem hasnt walked in years) present  No abnormal muscle tone  Psychiatric:         Behavior: Behavior normal          Thought Content:  Thought content normal          Judgment: Judgment normal        Lab, Imaging and other studies: I have personally reviewed pertinent reports

## 2021-03-04 NOTE — ASSESSMENT & PLAN NOTE
Malnutrition Findings:   Adult Malnutrition type: Acute illness(in the setting of chronic illness)  Adult Degree of Malnutrition: Other severe protein calorie malnutrition(related to catabolic illness, inadequate oral intake)    BMI Findings: Body mass index is 19 24 kg/m²     · Patient w/ unintentional weight loss-- appears to have lost nearly 20 lbs in the last 1 month   · Reports no appetite x1 week  · Has had GI work up recently w/ no organic cause of poor PO intake  · Continue w/ nutritional supplements  · May need to consider medication for appetite stimulation

## 2021-03-04 NOTE — ED PROVIDER NOTES
History  Chief Complaint   Patient presents with    Shortness of Breath     Pt comes from home with shortness of breath  Per EMSpt was sating 76% on 3 liters  EMS states that he was sating 100% Pt has history of Pulmonary Fibrosis  History provided by:  Patient   used: No    Shortness of Breath  Associated symptoms: no abdominal pain, no chest pain, no cough, no diaphoresis, no fever, no headaches, no neck pain, no rash, no sore throat, no vomiting and no wheezing        Patient is a 61-year-old male presenting to emergency department with worsening shortness of breath  Patient with history of interstitial lung disease and pulmonary fibrosis  Usually on 3 L of oxygen  EMS found patient saturating at 76%  Place patient on mid flow and was brought to the ER  Patient has no complaints except for shortness of breath  No fevers or chills  No cough  No chest pain  No nausea vomiting  Lightheadedness or dizziness  No back pain  MDM patient has increased oxygen requirement  Will do cardiac evaluation, chest x-ray, D-dimer, will need admission to the hospital    Prior to Admission Medications   Prescriptions Last Dose Informant Patient Reported? Taking?    Liza Gonzales LANCETS 96A [facility] 3/2/2021 at Unknown time Child Yes Yes   Sig: by Does not apply route daily   aspirin 81 MG tablet 3/1/2021 at Unknown time Child Yes Yes   Sig: Take 2 tablets by mouth daily   atorvastatin (LIPITOR) 40 mg tablet 3/1/2021 at Unknown time Child No Yes   Sig: TAKE 1 TABLET BY MOUTH EVERY DAY   docusate sodium (COLACE) 100 mg capsule Unknown at Unknown time Child No No   Sig: Take 1 capsule (100 mg total) by mouth 2 (two) times a day   famotidine (PEPCID) 40 MG tablet 3/1/2021 at Unknown time Child No Yes   Sig: Take 1 tablet (40 mg total) by mouth daily   glucose blood test strip  Child Yes Yes   Sig: by In Vitro route daily   lisinopril (ZESTRIL) 10 mg tablet 3/1/2021 at Unknown time Child No Yes Sig: Take 0 5 tablets (5 mg total) by mouth daily   metoprolol tartrate (LOPRESSOR) 25 mg tablet 3/1/2021 at Unknown time Child Yes Yes   Sig: Take 25 mg by mouth every 12 (twelve) hours   nystatin (MYCOSTATIN) powder Unknown at Unknown time Child No No   Sig: Apply topically 2 (two) times a day   pantoprazole (PROTONIX) 40 mg tablet Unknown at Unknown time Child No No   Sig: Take 1 tablet (40 mg total) by mouth daily in the early morning   polyethylene glycol (MIRALAX) 17 g packet Past Week at Unknown time Child No Yes   Sig: Take 17 g by mouth 2 (two) times a day   senna (SENOKOT) 8 6 mg 3/1/2021 at Unknown time Child No Yes   Sig: Take 2 tablets (17 2 mg total) by mouth daily at bedtime      Facility-Administered Medications: None       Past Medical History:   Diagnosis Date    Acute myocardial infarction of inferior wall ([facility] [address] ) 11/2011    inferiorwall; drug-eluting stent RCA    Chronic tension type headache     last assessed: 7/4/2012    Colon polyp     Developmental dysplasia of hip     bilateral    Diabetes mellitus ([facility] [address] )     Former smoker     quit 2011     Gout     last assessed: 8/31/2012    Hemorrhage of anus and rectum     last assessed: 8/12/2013    Hyperlipidemia     Hypertension     Internal hemorrhoids     Migraine     last assessed: 11/25/2013    Old MI (myocardial infarction) 2011    Pulmonary fibrosis (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    LUMBAR LAMINECTOMY  03/2010    decompressive more than 2 lumbar segments L4-S1        Family History   Problem Relation Age of Onset    Kidney failure Mother     Diabetes Mother     Hypertension Mother      I have reviewed and agree with the history as documented  E-Cigarette/Vaping     E-Cigarette/Vaping Substances     Social History     Tobacco Use    Smoking status: Former Smoker     Packs/day: 1 00     Years: 35 00     Pack years: 35 00     Quit date: 2011     Years since quittin 3    Smokeless tobacco: Never Used   Substance Use Topics    Alcohol use: No    Drug use: No       Review of Systems   Constitutional: Negative for chills, diaphoresis and fever  HENT: Negative for congestion and sore throat  Respiratory: Positive for shortness of breath  Negative for cough, wheezing and stridor  Cardiovascular: Negative for chest pain, palpitations and leg swelling  Gastrointestinal: Negative for abdominal pain, blood in stool, diarrhea, nausea and vomiting  Genitourinary: Negative for dysuria, frequency and urgency  Musculoskeletal: Negative for neck pain and neck stiffness  Skin: Negative for pallor and rash  Neurological: Negative for dizziness, syncope, weakness, light-headedness and headaches  All other systems reviewed and are negative  Physical Exam  Physical Exam  Vitals signs reviewed  Constitutional:       Comments: Cachectic   HENT:      Head: Normocephalic and atraumatic  Eyes:      Pupils: Pupils are equal, round, and reactive to light  Neck:      Musculoskeletal: Neck supple  Cardiovascular:      Rate and Rhythm: Normal rate and regular rhythm  Heart sounds: Normal heart sounds  Pulmonary:      Comments: Tachypnea and rales throughout  Abdominal:      General: Bowel sounds are normal       Palpations: Abdomen is soft  Tenderness: There is no abdominal tenderness  Musculoskeletal: Normal range of motion  Right lower leg: He exhibits no tenderness  No edema  Left lower leg: He exhibits no tenderness  No edema  Skin:     General: Skin is warm and dry  Capillary Refill: Capillary refill takes less than 2 seconds  Neurological:      General: No focal deficit present  Mental Status: He is alert and oriented to person, place, and time           Vital Signs  ED Triage Vitals   Temperature Pulse Respirations Blood Pressure SpO2   03/02/21 1240 03/02/21 1204 03/02/21 1204 03/02/21 1204 03/02/21 1204   98 2 °F (36 8 °C) (!) 119 20 135/70 100 %      Temp Source Heart Rate Source Patient Position - Orthostatic VS BP Location FiO2 (%)   03/02/21 1240 03/02/21 1204 03/02/21 1204 03/02/21 1204 --   Oral Monitor Lying Right arm       Pain Score       03/03/21 0249       No Pain           Vitals:    03/03/21 1454 03/03/21 2201 03/04/21 0645 03/04/21 1040   BP: 91/52 99/53 100/51 111/52   Pulse: (!) 106 103 92 72   Patient Position - Orthostatic VS: Lying Lying Lying Lying         Visual Acuity  Visual Acuity      Most Recent Value   L Pupil Size (mm)  3   R Pupil Size (mm)  3          ED Medications  Medications   aspirin chewable tablet 81 mg (81 mg Oral Given 3/4/21 0817)   atorvastatin (LIPITOR) tablet 40 mg (40 mg Oral Given 3/4/21 0817)   docusate sodium (COLACE) capsule 100 mg (100 mg Oral Given 3/4/21 0818)   famotidine (PEPCID) tablet 40 mg (40 mg Oral Given 3/4/21 0818)   metoprolol tartrate (LOPRESSOR) tablet 25 mg (25 mg Oral Not Given 3/4/21 0817)   nystatin (MYCOSTATIN) powder ( Topical Given 3/4/21 0819)   pantoprazole (PROTONIX) EC tablet 40 mg (40 mg Oral Given 3/4/21 0544)   polyethylene glycol (MIRALAX) packet 17 g (17 g Oral Given 3/4/21 0819)   senna (SENOKOT) tablet 17 2 mg (17 2 mg Oral Refused 3/3/21 2207)   ondansetron (ZOFRAN) injection 4 mg (4 mg Intravenous Given 3/3/21 0142)   acetaminophen (TYLENOL) tablet 650 mg (has no administration in time range)   heparin (porcine) subcutaneous injection 5,000 Units (5,000 Units Subcutaneous Given 3/4/21 0722)   carbamide peroxide (DEBROX) 6 5 % otic solution 5 drop (5 drops Both Ears Given 3/4/21 4855)   methylPREDNISolone sodium succinate (Solu-MEDROL) injection 40 mg (40 mg Intravenous Given 3/4/21 0819)   albuterol (PROVENTIL HFA,VENTOLIN HFA) inhaler 2 puff (2 puffs Inhalation Given 3/3/21 1812)   insulin lispro (HumaLOG) 100 units/mL subcutaneous injection 1-5 Units (1 Units Subcutaneous Given 3/4/21 1250)   insulin lispro (HumaLOG) 100 units/mL subcutaneous injection 1-5 Units (2 Units Subcutaneous Given 3/3/21 2210)   sodium chloride 0 9 % infusion (75 mL/hr Intravenous Rate/Dose Change 3/4/21 1418)   iohexol (OMNIPAQUE) 350 MG/ML injection (MULTI-DOSE) 85 mL (85 mL Intravenous Given 3/2/21 1520)   sodium chloride 0 9 % bolus 500 mL (0 mL Intravenous Stopped 3/3/21 0033)   sodium polystyrene (KAYEXALATE) powder 15 g (15 g Oral Given 3/3/21 1038)       Diagnostic Studies  Results Reviewed     Procedure Component Value Units Date/Time    Comprehensive metabolic panel [[id number]]  (Abnormal) Collected: 03/03/21 0527    Lab Status: Final result Specimen: Blood from Arm, Right Updated: 03/03/21 0617     Sodium 135 mmol/L      Potassium 5 7 mmol/L      Chloride 99 mmol/L      CO2 25 mmol/L      ANION GAP 11 mmol/L      BUN 51 mg/dL      Creatinine 0 80 mg/dL      Glucose 93 mg/dL      Calcium 9 7 mg/dL      Corrected Calcium 10 6 mg/dL       U/L      ALT 99 U/L      Alkaline Phosphatase 116 U/L      Total Protein 7 7 g/dL      Albumin 2 9 g/dL      Total Bilirubin 0 43 mg/dL      eGFR 94 ml/min/1 73sq m     Narrative:      [place] guidelines for Chronic Kidney Disease (CKD):     Stage 1 with normal or high GFR (GFR > 90 mL/min/1 73 square meters)    Stage 2 Mild CKD (GFR = 60-89 mL/min/1 73 square meters)    Stage 3A Moderate CKD (GFR = 45-59 mL/min/1 73 square meters)    Stage 3B Moderate CKD (GFR = 30-44 mL/min/1 73 square meters)    Stage 4 Severe CKD (GFR = 15-29 mL/min/1 73 square meters)    Stage 5 End Stage CKD (GFR <15 mL/min/1 73 square meters)  Note: GFR calculation is accurate only with a steady state creatinine Magnesium [[id number]]  (Normal) Collected: 03/03/21 0527    Lab Status: Final result Specimen: Blood from Arm, Right Updated: 03/03/21 0617     Magnesium 1 7 mg/dL     CBC (With Platelets) [[id number]]  (Abnormal) Collected: 03/03/21 0527    Lab Status: Final result Specimen: Blood from Arm, Right Updated: 03/03/21 0550     WBC 10 63 Thousand/uL      RBC 4 26 Million/uL      Hemoglobin 11 7 g/dL      Hematocrit 38 1 %      MCV 89 fL      MCH 27 5 pg      MCHC 30 7 g/dL      RDW 15 2 %      Platelets 088 Thousands/uL      MPV 11 5 fL     COVID19, Influenza A/B, RSV PCR, [facility] [[id number]]  (Normal) Collected: 03/02/21 1250    Lab Status: Final result Specimen: Nares from Nose Updated: 03/02/21 1441     SARS-CoV-2 Negative     INFLUENZA A PCR Negative     INFLUENZA B PCR Negative     RSV PCR Negative    Narrative: This test has been authorized by FDA under an EUA (Emergency Use Assay) for use by authorized laboratories  Clinical caution and judgement should be used with the interpretation of these results with consideration of the clinical impression and other laboratory testing  Testing reported as "Positive" or "Negative" has been proven to be accurate according to standard laboratory validation requirements  All testing is performed with control materials showing appropriate reactivity at standard intervals      Comprehensive metabolic panel [[id number]]  (Abnormal) Collected: 03/02/21 1404    Lab Status: Final result Specimen: Blood from Arm, Left Updated: 03/02/21 1433     Sodium 135 mmol/L      Potassium 5 2 mmol/L      Chloride 96 mmol/L      CO2 26 mmol/L      ANION GAP 13 mmol/L      BUN 59 mg/dL      Creatinine 0 89 mg/dL      Glucose 118 mg/dL      Calcium 10 5 mg/dL       U/L       U/L      Alkaline Phosphatase 145 U/L      Total Protein 9 6 g/dL      Albumin 3 7 g/dL      Total Bilirubin 0 47 mg/dL      eGFR 90 ml/min/1 73sq m     Narrative:      [place] guidelines for Chronic Kidney Disease (CKD):     Stage 1 with normal or high GFR (GFR > 90 mL/min/1 73 square meters)    Stage 2 Mild CKD (GFR = 60-89 mL/min/1 73 square meters)    Stage 3A Moderate CKD (GFR = 45-59 mL/min/1 73 square meters)    Stage 3B Moderate CKD (GFR = 30-44 mL/min/1 73 square meters)    Stage 4 Severe CKD (GFR = 15-29 mL/min/1 73 square meters)    Stage 5 End Stage CKD (GFR <15 mL/min/1 73 square meters)  Note: GFR calculation is accurate only with a steady state creatinine    NT-BNP PRO [[id number]]  (Abnormal) Collected: 03/02/21 1404    Lab Status: Final result Specimen: Blood from Arm, Left Updated: 03/02/21 1433     NT-proBNP 424 pg/mL     Troponin I [[id number]]  (Normal) Collected: 03/02/21 1250    Lab Status: Final result Specimen: Blood from Arm, Left Updated: 03/02/21 1341     Troponin I <0 02 ng/mL     D-Dimer [[id number]]  (Abnormal) Collected: 03/02/21 1250    Lab Status: Final result Specimen: Blood from Arm, Left Updated: 03/02/21 1336     D-Dimer, Quant 1 21 ug/ml FEU     CBC and differential [[id number]]  (Abnormal) Collected: 03/02/21 1250    Lab Status: Final result Specimen: Blood from Arm, Left Updated: 03/02/21 1303     WBC 9 84 Thousand/uL      RBC 4 85 Million/uL      Hemoglobin 13 3 g/dL      Hematocrit 43 4 %      MCV 90 fL      MCH 27 4 pg      MCHC 30 6 g/dL      RDW 15 7 %      MPV 11 7 fL      Platelets 222 Thousands/uL      nRBC 0 /100 WBCs      Neutrophils Relative 82 %      Immat GRANS % 0 %      Lymphocytes Relative 12 %      Monocytes Relative 5 %      Eosinophils Relative 1 %      Basophils Relative 0 %      Neutrophils Absolute 8 00 Thousands/µL      Immature Grans Absolute 0 02 Thousand/uL      Lymphocytes Absolute 1 18 Thousands/µL      Monocytes Absolute 0 49 Thousand/µL      Eosinophils Absolute 0 12 Thousand/µL      Basophils Absolute 0 03 Thousands/µL                  CTA ED chest PE study   Final Result by Kika Barriga MD (03/02 1787)      No evidence for pulmonary embolism  Redemonstrated findings of interstitial lung disease  Workstation performed: [id number]         XR chest 1 view portable   Final Result by Dorma Essex, MD (03/02 1450)      Changes consistent with known history of interstitial pulmonary fibrosis, similar to prior study  Simulated pneumothorax on the left felt to be related to skinfold  Would recommend follow-up frontal and lateral views of the chest when clinically    feasible for confirmation  The examination demonstrates a significant  finding and was documented as such in [facility] for liaison and referring practitioner immediate notification  Workstation performed: [id number]                    Procedures  Procedures         ED Course  ED Course as of Mar 04 1437   Tue Mar 02, 2021   1232 Case discussed with cardiologist   ECG reviewed by cardiologist   Does not think this meets STEMI criteria  Recommend serial troponins and EKG        1236 ECG shows rate of 101, sinus, right axis deviation, elevated ST segment into and slightly elevated in V6, new inverted T-waves 3 in AVF, peaked T-waves 1 and aVL, this is changed from previous EKG, case was discussed with Cardiology, does not meet STEMI criteria                                              MDM    Disposition  Final diagnoses:   Abnormal EKG   Hypoxia     Time reflects when diagnosis was documented in both MDM as applicable and the Disposition within this note     Time User Action Codes Description Comment    3/2/2021  4:42 PM Tadevosyan, Geraldine Add [R94 31] Abnormal EKG     3/2/2021  4:42 PM Tadevosyan, Geraldine Add [R09 02] Hypoxia     3/4/2021  1:35 PM Dago Mandujano Add [I25 10,  Z98 61] CAD S/P percutaneous coronary angioplasty     3/4/2021  1:35 PM Dago Mandujano Add [N18 2] CKD (chronic kidney disease) stage 2, GFR 60-89 ml/min     3/4/2021  1:35 PM Dago Mandujano V Add [J84 10] Pulmonary fibrosis determined by high resolution computed tomography [facility])       ED Disposition     ED Disposition Condition Date/Time Comment    Admit Berna Magana Mar 2, 2021  4:42 PM Case was discussed with medicine and the patient's admission status was agreed to be Admission Status: observation status to the service of Dr Mario Diaz   Follow-up Information    None         Current Discharge Medication List      CONTINUE these medications which have NOT CHANGED    Details   aspirin 81 MG tablet Take 2 tablets by mouth daily      atorvastatin (LIPITOR) 40 mg tablet TAKE 1 TABLET BY MOUTH EVERY DAY  Qty: 90 tablet, Refills: 1    Associated Diagnoses: Mixed hyperlipidemia      famotidine (PEPCID) 40 MG tablet Take 1 tablet (40 mg total) by mouth daily  Qty: 90 tablet, Refills: 1    Associated Diagnoses: Nausea;  Abdominal cramping      glucose blood test strip by In Vitro route daily      lisinopril (ZESTRIL) 10 mg tablet Take 0 5 tablets (5 mg total) by mouth daily  Qty: 90 tablet, Refills: 1    Associated Diagnoses: Benign essential hypertension      metoprolol tartrate (LOPRESSOR) 25 mg tablet Take 25 mg by mouth every 12 (twelve) hours      ONETOUCH DELICA LANCETS 00F MISC by Does not apply route daily      polyethylene glycol (MIRALAX) 17 g packet Take 17 g by mouth 2 (two) times a day  Qty:  , Refills: 0    Associated Diagnoses: Fecal impaction (HCC)      senna (SENOKOT) 8 6 mg Take 2 tablets (17 2 mg total) by mouth daily at bedtime  Qty:  , Refills: 0    Associated Diagnoses: Fecal impaction (HCC)      docusate sodium (COLACE) 100 mg capsule Take 1 capsule (100 mg total) by mouth 2 (two) times a day  Qty: 60 capsule, Refills: 1    Associated Diagnoses: Constipation, unspecified constipation type      nystatin (MYCOSTATIN) powder Apply topically 2 (two) times a day  Qty: 15 g, Refills: 0    Associated Diagnoses: Rash      pantoprazole (PROTONIX) 40 mg tablet Take 1 tablet (40 mg total) by mouth daily in the early morning  Qty:  , Refills: 0    Associated Diagnoses: Fecal impaction ([facility] [address] )           No discharge procedures on file      PDMP Review       Value Time User    PDMP Reviewed  Yes 11/11/2020 12:39 PM Ramin Clay MD          ED Provider  Electronically Signed by           Martell Rosales MD  03/04/21 [phone]

## 2021-03-04 NOTE — ASSESSMENT & PLAN NOTE
· Follows w/ Dr Rios   · Recent admission for similar sx-- overall discussions had during that hospitalization entailed chronicity and progression of dz  He was not felt to be candidate for steroids at that time   He has not felt to be a candidate for anti-fibrotics either given cachexia   · He was seen and evaluated by palliative care medicine during that admission-- has not had OP f/u

## 2021-03-04 NOTE — UTILIZATION REVIEW
Notification of Inpatient Admission/Inpatient Authorization Request   This is a Notification of Inpatient Admission for Beth  Be advised that this patient was admitted to our facility under Inpatient Status  Contact Alejandra Zimmer at [phone] for additional admission information  [facility]  DEDICATED [phone]  Patient Name:   Maria G Hardwick   YOB: 1955       [address]   [address]:   [address]  Tax ID: [id number]  NPI: [id number] Attending Provider/NPI:  Address:  Phone: Philip Marin Md [[id number]]  Same as the facility  [phone]   Place of Service Code: 24 Place of Service Name:  [address] [place] [address]   Start Date: 3/3/21 1412     Discharge Date & Time: No discharge date for patient encounter  Type of Admission: Inpatient Status Discharge Disposition   (if discharged): Released to SNF/TCU/SNU Facility   Patient Diagnoses: SOB (shortness of breath) [R06 02]  Abnormal EKG [R94 31]  Hypoxia [R09 02]     Orders: Admission Orders (From admission, onward)     Ordered        03/03/21 1412  Inpatient Admission  Once         03/02/21 1643  Place in Observation  Once                    Assigned Utilization Review Contact: Alejandra Zimmer  Utilization   Network Utilization Review Department  Phone: [phone]; Fax [phone]  Email: [email or web address]   ATTENTION PAYERS: Please call the assigned Utilization  directly with any questions or concerns ALL voicemails in the department are confidential  Send all requests for admission clinical reviews, approved or denied determinations and any other requests to dedicated fax number belonging to the campus where the patient is receiving treatment

## 2021-03-04 NOTE — PROGRESS NOTES
Progress Note - Chano Height 1955, 60 y o  male MRN: [id number]    Unit/Bed#: S -01 Encounter: [id number]    Primary Care Provider: Pantera Torres DO   Date and time admitted to hospital: 3/2/2021 11:59 AM        * Acute on chronic respiratory failure with hypoxia [facility])  Assessment & Plan  Hospitalized due to acute on chronic respiratory failure with hypoxia likely secondary to interstitial lung disease  COVID-19 negative  CTA PE study on 03/02/21 noted negative for PE  Currently O2 saturation 100% on 3 L nasal cannula  Currently not in acute respiratory distress  Hemodynamically stable  Plan is to continue IV Solu-Medrol 40 mg q 12 hours for now and likely taper to daily by tomorrow  Continue short-acting bronchodilators as needed  Declined lung transplant eval   Palliative care consult pending  [facility]  Assessment & Plan  Baseline creatinine was 0 80  Patient had CTA PE study with contrast on 03/02/2021  Now creatinine 1 93, BUN 71  Will start on IV normal saline 75 cc an hour hydration for now  Hold nephrotoxic agents  Consider nephrology consult if not improving or worsening  Severe protein-calorie malnutrition ([facility] )  Assessment & Plan  Malnutrition Findings:   Adult Malnutrition type: Acute illness(in the setting of chronic illness)  Adult Degree of Malnutrition: Other severe protein calorie malnutrition(related to catabolic illness, inadequate oral intake)    BMI Findings: Body mass index is 19 24 kg/m²  · Patient w/ unintentional weight loss-- appears to have lost nearly 20 lbs in the last 1 month   · Reports no appetite x1 week  · Has had GI work up recently w/ no organic cause of poor PO intake  · Continue w/ nutritional supplements  · Follow-up with palliative care consult  Elevated liver enzymes  Assessment & Plan  · Chronic, w/o abdominal pain   · Hepatitis panel negative in 11/2020  · LFTs Improving    · Patient follows with Gastroenterology outpatient  Pulmonary fibrosis determined by high resolution computed tomography [facility])  Assessment & Plan  · Follows w/ Dr Ilya Estrada   · Recent admission for similar sx-- overall discussions had during that hospitalization entailed chronicity and progression of dz  He was not felt to be candidate for steroids at that time  He has not felt to be a candidate for anti-fibrotics either given cachexia   · He was seen and evaluated by palliative care medicine during that admission-- has not had OP f/u      CKD (chronic kidney disease) stage 2, GFR 60-89 ml/min  Assessment & Plan  Lab Results   Component Value Date    EGFR 36 03/04/2021    EGFR 94 03/03/2021    EGFR 90 03/02/2021    CREATININE 1 93 (H) 03/04/2021    CREATININE 0 80 03/03/2021    CREATININE 0 89 03/02/2021     Acute kidney injury on CKD  Now creatinine 1 93  Had CTA with contrast on 03/2/2021  Hold/avoid nephrotoxic agents  IV fluids and monitoring renal function  Consider Nephrology consult if not improving or worsening  CAD S/P percutaneous coronary angioplasty  Assessment & Plan  · S/P NEISHA to RCA in 2006  · Admitted in November 2020 for NSTEMI, no acute obstruction noted-- no intervention and medical management pursued  · EKG changes today: ST elevated lead II, V6; inverted T wave in III, aVF; large T waves I, aVL-- these are new compared to prior EKGs  · ED D/W cardiology-- no STEMI alert as patient is not having active CP  · Maintain telemetry    Currently asymptomatic  Continue aspirin, statin, beta-blocker,  Outpatient follow-up  Hyperkalemia:  Now improving currently 5 1  Asymptomatic  Continue IV fluids in the settings of acute kidney injury  Monitor BMP  VTE Pharmacologic Prophylaxis:   Pharmacologic: Heparin    Patient Centered Rounds: I have performed bedside rounds with nursing staff today      Education and Discussions with Family / Patient:  Spoke with daughter Ashley Hwang over the phone at length, reports that his mother has been also hospitalized  Time Spent for Care: 30 minutes  More than 50% of total time spent on counseling and coordination of care as described above  Current Length of Stay: 1 day(s)    Current Patient Status: Inpatient   Certification Statement: The patient will continue to require additional inpatient hospital stay due to acute kidney injury, hypoxic failure  Discharge Plan:  Expected 48 hours  Code Status: Level 3 - DNAR and DNI      Subjective:   Afebrile, hemodynamically stable  O2 saturation % on 2-3 L  Appears at baseline  Chronically ill-appearing, acutely nontoxic appearing  Not in respiratory distress  Reports that he is mostly bed bound due to bilateral lower extremity weakness  Denies chest pain, dyspnea, nausea, vomiting,  no other events reported  Objective:     Vitals:   Temp (24hrs), Av 7 °F (36 5 °C), Min:97 5 °F (36 4 °C), Max:98 2 °F (36 8 °C)    Temp:  [97 5 °F (36 4 °C)-98 2 °F (36 8 °C)] 97 5 °F (36 4 °C)  HR:  [] 72  Resp:  [17-19] 19  BP: ()/(51-53) 111/52  SpO2:  [100 %] 100 %  Body mass index is 19 24 kg/m²  Input and Output Summary (last 24 hours): Intake/Output Summary (Last 24 hours) at 3/4/2021 1333  Last data filed at 3/4/2021 1100  Gross per 24 hour   Intake 650 ml   Output 100 ml   Net 550 ml       Physical Exam:     Physical Exam  Constitutional:       General: He is not in acute distress  Appearance: Normal appearance  He is not ill-appearing  Comments: Elderly, frail, cachectic male patient, acutely nontoxic appearing  , chronically ill-appearing  HENT:      Head: Normocephalic and atraumatic  Nose: No rhinorrhea  Eyes:      Extraocular Movements: Extraocular movements intact  Pupils: Pupils are equal, round, and reactive to light  Neck:      Musculoskeletal: Normal range of motion and neck supple  No neck rigidity     Cardiovascular:      Rate and Rhythm: Normal rate and regular rhythm  Pulses: Normal pulses  Heart sounds: Normal heart sounds  Pulmonary:      Effort: Pulmonary effort is normal  No respiratory distress  Breath sounds: Normal breath sounds  No stridor  Comments: Decreased breath sounds  Abdominal:      General: Bowel sounds are normal  There is no distension  Palpations: Abdomen is soft  Tenderness: There is no abdominal tenderness  Neurological:      General: No focal deficit present  Mental Status: He is alert and oriented to person, place, and time  Mental status is at baseline  Psychiatric:         Behavior: Behavior normal          Additional Data:     Labs:    Results from last 7 days   Lab Units 03/03/21  0527 03/02/21  1250   WBC Thousand/uL 10 63* 9 84   HEMOGLOBIN g/dL 11 7* 13 3   HEMATOCRIT % 38 1 43 4   PLATELETS Thousands/uL 289 366   NEUTROS PCT %  --  82*   LYMPHS PCT %  --  12*   MONOS PCT %  --  5   EOS PCT %  --  1     Results from last 7 days   Lab Units 03/04/21  0540  03/03/21  0527   SODIUM mmol/L 131*  --  135*   POTASSIUM mmol/L 5 1   < > 5 7*   CHLORIDE mmol/L 95*  --  99*   CO2 mmol/L 28  --  25   BUN mg/dL 71*  --  51*   CREATININE mg/dL 1 93*  --  0 80   ANION GAP mmol/L 8  --  11   CALCIUM mg/dL 9 3  --  9 7   ALBUMIN g/dL  --   --  2 9*   TOTAL BILIRUBIN mg/dL  --   --  0 43   ALK PHOS U/L  --   --  116   ALT U/L  --   --  99*   AST U/L  --   --  116*   GLUCOSE RANDOM mg/dL 189*  --  93    < > = values in this interval not displayed  Results from last 7 days   Lab Units 03/04/21  1042 03/04/21  0653 03/03/21  2055 03/03/21  1604   POC GLUCOSE mg/dl 194* 172* 282* 150*                   * I Have Reviewed All Lab Data Listed Above  * Additional Pertinent Lab Tests Reviewed:  All Labs Within Last 24 Hours Reviewed        Recent Cultures (last 7 days):           Last 24 Hours Medication List:   Current Facility-Administered Medications   Medication Dose Route Frequency Provider Last Rate    acetaminophen  650 mg Oral Q6H PRN Patrizia Shah, PA-C      albuterol  2 puff Inhalation Q4H PRN Ignacio Martinez, PA-C      aspirin  81 mg Oral Daily Patrizia Shah, PA-C      atorvastatin  40 mg Oral Daily Patrizia MERIDA [facility], PA-C      carbamide peroxide  5 drop Both Ears BID Rylee Case MD      docusate sodium  100 mg Oral BID Patrizia F[facility]AD [facility], EMPERATRIZ      famotidine  40 mg Oral Daily Patrizia FUAD [facility], PA-DAYLIN      heparin (porcine)  5,000 Units Subcutaneous Q8H [facility] Patrizia FUAD [facility], EMPERATRIZ      insulin lispro  1-5 Units Subcutaneous TID AC Rylee Case MD      insulin lispro  1-5 Units Subcutaneous HS Rylee Case MD      methylPREDNISolone sodium succinate  40 mg Intravenous Q12H [facility] Alise Valenciajael, EMPERATRIZ      metoprolol tartrate  25 mg Oral Q12H [facility] Patrizia FUAD Isaacs, PA-C      nystatin   Topical BID Patrizia [facility], PA-C      ondansetron  4 mg Intravenous Q6H PRN Patrizia FUDA Isaacs, PA-C      pantoprazole  40 mg Oral Early Morning Patrizia FUAD Shah, PA-C      polyethylene glycol  17 g Oral BID Patrizia [facility], PA-C      senna  17 2 mg Oral HS Patrizia FUAD [facility], PA-C      sodium chloride  100 mL/hr Intravenous Continuous Debra MD Miguelina          Today, Patient Was Seen By: Gilbert Rubin MD    ** Please Note: Dictation voice to text software may have been used in the creation of this document   **

## 2021-03-04 NOTE — MALNUTRITION/BMI
This medical record reflects one or more clinical indicators suggestive of malnutrition and/or morbid obesity  Malnutrition Findings:   Adult Malnutrition type: Acute illness(in the setting of chronic illness)  Adult Degree of Malnutrition: Other severe protein calorie malnutrition(related to catabolic illness, inadequate oral intake)  Malnutrition Characteristics: Fat loss, Muscle loss, Weight loss(as evidenced by 36 7% weight decrease x 1 year, loss of fat and muscle scapula, tricep  Currently treated with nutrition education, oral supplementation)    BMI Findings: Body mass index is 19 24 kg/m²  See Nutrition note dated 3/4/2021 for additional details  Completed nutrition assessment is viewable in the nutrition documentation

## 2021-03-04 NOTE — PLAN OF CARE
Problem: Nutrition/Hydration-ADULT  Goal: Nutrient/Hydration intake appropriate for improving, restoring or maintaining nutritional needs  Description: Monitor and assess patient's nutrition/hydration status for malnutrition  Collaborate with interdisciplinary team and initiate plan and interventions as ordered  Monitor patient's weight and dietary intake as ordered or per policy  Utilize nutrition screening tool and intervene as necessary  Determine patient's food preferences and provide high-protein, high-caloric foods as appropriate  INTERVENTIONS:  - Monitor oral intake, urinary output, labs, and treatment plans  - Assess nutrition and hydration status and recommend course of action  - Evaluate amount of meals eaten  - Assist patient with eating if necessary   - Allow adequate time for meals  - Recommend/ encourage appropriate diets, oral nutritional supplements, and vitamin/mineral supplements  - Order, calculate, and assess calorie counts as needed  - Recommend, monitor, and adjust tube feedings and TPN/PPN based on assessed needs  - Assess need for intravenous fluids  - Provide specific nutrition/hydration education as appropriate  - Include patient/family/caregiver in decisions related to nutrition  Outcome: Not Progressing     Problem: Potential for Falls  Goal: Patient will remain free of falls  Description: INTERVENTIONS:  - Assess patient frequently for physical needs  -  Identify cognitive and physical deficits and behaviors that affect risk of falls    -  [place] fall precautions as indicated by assessment   - Educate patient/family on patient safety including physical limitations  - Instruct patient to call for assistance with activity based on assessment  - Modify environment to reduce risk of injury  - Consider OT/PT consult to assist with strengthening/mobility  Outcome: Progressing     Problem: Prexisting or High Potential for Compromised Skin Integrity  Goal: Skin integrity is maintained or improved  Description: INTERVENTIONS:  - Identify patients at risk for skin breakdown  - Assess and monitor skin integrity  - Assess and monitor nutrition and hydration status  - Monitor labs   - Assess for incontinence   - Turn and reposition patient  - Assist with mobility/ambulation  - Relieve pressure over bony prominences  - Avoid friction and shearing  - Provide appropriate hygiene as needed including keeping skin clean and dry  - Evaluate need for skin moisturizer/barrier cream  - Collaborate with interdisciplinary team   - Patient/family teaching  - Consider wound care consult   Outcome: Progressing     Problem: PAIN - ADULT  Goal: Verbalizes/displays adequate comfort level or baseline comfort level  Description: Interventions:  - Encourage patient to monitor pain and request assistance  - Assess pain using appropriate pain scale  - Administer analgesics based on type and severity of pain and evaluate response  - Implement non-pharmacological measures as appropriate and evaluate response  - Consider cultural and social influences on pain and pain management  - Notify physician/advanced practitioner if interventions unsuccessful or patient reports new pain  Outcome: Progressing     Problem: INFECTION - ADULT  Goal: Absence or prevention of progression during hospitalization  Description: INTERVENTIONS:  - Assess and monitor for signs and symptoms of infection  - Monitor lab/diagnostic results  - Monitor all insertion sites, i e  indwelling lines, tubes, and drains  - Monitor endotracheal if appropriate and nasal secretions for changes in amount and color  - [place] appropriate cooling/warming therapies per order  - Administer medications as ordered  - Instruct and encourage patient and family to use good hand hygiene technique  - Identify and instruct in appropriate isolation precautions for identified infection/condition  Outcome: Progressing     Problem: SAFETY ADULT  Goal: Patient will remain free of falls  Description: INTERVENTIONS:  - Assess patient frequently for physical needs  -  Identify cognitive and physical deficits and behaviors that affect risk of falls    -  [place] fall precautions as indicated by assessment   - Educate patient/family on patient safety including physical limitations  - Instruct patient to call for assistance with activity based on assessment  - Modify environment to reduce risk of injury  - Consider OT/PT consult to assist with strengthening/mobility  Outcome: Progressing     Problem: RESPIRATORY - ADULT  Goal: Achieves optimal ventilation and oxygenation  Description: INTERVENTIONS:  - Assess for changes in respiratory status  - Assess for changes in mentation and behavior  - Position to facilitate oxygenation and minimize respiratory effort  - Oxygen administered by appropriate delivery if ordered  - Initiate smoking cessation education as indicated  - Encourage broncho-pulmonary hygiene including cough, deep breathe, Incentive Spirometry  - Assess the need for suctioning and aspirate as needed  - Assess and instruct to report SOB or any respiratory difficulty  - Respiratory Therapy support as indicated  Outcome: Progressing

## 2021-03-04 NOTE — UTILIZATION REVIEW
Continued Stay Review    Date: 3/5                         Current Patient Class: IP  Current Level of Care: MS    HPI:65 y o  male  With hx pulmonary fibrosis with baseline Benjieège@[employer] admitted on 3/2 as OBS converted to Inpatient 3/3 with acute on chronic hypoxic respiratory failure likely 2/2 interstitial lung disease requiring IV steroids  CTA -PE study neg 3/2  Assessment/Plan:3/5  Pt remains on O2 -3L with sat of 100%, plan to continue IV steroids q12 h with likely taper to daily 3/5  Pt with decreased breath sounds, not in respiratory distress  Will continue short-acting bronchodilators as needed  Pt declined lung transplant eval and palliative care consult pending  Pulmonology continues to be involve in pts care  Pts creat elevated after CTA PE study with contrast,pt started on IVF-NSS @75 ml/hr  Baseline creat 0 80  Will hold nephrotoxics,monitor renal function and consult nephrology if persists /worsens  Hyperkalemia improving, will monitor BMP     Pertinent Labs/Diagnostic Results:   Results from last 7 days   Lab Units 03/02/21  1250   SARS-COV-2  Negative     Results from last 7 days   Lab Units 03/03/21  0527 03/02/21  1250   WBC Thousand/uL 10 63* 9 84   HEMOGLOBIN g/dL 11 7* 13 3   HEMATOCRIT % 38 1 43 4   PLATELETS Thousands/uL 289 366   NEUTROS ABS Thousands/µL  --  8 00*         Results from last 7 days   Lab Units 03/04/21  0540 03/03/21  1623 03/03/21  0527 03/02/21  1404   SODIUM mmol/L 131*  --  135* 135*   POTASSIUM mmol/L 5 1 6 0* 5 7* 5 2   CHLORIDE mmol/L 95*  --  99* 96*   CO2 mmol/L 28  --  25 26   ANION GAP mmol/L 8  --  11 13   BUN mg/dL 71*  --  51* 59*   CREATININE mg/dL 1 93*  --  0 80 0 89   EGFR ml/min/1 73sq m 36  --  94 90   CALCIUM mg/dL 9 3  --  9 7 10 5*   MAGNESIUM mg/dL  --   --  1 7  --      Results from last 7 days   Lab Units 03/03/21  0527 03/02/21  1404   AST U/L 116* 156*   ALT U/L 99* 136*   ALK PHOS U/L 116 145*   TOTAL PROTEIN g/dL 7 7 9 6*   ALBUMIN g/dL 2 9* 3  7   TOTAL BILIRUBIN mg/dL 0 43 0 47     Results from last 7 days   Lab Units 03/04/21  1042 03/04/21  0653 03/03/21  2055 03/03/21  1604   POC GLUCOSE mg/dl 194* 172* 282* 150*     Results from last 7 days   Lab Units 03/04/21  0540 03/03/21  0527 03/02/21  1404   GLUCOSE RANDOM mg/dL 189* 93 118           Results from last 7 days   Lab Units 03/02/21  1250   TROPONIN I ng/mL <0 02     Results from last 7 days   Lab Units 03/02/21  1250   D-DIMER QUANTITATIVE ug/ml FEU 1 21*           Results from last 7 days   Lab Units 03/02/21  1404   NT-PRO BNP pg/mL 424*               Results from last 7 days   Lab Units 03/02/21  1250   INFLUENZA A PCR  Negative   INFLUENZA B PCR  Negative   RSV PCR  Negative             Vital Signs:   Date/Time  Temp  Pulse  Resp  BP  MAP (mmHg)  SpO2  Calculated FIO2 (%) - Nasal Cannula  O2 Flow Rate (L/min)  Nasal Cannula O2 Flow Rate (L/min)  O2 Device  O2 Interface Device  Patient Position - Orthostatic VS   03/04/21 1457  97 8 °F (36 6 °C)  91  18  122/59  85  100 %  --  --  --  Nasal cannula  --  Lying   03/04/21 1040  97 5 °F (36 4 °C)  72  19  111/52  --  100 %  --  --  --  --  --  Lying   03/04/21 0645  97 5 °F (36 4 °C)  92  19  100/51  --  100 %  --  --  --  --  --  Lying   03/03/21 2201  98 2 °F (36 8 °C)  103  18  99/53  --  100 %  32  --  3 L/min  Nasal cannula  --  Lying   03/03/21 1454  97 6 °F (36 4 °C)  106Abnormal   17  91/52  66  100 %  --  --  --  None (Room air)  --  Lying         Medications:   Scheduled Medications:  aspirin, 81 mg, Oral, Daily  atorvastatin, 40 mg, Oral, Daily  carbamide peroxide, 5 drop, Both Ears, BID  docusate sodium, 100 mg, Oral, BID  famotidine, 40 mg, Oral, Daily  heparin (porcine), 5,000 Units, Subcutaneous, Q8H HERNANDEZ  insulin lispro, 1-5 Units, Subcutaneous, TID AC  insulin lispro, 1-5 Units, Subcutaneous, HS  methylPREDNISolone sodium succinate, 40 mg, Intravenous, Q12H HERNANDEZ  metoprolol tartrate, 25 mg, Oral, Q12H [facility]  nystatin, , Topical, BID  pantoprazole, 40 mg, Oral, Early Morning  polyethylene glycol, 17 g, Oral, BID  senna, 17 2 mg, Oral, HS      Continuous IV Infusions:  sodium chloride, 75 mL/hr, Intravenous, Continuous      PRN Meds:  acetaminophen, 650 mg, Oral, Q6H PRN  albuterol, 2 puff, Inhalation, Q4H PRN x1 3/3  ondansetron, 4 mg, Intravenous, Q6H PRN        Discharge Plan: TBD  Network Utilization Review Department  ATTENTION: Please call with any questions or concerns to [phone] and carefully listen to the prompts so that you are directed to the right person  All voicemails are confidential   Sly Del Rosario all requests for admission clinical reviews, approved or denied determinations and any other requests to dedicated fax number below belonging to the campus where the patient is receiving treatment   List of dedicated fax numbers for the Facilities:  [address] DENIALS (Administrative/Medical Necessity) [phone]   [address] (Maternity/NICU/Pediatrics) [phone]   [address] [place] [address] ([facility]) [address] [place] [address] [address] [place] [address] [phone]

## 2021-03-05 PROBLEM — N17.9 ACUTE RENAL FAILURE SUPERIMPOSED ON STAGE 2 CHRONIC KIDNEY DISEASE (HCC): Status: ACTIVE | Noted: 2019-08-27

## 2021-03-05 PROBLEM — E87.5 HYPERKALEMIA: Status: ACTIVE | Noted: 2021-01-01

## 2021-03-05 PROBLEM — E87.1 HYPONATREMIA: Status: ACTIVE | Noted: 2021-01-01

## 2021-03-05 NOTE — ASSESSMENT & PLAN NOTE
· Follows w/ Dr Brittni Pressley   · Recent admission for similar sx-- overall discussions had during that hospitalization entailed chronicity and progression of dz  He was not felt to be candidate for steroids at that time  He has not felt to be a candidate for anti-fibrotics either given cachexia   · He was seen and evaluated by palliative care medicine during that admission-- has not had OP f/u   · Palliative care doctor on board:  Continue disease directed care with limit of DNAR+DNI  Started Remeron for insomnia and anorexia  · IV Solu-Medrol was switched to prednisone to be tapered every 3 days and decrease by 10 mg   · Patient may have short-acting bronchodilators as needed  · Declined lung transplant evaluation

## 2021-03-05 NOTE — PROGRESS NOTES
Progress notes - Pulmonary Medicine   Vinie Band 60 y o  male MRN: [id number]  Unit/Bed#: S -01      Assessment:  Acute on chronic hypoxic respiratory failure with underlying ILD secondary to occupational exposure  Has interstitial lung disease who presents with failure to thrive weight loss and shortness of breath for the last week  Patient was noted to be hypoxic on his baseline 3 L on admission    PFT was ordered outpatient was not completed    · Currently in 3L NC  · maintain saturations greater than 88%  · Switch  solumedrol 40mg IV to once a day and will taper to prednisone PO danie and will taper every 3 days and decrease by 10mg   · Patient may use short-acting bronchodilators as needed  · Declined lung transplant eval  · Would benefit from palliative care follow-up    Interstitial Lung Disease  history for occupational exposures of graphite, worked in strip mine    Chest CT:No evidence for pulmonary embolism  Chronic findings of interstitial lung disease  · Declined lung transplant eval and not a good candidate  · IV steroids   · Continued smoking cessation  · Continue oxygen  · Consider anti-fibrotic as outpatient  · Palliative care per primary team    BJ with hyperkalemia    Recent Labs     03/03/21  1623 03/04/21  0540 03/05/21  0604   K 6 0* 5 1 5 8*         Recent Labs     03/03/21  0527 03/04/21  0540 03/05/21  0604   CREATININE 0 80 1 93* 2 50*   EGFR 94 36 26     Likely prerenal vs contrast induced from recent CTA  · Manage by primary  · IV fluids 100ml / hr, nephrology consult per primary team  · Low K diet    Former smoker   Smoked 35 pack years and quit 2011      Subjective/Objective       Subjective: Patient reported no changes from breathing but wanted to try how he will do with once a day steroid    Objective:     Vitals: Blood pressure 108/56, pulse 70, temperature 97 8 °F (36 6 °C), temperature source Oral, resp  rate 18, weight 43 2 kg (95 lb 3 8 oz), SpO2 99 %  ,Body mass index is 19 24 kg/m²  Intake/Output Summary (Last 24 hours) at 3/5/2021 0926  Last data filed at 3/5/2021 0801  Gross per 24 hour   Intake 2199 08 ml   Output 500 ml   Net 1699 08 ml       Invasive Devices     Peripheral Intravenous Line            Peripheral IV 03/02/21 Left Antecubital 2 days              Physical Exam  Vitals signs and nursing note reviewed  Constitutional:       General: He is not in acute distress  Appearance: Normal appearance  He is well-developed  He is not diaphoretic  HENT:      Head: Normocephalic and atraumatic  Nose: Nose normal       Mouth/Throat:      Mouth: Mucous membranes are moist    Eyes:      General: No scleral icterus  Right eye: No discharge  Left eye: No discharge  Conjunctiva/sclera: Conjunctivae normal    Neck:      Musculoskeletal: Normal range of motion  Trachea: No tracheal deviation  Cardiovascular:      Rate and Rhythm: Normal rate and regular rhythm  Pulses: Normal pulses  Heart sounds: Normal heart sounds  No murmur  No friction rub  No gallop  Pulmonary:      Effort: Pulmonary effort is normal  No respiratory distress  Breath sounds: No stridor  Comments: Diminished lung sounds crackles in bases  Abdominal:      General: There is no distension  Tenderness: There is no guarding  Musculoskeletal: Normal range of motion  General: No tenderness, deformity or signs of injury  Right lower leg: No edema  Left lower leg: No edema  Skin:     General: Skin is warm and dry  Coloration: Skin is not pale  Findings: No erythema or rash  Neurological:      General: No focal deficit present  Mental Status: He is alert and oriented to person, place, and time  Mental status is at baseline  Cranial Nerves: No cranial nerve deficit  Motor: Weakness (chronic problem hasnt walked in years) present  No abnormal muscle tone     Psychiatric:         Behavior: Behavior normal          Thought Content: Thought content normal          Judgment: Judgment normal        Lab, Imaging and other studies: I have personally reviewed pertinent reports

## 2021-03-05 NOTE — ASSESSMENT & PLAN NOTE
· Mild  · Nephrologist on board  · IV fluids with normal saline  · IV Lasix  · Kayexalate  · Monitor

## 2021-03-05 NOTE — ASSESSMENT & PLAN NOTE
Malnutrition Findings:   Adult Malnutrition type: Acute illness(in the setting of chronic illness)  Adult Degree of Malnutrition: Other severe protein calorie malnutrition(related to catabolic illness, inadequate oral intake)    BMI Findings: Body mass index is 19 24 kg/m²     · Patient w/ unintentional weight loss-- appears to have lost nearly 20 lbs in the last 1 month   · Reports no appetite x1 week  · Has had GI work up recently w/ no organic cause of poor PO intake  · Continue w/ nutritional supplements  · Started Remeron as per palliative Care, for patient's anorexia

## 2021-03-05 NOTE — ASSESSMENT & PLAN NOTE
· S/P NEISHA to RCA in 2006  · Admitted in November 2020 for NSTEMI, no acute obstruction noted-- no intervention and medical management pursued  · Status post EKG changes: ST elevated lead II, V6; inverted T wave in III, aVF; large T waves I, aVL-- these are new compared to prior EKGs  · ED D/W cardiology-- no STEMI alert as patient is not having active CP    · Maintain telemetry  · Serial EKG and troponins (repeat EKG in ED unchanged from arrival EKG)

## 2021-03-05 NOTE — CONSULTS
Consultation - Palliative and Supportive Care   Chano Height 60 y o  male [id number]    Assessment:  Patient Active Problem List   Diagnosis    Arteriosclerotic cardiovascular disease (ASCVD)    Benign essential hypertension    CAD S/P percutaneous coronary angioplasty    Colon, diverticulosis    Type 2 diabetes mellitus without complication, without long-term current use of insulin (HCC)    Hip pain, bilateral    History of acute inferior wall MI    Hyperlipidemia    Osteoarthritis of knee    Small stature    Spinal stenosis    Microalbuminuria due to type 2 diabetes mellitus (HCC)    CKD (chronic kidney disease) stage 2, GFR 60-89 ml/min    Pulmonary fibrosis determined by high resolution computed tomography ([facility])    Hypertension    Congenital bowing legs    Elevated liver enzymes    Chronic respiratory failure with hypoxia ([address] )    Former smoker    NSTEMI (non-ST elevated myocardial infarction) [facility]    Epigastric pain    Anemia    Unintentional weight loss    Pulmonary insufficiency    Ambulatory dysfunction    Severe protein-calorie malnutrition (HCC)    Physical deconditioning    Acute on chronic respiratory failure with hypoxia (HCC)    BJ (acute kidney injury) ([facility]    Hyperkalemia    Hyponatremia   Goals of care counseling    Goals:  Level 3 code status  Disease focused care  Will continue discussions regarding [address] as patient's clinical presentation evolves  Encouraged follow up with Palliative Medicine on an outpatient basis after discharge for continued symptom management  Wishes to return to the hospital as needed for treatment of all medical condition and acute issues that could arise  Plan:  Symptom management:  Difficulty sleeping/decreased appetite/depression  Start Remeron 7 5mg PO once daily @ hs     Pain  Continue Acetaminophen 650mg PO Q6H PRN    Social support:   Time spent providing supportive listening        Worried about wife who is undergoing brain surgery today   Lives with wife and daughter              I have reviewed the patient's controlled substance dispensing history in the Prescription Drug Monitoring Program in compliance with the [facility] regulations before prescribing any controlled substances  Last refills  No opioid or benzo refills in [place] over past year  Decisional apparatus:  Patient is competent on my exam today  If competence is lost, patient's substitute decision maker would default to spouse Harshil Solomon by [place]  Family: Spouse Margarette Shah, daughter Chanda Abarca Directive / Living Will / POLST: none    We appreciate the invitation to be involved in this patient's care  We will continue to follow throughout this hospitalization  Please do not hesitate to reach our on call provider through our clinic answering service at  should you have acute symptom control concerns  [facility] Service: [phone]  You can find me on [employer]! IDENTIFICATION:  Inpatient consult to Palliative Care  Consult performed by: CRISTOBAL Sherman  Consult ordered by: Krista Morales MD      Physician Requesting Consult: Krista Morales MD  Reason for Consult / Principal Problem: GOC counseling and SM secondary to respiratory failure and pulmonary fibrosis  History of Present Illness:  Junaid Cummins is a 60 y o  male who presents with a palliative diagnosis of hypoxic respiratory failure with 2L O2 at baseline and pulmonary fibrosis who presents with worsening shortness of breath and progressive ambulatory dysfunction/weight loss  Patient recently admitted in October 2020 and then again in February 2021 for the same with imaging suggestive of underlying fibrosis  This is his 4th admission in 6 months  Palliative Medicine has been consulted to assist with symptom management and goals of care counseling during this admission  Patient is seen lying in bed  He is comfortable and denies pain  He denies SOB and cough  Denies N/V/D/C  He is concerned about his decreased appetite  He does not feel hungry and has not desire to eat  Also reports difficulty sleeping at times  Falls asleep but wakes up frequently, not every night but often  Discussed expected disease trajectory and prognosis  Introduced disease focused care versus comfort care and hospice  Patient spouse is undergoing brain surgery today for a tumor and he has a birthday in 2 weeks  Needs to keep fighteing to live for his family  Wants to continue to return to the hospital for acute medical conditions and treatment of chronic conditions      ROS  All other systems are negative    Past Medical History:   Diagnosis Date    Acute myocardial infarction of inferior wall ([facility] ) 11/2011    inferiorwall; drug-eluting stent RCA    Chronic tension type headache     last assessed: 7/4/2012    Colon polyp     Developmental dysplasia of hip     bilateral    Diabetes mellitus [facility] )     Former smoker     quit 2011     Gout     last assessed: 8/31/2012    Hemorrhage of anus and rectum     last assessed: 8/12/2013    Hyperlipidemia     Hypertension     Internal hemorrhoids     Migraine     last assessed: 11/25/2013    Old MI (myocardial infarction) 2011    Pulmonary fibrosis ([facility] )     Renal insufficiency syndrome     last assessed: 10/22/2014     Past Surgical History:   Procedure Laterality Date    CARDIAC CATHETERIZATION      COLONOSCOPY  05/2012    int hem, dirverticulosis Dr Moffett Olp Bilateral      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    LUMBAR LAMINECTOMY  03/2010    decompressive more than 2 lumbar segments L4-S1      Social History     Socioeconomic History    Marital status: /Civil Union     Spouse name: Not on file    Number of children: 2    Years of education: 7 years completed    Highest education level: Not on file   Occupational History    Occupation: castillo   Social Needs    Financial resource strain: Not on file    Food insecurity     Worry: Not on file     Inability: Not on file   [employer] needs     Medical: Not on file     Non-medical: Not on file   Tobacco Use    Smoking status: Former Smoker     Packs/day: 1 00     Years: 35 00     Pack years: 35 00     Quit date: 2011     Years since quittin 3    Smokeless tobacco: Never Used   Substance and Sexual Activity    Alcohol use: No    Drug use: No    Sexual activity: Not on file   Lifestyle    Physical activity     Days per week: Not on file     Minutes per session: Not on file    Stress: Not on file   Relationships    Social connections     Talks on phone: Not on file     Gets together: Not on file     Attends Sikhism service: Not on file     Active member of club or organization: Not on file     Attends meetings of clubs or organizations: Not on file     Relationship status: Not on file    Intimate partner violence     Fear of current or ex partner: Not on file     Emotionally abused: Not on file     Physically abused: Not on file     Forced sexual activity: Not on file   Other Topics Concern    Not on file   Social History Narrative    Not on file     Family History   Problem Relation Age of Onset    Kidney failure Mother     Diabetes Mother     Hypertension Mother      Medications:  all current active meds have been reviewed    No Known Allergies    Objective:  /59 (BP Location: Left arm)   Pulse 91   Temp 97 8 °F (36 6 °C) (Oral)   Resp 18   Wt 43 2 kg (95 lb 3 8 oz)   SpO2 100%   BMI 19 24 kg/m²   Physical Exam:  Constitutional: Appears thin and frail  In no acute distress  Head: Normocephalic and atraumatic  Eyes: EOM are normal  No ocular discharge  No scleral icterus  Neck: no visible adenopathy or masses  Respiratory: Effort normal  No stridor   No respiratory distress  Wearing O2 via NC  Gastrointestinal: No abdominal distension  Musculoskeletal: No edema  muscle wasting and fat loss present,   Neurological: Alert, oriented and appropriately conversant  Skin: Dry, no diaphoresis  Very pale  Psychiatric: Displays a normal mood and affect  Behavior, judgement and thought content appear normal      Lab Results:   I have personally reviewed pertinent labs  , CBC:   Lab Results   Component Value Date    WBC 10 24 (H) 03/05/2021    HGB 10 0 (L) 03/05/2021    HCT 31 6 (L) 03/05/2021    MCV 87 03/05/2021     03/05/2021    MCH 27 4 03/05/2021    MCHC 31 6 03/05/2021    RDW 15 1 03/05/2021    MPV 11 4 03/05/2021   , CMP:   Lab Results   Component Value Date    SODIUM 129 (L) 03/05/2021    K 5 8 (H) 03/05/2021    CL 95 (L) 03/05/2021    CO2 28 03/05/2021    BUN 82 (H) 03/05/2021    CREATININE 2 50 (H) 03/05/2021    CALCIUM 8 3 03/05/2021    EGFR 26 03/05/2021     Counseling / Coordination of Care  Total floor / unit time spent today 80 minutes  Greater than 50% of total time was spent with the patient and / or family counseling and / or coordination of care  A description of the counseling / coordination of care: provided medical updates, discussed palliative care, discussed hospice care, determined competency, determined goals of care, determined POA, determined social/family support, discussed plans of care, discussed symptom management, provided psychosocial support         Ezra Grande, [address] [place] [address]

## 2021-03-05 NOTE — ASSESSMENT & PLAN NOTE
Due to patient's interstitial lung disease  Patient continues to complain of shortness of breath, recurrent worsening episodes  Discussed with the pulmonology  Status post IV steroid  Now on oral prednisone to be tapered every 3 days  Continue supplemental oxygen  Continue to monitor inpatient

## 2021-03-05 NOTE — PROGRESS NOTES
Progress Note - Ameena Avendaño 1955, 60 y o  male MRN: [id number]    Unit/Bed#: S -01 Encounter: [id number]    Primary Care Provider: Jere Diehl DO   Date and time admitted to hospital: 3/2/2021 11:59 AM        * Acute on chronic respiratory failure with hypoxia [facility])  Assessment & Plan  Due to patient's interstitial lung disease  Patient continues to complain of shortness of breath, recurrent worsening episodes  Discussed with the pulmonology  Status post IV steroid  Now on oral prednisone to be tapered every 3 days  Continue supplemental oxygen  Continue to monitor inpatient  Pulmonary fibrosis determined by high resolution computed tomography [facility])  Assessment & Plan  · Follows w/ Dr Malinda Jones   · Recent admission for similar sx-- overall discussions had during that hospitalization entailed chronicity and progression of dz  He was not felt to be candidate for steroids at that time  He has not felt to be a candidate for anti-fibrotics either given cachexia   · He was seen and evaluated by palliative care medicine during that admission-- has not had OP f/u   · Palliative care doctor on board:  Continue disease directed care with limit of DNAR+DNI  Started Remeron for insomnia and anorexia  · IV Solu-Medrol was switched to prednisone to be tapered every 3 days and decrease by 10 mg   · Patient may have short-acting bronchodilators as needed  · Declined lung transplant evaluation  Acute renal failure superimposed on stage 2 chronic kidney disease ([facility] )  Assessment & Plan  · Patient is on acute kidney injury protocol  · Nephrologist on board  · IV fluids  · Monitor kidney function  · Monitor input and output  · Avoid nephrotoxins  · Avoid hypotension  · PVR measured:  None  Hyponatremia  Assessment & Plan  · Nephrologist on board  · IV fluids with normal saline  · IV Lasix  · Monitor  Hyperkalemia  Assessment & Plan  · Mild  · Nephrologist on board    · IV fluids with normal saline  · IV Lasix  · Kayexalate  · Monitor  Severe protein-calorie malnutrition ([facility] )  Assessment & Plan  Malnutrition Findings:   Adult Malnutrition type: Acute illness(in the setting of chronic illness)  Adult Degree of Malnutrition: Other severe protein calorie malnutrition(related to catabolic illness, inadequate oral intake)    BMI Findings: Body mass index is 19 24 kg/m²  · Patient w/ unintentional weight loss-- appears to have lost nearly 20 lbs in the last 1 month   · Reports no appetite x1 week  · Has had GI work up recently w/ no organic cause of poor PO intake  · Continue w/ nutritional supplements  · Started Remeron as per palliative Care, for patient's anorexia    Elevated liver enzymes  Assessment & Plan  · Chronic, w/o abdominal pain     CAD S/P percutaneous coronary angioplasty  Assessment & Plan  · S/P NEISHA to RCA in 2006  · Admitted in November 2020 for NSTEMI, no acute obstruction noted-- no intervention and medical management pursued  · Status post EKG changes: ST elevated lead II, V6; inverted T wave in III, aVF; large T waves I, aVL-- these are new compared to prior EKGs  · ED D/W cardiology-- no STEMI alert as patient is not having active CP  · Maintain telemetry  · Serial EKG and troponins (repeat EKG in ED unchanged from arrival EKG)       VTE Pharmacologic Prophylaxis:   Pharmacologic: Heparin  Mechanical VTE Prophylaxis in Place: Yes    Patient Centered Rounds: I have performed bedside rounds with nursing staff today  Discussions with Specialists or Other Care Team Provider:  Case management  Pulmonary resident  Nephrologist     Education and Discussions with Family / Patient:  I discussed our findings, management and plans to the patient  I answered questions and concerns  He is okay with the management and plans  I offered to call patient's family, but patient declined  Time Spent for Care: 30 minutes    More than 50% of total time spent on counseling and coordination of care as described above  Current Length of Stay: 2 day(s)    Current Patient Status: Inpatient   Certification Statement: The patient will continue to require additional inpatient hospital stay due to Above findings and plans  Discharge Plan:  None yet  Code Status: Level 3 - DNAR and DNI      Subjective:   Patient told me that his doing fine  No significant shortness of breath at this point  No pains  No complaints  Objective:     Vitals:   Temp (24hrs), Av 7 °F (36 5 °C), Min:97 5 °F (36 4 °C), Max:97 8 °F (36 6 °C)    Temp:  [97 5 °F (36 4 °C)-97 8 °F (36 6 °C)] 97 5 °F (36 4 °C)  HR:  [65-70] 65  Resp:  [18] 18  BP: ()/(55-57) 99/55  SpO2:  [97 %-100 %] 97 %  Body mass index is 19 24 kg/m²  Input and Output Summary (last 24 hours): Intake/Output Summary (Last 24 hours) at 3/5/2021 1709  Last data filed at 3/5/2021 1622  Gross per 24 hour   Intake 2182 ml   Output 500 ml   Net 1682 ml       Physical Exam:     Physical Exam  Vitals signs and nursing note reviewed  Constitutional:       General: He is not in acute distress  Appearance: He is ill-appearing  He is not toxic-appearing or diaphoretic  Comments: Cachectic  Cardiovascular:      Rate and Rhythm: Normal rate and regular rhythm  Heart sounds: Normal heart sounds  Pulmonary:      Effort: Pulmonary effort is normal  No respiratory distress  Breath sounds: No stridor  No wheezing, rhonchi or rales  Comments: Decreased breath sounds bilaterally  Abdominal:      General: Abdomen is flat  Bowel sounds are normal  There is no distension  Palpations: Abdomen is soft  Tenderness: There is no abdominal tenderness  Musculoskeletal:      Right lower leg: No edema  Left lower leg: No edema  Skin:     General: Skin is warm  Coloration: Skin is not pale  Findings: No erythema or rash  Neurological:      General: No focal deficit present  Mental Status: He is alert  Psychiatric:         Mood and Affect: Mood normal          Behavior: Behavior normal          Thought Content: Thought content normal               Additional Data:     Labs:    Results from last 7 days   Lab Units 03/05/21  0604  03/02/21  1250   WBC Thousand/uL 10 24*   < > 9 84   HEMOGLOBIN g/dL 10 0*   < > 13 3   HEMATOCRIT % 31 6*   < > 43 4   PLATELETS Thousands/uL 259   < > 366   NEUTROS PCT %  --   --  82*   LYMPHS PCT %  --   --  12*   MONOS PCT %  --   --  5   EOS PCT %  --   --  1    < > = values in this interval not displayed  Results from last 7 days   Lab Units 03/05/21  1616  03/03/21  0527   POTASSIUM mmol/L 5 2   < > 5 7*   CHLORIDE mmol/L 98*   < > 99*   CO2 mmol/L 28   < > 25   BUN mg/dL 86*   < > 51*   CREATININE mg/dL 2 54*   < > 0 80   CALCIUM mg/dL 8 2*   < > 9 7   ALK PHOS U/L  --   --  116   ALT U/L  --   --  99*   AST U/L  --   --  116*    < > = values in this interval not displayed  * I Have Reviewed All Lab Data Listed Above  * Additional Pertinent Lab Tests Reviewed: Raz [address] Admission Reviewed    Imaging:    Imaging Reports Reviewed Today Include:  Diagnostic imaging studies that were done on this admission  Imaging Personally Reviewed by Myself Includes:  None      Recent Cultures (last 7 days):           Last 24 Hours Medication List:   Current Facility-Administered Medications   Medication Dose Route Frequency Provider Last Rate    acetaminophen  650 mg Oral Q6H PRN Patrizia Shah PA-C      albuterol  2 puff Inhalation Q4H PRN Nicko Merchant PA-C      aspirin  81 mg Oral Daily RALPH Melara-DAYLIN      atorvastatin  40 mg Oral Daily Patrizia Shah PA-C      carbamide peroxide  5 drop Both Ears BID Mark Vanegas MD      docusate sodium  100 mg Oral BID Patrizia Shah PA-C      famotidine  40 mg Oral Daily Patrizia Shah PA-C      heparin (porcine)  5,000 Units Subcutaneous Q8H [address] Patrizia Shah PA-C      insulin lispro  1-5 Units Subcutaneous TID AC Rylee Case MD      insulin lispro  1-5 Units Subcutaneous HS Rylee Case MD      metoprolol tartrate  12 5 mg Oral Q12H [address] CRISTOBAL Fortune      mirtazapine  7 5 mg Oral HS CRISTOBAL Sparks      nystatin   Topical BID Patrizia Shah PA-C      ondansetron  4 mg Intravenous Q6H PRN Patrizia Shah PA-C      pantoprazole  40 mg Oral Early Morning Patrizia Shah PA-C      polyethylene glycol  17 g Oral BID Patrizia Shah PA-C      [START ON 3/6/2021] predniSONE  40 mg Oral Daily Amanda Dias MD      Followed by   Joce Casas ON 3/9/2021] predniSONE  30 mg Oral Daily Amanda Dias MD      Followed by   Joce Casas ON 3/12/2021] predniSONE  20 mg Oral Daily Patel MD Larissa      Followed by   Joce Casas ON 3/15/2021] predniSONE  10 mg Oral Daily Amanda Dias MD      senna  17 2 mg Oral HS Patrizia Shah PA-C      sodium chloride  100 mL/hr Intravenous Continuous Cherelle Ramirez  mL/hr (03/05/21 1224)        Today, Patient Was Seen By: Yo Darling MD    ** Please Note: Dragon 360 Dictation voice to text software may have been used in the creation of this document   **

## 2021-03-05 NOTE — TREATMENT PLAN
CHECK ONBASE FOR SCAN     I was called with up date by nurse and patient not putting out much urine and wioth cracles  Will dc SWATI and start lasix drip 20 mg/hr  Potassium 5 2 and sodium improved  Creatinine stable

## 2021-03-06 PROBLEM — E87.1 HYPONATREMIA: Status: RESOLVED | Noted: 2021-01-01 | Resolved: 2021-01-01

## 2021-03-06 PROBLEM — E87.5 HYPERKALEMIA: Status: RESOLVED | Noted: 2021-01-01 | Resolved: 2021-01-01

## 2021-03-06 NOTE — ASSESSMENT & PLAN NOTE
Recent Labs     03/05/21  0604 03/06/21  0516   HGB 10 0* 10 0*     Possibility of dilutional on anemia chronic disease  Monitor H&H

## 2021-03-06 NOTE — ASSESSMENT & PLAN NOTE
· Follows w/ Dr Preston Reeder   · Recent admission for similar sx-- overall discussions had during that hospitalization entailed chronicity and progression of dz  He was not felt to be candidate for steroids at that time  He has not felt to be a candidate for anti-fibrotics either given cachexia   · He was seen and evaluated by palliative care medicine during that admission-- has not had OP f/u   · Palliative care doctor on board:  Continue disease directed care with limit of DNAR+DNI  Started Remeron for insomnia and anorexia  · IV Solu-Medrol was switched to prednisone to be tapered every 3 days and decrease by 10 mg   · Patient may have short-acting bronchodilators as needed  · Declined lung transplant evaluation

## 2021-03-06 NOTE — PROGRESS NOTES
Progress Note - Pulmonary   Bambi Ceballos 60 y o  male MRN: [id number]  Unit/Bed#: S -01 Encounter: [id number]      Assessment:  1  Acute on chronic hypoxic respiratory failure with underlying ILD secondary to occupational exposure  2  Hyperkalemia  3  Acute Kidney Injury    Plan:  -Remains on 3L Nasal canula  -Feels subjectively improved steroids, transition to p o  steroids and taper by 10mg every three days  -Has declined lung transplant in the past, palliative care following  -BJ and hyperkalemia management per nephrology, creatinine is plateuing, remains on lasix drip  -Will see again on 3/8 if remains inpatient    Subjective:   "I feel fine"    Vitals: Blood pressure 132/65, pulse 99, temperature (!) 97 3 °F (36 3 °C), temperature source Oral, resp  rate 18, weight 47 kg (103 lb 9 9 oz), SpO2 100 %  , Body mass index is 20 93 kg/m²  Intake/Output Summary (Last 24 hours) at 3/6/2021 1717  Last data filed at 3/6/2021 1244  Gross per 24 hour   Intake --   Output 1275 ml   Net -1275 ml       Physical Exam  Gen: BMI 20, cachectic appearing  HEENT: Mucous membranes moist, no oral lesions, no thrush  NECK: No accessory muscle use, JVP not elevated  Cardiac: Regular, single S1, single S2, no murmurs, no rubs, no gallops  Lungs: Decreased breath sounds at the bases  Abdomen: normoactive bowel sounds, soft nontender, nondistended, no rebound or rigidity, no guarding  Extremities: no cyanosis, no clubbing, no edema    Labs: I have personally reviewed pertinent lab results          Michael Vang MD

## 2021-03-06 NOTE — ASSESSMENT & PLAN NOTE
· Patient is on acute kidney injury protocol  · Nephrologist on board  · IV diuresis- Lasix drip 20 cc per hour  · Monitor kidney function  · Monitor input and output  · Avoid nephrotoxins  · Avoid hypotension

## 2021-03-06 NOTE — PROGRESS NOTES
NEPHROLOGY PROGRESS NOTE    Mike Wilburn 60 y o  male MRN: [id number]  Unit/Bed#: S -01 Encounter: [id number]  Reason for Consult:  Acute renal failure    Patient is awake and alert when I asked him if he had any complaints he felt like his nose was congested but other than that he does not feel short of breath at rest   He feels like he is urinating a little bit more  ASSESSMENT/PLAN:  1  Renal    The patient developed acute renal failure after receiving CTA to rule out pulmonary embolism and creatinine is increased from normal baseline  That makes the timing likely to be ATN related to the contrast   Patient had lower urine output was started on diuretic infusion and urine output picked up some  Creatinine is starting to plateau as the level was 2 4 which is slightly lower so hopefully will see improvement of renal function over the next 24 hours  Continue Lasix drip at current rate of 20 mg an hour  BMP a m  2  Hyperkalemia    This was treated medically as well as he is improvement of urine output it is resolved  3  Hyponatremia    This was due to acute renal failure and reduced urine output impeding ability to excrete water load this has improved and is resolved for now as well  4  History of interstitial lung disease  SUBJECTIVE:  Review of Systems   Constitution: Negative for chills, decreased appetite, fever and night sweats  HENT: Positive for congestion  Nasal congestion   Eyes: Negative  Cardiovascular: Negative  Negative for chest pain, dyspnea on exertion, leg swelling and orthopnea  Respiratory: Negative  Negative for cough, shortness of breath, sputum production and wheezing  Gastrointestinal: Negative for abdominal pain, diarrhea, nausea and vomiting  Genitourinary: Negative for dysuria, flank pain, hematuria and incomplete emptying  Feels he is urinating a little more     Neurological: Negative for dizziness, focal weakness, headaches and light-headedness  Psychiatric/Behavioral: Negative for altered mental status, depression, hallucinations and hypervigilance  OBJECTIVE:  Current Weight: Weight - Scale: 47 kg (103 lb 9 9 oz)  Vitals:Temp (24hrs), Av 6 °F (36 4 °C), Min:97 5 °F (36 4 °C), Max:97 6 °F (36 4 °C)  Current: Temperature: 97 6 °F (36 4 °C)   Blood pressure 120/58, pulse 84, temperature 97 6 °F (36 4 °C), temperature source Oral, resp  rate 18, weight 47 kg (103 lb 9 9 oz), SpO2 98 %  , Body mass index is 20 93 kg/m²  Intake/Output Summary (Last 24 hours) at 3/6/2021 1155  Last data filed at 3/6/2021 0701  Gross per 24 hour   Intake 60 ml   Output 950 ml   Net -890 ml       Physical Exam: /58 (BP Location: Left arm)   Pulse 84   Temp 97 6 °F (36 4 °C) (Oral)   Resp 18   Wt 47 kg (103 lb 9 9 oz)   SpO2 98%   BMI 20 93 kg/m²   Physical Exam  Constitutional:       General: He is not in acute distress  Appearance: He is not toxic-appearing or diaphoretic  HENT:      Head: Normocephalic and atraumatic  Mouth/Throat:      Mouth: Mucous membranes are dry  Eyes:      General: No scleral icterus  Extraocular Movements: Extraocular movements intact  Neck:      Musculoskeletal: Normal range of motion and neck supple  Cardiovascular:      Rate and Rhythm: Normal rate and regular rhythm  Heart sounds: No friction rub  No gallop  Pulmonary:      Effort: Pulmonary effort is normal  No respiratory distress  Breath sounds: Normal breath sounds  No wheezing, rhonchi or rales  Abdominal:      General: Bowel sounds are normal  There is no distension  Palpations: Abdomen is soft  Tenderness: There is no abdominal tenderness  There is no rebound  Neurological:      Mental Status: He is alert and oriented to person, place, and time  Mental status is at baseline  Psychiatric:         Mood and Affect: Mood normal          Behavior: Behavior normal          Thought Content:  Thought content normal          Judgment: Judgment normal          Medications:    Current Facility-Administered Medications:     acetaminophen (TYLENOL) tablet 650 mg, 650 mg, Oral, Q6H PRN, Patrizia Shah PA-C    albuterol (PROVENTIL HFA,VENTOLIN HFA) inhaler 2 puff, 2 puff, Inhalation, Q4H PRN, Jarred Covarrubias PA-C, 2 puff at 03/03/21 1812    aspirin chewable tablet 81 mg, 81 mg, Oral, Daily, Patrizia Shah PA-C, 81 mg at 03/06/21 0900    atorvastatin (LIPITOR) tablet 40 mg, 40 mg, Oral, Daily, Patrizia Shah PA-C, 40 mg at 03/06/21 0900    carbamide peroxide (DEBROX) 6 5 % otic solution 5 drop, 5 drop, Both Ears, BID, Lawrence Weber MD, 5 drop at 03/06/21 0901    docusate sodium (COLACE) capsule 100 mg, 100 mg, Oral, BID, Patrizia Shah PA-C, 100 mg at 03/06/21 0859    famotidine (PEPCID) tablet 40 mg, 40 mg, Oral, Daily, Patrizia Shah PA-C, 40 mg at 03/06/21 0859    furosemide (LASIX) 500 mg infusion 50 mL, 20 mg/hr, Intravenous, Continuous, Shahram Siddiqi MD, Last Rate: 2 mL/hr at 03/06/21 1121, 20 mg/hr at 03/06/21 1121    heparin (porcine) subcutaneous injection 5,000 Units, 5,000 Units, Subcutaneous, Q8H [facility], 5,000 Units at 03/06/21 0506 **AND** [CANCELED] Platelet count, , , Once, Patrizia Shah PA-C    insulin lispro (HumaLOG) 100 units/mL subcutaneous injection 1-5 Units, 1-5 Units, Subcutaneous, TID AC, 2 Units at 03/04/21 1703 **AND** Fingerstick Glucose (POCT), , , TID AC, Lawrence Weber MD    insulin lispro (HumaLOG) 100 units/mL subcutaneous injection 1-5 Units, 1-5 Units, Subcutaneous, HS, Lawrence Weber MD, 2 Units at 03/03/21 2210    metoprolol tartrate (LOPRESSOR) partial tablet 12 5 mg, 12 5 mg, Oral, Q12H [facility], Geeta CRISTOBAL Cardenas, 12 5 mg at 03/06/21 0900    mirtazapine (REMERON) tablet 7 5 mg, 7 5 mg, Oral, HS, CRISTOBAL Licea, 7 5 mg at 03/05/21 2140    nystatin (MYCOSTATIN) powder, , Topical, BID, Patrizia Shah PA-C, Given at 03/06/21 0901    ondansetron (ZOFRAN) injection 4 mg, 4 mg, Intravenous, Q6H PRN, Patrizia Shah PA-C, 4 mg at 03/03/21 0142    pantoprazole (PROTONIX) EC tablet 40 mg, 40 mg, Oral, Early Morning, Patrizia Shah PA-C, 40 mg at 03/06/21 0507    polyethylene glycol (MIRALAX) packet 17 g, 17 g, Oral, BID, Patrizia Shah PA-C, 17 g at 03/04/21 9860    predniSONE tablet 40 mg, 40 mg, Oral, Daily, 40 mg at 03/06/21 0900 **FOLLOWED BY** [START ON 3/9/2021] predniSONE tablet 30 mg, 30 mg, Oral, Daily **FOLLOWED BY** [START ON 3/12/2021] predniSONE tablet 20 mg, 20 mg, Oral, Daily **FOLLOWED BY** [START ON 3/15/2021] predniSONE tablet 10 mg, 10 mg, Oral, Daily, Roby Reyna MD    senna (SENOKOT) tablet 17 2 mg, 17 2 mg, Oral, HS, Patrizia Shah PA-C    Laboratory Results:  Lab Results   Component Value Date    WBC 10 13 03/06/2021    HGB 10 0 (L) 03/06/2021    HCT 31 7 (L) 03/06/2021    MCV 87 03/06/2021     03/06/2021     Lab Results   Component Value Date    SODIUM 136 03/06/2021    K 3 5 03/06/2021    CL 99 (L) 03/06/2021    CO2 27 03/06/2021    BUN 90 (H) 03/06/2021    CREATININE 2 44 (H) 03/06/2021    GLUC 89 03/06/2021    CALCIUM 8 4 03/06/2021     Lab Results   Component Value Date    CALCIUM 8 4 03/06/2021     No results found for: LABPROT

## 2021-03-06 NOTE — ASSESSMENT & PLAN NOTE
· Possible secondary to contrast  · On IV diuresis drip  · Monitor input-output  · Monitor BMP a m   · Monitor vital signs- maintain BP above 120 mm of Hg to maintain renal perfusion

## 2021-03-06 NOTE — ASSESSMENT & PLAN NOTE
· S/P NEISHA to RCA in 2006  · Admitted in November 2020 for NSTEMI, no acute obstruction noted-- no intervention and medical management pursued  · Status post EKG changes: ST elevated lead II, V6; inverted T wave in III, aVF; large T waves I, aVL-- these are new compared to prior EKGs  · ED D/W cardiology-- no STEMI alert as patient is not having active CP

## 2021-03-06 NOTE — PROGRESS NOTES
Progress Note - Cherre Covert 1955, 60 y o  male MRN: [id number]    Unit/Bed#: S -01 Encounter: [id number]    Primary Care Provider: Romeo Cartwright DO   Date and time admitted to hospital: 3/2/2021 11:59 AM        Acute renal failure superimposed on stage 2 chronic kidney disease [facility])  Assessment & Plan  · Patient is on acute kidney injury protocol  · Nephrologist on board  · IV diuresis- Lasix drip 20 cc per hour  · Monitor kidney function  · Monitor input and output  · Avoid nephrotoxins  · Avoid hypotension  ATN (acute tubular necrosis) (HCC)  Assessment & Plan  · Possible secondary to contrast  · On IV diuresis drip  · Monitor input-output  · Monitor BMP a m   · Monitor vital signs- maintain BP above 120 mm of Hg to maintain renal perfusion    Severe protein-calorie malnutrition (HCC)  Assessment & Plan  Malnutrition Findings:   Adult Malnutrition type: Acute illness(in the setting of chronic illness)  Adult Degree of Malnutrition: Other severe protein calorie malnutrition(related to catabolic illness, inadequate oral intake)    BMI Findings: Body mass index is 20 93 kg/m²  · Patient w/ unintentional weight loss-- appears to have lost nearly 20 lbs in the last 1 month   · Reports no appetite x1 week  · Has had GI work up recently w/ no organic cause of poor PO intake  · Continue w/ nutritional supplements  · Started Remeron as per palliative Care, for patient's anorexia    Anemia  Assessment & Plan  Recent Labs     03/05/21  0604 03/06/21  0516   HGB 10 0* 10 0*     Possibility of dilutional on anemia chronic disease  Monitor H&H    Elevated liver enzymes  Assessment & Plan  · Chronic, w/o abdominal pain     Pulmonary fibrosis determined by high resolution computed tomography [facility])  Assessment & Plan  · Follows w/ Dr Naranjo Friend   · Recent admission for similar sx-- overall discussions had during that hospitalization entailed chronicity and progression of dz   He was not felt to be candidate for steroids at that time  He has not felt to be a candidate for anti-fibrotics either given cachexia   · He was seen and evaluated by palliative care medicine during that admission-- has not had OP f/u   · Palliative care doctor on board:  Continue disease directed care with limit of DNAR+DNI  Started Remeron for insomnia and anorexia  · IV Solu-Medrol was switched to prednisone to be tapered every 3 days and decrease by 10 mg   · Patient may have short-acting bronchodilators as needed  · Declined lung transplant evaluation  CAD S/P percutaneous coronary angioplasty  Assessment & Plan  · S/P NEISHA to RCA in 2006  · Admitted in November 2020 for NSTEMI, no acute obstruction noted-- no intervention and medical management pursued  · Status post EKG changes: ST elevated lead II, V6; inverted T wave in III, aVF; large T waves I, aVL-- these are new compared to prior EKGs  · ED D/W cardiology-- no STEMI alert as patient is not having active CP  * Acute on chronic respiratory failure with hypoxia [facility])  Assessment & Plan  Due to patient's interstitial lung disease  Patient continues to complain of shortness of breath, recurrent worsening episodes  Discussed with the pulmonology  Status post IV steroid  Now on oral prednisone to be tapered every 3 days  Continue supplemental oxygen  Continue to monitor inpatient  Hyperkalemia-resolved as of 3/6/2021  Assessment & Plan  · Mild  · Nephrologist on board  · IV fluids with normal saline  · IV Lasix  · Kayexalate  · Monitor            VTE Pharmacologic Prophylaxis:   Pharmacologic: Heparin  Mechanical VTE Prophylaxis in Place: No    Discussions with Specialists or Other Care Team Provider:  Nephrology    Education and Discussions with Family / Patient:  Patient refused the offer to call family    Current Length of Stay: 3 day(s)    Current Patient Status: Inpatient     Discharge Plan / Estimated Discharge Date:  TBD    Code Status: Level 3 - DNAR and DNI      Subjective:   Patient was seen and examined bedside  Stated he is not out of breath  No chest pain, palpitations  No abdominal pain  Stated improving appetite  Last bowel movement yesterday  Objective:     Vitals:   Temp (24hrs), Av 6 °F (36 4 °C), Min:97 5 °F (36 4 °C), Max:97 6 °F (36 4 °C)    Temp:  [97 5 °F (36 4 °C)-97 6 °F (36 4 °C)] 97 6 °F (36 4 °C)  HR:  [65-84] 84  Resp:  [18] 18  BP: ()/(55-58) 120/58  SpO2:  [96 %-98 %] 98 %  Body mass index is 20 93 kg/m²  Input and Output Summary (last 24 hours): Intake/Output Summary (Last 24 hours) at 3/6/2021 1422  Last data filed at 3/6/2021 1244  Gross per 24 hour   Intake --   Output 1475 ml   Net -1475 ml       Physical Exam:     Physical Exam  Vitals signs and nursing note reviewed  Constitutional:       General: He is awake  Appearance: He is cachectic  HENT:      Head: Normocephalic  Mouth/Throat:      Mouth: Mucous membranes are dry  Pharynx: Oropharynx is clear  Eyes:      General: Lids are normal       Extraocular Movements: Extraocular movements intact  Pupils: Pupils are equal, round, and reactive to light  Neck:      Musculoskeletal: Normal range of motion and neck supple  Trachea: Trachea normal       Comments: No JVD  Cardiovascular:      Rate and Rhythm: Normal rate and regular rhythm  Pulses: Normal pulses  Heart sounds: No murmur  No friction rub  No gallop  Pulmonary:      Effort: Pulmonary effort is normal       Comments: Fine crackles bilateral basal lung zones  Abdominal:      General: Abdomen is flat  Bowel sounds are normal  There is no distension  Palpations: Abdomen is soft  Tenderness: There is no abdominal tenderness  Musculoskeletal: Normal range of motion  Comments: Emphysematous chest   Skin:     General: Skin is warm and dry  Capillary Refill: Capillary refill takes less than 2 seconds  Coloration: Skin is pale  Skin is not jaundiced  Findings: No bruising, erythema, lesion or rash  Neurological:      General: No focal deficit present  Mental Status: He is alert and oriented to person, place, and time  Motor: Weakness present  Psychiatric:         Mood and Affect: Mood normal          Behavior: Behavior normal  Behavior is cooperative  Thought Content: Thought content normal        Additional Data:     Labs:    Results from last 7 days   Lab Units 03/06/21  0516  03/02/21  1250   WBC Thousand/uL 10 13   < > 9 84   HEMOGLOBIN g/dL 10 0*   < > 13 3   HEMATOCRIT % 31 7*   < > 43 4   PLATELETS Thousands/uL 252   < > 366   NEUTROS PCT %  --   --  82*   LYMPHS PCT %  --   --  12*   MONOS PCT %  --   --  5   EOS PCT %  --   --  1    < > = values in this interval not displayed  Results from last 7 days   Lab Units 03/06/21  0516  03/03/21  0527   POTASSIUM mmol/L 3 5   < > 5 7*   CHLORIDE mmol/L 99*   < > 99*   CO2 mmol/L 27   < > 25   BUN mg/dL 90*   < > 51*   CREATININE mg/dL 2 44*   < > 0 80   CALCIUM mg/dL 8 4   < > 9 7   ALK PHOS U/L  --   --  116   ALT U/L  --   --  99*   AST U/L  --   --  116*    < > = values in this interval not displayed  * I Have Reviewed All Lab Data Listed Above  * Additional Pertinent Lab Tests Reviewed:  All Labs Within Last 24 Hours Reviewed    Imaging:    Imaging Reports Reviewed Today Include:  None  Imaging Personally Reviewed by Myself Includes:  None    Recent Cultures (last 7 days):           Last 24 Hours Medication List:   Current Facility-Administered Medications   Medication Dose Route Frequency Provider Last Rate    acetaminophen  650 mg Oral Q6H PRN Patrizia Shah PA-C      albuterol  2 puff Inhalation Q4H PRN Sunshine Cardenas PA-C      aspirin  81 mg Oral Daily Patrizia Shah PA-C      atorvastatin  40 mg Oral Daily Patrizia Shah PA-C      carbamide peroxide  5 drop Both Ears BID Levi Norris MD      docusate sodium 100 mg Oral BID Patrizia Shah PA-C      famotidine  40 mg Oral Daily Patrizia Shah PA-C      furosemide  20 mg/hr Intravenous Continuous Ray Lipscomb MD 20 mg/hr (03/06/21 1121)    heparin (porcine)  5,000 Units Subcutaneous Q8H [address] Patrizia Shah PA-C      insulin lispro  1-5 Units Subcutaneous TID AC Tanna Mcarthur MD      insulin lispro  1-5 Units Subcutaneous HS Tanna Mcarthur MD      metoprolol tartrate  12 5 mg Oral Q12H [address] Vanessa Barley, CRNP      mirtazapine  7 5 mg Oral HS CRISTOBAL Downey      nystatin   Topical BID Patrizia Shah PA-C      ondansetron  4 mg Intravenous Q6H PRN Patrizia Shah PA-C      pantoprazole  40 mg Oral Early Morning Patrizia Shah PA-C      polyethylene glycol  17 g Oral BID Patrizia Shah PA-C      predniSONE  40 mg Oral Daily Vonda Cole MD      Followed by   Yanet Brown ON 3/9/2021] predniSONE  30 mg Oral Daily Vonda Cole MD      Followed by   Yanet Bronw ON 3/12/2021] predniSONE  20 mg Oral Daily Vonda Cole MD      Followed by   Yanet Brown ON 3/15/2021] predniSONE  10 mg Oral Daily Vonda Cole MD      senna  17 2 mg Oral HS Patrizia Shah PA-C          Today, Patient Was Seen By: Meng Jean MD    ** Please Note: This note has been constructed using a voice recognition system   **

## 2021-03-06 NOTE — ASSESSMENT & PLAN NOTE
Malnutrition Findings:   Adult Malnutrition type: Acute illness(in the setting of chronic illness)  Adult Degree of Malnutrition: Other severe protein calorie malnutrition(related to catabolic illness, inadequate oral intake)    BMI Findings: Body mass index is 20 93 kg/m²     · Patient w/ unintentional weight loss-- appears to have lost nearly 20 lbs in the last 1 month   · Reports no appetite x1 week  · Has had GI work up recently w/ no organic cause of poor PO intake  · Continue w/ nutritional supplements  · Started Remeron as per palliative Care, for patient's anorexia

## 2021-03-07 PROBLEM — E87.6 HYPOKALEMIA: Status: ACTIVE | Noted: 2021-01-01

## 2021-03-07 NOTE — PROGRESS NOTES
NEPHROLOGY PROGRESS NOTE    Dejuan Mills 60 y o  male MRN: [id number]  Unit/Bed#: S -01 Encounter: [id number]  Reason for Consult:  Acute renal failure    The patient was in his room lying in bed he looks sleepy and tired  He says that he is urinating normally  Also stated that he was breathing comfortably with no shortness of breath at the present time  ASSESSMENT/PLAN:  1  Renal    Patient developed acute renal failure after contrast exposure and baseline creatinine is normal   He had initially low urine output was given diuretic infusion had excellent response to that creatinine is now improving over the last 24 hours  He has been off the diuretic infusion continued with normal urine output  He did have hyperkalemia earlier in hospitalization with the acute renal failure but that has resolved itself and now is hypokalemic likely due to increased urinary losses from diuretic drip which is now discontinued  Oral supplementation was given earlier by another service  Continue to hold diuretic  BMP a m  Monitor his renal function should improved to normal over the next couple days  2  Hyponatremia    Has resolved with improvement of renal function  3  History of interstitial lung disease  SUBJECTIVE:  Review of Systems   Constitution: Positive for malaise/fatigue  Negative for chills, fever and night sweats  HENT: Negative  Eyes: Negative  Cardiovascular: Negative for chest pain, dyspnea on exertion, orthopnea and palpitations  Respiratory: Negative for cough, shortness of breath, sputum production and wheezing  Gastrointestinal: Negative for abdominal pain, diarrhea, nausea and vomiting  Genitourinary: Negative for dysuria, flank pain, hematuria and incomplete emptying  Neurological: Positive for weakness  Negative for dizziness, headaches and light-headedness  Psychiatric/Behavioral: Negative for altered mental status, depression, hallucinations and hypervigilance  OBJECTIVE:  Current Weight: Weight - Scale: 47 1 kg (103 lb 13 4 oz)  Vitals:Temp (24hrs), Av 2 °F (36 2 °C), Min:97 1 °F (36 2 °C), Max:97 3 °F (36 3 °C)  Current: Temperature: (!) 97 1 °F (36 2 °C)(RN notified)   Blood pressure 111/63, pulse (!) 114, temperature (!) 97 1 °F (36 2 °C), temperature source Axillary, resp  rate 18, weight 47 1 kg (103 lb 13 4 oz), SpO2 94 %  , Body mass index is 20 97 kg/m²        Intake/Output Summary (Last 24 hours) at 3/7/2021 1206  Last data filed at 3/7/2021 0446  Gross per 24 hour   Intake --   Output 3400 ml   Net -3400 ml       Physical Exam: /63 (BP Location: Left arm)   Pulse (!) 114   Temp (!) 97 1 °F (36 2 °C) (Axillary) Comment: RN notified  Resp 18   Wt 47 1 kg (103 lb 13 4 oz)   SpO2 94%   BMI 20 97 kg/m²   Physical Exam    Medications:    Current Facility-Administered Medications:     acetaminophen (TYLENOL) tablet 650 mg, 650 mg, Oral, Q6H PRN, Patrizia Shah PA-C    albuterol (PROVENTIL HFA,VENTOLIN HFA) inhaler 2 puff, 2 puff, Inhalation, Q4H PRN, Simone Obrien PA-C, 2 puff at 21 181    aspirin chewable tablet 81 mg, 81 mg, Oral, Daily, Patrizia Shah PA-C, 81 mg at 21 09    atorvastatin (LIPITOR) tablet 40 mg, 40 mg, Oral, Daily, Patrizia Shah PA-C, 40 mg at 21 09    carbamide peroxide (DEBROX) 6 5 % otic solution 5 drop, 5 drop, Both Ears, BID, Seymour Weir MD, 5 drop at 21 5740    docusate sodium (COLACE) capsule 100 mg, 100 mg, Oral, BID, Patrizia Shah PA-C, 100 mg at 21 0921    famotidine (PEPCID) tablet 40 mg, 40 mg, Oral, Daily, Patrizia Shah PA-C, 40 mg at 21 0921    heparin (porcine) subcutaneous injection 5,000 Units, 5,000 Units, Subcutaneous, Q8H [address], 5,000 Units at 21 0542 **AND** [CANCELED] Platelet count, , , Once, Patrizia Shah PA-C    insulin lispro (HumaLOG) 100 units/mL subcutaneous injection 1-5 Units, 1-5 Units, Subcutaneous, TID AC, 1 Units at 03/06/21 1732 **AND** Fingerstick Glucose (POCT), , , TID AC, Anshul Flores MD    insulin lispro (HumaLOG) 100 units/mL subcutaneous injection 1-5 Units, 1-5 Units, Subcutaneous, HS, Anshul Flores MD, 1 Units at 03/06/21 2139    methylPREDNISolone sodium succinate (Solu-MEDROL) injection 40 mg, 40 mg, Intravenous, Q12H [address], Humza Alfaro MD    metoprolol tartrate (LOPRESSOR) partial tablet 12 5 mg, 12 5 mg, Oral, Q12H [address], CRISTOBAL Urena, 12 5 mg at 03/07/21 4520    mirtazapine (REMERON) tablet 7 5 mg, 7 5 mg, Oral, HS, CRISTOBAL Bender, 7 5 mg at 03/06/21 2141    nystatin (MYCOSTATIN) powder, , Topical, BID, Patrizia Shah PA-C, Given at 03/07/21 0922    ondansetron (ZOFRAN) injection 4 mg, 4 mg, Intravenous, Q6H PRN, Patrizai Shah PA-C, 4 mg at 03/03/21 0142    pantoprazole (PROTONIX) EC tablet 40 mg, 40 mg, Oral, Early Morning, RALPH Melara-C, 40 mg at 03/07/21 0542    polyethylene glycol (MIRALAX) packet 17 g, 17 g, Oral, BID, RALPH Melara-C, 17 g at 03/04/21 0819    potassium chloride (K-DUR,KLOR-CON) CR tablet 20 mEq, 20 mEq, Oral, Once, Patel Calles MD    [facility]) tablet 17 2 mg, 17 2 mg, Oral, HS, RALPH Melara-C, 17 2 mg at 03/06/21 2140    Laboratory Results:  Lab Results   Component Value Date    WBC 10 13 03/06/2021    HGB 10 0 (L) 03/06/2021    HCT 31 7 (L) 03/06/2021    MCV 87 03/06/2021     03/06/2021     Lab Results   Component Value Date    SODIUM 138 03/07/2021    K 3 0 (L) 03/07/2021    CL 97 (L) 03/07/2021    CO2 38 (H) 03/07/2021    BUN 74 (H) 03/07/2021    CREATININE 1 97 (H) 03/07/2021    GLUC 139 03/07/2021    CALCIUM 9 5 03/07/2021     Lab Results   Component Value Date    CALCIUM 9 5 03/07/2021     No results found for: LABPROT

## 2021-03-07 NOTE — ASSESSMENT & PLAN NOTE
Recent Labs     03/05/21  1616 03/06/21  0516 03/07/21  0446   K 5 2 3 5 3 0*     Replete as needed  Monitor BMP

## 2021-03-07 NOTE — PROGRESS NOTES
Progress Note - [facility] 1955, 60 y o  male MRN: [id number]    Unit/Bed#: S -01 Encounter: [id number]    Primary Care Provider: Jc Mitchell DO   Date and time admitted to hospital: 3/2/2021 11:59 AM        Hypokalemia  Assessment & Plan  Recent Labs     03/05/21  1616 03/06/21  0516 03/07/21  0446   K 5 2 3 5 3 0*     Replete as needed  Monitor BMP    ATN (acute tubular necrosis) (HCC)  Assessment & Plan  · Possible secondary to contrast  · Monitor input-output  · Monitor BMP a m   · Monitor vital signs- maintain BP above 120 mm of Hg to maintain renal perfusion    Severe protein-calorie malnutrition (HCC)  Assessment & Plan  Malnutrition Findings:   Adult Malnutrition type: Acute illness(in the setting of chronic illness)  Adult Degree of Malnutrition: Other severe protein calorie malnutrition(related to catabolic illness, inadequate oral intake)    BMI Findings: Body mass index is 20 97 kg/m²  · Patient w/ unintentional weight loss-- appears to have lost nearly 20 lbs in the last 1 month   · Reports no appetite x1 week  · Has had GI work up recently w/ no organic cause of poor PO intake  · Continue w/ nutritional supplements  · Started Remeron as per palliative Care, for patient's anorexia    Anemia  Assessment & Plan  Recent Labs     03/05/21  0604 03/06/21  0516   HGB 10 0* 10 0*     Possibility of dilutional on anemia chronic disease  Monitor H&H    Elevated liver enzymes  Assessment & Plan  · Chronic, w/o abdominal pain     Pulmonary fibrosis determined by high resolution computed tomography [facility])  Assessment & Plan  · Follows w/ Dr Brittni Pressley   · Recent admission for similar sx-- overall discussions had during that hospitalization entailed chronicity and progression of dz  He was not felt to be candidate for steroids at that time   He has not felt to be a candidate for anti-fibrotics either given cachexia   · He was seen and evaluated by palliative care medicine during that admission-- has not had OP f/u   · Palliative care doctor on board:  Continue disease directed care with limit of DNAR+DNI  Started Remeron for insomnia and anorexia  · IV Solu-Medrol was switched to prednisone to be tapered every 3 days and decrease by 10 mg  Currently on 40 mg    · Patient may have short-acting bronchodilators as needed  · Declined lung transplant evaluation  Acute renal failure superimposed on stage 2 chronic kidney disease [facility])  Assessment & Plan  Recent Labs     03/05/21  1616 03/06/21  0516 03/07/21  0446   CREATININE 2 54* 2 44* 1 97*   EGFR 26 27 35   ·   · Patient is on acute kidney injury protocol  · Nephrologist on board  · Monitor kidney function  · Monitor input and output  · Avoid nephrotoxins  · Avoid hypotension  CAD S/P percutaneous coronary angioplasty  Assessment & Plan  · S/P NEISHA to RCA in 2006  · Admitted in November 2020 for NSTEMI, no acute obstruction noted-- no intervention and medical management pursued  · Status post EKG changes: ST elevated lead II, V6; inverted T wave in III, aVF; large T waves I, aVL-- these are new compared to prior EKGs  · ED D/W cardiology-- no STEMI alert as patient is not having active CP  * Acute on chronic respiratory failure with hypoxia [facility])  Assessment & Plan  Due to patient's interstitial lung disease  Patient continues to complain of shortness of breath, recurrent worsening episodes  Discussed with the pulmonology  Status post IV steroid  Now on oral prednisone to be tapered every 3 days  Currently at 40  Continue supplemental oxygen  Continue to monitor inpatient          VTE Pharmacologic Prophylaxis:   Pharmacologic: Heparin  Mechanical VTE Prophylaxis in Place: No    Discussions with Specialists or Other Care Team Provider: pulmonology, nephrology    Education and Discussions with Family / Patient: Patient declined his family to be updated    Current Length of Stay: 4 day(s)    Current Patient Status: Inpatient     Discharge Plan / Estimated Discharge Date: TBD    Code Status: Level 3 - DNAR and DNI      Subjective:   He complained he has no voice today, which started yesterday  Denied symptoms of heartburn, coughing while laying down, or acid reflux  He also stated that he is more short of breath today  Denied chest pain, abdominal pain nausea vomiting and his appetite okay  He had BM yesterday  Objective:     Vitals:   Temp (24hrs), Av 2 °F (36 2 °C), Min:97 1 °F (36 2 °C), Max:97 3 °F (36 3 °C)    Temp:  [97 1 °F (36 2 °C)-97 3 °F (36 3 °C)] 97 1 °F (36 2 °C)  HR:  [] 114  Resp:  [18] 18  BP: (111-132)/(59-65) 111/63  SpO2:  [94 %-100 %] 94 %  Body mass index is 20 97 kg/m²  Input and Output Summary (last 24 hours): Intake/Output Summary (Last 24 hours) at 3/7/2021 0948  Last data filed at 3/7/2021 0446  Gross per 24 hour   Intake --   Output 3400 ml   Net -3400 ml       Physical Exam:     Physical Exam  Vitals signs and nursing note reviewed  Constitutional:       General: He is awake  He is not in acute distress  Appearance: Normal appearance  He is well-developed  He is cachectic  He is not diaphoretic  HENT:      Head: Normocephalic and atraumatic  Mouth/Throat:      Mouth: Mucous membranes are dry  Pharynx: Oropharynx is clear  Eyes:      General: Lids are normal  No scleral icterus  Extraocular Movements: Extraocular movements intact  Conjunctiva/sclera: Conjunctivae normal       Pupils: Pupils are equal, round, and reactive to light  Neck:      Musculoskeletal: Normal range of motion and neck supple  Trachea: Trachea normal       Comments: No JVD  Cardiovascular:      Rate and Rhythm: Normal rate and regular rhythm  Pulses: Normal pulses  Heart sounds: Normal heart sounds  No murmur  No friction rub  No gallop  Pulmonary:      Effort: Pulmonary effort is normal  No respiratory distress  Breath sounds: Normal breath sounds  No stridor  No wheezing or rales  Comments: Fine crackles bilateral basal lung zones  Chest:      Chest wall: No tenderness  Abdominal:      General: Abdomen is flat  Bowel sounds are normal  There is no distension  Palpations: Abdomen is soft  There is no mass  Tenderness: There is no abdominal tenderness  There is no right CVA tenderness, left CVA tenderness, guarding or rebound  Hernia: No hernia is present  Musculoskeletal: Normal range of motion  General: No swelling, tenderness, deformity or signs of injury  Right lower leg: No edema  Left lower leg: No edema  Comments: Emphysematous chest   Skin:     General: Skin is warm and dry  Capillary Refill: Capillary refill takes less than 2 seconds  Coloration: Skin is not jaundiced or pale  Findings: No bruising, erythema, lesion or rash  Neurological:      General: No focal deficit present  Mental Status: He is alert and oriented to person, place, and time  Mental status is at baseline  Motor: Weakness present  Psychiatric:         Attention and Perception: He is attentive  Mood and Affect: Mood normal  Affect is not labile  Speech: Speech normal          Behavior: Behavior normal  Behavior is not slowed  Behavior is cooperative  Thought Content: Thought content normal          Judgment: Judgment normal            Additional Data:     Labs:    Results from last 7 days   Lab Units 03/06/21  0516  03/02/21  1250   WBC Thousand/uL 10 13   < > 9 84   HEMOGLOBIN g/dL 10 0*   < > 13 3   HEMATOCRIT % 31 7*   < > 43 4   PLATELETS Thousands/uL 252   < > 366   NEUTROS PCT %  --   --  82*   LYMPHS PCT %  --   --  12*   MONOS PCT %  --   --  5   EOS PCT %  --   --  1    < > = values in this interval not displayed       Results from last 7 days   Lab Units 03/07/21  0446  03/03/21  0527   POTASSIUM mmol/L 3 0*   < > 5 7*   CHLORIDE mmol/L 97*   < > 99*   CO2 mmol/L 38*   < > 25 BUN mg/dL 74*   < > 51*   CREATININE mg/dL 1 97*   < > 0 80   CALCIUM mg/dL 9 5   < > 9 7   ALK PHOS U/L  --   --  116   ALT U/L  --   --  99*   AST U/L  --   --  116*    < > = values in this interval not displayed  * I Have Reviewed All Lab Data Listed Above  * Additional Pertinent Lab Tests Reviewed:  [address] Admission Reviewed    Imaging:    Imaging Reports Reviewed Today Include: none  Imaging Personally Reviewed by Myself Includes:  none    Recent Cultures (last 7 days):           Last 24 Hours Medication List:   Current Facility-Administered Medications   Medication Dose Route Frequency Provider Last Rate    acetaminophen  650 mg Oral Q6H PRN Patrizia Shah PA-C      albuterol  2 puff Inhalation Q4H PRN Brittany Nunez PA-C      aspirin  81 mg Oral Daily Patrizia Shah PA-C      atorvastatin  40 mg Oral Daily RALPH Melara-C      carbamide peroxide  5 drop Both Ears BID Star Lam MD      docusate sodium  100 mg Oral BID Patrizia Shah PA-C      famotidine  40 mg Oral Daily Patrizia Shah PA-C      heparin (porcine)  5,000 Units Subcutaneous Q8H [address] Patrizia Shah PA-C      insulin lispro  1-5 Units Subcutaneous TID AC Star Lam MD      insulin lispro  1-5 Units Subcutaneous HS Star Lam MD      metoprolol tartrate  12 5 mg Oral Q12H [address] CRISTOBAL Yates      mirtazapine  7 5 mg Oral HS CRISTOBAL Moore      nystatin   Topical BID Patrizia Shah PA-C      ondansetron  4 mg Intravenous Q6H PRN RALPH Melara-DAYLIN      pantoprazole  40 mg Oral Early Morning RALPH Melara-C      polyethylene glycol  17 g Oral BID RALPH Melara-C      potassium chloride  20 mEq Oral Once Patel Real MD      predniSONE  40 mg Oral Daily German Flynn MD      Followed by   Erin De Guzman ON 3/9/2021] predniSONE  30 mg Oral Daily German Flynn MD Followed by   Jordan Irby ON 3/12/2021] predniSONE  20 mg Oral Daily Cristina Arora MD      Followed by   Jordan Irby ON 3/15/2021] predniSONE  10 mg Oral Daily Cristina Arora MD      senna  17 2 mg Oral HS Patrizia Shah PA-C          Today, Patient Was Seen By: Josi Huber MD    ** Please Note: This note has been constructed using a voice recognition system   **

## 2021-03-07 NOTE — ASSESSMENT & PLAN NOTE
· Possible secondary to contrast  · Monitor input-output  · Monitor BMP a m   · Monitor vital signs- maintain BP above 120 mm of Hg to maintain renal perfusion

## 2021-03-07 NOTE — ASSESSMENT & PLAN NOTE
Due to patient's interstitial lung disease  Patient continues to complain of shortness of breath, recurrent worsening episodes  Discussed with the pulmonology  Status post IV steroid  Now on oral prednisone to be tapered every 3 days  Currently at 40  Continue supplemental oxygen  Continue to monitor inpatient

## 2021-03-07 NOTE — ASSESSMENT & PLAN NOTE
Recent Labs     03/05/21  1616 03/06/21  0516 03/07/21  0446   CREATININE 2 54* 2 44* 1 97*   EGFR 26 27 35   ·   · Patient is on acute kidney injury protocol  · Nephrologist on board  · Monitor kidney function  · Monitor input and output  · Avoid nephrotoxins  · Avoid hypotension

## 2021-03-07 NOTE — ASSESSMENT & PLAN NOTE
Malnutrition Findings:   Adult Malnutrition type: Acute illness(in the setting of chronic illness)  Adult Degree of Malnutrition: Other severe protein calorie malnutrition(related to catabolic illness, inadequate oral intake)    BMI Findings: Body mass index is 20 97 kg/m²     · Patient w/ unintentional weight loss-- appears to have lost nearly 20 lbs in the last 1 month   · Reports no appetite x1 week  · Has had GI work up recently w/ no organic cause of poor PO intake  · Continue w/ nutritional supplements  · Started Remeron as per palliative Care, for patient's anorexia

## 2021-03-07 NOTE — ASSESSMENT & PLAN NOTE
· Follows w/ Dr Jimenez Blocker   · Recent admission for similar sx-- overall discussions had during that hospitalization entailed chronicity and progression of dz  He was not felt to be candidate for steroids at that time  He has not felt to be a candidate for anti-fibrotics either given cachexia   · He was seen and evaluated by palliative care medicine during that admission-- has not had OP f/u   · Palliative care doctor on board:  Continue disease directed care with limit of DNAR+DNI  Started Remeron for insomnia and anorexia  · IV Solu-Medrol was switched to prednisone to be tapered every 3 days and decrease by 10 mg  Currently on 40 mg    · Patient may have short-acting bronchodilators as needed  · Declined lung transplant evaluation

## 2021-03-07 NOTE — PROGRESS NOTES
Progress Note - Pulmonary   Ameena Avendaño 60 y o  male MRN: [id number]  Unit/Bed#: S -01 Encounter: [id number]      Assessment:  1  Acute on chronic hypoxic respiratory failure with underlying ILD secondary to occupational exposure, is on baseline 3L nasal canua  2  Hyperkalemia  3  Acute Kidney Injury    Plan:  -Feels slightly more short of breath today compared to yesterday, oxygen requirement went from 3L to 5L  -Recommend Repeat CXR today  -Feels subjectively improved with steroids, given increase in oxygen requirement will go back to solumedrol 40mg IV BID given increase in oxygen requirement   -Also complaints of hoarseness, agree with reflux treatment and lozenges  -Has declined lung transplant in the past, palliative care following  -BJ management per nephrology, creatinine is downtrending     Subjective:   "My breathing is tighter"    Vitals: Blood pressure 130/67, pulse 91, temperature 97 8 °F (36 6 °C), temperature source Oral, resp  rate 17, weight 47 1 kg (103 lb 13 4 oz), SpO2 99 %  , Body mass index is 20 97 kg/m²  Intake/Output Summary (Last 24 hours) at 3/7/2021 1731  Last data filed at 3/7/2021 1531  Gross per 24 hour   Intake 240 ml   Output 3700 ml   Net -3460 ml       Physical Exam  Gen: Awake, alert, oriented x 3, no acute distress  HEENT: Mucous membranes moist, no oral lesions, no thrush  NECK: No accessory muscle use, JVP not elevated  Cardiac: Regular, single S1, single S2, no murmurs, no rubs, no gallops  Lungs: Decreased breath sounds at the bases  Abdomen: normoactive bowel sounds, soft nontender, nondistended, no rebound or rigidity, no guarding  Extremities: no cyanosis, no clubbing, no edema    Labs: I have personally reviewed pertinent lab results          Yamile Sawant MD

## 2021-03-08 NOTE — ASSESSMENT & PLAN NOTE
· Follows w/ Dr Hilton Wyman   · Recent admission for similar sx-- overall discussions had during that hospitalization entailed chronicity and progression of dz  He was not felt to be candidate for steroids at that time  He has not felt to be a candidate for anti-fibrotics either given cachexia   · He was seen and evaluated by palliative care medicine during that admission-- has not had OP f/u   · Palliative care doctor on board:  Continue disease directed care with limit of DNAR+DNI  Started Remeron for insomnia and anorexia  · IV Solu-Medrol with plan to transition to PO prednisone  · Patient may have short-acting bronchodilators as needed  · Declined lung transplant evaluation

## 2021-03-08 NOTE — PROGRESS NOTES
Progress Note - Ameena Avendaño 1955, 60 y o  male MRN: [id number]    Unit/Bed#: S -01 Encounter: [id number]    Primary Care Provider: Jere Diehl DO   Date and time admitted to hospital: 3/2/2021 11:59 AM      * Acute on chronic respiratory failure with hypoxia [facility])  Assessment & Plan  Due to patient's interstitial lung disease  Patient continues to complain of shortness of breath, recurrent worsening episodes  Discussed with the pulmonology  Continue IV steroid  Continue supplemental oxygen  Continue to monitor inpatient  Pulmonary fibrosis determined by high resolution computed tomography [facility])  Assessment & Plan  · Follows w/ Dr Malinda Jones   · Recent admission for similar sx-- overall discussions had during that hospitalization entailed chronicity and progression of dz  He was not felt to be candidate for steroids at that time  He has not felt to be a candidate for anti-fibrotics either given cachexia   · He was seen and evaluated by palliative care medicine during that admission-- has not had OP f/u   · Palliative care doctor on board:  Continue disease directed care with limit of DNAR+DNI  Started Remeron for insomnia and anorexia  · IV Solu-Medrol with plan to transition to PO prednisone  · Patient may have short-acting bronchodilators as needed  · Declined lung transplant evaluation  Acute renal failure superimposed on stage 2 chronic kidney disease [facility])  Assessment & Plan  Recent Labs     03/06/21  0516 03/07/21  0446 03/08/21  0514   CREATININE 2 44* 1 97* 1 29   EGFR 27 35 58     · Patient is on acute kidney injury protocol  · Nephrologist on board  · Monitor kidney function off of IV fluids  · Monitor input and output  · Avoid nephrotoxins  · Avoid hypotension        ATN (acute tubular necrosis) (HCC)  Assessment & Plan  · Possible secondary to contrast  · Monitor input-output  · Monitor BMP a m   · Monitor vital signs- maintain BP above 120 mm of Hg to maintain renal perfusion    Severe protein-calorie malnutrition (HCC)  Assessment & Plan  Malnutrition Findings:   Adult Malnutrition type: Acute illness(in the setting of chronic illness)  Adult Degree of Malnutrition: Other severe protein calorie malnutrition(related to catabolic illness, inadequate oral intake)    BMI Findings: Body mass index is 19 05 kg/m²  · Patient w/ unintentional weight loss-- appears to have lost nearly 20 lbs in the last 1 month   · Reports no appetite x1 week  · Has had GI work up recently w/ no organic cause of poor PO intake  · Continue w/ nutritional supplements  · Started Remeron as per palliative Care, for patient's anorexia  · Patient reports difficulty swallowing - ordered Speech evaluation  · Also ordered PT/OT evaluation for failure to thrive    Hypokalemia  Assessment & Plan  Recent Labs     03/06/21  0516 03/07/21  0446 03/08/21  0514   K 3 5 3 0* 4 6     Replete as needed  Monitor BMP    Anemia  Assessment & Plan  Recent Labs     03/06/21  0516   HGB 10 0*     Possibility of dilutional on anemia chronic disease  Monitor H&H    Elevated liver enzymes  Assessment & Plan  · Chronic, w/o abdominal pain     CAD S/P percutaneous coronary angioplasty  Assessment & Plan  · S/P NEISHA to RCA in 2006  · Admitted in November 2020 for NSTEMI, no acute obstruction noted-- no intervention and medical management pursued  · Status post EKG changes: ST elevated lead II, V6; inverted T wave in III, aVF; large T waves I, aVL-- these are new compared to prior EKGs  · ED D/W cardiology-- no STEMI alert as patient is not having active CP          VTE Pharmacologic Prophylaxis:   Pharmacologic: Heparin  Mechanical VTE Prophylaxis in Place: Yes    Discussions with Specialists or Other Care Team Provider: Pulmonology, Nephrology    Education and Discussions with Family / Patient: Patient declined call to family    Current Length of Stay: 5 day(s)    Current Patient Status: Inpatient     Discharge Plan / Estimated Discharge Date: Pending clinical course    Code Status: Level 3 - DNAR and DNI      Subjective:   Patient seen and examined at bedside  He denies any pain  No SOB at this time  On 2L O2  Objective:     Vitals:   Temp (24hrs), Av °F (36 7 °C), Min:97 7 °F (36 5 °C), Max:98 6 °F (37 °C)    Temp:  [97 7 °F (36 5 °C)-98 6 °F (37 °C)] 98 6 °F (37 °C)  HR:  [] 119  Resp:  [16-18] 18  BP: (109-114)/(62-68) 109/62  SpO2:  [95 %-100 %] 96 %  Body mass index is 19 05 kg/m²  Input and Output Summary (last 24 hours): Intake/Output Summary (Last 24 hours) at 3/8/2021 1626  Last data filed at 3/8/2021 1201  Gross per 24 hour   Intake 360 ml   Output 575 ml   Net -215 ml       Physical Exam:     Physical Exam  Constitutional:       Appearance: Normal appearance  Comments: Thin appearing  Cardiovascular:      Rate and Rhythm: Normal rate and regular rhythm  Pulmonary:      Effort: Pulmonary effort is normal       Breath sounds: Normal breath sounds  Abdominal:      Palpations: Abdomen is soft  Tenderness: There is no abdominal tenderness  Musculoskeletal:      Right lower leg: No edema  Left lower leg: No edema  Skin:     General: Skin is warm and dry  Neurological:      General: No focal deficit present  Mental Status: He is alert and oriented to person, place, and time  Psychiatric:         Mood and Affect: Mood normal          Behavior: Behavior normal          Additional Data:     Labs:    Results from last 7 days   Lab Units 21  0516  21  1250   WBC Thousand/uL 10 13   < > 9 84   HEMOGLOBIN g/dL 10 0*   < > 13 3   HEMATOCRIT % 31 7*   < > 43 4   PLATELETS Thousands/uL 252   < > 366   NEUTROS PCT %  --   --  82*   LYMPHS PCT %  --   --  12*   MONOS PCT %  --   --  5   EOS PCT %  --   --  1    < > = values in this interval not displayed       Results from last 7 days   Lab Units 21  0514  21  0527   POTASSIUM mmol/L 4 6   < > 5 7* CHLORIDE mmol/L 97*   < > 99*   CO2 mmol/L 34*   < > 25   BUN mg/dL 57*   < > 51*   CREATININE mg/dL 1 29   < > 0 80   CALCIUM mg/dL 9 7   < > 9 7   ALK PHOS U/L  --   --  116   ALT U/L  --   --  99*   AST U/L  --   --  116*    < > = values in this interval not displayed  * I Have Reviewed All Lab Data Listed Above  * Additional Pertinent Lab Tests Reviewed:  All Labs Within Last 24 Hours Reviewed    Imaging:    Imaging Reports Reviewed Today Include: None  Imaging Personally Reviewed by Myself Includes:      Recent Cultures (last 7 days):           Last 24 Hours Medication List:   Current Facility-Administered Medications   Medication Dose Route Frequency Provider Last Rate    acetaminophen  650 mg Oral Q6H PRN Patrizia Shah PA-C      albuterol  2 puff Inhalation Q4H PRN Indira Stoll PA-C      aspirin  81 mg Oral Daily Patrizia Shah PA-C      atorvastatin  40 mg Oral Daily Patrizia Shah PA-C      carbamide peroxide  5 drop Both Ears BID Noemi Evans MD      docusate sodium  100 mg Oral BID Patrizia Shah PA-C      famotidine  40 mg Oral Daily Patrizia Shah PA-C      heparin (porcine)  5,000 Units Subcutaneous Q8H [address] Patrizia Shah PA-C      insulin lispro  1-5 Units Subcutaneous TID AC Noemi Evans MD      insulin lispro  1-5 Units Subcutaneous HS Noemi Evans MD      methylPREDNISolone sodium succinate  40 mg Intravenous Q12H [address] Sarah Pan MD      metoprolol tartrate  12 5 mg Oral Q12H [address] CRISTOBAL Bahena      mirtazapine  7 5 mg Oral HS CRISTOBAL Booth      nystatin   Topical BID Patrizia Shah PA-C      ondansetron  4 mg Intravenous Q6H PRN Patrizia Shah PA-C      pantoprazole  40 mg Oral Early Morning Patrizia Shah PA-C      polyethylene glycol  17 g Oral BID Patrizia Shah PA-C      senna  17 2 mg Oral HS Patrizia Shah PA-C          Today, Patient Was Seen By: Kalyani Raines, DO    ** Please Note: This note has been constructed using a voice recognition system   **

## 2021-03-08 NOTE — ASSESSMENT & PLAN NOTE
Due to patient's interstitial lung disease  Patient continues to complain of shortness of breath, recurrent worsening episodes  Discussed with the pulmonology  Continue IV steroid  Continue supplemental oxygen  Continue to monitor inpatient  Head atraumatic, normal cephalic shape.

## 2021-03-08 NOTE — PROGRESS NOTES
NEPHROLOGY PROGRESS NOTE   Soraida Cedillo 60 y o  male MRN: [id number]  Unit/Bed#: S -01 Encounter: [id number]  Reason for Consult: BJ    ASSESSMENT/PLAN:  1  Acute Kidney Injury- likely secondary to prerenal azotemia with volume depletion & hypotension in the setting of ACEI + IV contrast leading to contrast induced nephropathy  - creatinine now improving s/p IVF  - continue to monitor creatinine trends  - peak creatinine   - no need for diuretics  2  Chronic Kidney Disease stage II- Baseline creatinine 0 8-1 0  3  Hyperkalemia- resolved  4  Hyponatremia- likely secondary to prerenal volume depletion   - improved  - mild fluid restriction, relax to <2L/day  5  Blood Pressure- soft, not on any antihypertensives  - hold ACEI  6  Interstitial Lung Disease- per pulmonology  - on steroids currently    Disposition:  No need for IVF or diuretics  Can likely re-initiate diuretics tomorrow  SUBJECTIVE:  Patient feels his breathing is so-so  He states he is following the fluid restriction and he is eating small meals  He denies difficulty urinating      OBJECTIVE:  Current Weight: Weight - Scale: 42 8 kg (94 lb 4 8 oz)  Vitals:    03/07/21 2225 03/08/21 0554 03/08/21 0700 03/08/21 0741   BP: 114/68  111/66    BP Location: Left arm  Left arm    Pulse: 93  93    Resp: 16  18    Temp: 97 8 °F (36 6 °C)  97 7 °F (36 5 °C)    TempSrc: Oral  Oral    SpO2: 96%  100% 95%   Weight:  42 8 kg (94 lb 4 8 oz)         Intake/Output Summary (Last 24 hours) at 3/8/2021 1153  Last data filed at 3/8/2021 0959  Gross per 24 hour   Intake 480 ml   Output 1400 ml   Net -920 ml     General: NAD  Skin: no rash  Eyes: anicteric  ENMT: mm slightly dry  Neck: no masses  Respiratory: coarse  Cardiac: RRR  Extremities: no edema  GI: soft nt nd  Neuro: alert awake  Psych: mood and affect appropriate    Medications:    Current Facility-Administered Medications:     acetaminophen (TYLENOL) tablet 650 mg, 650 mg, Oral, Q6H PRN, Machelle Buttner A EMPERATRIZ Shah    albuterol (PROVENTIL HFA,VENTOLIN HFA) inhaler 2 puff, 2 puff, Inhalation, Q4H PRN, Kirk Godoy PA-C, 2 puff at 03/08/21 0959    aspirin chewable tablet 81 mg, 81 mg, Oral, Daily, Patrizia Shah PA-C, 81 mg at 03/08/21 0800    atorvastatin (LIPITOR) tablet 40 mg, 40 mg, Oral, Daily, Patrizia Shah PA-C, 40 mg at 03/08/21 0759    carbamide peroxide (DEBROX) 6 5 % otic solution 5 drop, 5 drop, Both Ears, BID, Nina Amaya MD, 5 drop at 03/08/21 0801    docusate sodium (COLACE) capsule 100 mg, 100 mg, Oral, BID, Patrizia Shah PA-C, 100 mg at 03/08/21 0800    famotidine (PEPCID) tablet 40 mg, 40 mg, Oral, Daily, Patrizia Shah PA-C, 40 mg at 03/08/21 0800    heparin (porcine) subcutaneous injection 5,000 Units, 5,000 Units, Subcutaneous, Q8H [address], 5,000 Units at 03/08/21 0543 **AND** [CANCELED] Platelet count, , , Once, Patrizia Shah PA-C    insulin lispro (HumaLOG) 100 units/mL subcutaneous injection 1-5 Units, 1-5 Units, Subcutaneous, TID AC, 1 Units at 03/07/21 1243 **AND** Fingerstick Glucose (POCT), , , TID AC, Negra Marroquin MD    insulin lispro (HumaLOG) 100 units/mL subcutaneous injection 1-5 Units, 1-5 Units, Subcutaneous, HS, Nina Amaya MD, 3 Units at 03/07/21 2124    methylPREDNISolone sodium succinate (Solu-MEDROL) injection 40 mg, 40 mg, Intravenous, Q12H [address], Bon Pantoja MD, 40 mg at 03/08/21 0800    metoprolol tartrate (LOPRESSOR) partial tablet 12 5 mg, 12 5 mg, Oral, Q12H [address], Ethlyn Fails, CRNP, 12 5 mg at 03/08/21 0800    mirtazapine (REMERON) tablet 7 5 mg, 7 5 mg, Oral, HS, Drew Adamn, CRNP, 7 5 mg at 03/07/21 2119    nystatin (MYCOSTATIN) powder, , Topical, BID, Patrizia Shah PA-C, Given at 03/07/21 1725    ondansetron (ZOFRAN) injection 4 mg, 4 mg, Intravenous, Q6H PRN, Patrizia Shah PA-C, 4 mg at 03/03/21 0142    pantoprazole (PROTONIX) EC tablet 40 mg, 40 mg, Oral, Early Morning, Patrizia MERIDA EMPERATRIZ Shah, 40 mg at 03/08/21 0543    polyethylene glycol (MIRALAX) packet 17 g, 17 g, Oral, BID, Patrizia Shah PA-C, 17 g at 03/08/21 0801    senna (SENOKOT) tablet 17 2 mg, 17 2 mg, Oral, HS, Patrizia Shah PA-C, 17 2 mg at 03/07/21 2120    Laboratory Results:  Results from last 7 days   Lab Units 03/08/21  0514 03/07/21  0446 03/06/21  0516 03/05/21  1616 03/05/21  0604 03/04/21  0540 03/03/21  1623 03/03/21  0527  03/02/21  1250   WBC Thousand/uL  --   --  10 13  --  10 24*  --   --  10 63*  --  9 84   HEMOGLOBIN g/dL  --   --  10 0*  --  10 0*  --   --  11 7*  --  13 3   HEMATOCRIT %  --   --  31 7*  --  31 6*  --   --  38 1  --  43 4   PLATELETS Thousands/uL  --   --  252  --  259  --   --  289  --  366   POTASSIUM mmol/L 4 6 3 0* 3 5 5 2 5 8* 5 1 6 0* 5 7*   < >  --    CHLORIDE mmol/L 97* 97* 99* 98* 95* 95*  --  99*   < >  --    CO2 mmol/L 34* 38* 27 28 28 28  --  25   < >  --    BUN mg/dL 57* 74* 90* 86* 82* 71*  --  51*   < >  --    CREATININE mg/dL 1 29 1 97* 2 44* 2 54* 2 50* 1 93*  --  0 80   < >  --    CALCIUM mg/dL 9 7 9 5 8 4 8 2* 8 3 9 3  --  9 7   < >  --    MAGNESIUM mg/dL  --   --   --   --   --   --   --  1 7  --   --     < > = values in this interval not displayed

## 2021-03-08 NOTE — ASSESSMENT & PLAN NOTE
Recent Labs     03/06/21  0516 03/07/21  0446 03/08/21  0514   K 3 5 3 0* 4 6     Replete as needed  Monitor BMP

## 2021-03-08 NOTE — PROGRESS NOTES
Progress note - Palliative and Supportive Care   Mike Wilburn 60 y o  male [id number]    Assessment:    -   Patient Active Problem List   Diagnosis    Arteriosclerotic cardiovascular disease (ASCVD)    Benign essential hypertension    CAD S/P percutaneous coronary angioplasty    Colon, diverticulosis    Type 2 diabetes mellitus without complication, without long-term current use of insulin (HCC)    Hip pain, bilateral    History of acute inferior wall MI    Hyperlipidemia    Osteoarthritis of knee    Small stature    Spinal stenosis    Microalbuminuria due to type 2 diabetes mellitus (HCC)    Acute renal failure superimposed on stage 2 chronic kidney disease (HCC)    Pulmonary fibrosis [facility] by [facility] resolution computed [facility]    Hypertension    Congenital bowing legs    Elevated liver enzymes    Chronic respiratory failure with hypoxia ([facility] [address] [facility]    Former smoker    NSTEMI (non-ST elevated myocardial infarction) ([facility] [address] [facility]    Epigastric pain    Anemia    Unintentional weight loss    Pulmonary insufficiency    Ambulatory dysfunction    Severe protein-calorie malnutrition (HCC)    Physical deconditioning    Acute on chronic respiratory failure with hypoxia (HCC)    ATN (acute tubular necrosis) ([facility]    Hypokalemia       Plan:  1  Symptom management   Sleep disturbance  -  remeron 7 5 mg PO daily QHS initiated 3/5    2  Goals  Disease focused cares  · Limitations set at DNAR/DNI   -    Code Status: DNAR/DNI - Level 3   Decisional apparatus:  Patient is competent on my exam today  If competence is lost, patient's substitute decision maker would default to wife by [place]  Advance Directive / Living Will / POLST:  none    Interval history:       Patient reports improved appetite  Overall he reports feeling better  He has been able to sleep better at night  At the time of assessment, he demonstrates mild retractions  He states he is feeling mildly SOB    Informed nursing of increased work of breathing  MEDICATIONS / ALLERGIES:     current meds:   Current Facility-Administered Medications   Medication Dose Route Frequency    acetaminophen (TYLENOL) tablet 650 mg  650 mg Oral Q6H PRN    albuterol (PROVENTIL HFA,VENTOLIN HFA) inhaler 2 puff  2 puff Inhalation Q4H PRN    aspirin chewable tablet 81 mg  81 mg Oral Daily    atorvastatin (LIPITOR) tablet 40 mg  40 mg Oral Daily    carbamide peroxide (DEBROX) 6 5 % otic solution 5 drop  5 drop Both Ears BID    docusate sodium (COLACE) capsule 100 mg  100 mg Oral BID    famotidine (PEPCID) tablet 40 mg  40 mg Oral Daily    heparin (porcine) subcutaneous injection 5,000 Units  5,000 Units Subcutaneous Q8H [address]    insulin lispro (HumaLOG) 100 units/mL subcutaneous injection 1-5 Units  1-5 Units Subcutaneous TID AC    insulin lispro (HumaLOG) 100 units/mL subcutaneous injection 1-5 Units  1-5 Units Subcutaneous HS    methylPREDNISolone sodium succinate (Solu-MEDROL) injection 40 mg  40 mg Intravenous Q12H HERNANDEZ    metoprolol tartrate (LOPRESSOR) partial tablet 12 5 mg  12 5 mg Oral Q12H HERNANDEZ    mirtazapine (REMERON) tablet 7 5 mg  7 5 mg Oral HS    nystatin (MYCOSTATIN) powder   Topical BID    ondansetron (ZOFRAN) injection 4 mg  4 mg Intravenous Q6H PRN    pantoprazole (PROTONIX) EC tablet 40 mg  40 mg Oral Early Morning    polyethylene glycol (MIRALAX) packet 17 g  17 g Oral BID    senna (SENOKOT) tablet 17 2 mg  17 2 mg Oral HS       No Known Allergies    OBJECTIVE:    Physical Exam  Physical Exam  Vitals signs and nursing note reviewed  Constitutional:       Appearance: He is underweight  He is ill-appearing  HENT:      Head: Normocephalic and atraumatic  Pulmonary:      Effort: Accessory muscle usage present  Breath sounds: Decreased air movement present  Neurological:      General: No focal deficit present  Mental Status: He is alert and oriented to person, place, and time           Lab Results: CBC: No results found for: WBC, HGB, HCT, MCV, PLT, ADJUSTEDWBC, MCH, MCHC, RDW, MPV, NRBC, CMP:   Lab Results   Component Value Date    SODIUM 138 03/08/2021    K 4 6 03/08/2021    CL 97 (L) 03/08/2021    CO2 34 (H) 03/08/2021    BUN 57 (H) 03/08/2021    CREATININE 1 29 03/08/2021    CALCIUM 9 7 03/08/2021    EGFR 58 03/08/2021   , PT/PTT:No results found for: PT, PTT    Counseling / Coordination of Care    Total floor / unit time spent today 15+   minutes  Greater than 50% of total time was spent with the patient and / or family counseling and / or coordination of care   A description of the counseling / coordination of care: review of symptoms, support offered, chart review, supportive listening, collaboration with specialists

## 2021-03-08 NOTE — PROGRESS NOTES
Progress Note - Pulmonary   Rodríguez Cons 60 y o  male MRN: [id number]  Unit/Bed#: S -01 Encounter: [id number]    Assessment/Plan:    1  Acute on chronic hypoxic respiratory failure likely secondary to pulmonary fibrosis       -  currently on home O2 3 L-95%       -  maintain saturations greater than 89%       -  pulmonary toilet:  Deep breathing cough, OOB as tolerated, IS Q 1 hr    2  ILD with noted traction bronchiectasis      -  CXR- stable bilateral fibrosis, w/ LL honeycombing      -  likely secondary to mining and tobacco abuse      -  patient refusing lung transplant      -  if patient increases BMI- can consider anti fibrotic      -  Inpatient:  Continue IV Solu-Medrol 40 mg b i d  Thank can likely transition to prednisone 40 mg q 3 days, decreased by 10 mg q 3 days      -  consider palliative care consult    3  Chronic grade 1 diastolic CHF        -  1/2/738- EF 55%        -  patient does not appear overtly overloaded        -  no indication for diuretics at this time    4  BJ       -  likely secondary to contrast dye       -  nephrology following    5  Malnutrition likely secondary to Pulmonary cachexia       -  50 lb weight loss in 2 months       -  concern for possibly underlying malignancy- verify all cancer markers are up-to-date       -  recommend nutrition consult with Protein supplement    6  Severe deconditioning       -  patient need evaluation by PT and OT       -  would likely benefit from pulmonary rehab      - outpatient follow-up per discharge instructions        Chief Complaint:    "I still feel short of breath"    Subjective:    Gena Bliss was comfortably sitting in his bed  He does report he still feels very fatigued and short of breath  No significant overnight events reported  Patient currently denying any fevers, chills, hemoptysis, headaches, night sweats, pleuritic chest pain, or palpitations      Objective:    Vitals: Blood pressure 111/66, pulse 93, temperature 97 7 °F (36 5 °C), temperature source Oral, resp  rate 18, weight 42 8 kg (94 lb 4 8 oz), SpO2 95 %  3L,Body mass index is 19 05 kg/m²  Intake/Output Summary (Last 24 hours) at 3/8/2021 8250  Last data filed at 3/8/2021 0740  Gross per 24 hour   Intake 240 ml   Output 1400 ml   Net -1160 ml       Invasive Devices     Peripheral Intravenous Line            Peripheral IV 03/05/21 Right Antecubital 2 days                Physical Exam:   Physical Exam  Constitutional:       General: He is not in acute distress  Appearance: Normal appearance  He is normal weight  He is not ill-appearing  HENT:      Head: Normocephalic and atraumatic  Nose: Nose normal  No congestion or rhinorrhea  Mouth/Throat:      Mouth: Mucous membranes are dry  Pharynx: No oropharyngeal exudate or posterior oropharyngeal erythema  Neck:      Musculoskeletal: Normal range of motion and neck supple  No neck rigidity or muscular tenderness  Cardiovascular:      Rate and Rhythm: Normal rate and regular rhythm  Pulses: Normal pulses  Heart sounds: Normal heart sounds  No murmur  No friction rub  No gallop  Pulmonary:      Effort: Pulmonary effort is normal  No respiratory distress  Breath sounds: Normal breath sounds  No stridor  Abdominal:      General: Abdomen is flat  Bowel sounds are normal  There is no distension  Palpations: Abdomen is soft  There is no mass  Musculoskeletal: Normal range of motion  General: No swelling or tenderness  Skin:     General: Skin is warm and dry  Coloration: Skin is not jaundiced or pale  Neurological:      General: No focal deficit present  Mental Status: He is alert and oriented to person, place, and time  Mental status is at baseline  Psychiatric:         Mood and Affect: Mood normal          Behavior: Behavior normal          Labs:    I have personally reviewed pertinent lab results CMP:   Lab Results   Component Value Date    SODIUM 138 03/08/2021    K 4 6 03/08/2021    CL 97 (L) 03/08/2021    CO2 34 (H) 03/08/2021    BUN 57 (H) 03/08/2021    CREATININE 1 29 03/08/2021    CALCIUM 9 7 03/08/2021    EGFR 58 03/08/2021     Imaging and other studies: I have personally reviewed pertinent films in PACS     CXR- BL GGO

## 2021-03-08 NOTE — ASSESSMENT & PLAN NOTE
Recent Labs     03/06/21  0516 03/07/21  0446 03/08/21  0514   CREATININE 2 44* 1 97* 1 29   EGFR 27 35 58     · Patient is on acute kidney injury protocol  · Nephrologist on board  · Monitor kidney function off of IV fluids  · Monitor input and output  · Avoid nephrotoxins  · Avoid hypotension

## 2021-03-08 NOTE — SPEECH THERAPY NOTE
Speech-Language Pathology Bedside Swallow Evaluation        Patient Name: Mike Wilburn    [id number] Date: 3/8/2021     Problem List  Principal Problem:    Acute on chronic respiratory failure with hypoxia [facility])  Active Problems:    CAD S/P percutaneous coronary angioplasty    Acute renal failure superimposed on stage 2 chronic kidney disease (HCC)    Pulmonary fibrosis determined by high resolution computed tomography (HCC)    Elevated liver enzymes    Anemia    Severe protein-calorie malnutrition (HCC)    ATN (acute tubular necrosis) (HCC)    Hypokalemia         Past Medical History  Past Medical History:   Diagnosis Date    Acute myocardial infarction of inferior wall ([facility] [address] ) 11/2011    inferiorwall; drug-eluting stent RCA    Chronic tension type headache     last assessed: 7/4/2012    Colon polyp     Developmental dysplasia of hip     bilateral    Diabetes mellitus ([facility] [address] )     Former smoker     quit 2011     Gout     last assessed: 8/31/2012    Hemorrhage of anus and rectum     last assessed: 8/12/2013    Hyperlipidemia     Hypertension     Internal hemorrhoids     Migraine     last assessed: 11/25/2013    Old MI (myocardial infarction) 2011    Pulmonary fibrosis ([facility] [address] )     Renal insufficiency syndrome     last assessed: 10/22/2014       Past Surgical History  Past Surgical History:   Procedure Laterality Date    CARDIAC CATHETERIZATION      COLONOSCOPY  05/2012    int hem, dirverticulosis Dr Florentin Conway Bilateral      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    LUMBAR LAMINECTOMY  03/2010    decompressive more than 2 lumbar segments L4-S1        Summary    Pt presents with functional oral and pharyngeal swallowing  Pt w/ food dysphagia symptoms  Pt appears at low risk for aspiration        Recommendations:   Diet: regular diet and thin liquids   Meds: whole with liquid   Frequent Oral care: 2x/day  Other Recommendations/ considerations: No further ST tx warranted at this time  Current Medical Status  Pt is a 60 y o  male who presented to [address] [place] [address]  with acute on chronic respiratory failure w/ hypoxia  Pt has a past medical history significant for pulmonary fibrosis with chronic respiratory failure, coronary artery disease, CKD stage 2, unintentional weight loss presents to the ED for evaluation of shortness breath x1 week   Patient was recently hospitalized for similar symptoms   He was discharged to rehab   Over the last 1 week, patient has reported increasing shortness of breath at rest   Denies cough, fever, chest pain  He has had intermittent chills  Patient called EMS today and was found to be hypoxic on 3 L at 70%  [facility] was provided with mid flow nasal cannula oxygen by EMS, and was subsequently weaned back down to his baseline requirements of 3 L  Patient additionally reports no appetite for the last 1 week  [facility] cites unintentional weight loss over the last few months  [facility] has had a GI workup in the past which has been unremarkable and had identified organic cause for patient's lack of appetite/weight loss  Upon arrival, pt reports that he feels food "won't go down" all the time since the onset of his SOB  Pt denies coughing/choking w/ po  Past medical history:   Please see H&P for details    Special Studies:  CTA chest PE 03/02/2021: No evidence for pulmonary embolism  Redemonstrated findings of interstitial lung disease  CXR 03/07/2021: Findings consistent with mildly improved Covid pneumonia, most consolidation at the left lung base    EGD 11/16/2020: Normal duodenum, random biopsies taken  Mild gastritis biopsies taken  Normal retroflexion   Normal esophagus     Social/Education/Vocational Hx:  Pt lives at home    Swallow Information   Current Risks for Dysphagia & Aspiration: COVID pneumonia, change in respiratory status  Current Symptoms/Concerns: change in respiratory status  Current Diet: regular diet and thin liquids   Baseline Diet: regular diet and thin liquids  Takes pills- whole w/ water- cuts large pills    Baseline Assessment   Behavior/Cognition: alert  Speech/Language Status: able to participate in conversation and able to follow commands  Patient Positioning: upright in bed     Swallow Mechanism Exam   Facial: symmetrical  Labial: WFL  Lingual: WFL  Velum: unable to visualize  Mandible: adequate ROM  Dentition: upper dentures and edentulous lowers  Vocal quality:hoarse   Volitional Cough: weak   Respiratory: NC    Consistencies Assessed and Performance   Consistencies Administered: thin liquids, nectar thick, puree and hard solids (goldfish crackers, cookie)    Oral Stage: Pt had adequate bolus retrieval, and was able to suck from a straw  Pt w/ prolonged mastication/manipulation of hard solids, but adequate  Pt appeared w/ good oral control of liquids, and timely transfer of all consistencies  No oral residue noted  Pharyngeal Stage: Swallows appeared timely and complete  No s/s of aspiration noted         Esophageal Concerns: none reported      Results Reviewed with: patient   Dysphagia Goals: none at this time

## 2021-03-08 NOTE — ASSESSMENT & PLAN NOTE
Malnutrition Findings:   Adult Malnutrition type: Acute illness(in the setting of chronic illness)  Adult Degree of Malnutrition: Other severe protein calorie malnutrition(related to catabolic illness, inadequate oral intake)    BMI Findings: Body mass index is 19 05 kg/m²     · Patient w/ unintentional weight loss-- appears to have lost nearly 20 lbs in the last 1 month   · Reports no appetite x1 week  · Has had GI work up recently w/ no organic cause of poor PO intake  · Continue w/ nutritional supplements  · Started Remeron as per palliative Care, for patient's anorexia  · Patient reports difficulty swallowing - ordered Speech evaluation  · Also ordered PT/OT evaluation for failure to thrive

## 2021-03-08 NOTE — CASE MANAGEMENT
CM met with patient at bedside to discuss nursing bringing up that patient reported he was interested in getting a hospital bed at home  CM asked patient about this and was told that he already has one that he believes came from [facility]  CM clarified with patient that the bed is already at his home and set up and he confirmed this  CM notified primary nurse and primary CM following patient  CM department will continue to follow to assist with discharge coordination

## 2021-03-08 NOTE — ASSESSMENT & PLAN NOTE
· Possible secondary to contrast  · Monitor input-output  · Monitor BMP a m   · Monitor vital signs- maintain BP above 120 mm of Hg to maintain renal perfusion The patient is a 45y Female complaining of headache.

## 2021-03-08 NOTE — ASSESSMENT & PLAN NOTE
Recent Labs     03/06/21  0516   HGB 10 0*     Possibility of dilutional on anemia chronic disease  Monitor H&H

## 2021-03-09 NOTE — PROGRESS NOTES
NEPHROLOGY PROGRESS NOTE   Soraida Cedillo 60 y o  male MRN: [id number]  Unit/Bed#: S -01 Encounter: [id number]  Reason for Consult: BJ    ASSESSMENT/PLAN:  1  Acute Kidney Injury- likely secondary to prerenal azotemia with volume depletion & hypotension in the setting of ACEI + IV contrast leading to contrast induced nephropathy  - creatinine now improving s/p IVF  - continue to monitor creatinine trends  - peak creatinine 2 54 on 3/5  - down to 1 13 today  - no need for diuretics  2  Chronic Kidney Disease stage II- Baseline creatinine 0 8-1 0  3  Hyperkalemia- resolved  4  Hyponatremia- likely secondary to prerenal volume depletion   - improved  - mild fluid restriction, relax to <2L/day  5  Blood Pressure- soft, not on any antihypertensives  - hold ACEI  6  Interstitial Lung Disease- per pulmonology  - on steroids currently    Disposition:  Stable from a renal standpoint  Will sign off  Please call with any questions or concerns  Thank you  SUBJECTIVE:  Patient feeling well  States breathing is similar to yesterday  Eating okay      OBJECTIVE:  Current Weight: Weight - Scale: 44 2 kg (97 lb 8 oz)  Vitals:    03/08/21 2134 03/08/21 2237 03/09/21 0600 03/09/21 0731   BP: 120/67 133/66  118/65   BP Location:  Right arm  Right arm   Pulse: (!) 107 94  93   Resp:  18  16   Temp:  97 5 °F (36 4 °C)  97 9 °F (36 6 °C)   TempSrc:  Oral  Oral   SpO2:  97%  96%   Weight:   44 2 kg (97 lb 8 oz)    Height: 4' 11" (1 499 m)          Intake/Output Summary (Last 24 hours) at 3/9/2021 1056  Last data filed at 3/9/2021 1001  Gross per 24 hour   Intake 560 ml   Output 600 ml   Net -40 ml     General: NAD  Skin: no rash  Eyes: anicteric  ENMT: mm moist  Neck: no masses  Respiratory: coarse  Cardiac: RRR  Extremities: no edema  GI: soft nt nd  Neuro: alert awake  Psych: mood and affect appropriate    Medications:    Current Facility-Administered Medications:     acetaminophen (TYLENOL) tablet 650 mg, 650 mg, Oral, Q6H PRN, Patrizia Shah PA-C    albuterol (PROVENTIL HFA,VENTOLIN HFA) inhaler 2 puff, 2 puff, Inhalation, Q4H PRN, Julissa HamiltonRALPH zavaleta-DAYLIN, 2 puff at 03/08/21 0959    aspirin chewable tablet 81 mg, 81 mg, Oral, Daily, Patrizia Shah PA-C, 81 mg at 03/09/21 0945    atorvastatin (LIPITOR) tablet 40 mg, 40 mg, Oral, Daily, Patrizia Shah PA-C, 40 mg at 03/09/21 0945    carbamide peroxide (DEBROX) 6 5 % otic solution 5 drop, 5 drop, Both Ears, BID, Juan Bernal MD, 5 drop at 03/09/21 0946    docusate sodium (COLACE) capsule 100 mg, 100 mg, Oral, BID, Patrizia Shah PA-C, 100 mg at 03/09/21 0945    famotidine (PEPCID) tablet 40 mg, 40 mg, Oral, Daily, Patrizia Shah PA-C, 40 mg at 03/09/21 0945    heparin (porcine) subcutaneous injection 5,000 Units, 5,000 Units, Subcutaneous, Q8H [address], 5,000 Units at 03/09/21 0501 **AND** [CANCELED] Platelet count, , , Once, Patrizia Shah PA-C    insulin lispro (HumaLOG) 100 units/mL subcutaneous injection 1-5 Units, 1-5 Units, Subcutaneous, TID AC, 2 Units at 03/08/21 1832 **AND** Fingerstick Glucose (POCT), , , TID AC, Juan Bernal MD    insulin lispro (HumaLOG) 100 units/mL subcutaneous injection 1-5 Units, 1-5 Units, Subcutaneous, HS, Juan Bernal MD, 2 Units at 03/08/21 2142    methylPREDNISolone sodium succinate (Solu-MEDROL) injection 40 mg, 40 mg, Intravenous, Once, CRISTOBAL Sharma    metoprolol tartrate (LOPRESSOR) partial tablet 12 5 mg, 12 5 mg, Oral, Q12H [address], Judit Vince, CRNP, 12 5 mg at 03/09/21 0945    mirtazapine (REMERON) tablet 7 5 mg, 7 5 mg, Oral, HS, Lay Anshul, CRISTOBAL, 7 5 mg at 03/08/21 2135    nystatin (MYCOSTATIN) powder, , Topical, BID, Patrizia Shah PA-C, Stopped at 03/08/21 1845    ondansetron (ZOFRAN) injection 4 mg, 4 mg, Intravenous, Q6H PRN, Patrizia Shah PA-C, 4 mg at 03/03/21 0142    pantoprazole (PROTONIX) EC tablet 40 mg, 40 mg, Oral, Early Morning, Francisca Roof FUAD Shah PA-C, 40 mg at 03/09/21 0500    polyethylene glycol (MIRALAX) packet 17 g, 17 g, Oral, BID, Patrizia Shah PA-C, 17 g at 03/09/21 0946    [START ON 3/10/2021] predniSONE tablet 50 mg, 50 mg, Oral, Daily **FOLLOWED BY** [START ON 3/13/2021] predniSONE tablet 40 mg, 40 mg, Oral, Daily **FOLLOWED BY** [START ON 3/16/2021] predniSONE tablet 30 mg, 30 mg, Oral, Daily **FOLLOWED BY** [START ON 3/19/2021] predniSONE tablet 20 mg, 20 mg, Oral, Daily **FOLLOWED BY** [START ON 3/22/2021] predniSONE tablet 10 mg, 10 mg, Oral, Daily, CRISTOBAL Carrasco    senna (SENOKOT) tablet 17 2 mg, 17 2 mg, Oral, HS, Patrizia Shah PA-C, 17 2 mg at 03/08/21 2134    Laboratory Results:  Results from last 7 days   Lab Units 03/09/21  0511 03/09/21  0510 03/08/21  0514 03/07/21  0446 03/06/21  0516 03/05/21  1616 03/05/21  0604 03/04/21  0540  03/03/21  0527  03/02/21  1250   WBC Thousand/uL  --  11 60*  --   --  10 13  --  10 24*  --   --  10 63*  --  9 84   HEMOGLOBIN g/dL  --  12 0  --   --  10 0*  --  10 0*  --   --  11 7*  --  13 3   HEMATOCRIT %  --  37 9  --   --  31 7*  --  31 6*  --   --  38 1  --  43 4   PLATELETS Thousands/uL  --  314  --   --  252  --  259  --   --  289  --  366   POTASSIUM mmol/L 4 8  --  4 6 3 0* 3 5 5 2 5 8* 5 1   < > 5 7*   < >  --    CHLORIDE mmol/L 99*  --  97* 97* 99* 98* 95* 95*  --  99*   < >  --    CO2 mmol/L 34*  --  34* 38* 27 28 28 28  --  25   < >  --    BUN mg/dL 45*  --  57* 74* 90* 86* 82* 71*  --  51*   < >  --    CREATININE mg/dL 1 13  --  1 29 1 97* 2 44* 2 54* 2 50* 1 93*  --  0 80   < >  --    CALCIUM mg/dL 9 4  --  9 7 9 5 8 4 8 2* 8 3 9 3  --  9 7   < >  --    MAGNESIUM mg/dL  --   --   --   --   --   --   --   --   --  1 7  --   --     < > = values in this interval not displayed

## 2021-03-09 NOTE — ASSESSMENT & PLAN NOTE
· Follows w/ Dr Leetta Goldmann   · Recent admission for similar sx-- overall discussions had during that hospitalization entailed chronicity and progression of dz  He was not felt to be candidate for steroids at that time  He has not felt to be a candidate for anti-fibrotics either given cachexia   · He was seen and evaluated by palliative care medicine during that admission-- has not had OP f/u   · Palliative care doctor on board:  Continue disease directed care with limit of DNAR+DNI  Started Remeron for insomnia and anorexia  · IV Solu-Medrol with plan to transition to PO prednisone  · Patient may have short-acting bronchodilators as needed  · Declined lung transplant evaluation

## 2021-03-09 NOTE — PROGRESS NOTES
[facility]  Progress Note - Yvonne Garcia 1955, 60 y o  male MRN: [id number]  Unit/Bed#: S -01 Encounter: [id number]  Primary Care Provider: Corinna Kapadia DO   Date and time admitted to hospital: 3/2/2021 11:59 AM    * Acute on chronic respiratory failure with hypoxia [facility])  Assessment & Plan  Due to patient's interstitial lung disease  Patient continues to complain of shortness of breath, recurrent worsening episodes  Discussed with the pulmonology  Continue IV steroid with transition to PO tomorrow  Continue supplemental oxygen  Continue to monitor inpatient  Pulmonary fibrosis determined by high resolution computed tomography [facility])  Assessment & Plan  · Follows w/ Dr Annita Moore   · Recent admission for similar sx-- overall discussions had during that hospitalization entailed chronicity and progression of dz  He was not felt to be candidate for steroids at that time  He has not felt to be a candidate for anti-fibrotics either given cachexia   · He was seen and evaluated by palliative care medicine during that admission-- has not had OP f/u   · Palliative care doctor on board:  Continue disease directed care with limit of DNAR+DNI  Started Remeron for insomnia and anorexia  · IV Solu-Medrol with plan to transition to PO prednisone  · Patient may have short-acting bronchodilators as needed  · Declined lung transplant evaluation  Acute renal failure superimposed on stage 2 chronic kidney disease [facility])  Assessment & Plan  Recent Labs     03/07/21  0446 03/08/21  0514 03/09/21  0511   CREATININE 1 97* 1 29 1 13   EGFR 35 58 68     · Patient is on acute kidney injury protocol  · Nephrologist on board  · Monitor kidney function off of IV fluids  · Monitor input and output  · Avoid nephrotoxins  · Avoid hypotension        ATN (acute tubular necrosis) (HCC)  Assessment & Plan  · Possible secondary to contrast  · Monitor input-output  · Monitor BMP a m   · Monitor vital signs- maintain BP above 120 mm of Hg to maintain renal perfusion    Severe protein-calorie malnutrition (HCC)  Assessment & Plan  Malnutrition Findings:   Adult Malnutrition type: Acute illness(in the setting of chronic illness)  Adult Degree of Malnutrition: Other severe protein calorie malnutrition(related to catabolic illness, inadequate oral intake)    BMI Findings: Body mass index is 19 69 kg/m²  · Patient w/ unintentional weight loss-- appears to have lost nearly 20 lbs in the last 1 month   · Reports no appetite x1 week  · Has had GI work up recently w/ no organic cause of poor PO intake  · Continue w/ nutritional supplements  · Started Remeron as per palliative Care, for patient's anorexia  · Patient reports difficulty swallowing - ordered Speech evaluation which found functional swallowing, recommend regular diet and thin liquids    Hypokalemia  Assessment & Plan  Recent Labs     03/07/21  0446 03/08/21  0514 03/09/21  0511   K 3 0* 4 6 4 8     Replete as needed  Monitor BMP    Anemia  Assessment & Plan  Recent Labs     03/09/21  0510   HGB 12 0     Possibility of dilutional on anemia chronic disease  Monitor H&H    Elevated liver enzymes  Assessment & Plan  · Chronic, w/o abdominal pain     CAD S/P percutaneous coronary angioplasty  Assessment & Plan  · S/P NEISHA to RCA in 2006  · Admitted in November 2020 for NSTEMI, no acute obstruction noted-- no intervention and medical management pursued  · Status post EKG changes: ST elevated lead II, V6; inverted T wave in III, aVF; large T waves I, aVL-- these are new compared to prior EKGs  · ED D/W cardiology-- no STEMI alert as patient is not having active CP  VTE Pharmacologic Prophylaxis:   Pharmacologic: Heparin  Mechanical VTE Prophylaxis in Place: Yes    Discussions with Specialists or Other Care Team Provider: Pulmonology, Nephrology    Education and Discussions with Family / Patient:  Will discuss with patient's daughter     Current Length of Stay: 6 day(s)    Current Patient Status: Inpatient     Discharge Plan / Estimated Discharge Date: Pending clinical course    Code Status: Level 3 - DNAR and DNI      Subjective:   Patient seen and examined at bedside  He has mild SOB but denies chest pain or other pain  Objective:     Vitals:   Temp (24hrs), Av °F (36 7 °C), Min:97 5 °F (36 4 °C), Max:98 6 °F (37 °C)    Temp:  [97 5 °F (36 4 °C)-98 6 °F (37 °C)] 97 9 °F (36 6 °C)  HR:  [] 93  Resp:  [16-18] 16  BP: (109-133)/(62-67) 118/65  SpO2:  [96 %-97 %] 96 %  Body mass index is 19 69 kg/m²  Input and Output Summary (last 24 hours): Intake/Output Summary (Last 24 hours) at 3/9/2021 1410  Last data filed at 3/9/2021 1001  Gross per 24 hour   Intake 440 ml   Output 600 ml   Net -160 ml       Physical Exam:     Physical Exam  Constitutional:       Appearance: Normal appearance  Comments: Thin appearing  Cardiovascular:      Rate and Rhythm: Normal rate and regular rhythm  Pulmonary:      Effort: Pulmonary effort is normal       Breath sounds: Rales (bilateral bases) present  Abdominal:      Palpations: Abdomen is soft  Tenderness: There is no abdominal tenderness  Musculoskeletal:      Right lower leg: No edema  Left lower leg: No edema  Skin:     General: Skin is warm and dry  Neurological:      General: No focal deficit present  Mental Status: He is alert and oriented to person, place, and time     Psychiatric:         Mood and Affect: Mood normal          Behavior: Behavior normal            Additional Data:     Labs:    Results from last 7 days   Lab Units 21  0510   WBC Thousand/uL 11 60*   HEMOGLOBIN g/dL 12 0   HEMATOCRIT % 37 9   PLATELETS Thousands/uL 314     Results from last 7 days   Lab Units 21  0511  21  0527   POTASSIUM mmol/L 4 8   < > 5 7*   CHLORIDE mmol/L 99*   < > 99*   CO2 mmol/L 34*   < > 25   BUN mg/dL 45*   < > 51*   CREATININE mg/dL 1 13   < > 0 80 CALCIUM mg/dL 9 4   < > 9 7   ALK PHOS U/L  --   --  116   ALT U/L  --   --  99*   AST U/L  --   --  116*    < > = values in this interval not displayed  * I Have Reviewed All Lab Data Listed Above  * Additional Pertinent Lab Tests Reviewed:  All Labs Within Last 24 Hours Reviewed    Imaging:    Imaging Reports Reviewed Today Include: None  Imaging Personally Reviewed by Myself Includes:  None    Recent Cultures (last 7 days):           Last 24 Hours Medication List:   Current Facility-Administered Medications   Medication Dose Route Frequency Provider Last Rate    acetaminophen  650 mg Oral Q6H PRN Patrizia Shah PA-C      albuterol  2 puff Inhalation Q4H PRN Tyson Mason PA-C      aspirin  81 mg Oral Daily Patrizia Shah PA-C      atorvastatin  40 mg Oral Daily Patrizia Shah PA-C      carbamide peroxide  5 drop Both Ears BID Karlee Alcantar MD      docusate sodium  100 mg Oral BID Patrizia Shah PA-C      famotidine  40 mg Oral Daily Patrizia Shah PA-C      heparin (porcine)  5,000 Units Subcutaneous Q8H [facility] Patrizia Shah PA-C      insulin lispro  1-5 Units Subcutaneous TID AC Karlee Alcantar MD      insulin lispro  1-5 Units Subcutaneous HS Karlee Alcantar MD      methylPREDNISolone sodium succinate  40 mg Intravenous Once CRISTOBAL Domínguez      metoprolol tartrate  12 5 mg Oral Q12H [facility] CRISTOBAL Mazariegos Mc      mirtazapine  7 5 mg Oral HS CRISTOBAL Bowers      nystatin   Topical BID Patrizia Shah PA-C      ondansetron  4 mg Intravenous Q6H PRN Patrizia Shah PA-C      pantoprazole  40 mg Oral Early Morning Patrizia Shah PA-C      polyethylene glycol  17 g Oral BID Patrizia Shah PA-C      [START ON 3/10/2021] predniSONE  50 mg Oral Daily Izella Roughen, MERRILLNP      Followed by   Anne Marie Devries ON 3/13/2021] predniSONE  40 mg Oral Daily Izella Roughen, CRNP      Followed by   Anne Marie Devries ON 3/16/2021] predniSONE  30 mg Oral Daily Patience Leaver, CRNP      Followed by   Danielle Linares ON 3/19/2021] predniSONE  20 mg Oral Daily Patience Leaver, CRNP      Followed by   Danielle Linares ON 3/22/2021] predniSONE  10 mg Oral Daily Patience Leaver, MERRILLNP      senna  17 2 mg Oral HS Patrizia Shah PA-C          Today, Patient Was Seen By: Fide De DO    ** Please Note: This note has been constructed using a voice recognition system   **

## 2021-03-09 NOTE — ASSESSMENT & PLAN NOTE
Recent Labs     03/07/21  0446 03/08/21  0514 03/09/21  0511   CREATININE 1 97* 1 29 1 13   EGFR 35 58 68     · Patient is on acute kidney injury protocol  · Nephrologist on board  · Monitor kidney function off of IV fluids  · Monitor input and output  · Avoid nephrotoxins  · Avoid hypotension

## 2021-03-09 NOTE — ASSESSMENT & PLAN NOTE
Malnutrition Findings:   Adult Malnutrition type: Acute illness(in the setting of chronic illness)  Adult Degree of Malnutrition: Other severe protein calorie malnutrition(related to catabolic illness, inadequate oral intake)    BMI Findings: Body mass index is 19 69 kg/m²     · Patient w/ unintentional weight loss-- appears to have lost nearly 20 lbs in the last 1 month   · Reports no appetite x1 week  · Has had GI work up recently w/ no organic cause of poor PO intake  · Continue w/ nutritional supplements  · Started Remeron as per palliative Care, for patient's anorexia  · Patient reports difficulty swallowing - ordered Speech evaluation which found functional swallowing, recommend regular diet and thin liquids

## 2021-03-09 NOTE — PROGRESS NOTES
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  Tobacco abuse      -   Currently not interested in lung transplant      -   Patient's BMI is too low to consider anti fibrotic therapy      -   Inpatient- will continue IV solmedrol 40 mg b i d today and switch to prednisone 50 mg tomorrow decrease by 10 mg Q 3 days      -   Palliative care following       3  Chronic grade 1 diastolic CHF        -  4/2/750- EF 55%        -   Patient does not appear overtly overloaded        -   No indication for diuretics at this time        -   Continue I&Os and daily weight    4  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        -  50lb  Weight loss in 2 months        -   Recommend nutrition consult with protein supplement        -   Would recommend updating all cancer markers    5  Severe deconditioning        -   Patient reports his baseline is wheelchair bound at home        -   Currently reports he is not interested in rehab      -  Outpatient follow-up per discharge instructions    Chief Complaint:    "I still feel weak"    Subjective:    Mata Piña Was comfortably sitting in his bed  He reports he is still feeling very weak and somewhat short of breath  Patient reports she is not interested attending rehab  No significant overnight events reported  Patient currently denying any fevers, chills, hemoptysis, headaches, night sweats, pleuritic chest pain      Objective:    Vitals: Blood pressure 118/65, pulse 93, temperature 97 9 °F (36 6 °C), temperature source Oral, resp  rate 16, height 4' 11" (1 499 m), weight 44 2 kg (97 lb 8 oz), SpO2 96 %  3L,Body mass index is 19 69 kg/m²  Intake/Output Summary (Last 24 hours) at 3/9/2021 0973  Last data filed at 3/9/2021 8440  Gross per 24 hour   Intake 600 ml   Output 300 ml   Net 300 ml       Invasive Devices     Peripheral Intravenous Line            Peripheral IV 03/09/21 Left Forearm less than 1 day                Physical Exam:   Physical Exam  Constitutional:       General: He is not in acute distress  Appearance: He is well-developed  He is cachectic  He is not ill-appearing  HENT:      Head: Normocephalic and atraumatic  Mouth/Throat:      Mouth: Mucous membranes are moist    Neck:      Musculoskeletal: Normal range of motion and neck supple  Thyroid: No thyromegaly  Cardiovascular:      Rate and Rhythm: Normal rate and regular rhythm  Pulmonary:      Effort: Pulmonary effort is normal  No tachypnea, bradypnea, accessory muscle usage or respiratory distress  Breath sounds: No stridor  Decreased breath sounds present  No wheezing, rhonchi or rales  Comments: Poor effort, diminished airation  Chest:      Chest wall: No mass or deformity  Abdominal:      General: Bowel sounds are normal       Palpations: Abdomen is soft  Musculoskeletal: Normal range of motion  Right lower leg: No edema  Left lower leg: No edema  Lymphadenopathy:      Cervical: No cervical adenopathy  Skin:     General: Skin is warm and dry  Coloration: Skin is not cyanotic or pale  Neurological:      General: No focal deficit present  Mental Status: He is alert and oriented to person, place, and time  Psychiatric:         Mood and Affect: Mood normal  Mood is not anxious  Behavior: Behavior normal  Behavior is not agitated  Labs:    I have personally reviewed pertinent lab results CBC:   Lab Results   Component Value Date    WBC 11 60 (H) 03/09/2021    HGB 12 0 03/09/2021    HCT 37 9 03/09/2021    MCV 88 03/09/2021     03/09/2021    MCH 27 9 03/09/2021    MCHC 31 7 03/09/2021    RDW 15 5 (H) 03/09/2021    MPV 11 7 03/09/2021   , CMP:   Lab Results   Component Value Date    SODIUM 138 03/09/2021    K 4 8 03/09/2021    CL 99 (L) 03/09/2021    CO2 34 (H) 03/09/2021    BUN 45 (H) 03/09/2021    CREATININE 1 13 03/09/2021    CALCIUM 9 4 03/09/2021    EGFR 68 03/09/2021       Imaging and other studies: I have personally reviewed pertinent films in PACS     CXR- BL GGO

## 2021-03-09 NOTE — ASSESSMENT & PLAN NOTE
Recent Labs     03/09/21  0510   HGB 12 0     Possibility of dilutional on anemia chronic disease  Monitor H&H

## 2021-03-09 NOTE — TELEPHONE ENCOUNTER
I called spoke to daughter Gregoria Cabrera she stated he is in the hospital  We will keep an eye on that to schedule referral

## 2021-03-09 NOTE — ASSESSMENT & PLAN NOTE
Recent Labs     03/07/21  0446 03/08/21  0514 03/09/21  0511   K 3 0* 4 6 4 8     Replete as needed  Monitor BMP

## 2021-03-09 NOTE — UTILIZATION REVIEW
Continued Stay Review    Date: 3/7/21                          Current Patient Class: IP   Current Level of Care: MS    HPI:65 y o  male initially admitted on 3/2 /21 as OBS converted to Inpatient 3/3 with acute on chronic hypoxic respiratory failure likely 2/2 interstitial lung disease requiring IV steroids  CTA -PE study neg 3/2  PMH includes pulmonary fibrosis with baseline [email or web address]  Plan included IV steroids, short acting bronchodilators with pulmonology involved in pts care  Pt declined lung transplant eval -palliative care consulted  Creat elevated after contrast imaging study with pt started on IVF x 10hrs  likely ATN related to contrast  Pt with hyperkalemia , monitoring BMP  Nephrology consulted 3/5 due to elevated creat , hyponatremia and hyperkalemia  -plan to give IV lasix 80 mg x1, Kayexelate 15 gx 2, monitor potassium  3/6-pt started on IV lasix drip 3/5 pm due to low urine output, monitoring I/O and BMP,UOP increasing some on Lasix drip  Creat starting to plateau  Hyperkalemia resolved, hyponatremia resolved for now  Pt continues on tapering doses of IV steroid  , pulmonology plans to transition top po steroids and taper by 10 mg q 3days  Pt remains on O2 @3L  with decrease breath sounds at bases    Assessment/Plan:3/7  Pt feels more SOB than his baseline today, presently on O2 4-5 L, pt feels hoarse  Pt is now off lasix drip with good UOP  Pt is net -2 8 L  Pt keith hypokalemic and K repleted, plan to continue to monitor renal function and lytes  Pulmonology eval ed pt and pt returned to IV steroids  Creat trending downward  Nephrology plans to continue to Hold diuretic, monitor BMP, renal function  Pulmonology-recommend CXR today, go back to IV steroids solumedrol 40 mg IV given increase in O2 need  Pt has decreased breath sounds at bases      Pertinent Labs/Diagnostic Results:   3/7 CXR  Findings consistent with mildly improved Covid pneumonia, most consolidation at the left lung base  Results from last 7 days   Lab Units 03/02/21  1250   SARS-COV-2  Negative     Results from last 7 days   Lab Units 03/06/21  0516 03/05/21  0604 03/03/21  0527 03/02/21  1250   WBC Thousand/uL 10 13 10 24* 10 63* 9 84   HEMOGLOBIN g/dL 10 0* 10 0* 11 7* 13 3   HEMATOCRIT % 31 7* 31 6* 38 1 43 4   PLATELETS Thousands/uL 252 259 289 366   NEUTROS ABS Thousands/µL  --   --   --  8 00*         Results from last 7 days   Lab Units 03/08/21  0514 03/07/21  0446 03/06/21  0516 03/05/21  1616 03/05/21  0604  03/03/21  0527   SODIUM mmol/L 138 138 136 133* 129*   < > 135*   POTASSIUM mmol/L 4 6 3 0* 3 5 5 2 5 8*   < > 5 7*   CHLORIDE mmol/L 97* 97* 99* 98* 95*   < > 99*   CO2 mmol/L 34* 38* 27 28 28   < > 25   ANION GAP mmol/L 7 3* 10 7 6   < > 11   BUN mg/dL 57* 74* 90* 86* 82*   < > 51*   CREATININE mg/dL 1 29 1 97* 2 44* 2 54* 2 50*   < > 0 80   EGFR ml/min/1 73sq m 58 35 27 26 26   < > 94   CALCIUM mg/dL 9 7 9 5 8 4 8 2* 8 3   < > 9 7   MAGNESIUM mg/dL  --   --   --   --   --   --  1 7    < > = values in this interval not displayed       Results from last 7 days   Lab Units 03/03/21  0527 03/02/21  1404   AST U/L 116* 156*   ALT U/L 99* 136*   ALK PHOS U/L 116 145*   TOTAL PROTEIN g/dL 7 7 9 6*   ALBUMIN g/dL 2 9* 3 7   TOTAL BILIRUBIN mg/dL 0 43 0 47     Results from last 7 days   Lab Units 03/08/21  1549 03/08/21  1101 03/08/21  0724 03/07/21  2049 03/07/21  1609 03/07/21  1122 03/07/21  0743 03/06/21  2109 03/06/21  1640 03/06/21  1150 03/06/21  0817 03/05/21  2048   POC GLUCOSE mg/dl 280* 187* 127 316* 133 174* 130 174* 221* 125 82 152*     Results from last 7 days   Lab Units 03/08/21  0514 03/07/21  0446 03/06/21  0516 03/05/21  1616 03/05/21  0604 03/04/21  0540 03/03/21  0527 03/02/21  1404   GLUCOSE RANDOM mg/dL 145* 139 89 138 147* 189* 93 118           Results from last 7 days   Lab Units 03/02/21  1250   TROPONIN I ng/mL <0 02     Results from last 7 days   Lab Units 03/02/21  1250   D-DIMER QUANTITATIVE ug/ml FEU 1 21* Results from last 7 days   Lab Units 03/02/21  1404   NT-PRO BNP pg/mL 424*           Results from last 7 days   Lab Units 03/05/21  1604   CLARITY UA  Clear   COLOR UA  Yellow   SPEC GRAV UA  1 010   PH UA  5 5   GLUCOSE UA mg/dl Negative   KETONES UA mg/dl Negative   BLOOD UA  Trace-Intact*   PROTEIN UA mg/dl Negative   NITRITE UA  Negative   BILIRUBIN UA  Negative   UROBILINOGEN UA E U /dl 0 2   LEUKOCYTES UA  Negative   WBC UA /hpf 0-5*   RBC UA /hpf 0-5*   BACTERIA UA /hpf Occasional   EPITHELIAL CELLS WET PREP /hpf Occasional     Results from last 7 days   Lab Units 03/02/21  1250   INFLUENZA A PCR  Negative   INFLUENZA B PCR  Negative   RSV PCR  Negative             Vital Signs:   Date/Time  Temp  Pulse  Resp  BP  MAP (mmHg)  SpO2  Calculated FIO2 (%) - Nasal Cannula  Nasal Cannula O2 Flow Rate (L/min)  O2 Device    03/08/21 1516  98 6 °F (37 °C)  119Abnormal   18  109/62  80  96 %  --  --  Nasal cannula    03/08/21 0741  --  --  --  --  --  95 %  32  3 L/min  Nasal cannula    03/08/21 0700  97 7 °F (36 5 °C)  93  18  111/66  83  100 %  40  5 L/min  Nasal cannula    03/08/21 0000  --  --  --  --  --  --  --  --  Nasal cannula    03/07/21 2225  97 8 °F (36 6 °C)  93  16  114/68  86  96 %  40  5 L/min  Nasal cannula    03/07/21 1528  97 8 °F (36 6 °C)  91  17  130/67  91  99 %  40  5 L/min  Nasal cannula    03/07/21 1130  --  --  --  --  --  --  32  3 L/min  Nasal cannula    03/07/21 0700  97 1 °F (36 2 °C)Abnormal    114Abnormal   18  111/63  82  94 %  --  --  Nasal cannula    Temp: RN notified at 03/07/21 0700   03/06/21 2139  --  94  --  126/59  --  --  --  --  --    03/06/21 2000  --  --  --  --  --  --  32  3 L/min  Nasal cannula    03/06/21 1500  97 3 °F (36 3 °C)Abnormal   99  18  132/65  92  100 %  --  --  Nasal cannula    03/06/21 0701  97 6 °F (36 4 °C)  84  18  120/58  82  98 %  --  --  Nasal cannula          Medications:   Scheduled Medications:  aspirin, 81 mg, Oral, Daily  atorvastatin, 40 mg, Oral, Daily  carbamide peroxide, 5 drop, Both Ears, BID  docusate sodium, 100 mg, Oral, BID  famotidine, 40 mg, Oral, Daily  heparin (porcine), 5,000 Units, Subcutaneous, Q8H HERNANDEZ  insulin lispro, 1-5 Units, Subcutaneous, TID AC  insulin lispro, 1-5 Units, Subcutaneous, HS  methylPREDNISolone sodium succinate, 40 mg, Intravenous, Q12H HERNANDEZ  metoprolol tartrate, 12 5 mg, Oral, Q12H HERNANDEZ  mirtazapine, 7 5 mg, Oral, HS  nystatin, , Topical, BID  pantoprazole, 40 mg, Oral, Early Morning  polyethylene glycol, 17 g, Oral, BID  senna, 17 2 mg, Oral, HS  furosemide (LASIX) injection 80 mg   Dose: 80 mg  Freq: Once Route: IV x2 3/5  potassium chloride (K-DUR,KLOR-CON) CR tablet 20 mEq   Dose: 20 mEq  Freq: Once Route: PO x1 3/7  potassium chloride (K-DUR,KLOR-CON) CR tablet 40 mEq   Dose: 40 mEq  Freq: Once Route: PO x1 3/7  sodium polystyrene (KAYEXALATE) powder 15 g   Dose: 15 g  Freq: Once Route: PO x2 3/5      Continuous IV Infusions:furosemide (LASIX) 500 mg infusion 50 mL   Rate: 2 mL/hr Dose: 20 mg/hr  Freq: Continuous Route: IV  Last Dose: Stopped (03/06/21 1736)  Start: 03/05/21 1800 End: 03/06/21 1653     PRN Meds:  acetaminophen, 650 mg, Oral, Q6H PRN  albuterol, 2 puff, Inhalation, Q4H PRN x1 3/8  ondansetron, 4 mg, Intravenous, Q6H PRN        Discharge Plan: D      Network Utilization Review Department  ATTENTION: Please call with any questions or concerns to [phone] and carefully listen to the prompts so that you are directed to the right person  All voicemails are confidential   Kira Aguilera all requests for admission clinical reviews, approved or denied determinations and any other requests to dedicated fax number below belonging to the campus where the patient is receiving treatment   List of dedicated fax numbers for the Facilities:  [address] DENIALS (Administrative/Medical Necessity) [phone]   [address] (Maternity/NICU/Pediatrics) [phone] 5000 [facility] [place] [facility] [address] [place] [address]  [phone]   [address] ([facility], [facility]) [address] [place] [address] [address] [phone]   [place] [address] [phone]

## 2021-03-09 NOTE — ASSESSMENT & PLAN NOTE
Due to patient's interstitial lung disease  Patient continues to complain of shortness of breath, recurrent worsening episodes  Discussed with the pulmonology  Continue IV steroid with transition to PO tomorrow  Continue supplemental oxygen  Continue to monitor inpatient

## 2021-03-10 NOTE — ASSESSMENT & PLAN NOTE
Malnutrition Findings:   Adult Malnutrition type: Acute illness(in the setting of chronic illness)  Adult Degree of Malnutrition: Other severe protein calorie malnutrition(related to catabolic illness, inadequate oral intake)    BMI Findings: Body mass index is 19 35 kg/m²     · Patient w/ unintentional weight loss-- appears to have lost nearly 20 lbs in the last 1 month   · Reports no appetite x1 week  · Has had GI work up recently w/ no organic cause of poor PO intake  · Continue w/ nutritional supplements  · Started Remeron as per palliative Care, for patient's anorexia  · Patient reports difficulty swallowing - ordered Speech evaluation which found functional swallowing, recommend regular diet and thin liquids

## 2021-03-10 NOTE — PROGRESS NOTES
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)      -   Patient's current BMI to consider anti fibrotic therapy      -   Inpatient:  Prednisone 50 mg decreased by 10 mg q 3 days,  Will order spirometry- if indicative of restriction can trial BiPAP      -   Palliative care following    3  Chronic grade 1 diastolic CHF        -  8/3/756- EF 55%        -   Patient does not appear overtly volume overloaded        -   No indication for diuretics at this time        -   Continue I&Os and daily weight    4  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        -  50lb  Weight loss in 2 months        -   Recommend nutrition consult with protein supplement        -   Would recommend updating all cancer markers    5  Severe deconditioning        -   Patient reports his baseline is wheelchair bound at home        -   Patient reports he is currently not interested in rehab he states that he just completed rehab approximately 3 weeks ago      -  Outpatient follow-up per discharge instructions      Chief Complaint:    "I still feel so weak"    Subjective:    Jaclyn Gavin  Was comfortably sitting in his bed  He reports that he had still is not feel that he is at his respiratory baseline  Patient reports that he is feeling significantly weak  No significant overnight events reported    Patient currently denies any fever, chills, hemoptysis, headaches, night sweats, pleuritic chest pain, or palpitations    Objective:    Vitals: Blood pressure 124/68, pulse (!) 107, temperature 97 5 °F (36 4 °C), temperature source Oral, resp  rate 20, height 4' 11" (1 499 m), weight 43 5 kg (95 lb 12 8 oz), SpO2 97 %  3L,Body mass index is 19 35 kg/m²  Intake/Output Summary (Last 24 hours) at 3/10/2021 1000  Last data filed at 3/9/2021 2136  Gross per 24 hour   Intake 540 ml   Output 750 ml   Net -210 ml       Invasive Devices     Peripheral Intravenous Line            Peripheral IV 03/09/21 Left Forearm 1 day                Physical Exam:   Physical Exam  Constitutional:       General: He is not in acute distress  Appearance: He is cachectic  He is ill-appearing  Interventions: He is not intubated  HENT:      Head: Normocephalic and atraumatic  Mouth/Throat:      Mouth: Mucous membranes are moist       Pharynx: Oropharynx is clear  Neck:      Musculoskeletal: Normal range of motion and neck supple  Thyroid: No thyromegaly  Cardiovascular:      Rate and Rhythm: Normal rate and regular rhythm  Pulmonary:      Effort: No tachypnea, bradypnea, accessory muscle usage or respiratory distress  He is not intubated  Breath sounds: Decreased air movement present  No stridor or transmitted upper airway sounds  Decreased breath sounds present  No wheezing, rhonchi or rales  Comments: Poor effort diminished aeration throughout lung fields  Chest:      Chest wall: No mass or deformity  Abdominal:      General: Bowel sounds are normal       Palpations: Abdomen is soft  There is no hepatomegaly or splenomegaly  Musculoskeletal: Normal range of motion  Right lower leg: No edema  Left lower leg: No edema  Lymphadenopathy:      Cervical: No cervical adenopathy  Skin:     General: Skin is warm and dry  Coloration: Skin is not cyanotic or pale  Neurological:      General: No focal deficit present        Mental Status: He is alert and oriented to person, place, and time  Psychiatric:         Mood and Affect: Mood normal  Mood is not anxious  Behavior: Behavior normal  Behavior is not agitated  Labs: I have personally reviewed pertinent lab results  , ABG:   Lab Results   Component Value Date    PHART 7 459 (H) 03/10/2021    [id number] 49 4 (H) 03/10/2021    PO2ART 152 0 (H) 03/10/2021    [id number] 34 3 (H) 03/10/2021    BEART 9 1 03/10/2021    SOURCE Radial, Right 03/10/2021   , BNP: No results found for: BNP, CBC:   Lab Results   Component Value Date    WBC 16 02 (H) 03/10/2021    HGB 10 9 (L) 03/10/2021    HCT 34 8 (L) 03/10/2021    MCV 88 03/10/2021     03/10/2021    MCH 27 7 03/10/2021    MCHC 31 3 (L) 03/10/2021    RDW 15 2 (H) 03/10/2021    MPV 12 2 03/10/2021   , CMP:   Lab Results   Component Value Date    SODIUM 137 03/10/2021    K 4 9 03/10/2021    CL 99 (L) 03/10/2021    CO2 34 (H) 03/10/2021    BUN 37 (H) 03/10/2021    CREATININE 0 97 03/10/2021    CALCIUM 9 3 03/10/2021    EGFR 82 03/10/2021       Imaging and other studies: I have personally reviewed pertinent films in PACS     CXR- BL GGO

## 2021-03-10 NOTE — PLAN OF CARE
Problem: Potential for Falls  Goal: Patient will remain free of falls  Description: INTERVENTIONS:  - Assess patient frequently for physical needs  -  Identify cognitive and physical deficits and behaviors that affect risk of falls  -  [place] fall precautions as indicated by assessment   - Educate patient/family on patient safety including physical limitations  - Instruct patient to call for assistance with activity based on assessment  - Modify environment to reduce risk of injury  - Consider OT/PT consult to assist with strengthening/mobility  Outcome: Progressing     Problem: Prexisting or High Potential for Compromised Skin Integrity  Goal: Skin integrity is maintained or improved  Description: INTERVENTIONS:  - Identify patients at risk for skin breakdown  - Assess and monitor skin integrity  - Assess and monitor nutrition and hydration status  - Monitor labs   - Assess for incontinence   - Turn and reposition patient  - Assist with mobility/ambulation  - Relieve pressure over bony prominences  - Avoid friction and shearing  - Provide appropriate hygiene as needed including keeping skin clean and dry  - Evaluate need for skin moisturizer/barrier cream  - Collaborate with interdisciplinary team   - Patient/family teaching  - Consider wound care consult   Outcome: Progressing     Problem: Nutrition/Hydration-ADULT  Goal: Nutrient/Hydration intake appropriate for improving, restoring or maintaining nutritional needs  Description: Monitor and assess patient's nutrition/hydration status for malnutrition  Collaborate with interdisciplinary team and initiate plan and interventions as ordered  Monitor patient's weight and dietary intake as ordered or per policy  Utilize nutrition screening tool and intervene as necessary  Determine patient's food preferences and provide high-protein, high-caloric foods as appropriate       INTERVENTIONS:  - Monitor oral intake, urinary output, labs, and treatment plans  - Assess nutrition and hydration status and recommend course of action  - Evaluate amount of meals eaten  - Assist patient with eating if necessary   - Allow adequate time for meals  - Recommend/ encourage appropriate diets, oral nutritional supplements, and vitamin/mineral supplements  - Order, calculate, and assess calorie counts as needed  - Recommend, monitor, and adjust tube feedings and TPN/PPN based on assessed needs  - Assess need for intravenous fluids  - Provide specific nutrition/hydration education as appropriate  - Include patient/family/caregiver in decisions related to nutrition  Outcome: Progressing     Problem: PAIN - ADULT  Goal: Verbalizes/displays adequate comfort level or baseline comfort level  Description: Interventions:  - Encourage patient to monitor pain and request assistance  - Assess pain using appropriate pain scale  - Administer analgesics based on type and severity of pain and evaluate response  - Implement non-pharmacological measures as appropriate and evaluate response  - Consider cultural and social influences on pain and pain management  - Notify physician/advanced practitioner if interventions unsuccessful or patient reports new pain  Outcome: Progressing     Problem: INFECTION - ADULT  Goal: Absence or prevention of progression during hospitalization  Description: INTERVENTIONS:  - Assess and monitor for signs and symptoms of infection  - Monitor lab/diagnostic results  - Monitor all insertion sites, i e  indwelling lines, tubes, and drains  - Monitor endotracheal if appropriate and nasal secretions for changes in amount and color  - [place] appropriate cooling/warming therapies per order  - Administer medications as ordered  - Instruct and encourage patient and family to use good hand hygiene technique  - Identify and instruct in appropriate isolation precautions for identified infection/condition  Outcome: Progressing     Problem: SAFETY ADULT  Goal: Patient will remain free of falls  Description: INTERVENTIONS:  - Assess patient frequently for physical needs  -  Identify cognitive and physical deficits and behaviors that affect risk of falls    -  [place] fall precautions as indicated by assessment   - Educate patient/family on patient safety including physical limitations  - Instruct patient to call for assistance with activity based on assessment  - Modify environment to reduce risk of injury  - Consider OT/PT consult to assist with strengthening/mobility  Outcome: Progressing  Goal: Maintain or return to baseline ADL function  Description: INTERVENTIONS:  -  Assess patient's ability to carry out ADLs; assess patient's baseline for ADL function and identify physical deficits which impact ability to perform ADLs (bathing, care of mouth/teeth, toileting, grooming, dressing, etc )  - Assess/evaluate cause of self-care deficits   - Assess range of motion  - Assess patient's mobility; develop plan if impaired  - Assess patient's need for assistive devices and provide as appropriate  - Encourage maximum independence but intervene and supervise when necessary  - Involve family in performance of ADLs  - Assess for home care needs following discharge   - Consider OT consult to assist with ADL evaluation and planning for discharge  - Provide patient education as appropriate  Outcome: Progressing  Goal: Maintain or return mobility status to optimal level  Description: INTERVENTIONS:  - Assess patient's baseline mobility status (ambulation, transfers, stairs, etc )    - Identify cognitive and physical deficits and behaviors that affect mobility  - Identify mobility aids required to assist with transfers and/or ambulation (gait belt, sit-to-stand, lift, walker, cane, etc )  - [place] fall precautions as indicated by assessment  - Record patient progress and toleration of activity level on Mobility SBAR; progress patient to next Phase/Stage  - Instruct patient to call for assistance with activity based on assessment  - Consider rehabilitation consult to assist with strengthening/weightbearing, etc   Outcome: Progressing     Problem: DISCHARGE PLANNING  Goal: Discharge to home or other facility with appropriate resources  Description: INTERVENTIONS:  - Identify barriers to discharge w/patient and caregiver  - Arrange for needed discharge resources and transportation as appropriate  - Identify discharge learning needs (meds, wound care, etc )  - Arrange for interpretive services to assist at discharge as needed  - Refer to Case Management Department for coordinating discharge planning if the patient needs post-hospital services based on physician/advanced practitioner order or complex needs related to functional status, cognitive ability, or social support system  Outcome: Progressing     Problem: Knowledge Deficit  Goal: Patient/family/caregiver demonstrates understanding of disease process, treatment plan, medications, and discharge instructions  Description: Complete learning assessment and assess knowledge base    Interventions:  - Provide teaching at level of understanding  - Provide teaching via preferred learning methods  Outcome: Progressing     Problem: RESPIRATORY - ADULT  Goal: Achieves optimal ventilation and oxygenation  Description: INTERVENTIONS:  - Assess for changes in respiratory status  - Assess for changes in mentation and behavior  - Position to facilitate oxygenation and minimize respiratory effort  - Oxygen administered by appropriate delivery if ordered  - Initiate smoking cessation education as indicated  - Encourage broncho-pulmonary hygiene including cough, deep breathe, Incentive Spirometry  - Assess the need for suctioning and aspirate as needed  - Assess and instruct to report SOB or any respiratory difficulty  - Respiratory Therapy support as indicated  Outcome: Progressing

## 2021-03-10 NOTE — ASSESSMENT & PLAN NOTE
Recent Labs     03/09/21  0510 03/10/21  0524   HGB 12 0 10 9*     Possibility of dilutional on anemia chronic disease  Monitor H&H

## 2021-03-10 NOTE — PROGRESS NOTES
[facility]  Progress Note - Kim Common 1955, 60 y o  male MRN: [id number]  Unit/Bed#: S -01 Encounter: [id number]  Primary Care Provider: Vidya Bojorquez DO   Date and time admitted to hospital: 3/2/2021 11:59 AM    * Acute on chronic respiratory failure with hypoxia [facility])  Assessment & Plan  Due to patient's interstitial lung disease  Discussed with the pulmonology  Spirometry today and if restrictive pattern found, may need further qualification for BiPAP  Continue PO steroid taper  Continue supplemental oxygen  Continue to monitor inpatient  Pulmonary fibrosis determined by high resolution computed tomography [facility])  Assessment & Plan  · Follows w/ Dr Ilya Estrada   · Recent admission for similar sx-- overall discussions had during that hospitalization entailed chronicity and progression of dz  He was not felt to be candidate for steroids at that time  He has not felt to be a candidate for anti-fibrotics either given cachexia   · He was seen and evaluated by palliative care medicine during that admission-- has not had OP f/u   · Palliative care doctor on board:  Continue disease directed care with limit of DNAR+DNI  Started Remeron for insomnia and anorexia  · Continue PO steroids  · Patient may have short-acting bronchodilators as needed  · Declined lung transplant evaluation  Acute renal failure superimposed on stage 2 chronic kidney disease [facility])  Assessment & Plan  Recent Labs     03/08/21  0514 03/09/21  0511 03/10/21  0524   CREATININE 1 29 1 13 0 97   EGFR 58 68 82     · Patient is on acute kidney injury protocol  · Nephrologist on board  · Monitor kidney function off of IV fluids  · Monitor input and output  · Avoid nephrotoxins  · Avoid hypotension        ATN (acute tubular necrosis) (HCC)  Assessment & Plan  · Possible secondary to contrast  · Monitor input-output  · Monitor BMP a m   · Monitor vital signs- maintain BP above 120 mm of Hg to maintain renal perfusion    Severe protein-calorie malnutrition (HCC)  Assessment & Plan  Malnutrition Findings:   Adult Malnutrition type: Acute illness(in the setting of chronic illness)  Adult Degree of Malnutrition: Other severe protein calorie malnutrition(related to catabolic illness, inadequate oral intake)    BMI Findings: Body mass index is 19 35 kg/m²  · Patient w/ unintentional weight loss-- appears to have lost nearly 20 lbs in the last 1 month   · Reports no appetite x1 week  · Has had GI work up recently w/ no organic cause of poor PO intake  · Continue w/ nutritional supplements  · Started Remeron as per palliative Care, for patient's anorexia  · Patient reports difficulty swallowing - ordered Speech evaluation which found functional swallowing, recommend regular diet and thin liquids    Hypokalemia  Assessment & Plan  Recent Labs     03/08/21  0514 03/09/21  0511 03/10/21  0524   K 4 6 4 8 4 9     Replete as needed  Monitor BMP    Anemia  Assessment & Plan  Recent Labs     03/09/21  0510 03/10/21  0524   HGB 12 0 10 9*     Possibility of dilutional on anemia chronic disease  Monitor H&H    Elevated liver enzymes  Assessment & Plan  · Chronic, w/o abdominal pain     CAD S/P percutaneous coronary angioplasty  Assessment & Plan  · S/P NEISHA to RCA in 2006  · Admitted in November 2020 for NSTEMI, no acute obstruction noted-- no intervention and medical management pursued  · Status post EKG changes: ST elevated lead II, V6; inverted T wave in III, aVF; large T waves I, aVL-- these are new compared to prior EKGs  · ED D/W cardiology-- no STEMI alert as patient is not having active CP          VTE Pharmacologic Prophylaxis:   Pharmacologic: Heparin  Mechanical VTE Prophylaxis in Place: Yes    Discussions with Specialists or Other Care Team Provider: Pulmonology, Palliative    Education and Discussions with Family / Patient: Daughter    Current Length of Stay: 7 day(s)    Current Patient Status: Inpatient     Discharge Plan / Estimated Discharge Date: 24-48 hours    Code Status: Level 3 - DNAR and DNI      Subjective:   Patient seen and examined at bedside  Feels tired but no current SOB or pain  Objective:     Vitals:   Temp (24hrs), Av 9 °F (36 6 °C), Min:97 5 °F (36 4 °C), Max:98 6 °F (37 °C)    Temp:  [97 5 °F (36 4 °C)-98 6 °F (37 °C)] 97 8 °F (36 6 °C)  HR:  [] 82  Resp:  [18-20] 18  BP: (110-130)/(56-69) 122/69  SpO2:  [97 %-100 %] 100 %  Body mass index is 19 35 kg/m²  Input and Output Summary (last 24 hours): Intake/Output Summary (Last 24 hours) at 3/10/2021 1443  Last data filed at 3/10/2021 0730  Gross per 24 hour   Intake 340 ml   Output 900 ml   Net -560 ml       Physical Exam:     Physical Exam  Constitutional:       Appearance: Normal appearance  Comments: Thin appearing  Cardiovascular:      Rate and Rhythm: Normal rate and regular rhythm  Pulmonary:      Effort: Pulmonary effort is normal       Breath sounds: Rales present  Abdominal:      Palpations: Abdomen is soft  Tenderness: There is no abdominal tenderness  Musculoskeletal:      Right lower leg: No edema  Left lower leg: No edema  Skin:     General: Skin is warm and dry  Neurological:      General: No focal deficit present  Mental Status: He is alert and oriented to person, place, and time  Psychiatric:         Mood and Affect: Mood normal          Behavior: Behavior normal          Additional Data:     Labs:    Results from last 7 days   Lab Units 03/10/21  0524   WBC Thousand/uL 16 02*   HEMOGLOBIN g/dL 10 9*   HEMATOCRIT % 34 8*   PLATELETS Thousands/uL 340     Results from last 7 days   Lab Units 03/10/21  0524   POTASSIUM mmol/L 4 9   CHLORIDE mmol/L 99*   CO2 mmol/L 34*   BUN mg/dL 37*   CREATININE mg/dL 0 97   CALCIUM mg/dL 9 3           * I Have Reviewed All Lab Data Listed Above  * Additional Pertinent Lab Tests Reviewed:  All Labs Within Last 24 Hours Reviewed    Imaging:    Imaging Reports Reviewed Today Include: None  Imaging Personally Reviewed by Myself Includes:  None    Recent Cultures (last 7 days):           Last 24 Hours Medication List:   Current Facility-Administered Medications   Medication Dose Route Frequency Provider Last Rate    acetaminophen  650 mg Oral Q6H PRN Patrizia Shah PA-C      albuterol  2 puff Inhalation Q4H PRN Nicko Merchant PA-C      aspirin  81 mg Oral Daily Patrizia Shah PA-C      atorvastatin  40 mg Oral Daily Patrizia Shah PA-C      carbamide peroxide  5 drop Both Ears BID Mark Vanegas MD      docusate sodium  100 mg Oral BID Patrizia Shah PA-C      famotidine  40 mg Oral Daily Patrizia Shah PA-C      heparin (porcine)  5,000 Units Subcutaneous Q8H [address] Patrizia Shah PA-C      hydrocodone-chlorpheniramine polistirex  5 mL Oral Q12H [address] Toni Mar DO      insulin lispro  1-5 Units Subcutaneous TID AC Mark Vanegas MD      insulin lispro  1-5 Units Subcutaneous HS Mark Vanegas MD      metoprolol tartrate  12 5 mg Oral Q12H [address] CRISTOBAL Montero      mirtazapine  7 5 mg Oral HS Susanna Fothergill, CRNP      nystatin   Topical BID Patrizia Shah PA-C      ondansetron  4 mg Intravenous Q6H PRN Patrizia Shah PA-C      pantoprazole  40 mg Oral Early Morning Patrizia Shah PA-C      polyethylene glycol  17 g Oral BID Patrizia Shah PA-C      predniSONE  50 mg Oral Daily CRISTOBAL Lee      Followed by   Lopez Espitia ON 3/13/2021] predniSONE  40 mg Oral Daily CRISTOBAL Lee      Followed by   Lopez Espitia ON 3/16/2021] predniSONE  30 mg Oral Daily CRISTOBAL Lee      Followed by   Lopez Espitia ON 3/19/2021] predniSONE  20 mg Oral Daily CRISTOBAL Lee      Followed by   Lopez Espitia ON 3/22/2021] predniSONE  10 mg Oral Daily CRISTOBAL Lee  17 2 mg Oral HS Patrizia Shah PA-C Today, Patient Was Seen By: [facility]    ** Please Note: This note has been constructed using a voice recognition system   **

## 2021-03-10 NOTE — ASSESSMENT & PLAN NOTE
Due to patient's interstitial lung disease  Discussed with the pulmonology  Spirometry today and if restrictive pattern found, may need further qualification for BiPAP  Continue PO steroid taper  Continue supplemental oxygen  Continue to monitor inpatient

## 2021-03-10 NOTE — ASSESSMENT & PLAN NOTE
· Follows w/ Dr Preston Reeder   · Recent admission for similar sx-- overall discussions had during that hospitalization entailed chronicity and progression of dz  He was not felt to be candidate for steroids at that time  He has not felt to be a candidate for anti-fibrotics either given cachexia   · He was seen and evaluated by palliative care medicine during that admission-- has not had OP f/u   · Palliative care doctor on board:  Continue disease directed care with limit of DNAR+DNI  Started Remeron for insomnia and anorexia  · Continue PO steroids  · Patient may have short-acting bronchodilators as needed  · Declined lung transplant evaluation

## 2021-03-10 NOTE — ASSESSMENT & PLAN NOTE
Recent Labs     03/08/21  0514 03/09/21  0511 03/10/21  0524   K 4 6 4 8 4 9     Replete as needed  Monitor BMP

## 2021-03-10 NOTE — QUICK NOTE
Spirometry Reviewed  - Ratio 99%, FVC 17%, FEV1 22% - exp time 4 5 seconds  - Interpretation - Very severe restrictive airflow defect      · Will plan on overnight SpO2 testing on 2L/min NC to evaluate for BiPAP qualification    Sophia Mena DO

## 2021-03-10 NOTE — ASSESSMENT & PLAN NOTE
Recent Labs     03/08/21  0514 03/09/21  0511 03/10/21  0524   CREATININE 1 29 1 13 0 97   EGFR 58 68 82     · Patient is on acute kidney injury protocol  · Nephrologist on board  · Monitor kidney function off of IV fluids  · Monitor input and output  · Avoid nephrotoxins  · Avoid hypotension

## 2021-03-10 NOTE — PROGRESS NOTES
Progress Note - Palliative & Supportive Care  Viri Band  60 y o   male  S /S -01   MRN: [id number]  Encounter: [id number]     Assessment  Patient Active Problem List   Diagnosis    Arteriosclerotic cardiovascular disease (ASCVD)    Benign essential hypertension    CAD S/P percutaneous coronary angioplasty    Colon, diverticulosis    Type 2 diabetes mellitus without complication, without long-term current use of insulin (HCC)    Hip pain, bilateral    History of acute inferior wall MI    Hyperlipidemia    Osteoarthritis of knee    Small stature    Spinal stenosis    Microalbuminuria due to type 2 diabetes mellitus (HCC)    Acute renal failure superimposed on stage 2 chronic kidney disease (HCC)    Pulmonary fibrosis determined by high resolution computed tomography (HCC)    Hypertension    Congenital bowing legs    Elevated liver enzymes    Chronic respiratory failure with hypoxia ([address] )    Former smoker    NSTEMI (non-ST elevated myocardial infarction) [facility] [address]    Epigastric pain    Anemia    Unintentional weight loss    Pulmonary insufficiency    Ambulatory dysfunction    Severe protein-calorie malnutrition (HCC)    Physical deconditioning    Acute on chronic respiratory failure with hypoxia (HCC)    ATN (acute tubular necrosis) (HCC)    Hypokalemia     Goals of Care  Level 3 code status  Disease focused care  Will continue discussions regarding [address] as patient's clinical presentation evolves  Encouraged follow up with Palliative Medicine on an outpatient basis after discharge for continued symptom management  Wishes to return to the hospital as needed for treatment of all medical condition and acute issues that could arise      Plan:  Symptom management:  Difficulty sleeping/decreased appetite/depression  Remeron 7 5mg PO once daily @ hs      Pain  Acetaminophen 650mg PO Q6H PRN     24 History  Chart reviewed before visit      Continues to deny pain  Offers no concerns today  Eating and drinking  Slept well  Just wants to get home to see spouse    Review of Systems: Pertinent items are noted in HPI      Medications    Current Facility-Administered Medications:     acetaminophen (TYLENOL) tablet 650 mg, 650 mg, Oral, Q6H PRN, Patrizia Shah PA-C    albuterol (PROVENTIL HFA,VENTOLIN HFA) inhaler 2 puff, 2 puff, Inhalation, Q4H PRN, Susannahuvjas Yu PA-C, 2 puff at 03/08/21 0959    aspirin chewable tablet 81 mg, 81 mg, Oral, Daily, Patrizia Shah PA-C, 81 mg at 03/09/21 0945    atorvastatin (LIPITOR) tablet 40 mg, 40 mg, Oral, Daily, Patrizia Shah PA-C, 40 mg at 03/09/21 0945    carbamide peroxide (DEBROX) 6 5 % otic solution 5 drop, 5 drop, Both Ears, BID, Poornima Henderson MD, 5 drop at 03/09/21 1835    docusate sodium (COLACE) capsule 100 mg, 100 mg, Oral, BID, Patrizia Shah PA-C, 100 mg at 03/09/21 1835    famotidine (PEPCID) tablet 40 mg, 40 mg, Oral, Daily, Patrizia Shha PA-C, 40 mg at 03/09/21 0945    heparin (porcine) subcutaneous injection 5,000 Units, 5,000 Units, Subcutaneous, Q8H [address], 5,000 Units at 03/10/21 0545 **AND** [CANCELED] Platelet count, , , Once, Patrizia Shah PA-C    insulin lispro (HumaLOG) 100 units/mL subcutaneous injection 1-5 Units, 1-5 Units, Subcutaneous, TID AC, 2 Units at 03/09/21 1835 **AND** Fingerstick Glucose (POCT), , , TID AC, Poornima Henderson MD    insulin lispro (HumaLOG) 100 units/mL subcutaneous injection 1-5 Units, 1-5 Units, Subcutaneous, HS, Poornima Henderson MD, 1 Units at 03/09/21 2124    metoprolol tartrate (LOPRESSOR) partial tablet 12 5 mg, 12 5 mg, Oral, Q12H [address], Zofia Awaman, CRNP, 12 5 mg at 03/09/21 0945    mirtazapine (REMERON) tablet 7 5 mg, 7 5 mg, Oral, HS, Earleen Brothers, CRNP, 7 5 mg at 03/09/21 2114    nystatin (MYCOSTATIN) powder, , Topical, BID, Patrizia Shah PA-C, Stopped at 03/08/21 1845    ondansetron (ZOFRAN) injection 4 mg, 4 mg, Intravenous, Q6H PRN, Patrizia Shah PA-C, 4 mg at 03/03/21 0142    pantoprazole (PROTONIX) EC tablet 40 mg, 40 mg, Oral, Early Morning, Patrizia Shah PA-C, 40 mg at 03/10/21 0546    polyethylene glycol (MIRALAX) packet 17 g, 17 g, Oral, BID, RALPH Melara-C, 17 g at 03/09/21 0946    predniSONE tablet 50 mg, 50 mg, Oral, Daily **FOLLOWED BY** [START ON 3/13/2021] predniSONE tablet 40 mg, 40 mg, Oral, Daily **FOLLOWED BY** [START ON 3/16/2021] predniSONE tablet 30 mg, 30 mg, Oral, Daily **FOLLOWED BY** [START ON 3/19/2021] predniSONE tablet 20 mg, 20 mg, Oral, Daily **FOLLOWED BY** [START ON 3/22/2021] predniSONE tablet 10 mg, 10 mg, Oral, Daily, CRISTOBAL Sanders    senna (SENOKOT) tablet 17 2 mg, 17 2 mg, Oral, HS, RALPH Melara-DAYLIN, 17 2 mg at 03/09/21 2114    Objective  /64 (BP Location: Left arm)   Pulse 97   Temp 98 6 °F (37 °C) (Oral)   Resp 18   Ht 4' 11" (1 499 m) Comment: From previous charting  Wt 43 5 kg (95 lb 12 8 oz)   SpO2 100%   BMI 19 35 kg/m²   Physical Exam:   Constitutional: Appears thin, frail and debiliated  Head: Normocephalic and atraumatic  Eyes: EOM are normal  No ocular discharge  No scleral icterus  Neck: no visible adenopathy or masses  Respiratory: Effort normal  No stridor  No respiratory distress  Gastrointestinal: No abdominal distension  Musculoskeletal: No edema  muscle wasting and fat loss present  Neurological: Alert, oriented and appropriately conversant  Skin: Dry, no diaphoresis  Pale  Psychiatric: Displays a normal mood and affect  Behavior, judgement and thought content appear normal      Lab Results:   I have personally reviewed pertinent labs  , CBC:   Lab Results   Component Value Date    WBC 16 02 (H) 03/10/2021    HGB 10 9 (L) 03/10/2021    HCT 34 8 (L) 03/10/2021    MCV 88 03/10/2021     03/10/2021    MCH 27 7 03/10/2021    MCHC 31 3 (L) 03/10/2021    RDW 15 2 (H) 03/10/2021    MPV 12 2 03/10/2021   , CMP: Lab Results   Component Value Date    SODIUM 137 03/10/2021    K 4 9 03/10/2021    CL 99 (L) 03/10/2021    CO2 34 (H) 03/10/2021    BUN 37 (H) 03/10/2021    CREATININE 0 97 03/10/2021    CALCIUM 9 3 03/10/2021    EGFR 82 03/10/2021     Counseling / Coordination of Care  Total floor / unit time spent today 25 minutes       Cleo Ayers, [address] [facility]

## 2021-03-10 NOTE — UTILIZATION REVIEW
Continued Stay Review  Date: 3/10/2021                        Current Patient Class: inpatient    Current Level of Care: med surg   HPI:65 y o  male  With hx pulmonary fibrosis with baseline [employer] admitted on 3/2 as OBS converted to Inpatient 3/3 with acute on chronic hypoxic respiratory failure likely 2/2 interstitial lung disease requiring IV steroids  CTA -PE study neg 3/2  Assessment/Plan: 3/10/2021 Provider acute on chronic resp failure w hypoxia- spirometry today & if restrictive pattern may need further qualification for BiPAP  Cont PO steroid taper  Cont supplemental O2, monitoring as inpatient  Palliative care on treatment team, patient w disease directed care w limit of DNAR & DNI  Started Remeron for insomia, anorexia  Declined lung transplant  Monitor creatinine off IVF  BMP in am  Monitor vitals to maintain BP> 120 for renal perfusion  Monitor H&H,  monitor potassium       Pertinent Labs/Diagnostic Results:       Results from last 7 days   Lab Units 03/10/21  0524 03/09/21  0510 03/06/21  0516 03/05/21  0604   WBC Thousand/uL 16 02* 11 60* 10 13 10 24*   HEMOGLOBIN g/dL 10 9* 12 0 10 0* 10 0*   HEMATOCRIT % 34 8* 37 9 31 7* 31 6*   PLATELETS Thousands/uL 340 314 252 259         Results from last 7 days   Lab Units 03/10/21  0524 03/09/21  0511 03/08/21  0514 03/07/21  0446 03/06/21  0516   SODIUM mmol/L 137 138 138 138 136   POTASSIUM mmol/L 4 9 4 8 4 6 3 0* 3 5   CHLORIDE mmol/L 99* 99* 97* 97* 99*   CO2 mmol/L 34* 34* 34* 38* 27   ANION GAP mmol/L 4 5 7 3* 10   BUN mg/dL 37* 45* 57* 74* 90*   CREATININE mg/dL 0 97 1 13 1 29 1 97* 2 44*   EGFR ml/min/1 73sq m 82 68 58 35 27   CALCIUM mg/dL 9 3 9 4 9 7 9 5 8 4         Results from last 7 days   Lab Units 03/10/21  1100 03/10/21  0707 03/09/21  2101 03/09/21  1517 03/09/21  1111 03/09/21  0741 03/08/21  2106 03/08/21  1549 03/08/21  1101 03/08/21  0724 03/07/21 2049 03/07/21  1609   POC GLUCOSE mg/dl 110 131 163* 218* 169* 136 265* 280* 187* 127 316* 133     Results from last 7 days   Lab Units 03/10/21  0524 03/09/21  0511 03/08/21  0514 03/07/21  0446 03/06/21  0516 03/05/21  1616 03/05/21  0604 03/04/21  0540   GLUCOSE RANDOM mg/dL 175* 185* 145* 139 89 138 147* 189*             No results found for: BETA-HYDROXYBUTYRATE   Results from last 7 days   Lab Units 03/10/21  0601   PH ART  7 459*   PCO2 ART mm Hg 49 4*   PO2 ART mm Hg 152 0*   HCO3 ART mmol/L 34 3*   BASE EXC ART mmol/L 9 1   O2 CONTENT ART mL/dL 16 9   O2 HGB, ARTERIAL % 98 7*   ABG SOURCE  Radial, Right     Results from last 7 days   Lab Units 03/09/21  0714   PH MELANIE  7 360   PCO2 MELANIE mm Hg 67 5*   PO2 MELANIE mm Hg 63 5*   HCO3 MELANIE mmol/L 37 3*   BASE EXC MELANIE mmol/L 9 4   O2 CONTENT MELANIE ml/dL 15 9   O2 HGB, VENOUS % 90 3*           Results from last 7 days   Lab Units 03/05/21  1604   CLARITY UA  Clear   COLOR UA  Yellow   SPEC GRAV UA  1 010   PH UA  5 5   GLUCOSE UA mg/dl Negative   KETONES UA mg/dl Negative   BLOOD UA  Trace-Intact*   PROTEIN UA mg/dl Negative   NITRITE UA  Negative   BILIRUBIN UA  Negative   UROBILINOGEN UA E U /dl 0 2   LEUKOCYTES UA  Negative   WBC UA /hpf 0-5*   RBC UA /hpf 0-5*   BACTERIA UA /hpf Occasional   EPITHELIAL CELLS WET PREP /hpf Occasional   Vital Signs:   Date/Time  Temp  Pulse  Resp  BP  MAP (mmHg)  SpO2  Calculated FIO2 (%) - Nasal Cannula  Nasal Cannula O2 Flow Rate (L/min)  O2 Device  Patient Position - Orthostatic VS   03/10/21 1446  98 2 °F (36 8 °C)  105  18  129/71  94  96 %  32  3 L/min  Nasal cannula  Lying   03/10/21 1131  97 8 °F (36 6 °C)  82  18  122/69  --  100 %  32  3 L/min  Nasal cannula  Lying   03/10/21 0927  --  107Abnormal   --  124/68  --  --  --  --  --  --   03/10/21 0700  97 5 °F (36 4 °C)  94  20  110/60  --  97 %  --  --  Nasal cannula  Lying         Medications:   Scheduled Medications:  aspirin, 81 mg, Oral, Daily  atorvastatin, 40 mg, Oral, Daily  carbamide peroxide, 5 drop, Both Ears, BID  docusate sodium, 100 mg, Oral, BID  famotidine, 40 mg, Oral, Daily  heparin (porcine), 5,000 Units, Subcutaneous, Q8H [facility]  hydrocodone-chlorpheniramine polistirex, 5 mL, Oral, Q12H [facility]  insulin lispro, 1-5 Units, Subcutaneous, TID AC  insulin lispro, 1-5 Units, Subcutaneous, HS  metoprolol tartrate, 12 5 mg, Oral, Q12H HERNANDEZ  mirtazapine, 7 5 mg, Oral, HS  nystatin, , Topical, BID  pantoprazole, 40 mg, Oral, Early Morning  polyethylene glycol, 17 g, Oral, BID  predniSONE, 50 mg, Oral, Daily    Followed by  Isla Cranker ON 3/13/2021] predniSONE, 40 mg, Oral, Daily    Followed by  Isla Cranker ON 3/16/2021] predniSONE, 30 mg, Oral, Daily    Followed by  Isla Cranker ON 3/19/2021] predniSONE, 20 mg, Oral, Daily    Followed by  Isla Cranker ON 3/22/2021] predniSONE, 10 mg, Oral, Daily  senna, 17 2 mg, Oral, HS    Continuous IV Infusions:     PRN Meds:  acetaminophen, 650 mg, Oral, Q6H PRN  albuterol, 2 puff, Inhalation, Q4H PRN  ondansetron, 4 mg, Intravenous, Q6H PRN    Discharge Plan: d  Network Utilization Review Department  ATTENTION: Please call with any questions or concerns to [phone] and carefully listen to the prompts so that you are directed to the right person  All voicemails are confidential   Norah Roberson all requests for admission clinical reviews, approved or denied determinations and any other requests to dedicated fax number below belonging to the campus where the patient is receiving treatment   List of dedicated fax numbers for the Facilities:  [address] [place] [address] DENIALS (Administrative/Medical Necessity) [phone]   [address] (Maternity/NICU/Pediatrics) [phone] [address] Sweetie Minaya 265[address] ([facility], [facility]) [address] [place] [address] [address] [place] [address] [address] [phone]   [place] [address]71 [phone]

## 2021-03-11 PROBLEM — E87.6 HYPOKALEMIA: Status: RESOLVED | Noted: 2021-01-01 | Resolved: 2021-01-01

## 2021-03-11 NOTE — ACP (ADVANCE CARE PLANNING)
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 - FVC 17%  · He is and has not been interested in lung transplant evaluation  · The severity of his disease and poor functional status makes anti-fibrotic therapy of little utility   · We discussed his inability to participate in physical therapy, further functional decline will continue  · I discussed with him the role of hospice care and I anticipate his life expectancy to be less than 6 months  We discussed his goals to include being at home and comfortable   He is interested in hospice referral and I contacted [facility]  · I updated the primary team as well  · Total time discussing end of life care and hospice was 15 minutes    Guadlupe Schlatter, DO

## 2021-03-11 NOTE — ASSESSMENT & PLAN NOTE
Due to patient's interstitial lung disease  Continue PO steroid taper  Continue supplemental oxygen  Sp02 evaluation overnight  Patient does not qualify for BiPAP  Pulmonology discussed  Continue to monitor inpatient end stage ILD with severe fibrosis with no further options of treatment  Patient expressed interest in hospice evaluation  Hospice nurse discussed with patient today  Will need further placement discussion - SNF vs  Home with concerns for financial capabilities  CM aware

## 2021-03-11 NOTE — ASSESSMENT & PLAN NOTE
Malnutrition Findings:   Adult Malnutrition type: Acute illness(in the setting of chronic illness)  Adult Degree of Malnutrition: Other severe protein calorie malnutrition(related to catabolic illness, inadequate oral intake)    BMI Findings: Body mass index is 19 27 kg/m²     · Patient w/ unintentional weight loss-- appears to have lost nearly 20 lbs in the last 1 month   · Reports no appetite x1 week  · Has had GI work up recently w/ no organic cause of poor PO intake  · Continue w/ nutritional supplements  · Started Remeron as per palliative Care, for patient's anorexia  · Patient reports difficulty swallowing - ordered Speech evaluation which found functional swallowing, recommend regular diet and thin liquids

## 2021-03-11 NOTE — HOSPICE NOTE
WENT TO SEE PT AND DISCUSSED HOSPICE CARE AND SERVICES PER MEDICARE GUIDELINES HE IS IN AGREEMENT WITH THE SAME  HOWEVER, HIS WIFE IS IN [facility] ACUTELY ILL AND NOT EXPECTED TO SURVIVE  HIS DAUGHTER IS INJURED WALKING ON CRUTCHES AND UNABLE TO CARE FOR HIM  DR Raman Ke AWARE THAT HE MAY NEED TO BE PLACED IN A SNF AS THERE IS NO ONE TO CARE FOR HIM   WILL FOLLOW TO DISCHARGE

## 2021-03-11 NOTE — ASSESSMENT & PLAN NOTE
· Follows w/ Dr Osbaldo Low   · Recent admission for similar sx-- overall discussions had during that hospitalization entailed chronicity and progression of dz  He was not felt to be candidate for steroids at that time  He has not felt to be a candidate for anti-fibrotics either given cachexia   · He was seen and evaluated by palliative care medicine during that admission-- has not had OP f/u   · Palliative care doctor on board:  Continue disease directed care with limit of DNAR+DNI  Started Remeron for insomnia and anorexia  · Continue PO steroids  · Patient may have short-acting bronchodilators as needed  · Declined lung transplant evaluation    · Patient expressing interest in hospice given his end-stage disease  · Hospice nurse following

## 2021-03-11 NOTE — DISCHARGE INSTR - AVS FIRST PAGE
Dear Ray Aguila,     It was our pleasure to care for you here at [facility]  It is our hope that we were always able to exceed the expected standards for your care during your stay  You were hospitalized due to shortness of breath  You were cared for on the 2nd floor by Salazar Cunha DO under the service of Millie Salinas MD with the [facility] who covers for your primary care physician (PCP), Xavier Sanz DO, while you were hospitalized  If you have any questions or concerns related to this hospitalization, you may contact us at [phone]  For follow up as well as any medication refills, we recommend that you follow up with your primary care physician  A registered nurse will reach out to you by phone within a few days after your discharge to answer any additional questions that you may have after going home  However, at this time we provide for you here, the most important instructions / recommendations at discharge:     · Notable Medication Adjustments -   · Please continue to take prednisone taper  · Please decrease metoprolol tartrate dosage to 12 5 mg - twice a day  · Testing Required after Discharge -   · None  · Important follow up information -   · Please follow up with your primary care physician in 1 week  · Other Instructions -   ·   · Please review this entire after visit summary as additional general instructions including medication list, appointments, activity, diet, any pertinent wound care, and other additional recommendations from your care team that may be provided for you        Sincerely,     Salazar Cunha DO

## 2021-03-11 NOTE — CASE MANAGEMENT
CM received consult for hospice referral      CM sent referral to [facility] via [facility]  CM dept will follow for evaluation

## 2021-03-11 NOTE — PROGRESS NOTES
[facility]  Progress Note - Lashonda Emms 1955, 60 y o  male MRN: [id number]  Unit/Bed#: S -01 Encounter: [id number]  Primary Care Provider: Jayden Taylor DO   Date and time admitted to hospital: 3/2/2021 11:59 AM    * Acute on chronic respiratory failure with hypoxia [facility])  Assessment & Plan  Due to patient's interstitial lung disease  Continue PO steroid taper  Continue supplemental oxygen  Sp02 evaluation overnight  Patient does not qualify for BiPAP  Pulmonology discussed  Continue to monitor inpatient end stage ILD with severe fibrosis with no further options of treatment  Patient expressed interest in hospice evaluation  Hospice nurse discussed with patient today  Will need further placement discussion - SNF vs  Home with concerns for financial capabilities  CM aware  Pulmonary fibrosis determined by high resolution computed tomography [facility])  Assessment & Plan  · Follows w/ Dr Evangelina Mayorga   · Recent admission for similar sx-- overall discussions had during that hospitalization entailed chronicity and progression of dz  He was not felt to be candidate for steroids at that time  He has not felt to be a candidate for anti-fibrotics either given cachexia   · He was seen and evaluated by palliative care medicine during that admission-- has not had OP f/u   · Palliative care doctor on board:  Continue disease directed care with limit of DNAR+DNI  Started Remeron for insomnia and anorexia  · Continue PO steroids  · Patient may have short-acting bronchodilators as needed  · Declined lung transplant evaluation  · Patient expressing interest in hospice given his end-stage disease  · Hospice nurse following    Acute renal failure superimposed on stage 2 chronic kidney disease [facility])  Assessment & Plan  Recent Labs     03/09/21  0511 03/10/21  0524 03/11/21  0500   CREATININE 1 13 0 97 0 76   EGFR 68 82 96     · Resolved    · Patient is on acute kidney injury protocol  · Nephrology to follow up as outpatient  · Monitor kidney function off of IV fluids  · Monitor input and output  · Avoid nephrotoxins  · Avoid hypotension  Severe protein-calorie malnutrition ([address] )  Assessment & Plan  Malnutrition Findings:   Adult Malnutrition type: Acute illness(in the setting of chronic illness)  Adult Degree of Malnutrition: Other severe protein calorie malnutrition(related to catabolic illness, inadequate oral intake)    BMI Findings: Body mass index is 19 27 kg/m²  · Patient w/ unintentional weight loss-- appears to have lost nearly 20 lbs in the last 1 month   · Reports no appetite x1 week  · Has had GI work up recently w/ no organic cause of poor PO intake  · Continue w/ nutritional supplements  · Started Remeron as per palliative Care, for patient's anorexia  · Patient reports difficulty swallowing - ordered Speech evaluation which found functional swallowing, recommend regular diet and thin liquids    ATN (acute tubular necrosis) (HCC)-resolved as of 3/11/2021  Assessment & Plan  · Resolved  · Possible secondary to contrast  · Monitor input-output  · Monitor BMP a m   · Monitor vital signs- maintain BP above 120 mm of Hg to maintain renal perfusion    Anemia  Assessment & Plan  Recent Labs     03/09/21  0510 03/10/21  0524 03/11/21  0500   HGB 12 0 10 9* 11 0*     Possibility of dilutional on anemia chronic disease  Monitor H&H    Elevated liver enzymes  Assessment & Plan  · Chronic, w/o abdominal pain     CAD S/P percutaneous coronary angioplasty  Assessment & Plan  · S/P NEISHA to RCA in 2006  · Admitted in November 2020 for NSTEMI, no acute obstruction noted-- no intervention and medical management pursued  · Status post EKG changes: ST elevated lead II, V6; inverted T wave in III, aVF; large T waves I, aVL-- these are new compared to prior EKGs  · ED D/W cardiology-- no STEMI alert as patient is not having active CP        Hypokalemia-resolved as of 3/11/2021  Assessment & Plan  Recent Labs     21  0511 03/10/21  0524 21  0500   K 4 8 4 9 4 1     Resolved  Replete as needed  Monitor BMP    VTE Pharmacologic Prophylaxis:   Pharmacologic: Heparin  Mechanical VTE Prophylaxis in Place: Yes    Discussions with Specialists or Other Care Team Provider: Pulmonology, Palliative, Hospice    Education and Discussions with Family / Patient: Will discuss with daughter tomorrow    Current Length of Stay: 8 day(s)    Current Patient Status: Inpatient     Discharge Plan / Estimated Discharge Date: Pending placement    Code Status: Level 3 - DNAR and DNI      Subjective:   Patient seen at bedside  Comfortable  No current SOB at rest on 2L  Fatigue same as yesterday  Objective:     Vitals:   Temp (24hrs), Av 7 °F (36 5 °C), Min:97 5 °F (36 4 °C), Max:97 8 °F (36 6 °C)    Temp:  [97 5 °F (36 4 °C)-97 8 °F (36 6 °C)] 97 8 °F (36 6 °C)  HR:  [] 103  Resp:  [16-18] 16  BP: (120-133)/(63-71) 133/63  SpO2:  [96 %-100 %] 97 %  Body mass index is 19 27 kg/m²  Input and Output Summary (last 24 hours): Intake/Output Summary (Last 24 hours) at 3/11/2021 1815  Last data filed at 3/11/2021 1550  Gross per 24 hour   Intake 560 ml   Output 850 ml   Net -290 ml       Physical Exam:     Physical Exam  Constitutional:       Appearance: Normal appearance  Cardiovascular:      Rate and Rhythm: Normal rate and regular rhythm  Pulmonary:      Effort: Pulmonary effort is normal       Breath sounds: Rales present  Abdominal:      Palpations: Abdomen is soft  Tenderness: There is no abdominal tenderness  Musculoskeletal:      Right lower leg: No edema  Left lower leg: No edema  Skin:     General: Skin is warm and dry  Neurological:      General: No focal deficit present  Mental Status: He is alert and oriented to person, place, and time     Psychiatric:         Mood and Affect: Mood normal          Behavior: Behavior normal  Additional Data:     Labs:    Results from last 7 days   Lab Units 03/11/21  0500   WBC Thousand/uL 13 33*   HEMOGLOBIN g/dL 11 0*   HEMATOCRIT % 36 0*   PLATELETS Thousands/uL 318     Results from last 7 days   Lab Units 03/11/21  0500   POTASSIUM mmol/L 4 1   CHLORIDE mmol/L 98*   CO2 mmol/L 36*   BUN mg/dL 24   CREATININE mg/dL 0 76   CALCIUM mg/dL 9 1           * I Have Reviewed All Lab Data Listed Above  * Additional Pertinent Lab Tests Reviewed:  All Labs Within Last 24 Hours Reviewed    Imaging:    Imaging Reports Reviewed Today Include: None  Imaging Personally Reviewed by Myself Includes:  None    Recent Cultures (last 7 days):           Last 24 Hours Medication List:   Current Facility-Administered Medications   Medication Dose Route Frequency Provider Last Rate    acetaminophen  650 mg Oral Q6H PRN Patrizia Shah PA-C      albuterol  2 puff Inhalation Q4H PRN Sunshine Cardenas PA-C      aspirin  81 mg Oral Daily Patrizia Shah PA-C      atorvastatin  40 mg Oral Daily Patrizia Shah PA-C      carbamide peroxide  5 drop Both Ears BID Levi Norris MD      docusate sodium  100 mg Oral BID Patrizia Shah PA-C      famotidine  40 mg Oral Daily Patrizia Shah PA-C      heparin (porcine)  5,000 Units Subcutaneous Q8H [address] Patrizia Shah PA-C      hydrocodone-chlorpheniramine polistirex  5 mL Oral Q12H [address] Toni Mar DO      insulin lispro  1-5 Units Subcutaneous TID AC Levi Norris MD      insulin lispro  1-5 Units Subcutaneous HS Levi Norris MD      metoprolol tartrate  12 5 mg Oral Q12H [address] CRISTOBAL Fletcher      mirtazapine  7 5 mg Oral HS CRISTOBAL Dempsey      nystatin   Topical BID Patrizia Shah PA-C      ondansetron  4 mg Intravenous Q6H PRN Patrizia Shah PA-C      pantoprazole  40 mg Oral Early Morning RUKHSANA MelaraC      polyethylene glycol  17 g Oral BID Patrizia Shah PA-C      predniSONE  50 mg Oral Daily Sabi Rich, CRNP      Followed by   Sarai Ochoa ON 3/13/2021] predniSONE  40 mg Oral Daily Sabi Rich, CRNP      Followed by   Sarai Ochoa ON 3/16/2021] predniSONE  30 mg Oral Daily Sabi Rich, CRNP      Followed by   Sarai Ochoa ON 3/19/2021] predniSONE  20 mg Oral Daily Sabi Rich, CRNP      Followed by   Sarai Ochoa ON 3/22/2021] predniSONE  10 mg Oral Daily Sabi Rich, CRNP      senna  17 2 mg Oral HS Patrizia Shah PA-C          Today, Patient Was Seen By: Adams Gary DO    ** Please Note: This note has been constructed using a voice recognition system   **

## 2021-03-11 NOTE — ASSESSMENT & PLAN NOTE
Recent Labs     03/09/21  0511 03/10/21  0524 03/11/21  0500   CREATININE 1 13 0 97 0 76   EGFR 68 82 96     · Resolved  · Patient is on acute kidney injury protocol  · Nephrology to follow up as outpatient  · Monitor kidney function off of IV fluids  · Monitor input and output  · Avoid nephrotoxins  · Avoid hypotension

## 2021-03-11 NOTE — PROGRESS NOTES
Progress Note - Pulmonary   Cherre Covert 60 y o  male MRN: [id number]  Unit/Bed#: S -01 Encounter: [id number]      Assessment:  1  Acute on chronic hypoxic respiratory failure, mild chronic hypercarbic respiratory failure  · Does not qualify for NIPPV  · At baseline O2  Status  · Titrate O2 to keep SpO2 >88%    2  End-Stage ILD with severe fibrosis and restriction  · No further options for treatment of underlying fibrosis and very poor functional status  · Would continue with prednisone taper  · Continue with Tussionex for cough  · Discussed further GOC and he is interested in hospice evaluation - consult placed and discussed with HPM - please see ACP note for further details    3  Chronic diastolic CHF - euvolemic    4  Severe protein/calorie malnutrition secondary to pulmonary cachexia     5  Severe deconditioning - unable/unwilling to participate in physical therapy    Subjective:   Reports cough is improved with tussionex  Denied chest pain, no pleurisy, no sputum production  Objective:     Vitals: Blood pressure 120/71, pulse 102, temperature 97 5 °F (36 4 °C), temperature source Oral, resp  rate 18, height 4' 11" (1 499 m), weight 43 3 kg (95 lb 6 4 oz), SpO2 96 %  , 2LNC, Body mass index is 19 27 kg/m²        Intake/Output Summary (Last 24 hours) at 3/11/2021 0944  Last data filed at 3/11/2021 0915  Gross per 24 hour   Intake --   Output 500 ml   Net -500 ml         Physical Exam  Gen: Frail man, in bed, awake, alert, oriented x 3, no acute distress, conversant but with chronic rib retractions with respiration  HEENT: Mucous membranes moist, no oral lesions, no thrush  NECK: No accessory muscle use, JVP not elevated  Cardiac: Regular, single S1, single S2, no murmurs, no rubs, no gallops  Lungs: mid-basilar rales b/l ,no wheeze, no rhonchi  Abdomen: normoactive bowel sounds, soft nontender, nondistended, no rebound or rigidity, no guarding  Extremities: no cyanosis, no clubbing, no edema, poor muscle mass     Labs: I have personally reviewed pertinent lab results  Laboratory and Diagnostics  Results from last 7 days   Lab Units 03/11/21  0500 03/10/21  0524 03/09/21  0510 03/06/21  0516 03/05/21  0604   WBC Thousand/uL 13 33* 16 02* 11 60* 10 13 10 24*   HEMOGLOBIN g/dL 11 0* 10 9* 12 0 10 0* 10 0*   HEMATOCRIT % 36 0* 34 8* 37 9 31 7* 31 6*   PLATELETS Thousands/uL 318 340 314 252 259     Results from last 7 days   Lab Units 03/11/21  0500 03/10/21  0524 03/09/21  0511 03/08/21  0514 03/07/21  0446 03/06/21  0516 03/05/21  1616   SODIUM mmol/L 136 137 138 138 138 136 133*   POTASSIUM mmol/L 4 1 4 9 4 8 4 6 3 0* 3 5 5 2   CHLORIDE mmol/L 98* 99* 99* 97* 97* 99* 98*   CO2 mmol/L 36* 34* 34* 34* 38* 27 28   ANION GAP mmol/L 2* 4 5 7 3* 10 7   BUN mg/dL 24 37* 45* 57* 74* 90* 86*   CREATININE mg/dL 0 76 0 97 1 13 1 29 1 97* 2 44* 2 54*   CALCIUM mg/dL 9 1 9 3 9 4 9 7 9 5 8 4 8 2*   GLUCOSE RANDOM mg/dL 130 175* 185* 145* 139 89 138                                             ABG:   Results from last 7 days   Lab Units 03/10/21  0601   PH ART  7 459*   PCO2 ART mm Hg 49 4*   PO2 ART mm Hg 152 0*   HCO3 ART mmol/L 34 3*   BASE EXC ART mmol/L 9 1   ABG SOURCE  Radial, Right       Radiographs - no new images  CXR 3/7 - scattered mixed alveolar and interstitial infiltrates, no PTX, no sig effusion     CT angio 3/2 - no PE, diffuse fibrotic changes with SP honeycombing, traction bronchiectasis, no sig effusion    Spirometry 3/10/21  - Ratio 99%, FVC 17%, FEV1 22% - exp time 4 5 seconds  - Interpretation - Very severe restrictive airflow defect    Overnight SpO2 on 2L/min NC - no significant desaturations      Toni Mar DO, [facility]

## 2021-03-11 NOTE — ASSESSMENT & PLAN NOTE
Recent Labs     03/09/21  0511 03/10/21  0524 03/11/21  0500   K 4 8 4 9 4 1     Resolved     Replete as needed  Monitor BMP

## 2021-03-11 NOTE — ASSESSMENT & PLAN NOTE
Recent Labs     03/09/21  0510 03/10/21  0524 03/11/21  0500   HGB 12 0 10 9* 11 0*     Possibility of dilutional on anemia chronic disease  Monitor H&H

## 2021-03-11 NOTE — CASE MANAGEMENT
CM informed by Jennifer Loco from [facility] that Patient is agreeable to their services, but reports that his family is unable to provide care for him and he has financial concerns for additional care  CM met with the Patient to review the above  Patient states that his daughter is home all the time, but has brittle bones in her legs and is unable to physically care for him  Patient's son in law and granddaughter both work, and he states that he would not feel comfortable with him assisting him with ADLs  CM reviewed the possibility of hiring private caregivers, or receiving hospice services at a SNF, but Patient is very resistant to spending his savings and reports that he will not qualify for medical assistance  CM explained that a referral can be sent to SNF to review what that looks like financially and continue the discussion  Patient is requesting CM to complete the same, but states that he prefers to return home  CM sent referral to -E via [facility] to review costs associated with comfort care or hospice  CM dept will follow for review  CM updated Jennifer Loco at [facility] regarding the same and will follow up in the morning

## 2021-03-11 NOTE — ASSESSMENT & PLAN NOTE
· Resolved  · Possible secondary to contrast  · Monitor input-output  · Monitor BMP a m   · Monitor vital signs- maintain BP above 120 mm of Hg to maintain renal perfusion

## 2021-03-12 NOTE — CASE MANAGEMENT
HARJIT spoke with Charley Waller at [facility] to review the cost of SNF hospice at their facility  Private pay rate would be $8-9,000 per month  CM met with the Patient at the bedside to discuss this option and to determine if his preference is to return home with extra support, or to pursue SNF hospice at [facility]  Patient states that at this time, he would like to return home  CM explained that a list of private caregivers would be provided to supplement his care at home  Patient verbalized his understanding of this plan  HARJIT informed [address] [place] [address] liaison of [facility]; will meet with Patient to complete consents  CM informed [facility] of plan  CM sent BLS transport request to [facility] via [address]  CM dept will follow for confirmed transport and fax transport request to [place]

## 2021-03-12 NOTE — PLAN OF CARE
Problem: Potential for Falls  Goal: Patient will remain free of falls  Description: INTERVENTIONS:  - Assess patient frequently for physical needs  -  Identify cognitive and physical deficits and behaviors that affect risk of falls  -  [place] fall precautions as indicated by assessment   - Educate patient/family on patient safety including physical limitations  - Instruct patient to call for assistance with activity based on assessment  - Modify environment to reduce risk of injury  - Consider OT/PT consult to assist with strengthening/mobility  Outcome: Progressing     Problem: Prexisting or High Potential for Compromised Skin Integrity  Goal: Skin integrity is maintained or improved  Description: INTERVENTIONS:  - Identify patients at risk for skin breakdown  - Assess and monitor skin integrity  - Assess and monitor nutrition and hydration status  - Monitor labs   - Assess for incontinence   - Turn and reposition patient  - Assist with mobility/ambulation  - Relieve pressure over bony prominences  - Avoid friction and shearing  - Provide appropriate hygiene as needed including keeping skin clean and dry  - Evaluate need for skin moisturizer/barrier cream  - Collaborate with interdisciplinary team   - Patient/family teaching  - Consider wound care consult   Outcome: Progressing     Problem: Nutrition/Hydration-ADULT  Goal: Nutrient/Hydration intake appropriate for improving, restoring or maintaining nutritional needs  Description: Monitor and assess patient's nutrition/hydration status for malnutrition  Collaborate with interdisciplinary team and initiate plan and interventions as ordered  Monitor patient's weight and dietary intake as ordered or per policy  Utilize nutrition screening tool and intervene as necessary  Determine patient's food preferences and provide high-protein, high-caloric foods as appropriate       INTERVENTIONS:  - Monitor oral intake, urinary output, labs, and treatment plans  - Assess nutrition and hydration status and recommend course of action  - Evaluate amount of meals eaten  - Assist patient with eating if necessary   - Allow adequate time for meals  - Recommend/ encourage appropriate diets, oral nutritional supplements, and vitamin/mineral supplements  - Order, calculate, and assess calorie counts as needed  - Recommend, monitor, and adjust tube feedings and TPN/PPN based on assessed needs  - Assess need for intravenous fluids  - Provide specific nutrition/hydration education as appropriate  - Include patient/family/caregiver in decisions related to nutrition  Outcome: Progressing     Problem: PAIN - ADULT  Goal: Verbalizes/displays adequate comfort level or baseline comfort level  Description: Interventions:  - Encourage patient to monitor pain and request assistance  - Assess pain using appropriate pain scale  - Administer analgesics based on type and severity of pain and evaluate response  - Implement non-pharmacological measures as appropriate and evaluate response  - Consider cultural and social influences on pain and pain management  - Notify physician/advanced practitioner if interventions unsuccessful or patient reports new pain  Outcome: Progressing     Problem: INFECTION - ADULT  Goal: Absence or prevention of progression during hospitalization  Description: INTERVENTIONS:  - Assess and monitor for signs and symptoms of infection  - Monitor lab/diagnostic results  - Monitor all insertion sites, i e  indwelling lines, tubes, and drains  - Monitor endotracheal if appropriate and nasal secretions for changes in amount and color  - [place] appropriate cooling/warming therapies per order  - Administer medications as ordered  - Instruct and encourage patient and family to use good hand hygiene technique  - Identify and instruct in appropriate isolation precautions for identified infection/condition  Outcome: Progressing     Problem: SAFETY ADULT  Goal: Patient will remain free of falls  Description: INTERVENTIONS:  - Assess patient frequently for physical needs  -  Identify cognitive and physical deficits and behaviors that affect risk of falls    -  [place] fall precautions as indicated by assessment   - Educate patient/family on patient safety including physical limitations  - Instruct patient to call for assistance with activity based on assessment  - Modify environment to reduce risk of injury  - Consider OT/PT consult to assist with strengthening/mobility  Outcome: Progressing  Goal: Maintain or return to baseline ADL function  Description: INTERVENTIONS:  -  Assess patient's ability to carry out ADLs; assess patient's baseline for ADL function and identify physical deficits which impact ability to perform ADLs (bathing, care of mouth/teeth, toileting, grooming, dressing, etc )  - Assess/evaluate cause of self-care deficits   - Assess range of motion  - Assess patient's mobility; develop plan if impaired  - Assess patient's need for assistive devices and provide as appropriate  - Encourage maximum independence but intervene and supervise when necessary  - Involve family in performance of ADLs  - Assess for home care needs following discharge   - Consider OT consult to assist with ADL evaluation and planning for discharge  - Provide patient education as appropriate  Outcome: Progressing  Goal: Maintain or return mobility status to optimal level  Description: INTERVENTIONS:  - Assess patient's baseline mobility status (ambulation, transfers, stairs, etc )    - Identify cognitive and physical deficits and behaviors that affect mobility  - Identify mobility aids required to assist with transfers and/or ambulation (gait belt, sit-to-stand, lift, walker, cane, etc )  - [place] fall precautions as indicated by assessment  - Record patient progress and toleration of activity level on Mobility SBAR; progress patient to next Phase/Stage  - Instruct patient to call for assistance with activity based on assessment  - Consider rehabilitation consult to assist with strengthening/weightbearing, etc   Outcome: Progressing     Problem: DISCHARGE PLANNING  Goal: Discharge to home or other facility with appropriate resources  Description: INTERVENTIONS:  - Identify barriers to discharge w/patient and caregiver  - Arrange for needed discharge resources and transportation as appropriate  - Identify discharge learning needs (meds, wound care, etc )  - Arrange for interpretive services to assist at discharge as needed  - Refer to Case Management Department for coordinating discharge planning if the patient needs post-hospital services based on physician/advanced practitioner order or complex needs related to functional status, cognitive ability, or social support system  Outcome: Progressing     Problem: Knowledge Deficit  Goal: Patient/family/caregiver demonstrates understanding of disease process, treatment plan, medications, and discharge instructions  Description: Complete learning assessment and assess knowledge base    Interventions:  - Provide teaching at level of understanding  - Provide teaching via preferred learning methods  Outcome: Progressing     Problem: RESPIRATORY - ADULT  Goal: Achieves optimal ventilation and oxygenation  Description: INTERVENTIONS:  - Assess for changes in respiratory status  - Assess for changes in mentation and behavior  - Position to facilitate oxygenation and minimize respiratory effort  - Oxygen administered by appropriate delivery if ordered  - Initiate smoking cessation education as indicated  - Encourage broncho-pulmonary hygiene including cough, deep breathe, Incentive Spirometry  - Assess the need for suctioning and aspirate as needed  - Assess and instruct to report SOB or any respiratory difficulty  - Respiratory Therapy support as indicated  Outcome: Progressing

## 2021-03-12 NOTE — PROGRESS NOTES
Progress Note - Palliative & Supportive Care  Chano Height  60 y o   male  S /S -01   MRN: [id number]  Encounter: [id number]     Assessment/Plan:  1  Acute on chronic respiratory failure with hypoxia  2  Pulmonary fibrosis  3  ARF on CKD2, ATN  4  Severe protein-calorie malnutrition  5  CAD  6  Goals of Care  -plans to transition to home hospice, opening on Saturday  -hospice liaison requesting comfort meds be sent to pharmacy for discharge   -lorazepam 0 5mg, #15, q6h prn anxiety, insomnia, nausea   -oxyIR 2 5-5mg, #15, q4h prn pain, sob, resp difficulty  -d/w patient, answered all questions, patient states daughter and D[place]AS will be caring for him at home  -emotional support provided    Code status: Level 3, DNAR/DNI    Subjective:  Patient seen and examined  Without complaints  Has decided to transition to hospice at home  Hoping to be discharged today or early tomorrow  Hospice to start tomorrow        Medications    Current Facility-Administered Medications:     acetaminophen (TYLENOL) tablet 650 mg, 650 mg, Oral, Q6H PRN, Patrizia Shah PA-C    albuterol (PROVENTIL HFA,VENTOLIN HFA) inhaler 2 puff, 2 puff, Inhalation, Q4H PRN, Indira Stoll PA-C, 2 puff at 03/08/21 0959    aspirin chewable tablet 81 mg, 81 mg, Oral, Daily, Patrizia Shah PA-C, 81 mg at 03/12/21 7326    atorvastatin (LIPITOR) tablet 40 mg, 40 mg, Oral, Daily, Patrizia Shah PA-C, 40 mg at 03/12/21 9999    carbamide peroxide (DEBROX) 6 5 % otic solution 5 drop, 5 drop, Both Ears, BID, Noemi Evans MD, 5 drop at 03/11/21 0908    docusate sodium (COLACE) capsule 100 mg, 100 mg, Oral, BID, Patrizia Shah PA-C, 100 mg at 03/12/21 7003    famotidine (PEPCID) tablet 40 mg, 40 mg, Oral, Daily, RALPH Melara-DAYLIN, 40 mg at 03/12/21 0923    heparin (porcine) subcutaneous injection 5,000 Units, 5,000 Units, Subcutaneous, Q8H [address], 5,000 Units at 03/12/21 0523 **AND** [CANCELED] Platelet count, , , Once, Patrizia Shah PA-C    hydrocodone-chlorpheniramine polistirex (TUSSIONEX) ER suspension 5 mL, 5 mL, Oral, Q12H [address], Toni Mar, DO, 5 mL at 03/12/21 0923    insulin lispro (HumaLOG) 100 units/mL subcutaneous injection 1-5 Units, 1-5 Units, Subcutaneous, TID AC, 2 Units at 03/11/21 1642 **AND** Fingerstick Glucose (POCT), , , TID AC, Briana Guerra MD    insulin lispro (HumaLOG) 100 units/mL subcutaneous injection 1-5 Units, 1-5 Units, Subcutaneous, HS, Briana Guerra MD, 1 Units at 03/11/21 2134    metoprolol tartrate (LOPRESSOR) partial tablet 12 5 mg, 12 5 mg, Oral, Q12H [address], Tamera Manual, CRNP, 12 5 mg at 03/12/21 7575    mirtazapine (REMERON) tablet 7 5 mg, 7 5 mg, Oral, HS, Eulene Oliver, CRNP, 7 5 mg at 03/11/21 2132    nystatin (MYCOSTATIN) powder, , Topical, BID, Patrizia Shah PA-C, Given at 03/11/21 0907    ondansetron (ZOFRAN) injection 4 mg, 4 mg, Intravenous, Q6H PRN, Patrizia Shah PA-C, 4 mg at 03/03/21 0142    pantoprazole (PROTONIX) EC tablet 40 mg, 40 mg, Oral, Early Morning, Patrizia Shah PA-C, 40 mg at 03/12/21 0523    polyethylene glycol (MIRALAX) packet 17 g, 17 g, Oral, BID, Patrizia Shah PA-C, 17 g at 03/09/21 0946    [COMPLETED] predniSONE tablet 50 mg, 50 mg, Oral, Daily, 50 mg at 03/12/21 0922 **FOLLOWED BY** [START ON 3/13/2021] predniSONE tablet 40 mg, 40 mg, Oral, Daily **FOLLOWED BY** [START ON 3/16/2021] predniSONE tablet 30 mg, 30 mg, Oral, Daily **FOLLOWED BY** [START ON 3/19/2021] predniSONE tablet 20 mg, 20 mg, Oral, Daily **FOLLOWED BY** [START ON 3/22/2021] predniSONE tablet 10 mg, 10 mg, Oral, Daily, CRISTOBAL Alejo    senna (SENOKOT) tablet 17 2 mg, 17 2 mg, Oral, , Patrizia Shah PA-C, 17 2 mg at 03/11/21 2131    Objective:  /69 (BP Location: Left arm)   Pulse 93   Temp 97 5 °F (36 4 °C) (Oral)   Resp 18   Ht 4' 11" (1 499 m) Comment: From previous charting    Wt 44 3 kg (97 lb 9 6 oz) SpO2 98%   BMI 19 71 kg/m²   Physical Exam:  General: chronically ill, cachectic, pale  Neurological: aaox3  Cardiovascular: reg  Respiratory: normal effort, no distress  Gastrointestinal: nd, nt  Musculoskeletal: no edema  Skin: dry, pale  Psychiatric: flat affect, depressed mood, judgment and thought content normal     Lab Results: I have personally reviewed pertinent labs  Counseling / Coordination of Care  Total floor / unit time spent today 25 minutes  Greater than 50% of total time was spent with the patient and / or family counseling and / or coordinating of care  A description of the counseling / coordination of care: goals of care, emotional support, hospice medications for discharge      Anel Omalley DO  Palliative & Supportive Care

## 2021-03-12 NOTE — PLAN OF CARE
Problem: Potential for Falls  Goal: Patient will remain free of falls  Description: INTERVENTIONS:  - Assess patient frequently for physical needs  -  Identify cognitive and physical deficits and behaviors that affect risk of falls  -  [place] fall precautions as indicated by assessment   - Educate patient/family on patient safety including physical limitations  - Instruct patient to call for assistance with activity based on assessment  - Modify environment to reduce risk of injury  - Consider OT/PT consult to assist with strengthening/mobility  Outcome: Progressing     Problem: Prexisting or High Potential for Compromised Skin Integrity  Goal: Skin integrity is maintained or improved  Description: INTERVENTIONS:  - Identify patients at risk for skin breakdown  - Assess and monitor skin integrity  - Assess and monitor nutrition and hydration status  - Monitor labs   - Assess for incontinence   - Turn and reposition patient  - Assist with mobility/ambulation  - Relieve pressure over bony prominences  - Avoid friction and shearing  - Provide appropriate hygiene as needed including keeping skin clean and dry  - Evaluate need for skin moisturizer/barrier cream  - Collaborate with interdisciplinary team   - Patient/family teaching  - Consider wound care consult   Outcome: Progressing     Problem: Nutrition/Hydration-ADULT  Goal: Nutrient/Hydration intake appropriate for improving, restoring or maintaining nutritional needs  Description: Monitor and assess patient's nutrition/hydration status for malnutrition  Collaborate with interdisciplinary team and initiate plan and interventions as ordered  Monitor patient's weight and dietary intake as ordered or per policy  Utilize nutrition screening tool and intervene as necessary  Determine patient's food preferences and provide high-protein, high-caloric foods as appropriate       INTERVENTIONS:  - Monitor oral intake, urinary output, labs, and treatment plans  - Assess nutrition and hydration status and recommend course of action  - Evaluate amount of meals eaten  - Assist patient with eating if necessary   - Allow adequate time for meals  - Recommend/ encourage appropriate diets, oral nutritional supplements, and vitamin/mineral supplements  - Order, calculate, and assess calorie counts as needed  - Recommend, monitor, and adjust tube feedings and TPN/PPN based on assessed needs  - Assess need for intravenous fluids  - Provide specific nutrition/hydration education as appropriate  - Include patient/family/caregiver in decisions related to nutrition  Outcome: Progressing     Problem: PAIN - ADULT  Goal: Verbalizes/displays adequate comfort level or baseline comfort level  Description: Interventions:  - Encourage patient to monitor pain and request assistance  - Assess pain using appropriate pain scale  - Administer analgesics based on type and severity of pain and evaluate response  - Implement non-pharmacological measures as appropriate and evaluate response  - Consider cultural and social influences on pain and pain management  - Notify physician/advanced practitioner if interventions unsuccessful or patient reports new pain  Outcome: Progressing     Problem: INFECTION - ADULT  Goal: Absence or prevention of progression during hospitalization  Description: INTERVENTIONS:  - Assess and monitor for signs and symptoms of infection  - Monitor lab/diagnostic results  - Monitor all insertion sites, i e  indwelling lines, tubes, and drains  - Monitor endotracheal if appropriate and nasal secretions for changes in amount and color  - [place] appropriate cooling/warming therapies per order  - Administer medications as ordered  - Instruct and encourage patient and family to use good hand hygiene technique  - Identify and instruct in appropriate isolation precautions for identified infection/condition  Outcome: Progressing     Problem: SAFETY ADULT  Goal: Patient will remain free of falls  Description: INTERVENTIONS:  - Assess patient frequently for physical needs  -  Identify cognitive and physical deficits and behaviors that affect risk of falls    -  [place] fall precautions as indicated by assessment   - Educate patient/family on patient safety including physical limitations  - Instruct patient to call for assistance with activity based on assessment  - Modify environment to reduce risk of injury  - Consider OT/PT consult to assist with strengthening/mobility  Outcome: Progressing  Goal: Maintain or return to baseline ADL function  Description: INTERVENTIONS:  -  Assess patient's ability to carry out ADLs; assess patient's baseline for ADL function and identify physical deficits which impact ability to perform ADLs (bathing, care of mouth/teeth, toileting, grooming, dressing, etc )  - Assess/evaluate cause of self-care deficits   - Assess range of motion  - Assess patient's mobility; develop plan if impaired  - Assess patient's need for assistive devices and provide as appropriate  - Encourage maximum independence but intervene and supervise when necessary  - Involve family in performance of ADLs  - Assess for home care needs following discharge   - Consider OT consult to assist with ADL evaluation and planning for discharge  - Provide patient education as appropriate  Outcome: Progressing  Goal: Maintain or return mobility status to optimal level  Description: INTERVENTIONS:  - Assess patient's baseline mobility status (ambulation, transfers, stairs, etc )    - Identify cognitive and physical deficits and behaviors that affect mobility  - Identify mobility aids required to assist with transfers and/or ambulation (gait belt, sit-to-stand, lift, walker, cane, etc )  - [place] fall precautions as indicated by assessment  - Record patient progress and toleration of activity level on Mobility SBAR; progress patient to next Phase/Stage  - Instruct patient to call for assistance with activity based on assessment  - Consider rehabilitation consult to assist with strengthening/weightbearing, etc   Outcome: Progressing     Problem: DISCHARGE PLANNING  Goal: Discharge to home or other facility with appropriate resources  Description: INTERVENTIONS:  - Identify barriers to discharge w/patient and caregiver  - Arrange for needed discharge resources and transportation as appropriate  - Identify discharge learning needs (meds, wound care, etc )  - Arrange for interpretive services to assist at discharge as needed  - Refer to Case Management Department for coordinating discharge planning if the patient needs post-hospital services based on physician/advanced practitioner order or complex needs related to functional status, cognitive ability, or social support system  Outcome: Progressing     Problem: Knowledge Deficit  Goal: Patient/family/caregiver demonstrates understanding of disease process, treatment plan, medications, and discharge instructions  Description: Complete learning assessment and assess knowledge base    Interventions:  - Provide teaching at level of understanding  - Provide teaching via preferred learning methods  Outcome: Progressing     Problem: RESPIRATORY - ADULT  Goal: Achieves optimal ventilation and oxygenation  Description: INTERVENTIONS:  - Assess for changes in respiratory status  - Assess for changes in mentation and behavior  - Position to facilitate oxygenation and minimize respiratory effort  - Oxygen administered by appropriate delivery if ordered  - Initiate smoking cessation education as indicated  - Encourage broncho-pulmonary hygiene including cough, deep breathe, Incentive Spirometry  - Assess the need for suctioning and aspirate as needed  - Assess and instruct to report SOB or any respiratory difficulty  - Respiratory Therapy support as indicated  Outcome: Progressing

## 2021-03-12 NOTE — PROGRESS NOTES
Progress Note - Pulmonary   Kim Common 60 y o  male MRN: [id number]  Unit/Bed#: S -01 Encounter: [id number]    Assessment/Plan:    Acute hypoxic on chronic hypoxic hypercapnic respiratory failure due to end-stage ILD with severe fibrosis and restriction   Titrate oxygen to maintain POX > or = 89%  Activity as able  Plan is for hospice with D/C details pending  Continue Tussionex for cough  Continue prednisone taper  Chronic diastolic CHF   Appears euvolemic  Severe protein-calorie malnutrition due to pulmonary cachexia   Diet as tolerated  Severe deconditioning with chronic ambulatory dysfunction    Activity as tolerated  Plan is for hospice at D/C with possible SNF placement if patient agreeable for support/care  Will sign off  Please call with any questions or concerns  Chief Complaint:    "I'm okay "    Subjective:    Zhao Vazquez is sitting up in bed  He reports his breathing is relatively at baseline for him  He denies cough or chest pain  Plan for hospice is ongoing, specifically related to location  Objective:    Vitals: Blood pressure 133/69, pulse 93, temperature 97 5 °F (36 4 °C), temperature source Oral, resp  rate 18, height 4' 11" (1 499 m), weight 44 3 kg (97 lb 9 6 oz), SpO2 98 %  2L NC,Body mass index is 19 71 kg/m²  Intake/Output Summary (Last 24 hours) at 3/12/2021 0903  Last data filed at 3/11/2021 2141  Gross per 24 hour   Intake 820 ml   Output 1050 ml   Net -230 ml       Invasive Devices     Peripheral Intravenous Line            Peripheral IV 03/09/21 Left Forearm 3 days                Physical Exam:     Physical Exam  Vitals signs reviewed  Constitutional:       General: He is not in acute distress  Appearance: He is well-developed  He is not toxic-appearing or diaphoretic  Interventions: Nasal cannula in place  HENT:      Head: Normocephalic and atraumatic  Eyes:      General: No scleral icterus    Neck:      Musculoskeletal: Neck supple  Vascular: No JVD  Trachea: No tracheal deviation  Cardiovascular:      Rate and Rhythm: Normal rate and regular rhythm  Heart sounds: S1 normal and S2 normal  No murmur  No friction rub  No gallop  Pulmonary:      Effort: Pulmonary effort is normal  No tachypnea, accessory muscle usage or respiratory distress  Breath sounds: No stridor  Decreased breath sounds and rales present  No wheezing or rhonchi  Chest:      Chest wall: No tenderness  Abdominal:      General: Bowel sounds are normal  There is no distension  Palpations: Abdomen is soft  Tenderness: There is no abdominal tenderness  There is no guarding or rebound  Musculoskeletal:         General: No tenderness  Skin:     General: Skin is warm and dry  Findings: No rash  Neurological:      Mental Status: He is alert and oriented to person, place, and time  GCS: GCS eye subscore is 4  GCS verbal subscore is 5  GCS motor subscore is 6  Psychiatric:         Speech: Speech normal          Behavior: Behavior is cooperative         Labs: None new    Imaging and other studies: None new

## 2021-03-12 NOTE — DISCHARGE SUMMARY
Discharge Summary - [address] [facility]    Patient Information: Rodríguez Riggins 60 y o  male MRN: [id number]  Unit/Bed#: S -01 Encounter: [id number]    Discharging Physician / Practitioner: Alexander Chauhan MD  PCP: Monse Espinosa DO  Admission Date: 3/2/2021  Discharge Date: 03/12/21    Reason for Admission:  Acute on chronic respiratory failure with hypoxia/hypercapnia in the setting of end-stage interstitial lung disease with severe fibrosis and restriction    Discharge Diagnoses:     Principal Problem:    Acute on chronic respiratory failure with hypoxia ([facility])  Active Problems:    CAD S/P percutaneous coronary angioplasty    Acute renal failure superimposed on stage 2 chronic kidney disease ([facility])    Pulmonary fibrosis determined by high resolution computed tomography ([facility])    Elevated liver enzymes    Anemia    Severe protein-calorie malnutrition ([facility])      Consultations During Hospital Stay:  · Pulmonology  · Palliative care  · Nephrology    Procedures Performed:   · Spirometry - showing severe restrictive disease      Hospital Course:   Rodríguez Riggins is a 60 y o  male patient who originally presented to the hospital on 3/2/2021 due to worsening shortness of breath  The patient has a history significant for occasional exposures of graft 5 8 with resulting interstitial lung disease  At baseline, he is on 3 L supplemental O2 via nasal cannula  He was admitted with a diagnosis of acute on chronic respiratory failure  He was started on IV steroids and later transitioned to oral steroids  He had spirometry done which showed severe restrictive disease  Following extensive discussion with pulmonology regarding end-stage fibrosis, decision was made to transition to hospice care  He will be discharged home on hospice today      Condition at Discharge: End-stage lung disease     Discharge Day Visit / Exam:     Subjective:  No new complaints or acute overnight events    Vitals: Blood Pressure: 133/69 (03/12/21 0724)  Pulse: 93 (03/12/21 0724)  Temperature: 97 5 °F (36 4 °C) (03/12/21 0724)  Temp Source: Oral (03/12/21 0724)  Respirations: 18 (03/12/21 0724)  Height: 4' 11" (149 9 cm)(From previous charting ) (03/08/21 2134)  Weight - Scale: 44 3 kg (97 lb 9 6 oz) (03/12/21 0600)  SpO2: 98 % (03/12/21 0724)    General Appearance:    Cachectic, chronically ill-appearing, frail, alert, appropriately responsive    Head:    Bitemporal wasting, mucous membranes moist    Eyes:    Conjunctiva/corneas clear   Neck:   Supple   Lungs:     Nonlabored, no accessory muscle use, bilateral rales, greater in lower lung fields    Heart:    Regular rate and rhythm, S1 and S2    Abdomen:     Soft, flat, nontender   Extremities:   No edema   Neurologic:  Alert and oriented x3      Discharge instructions/Information to patient and family:   See after visit summary for information provided to patient and family  Provisions for Follow-Up Care:  See after visit summary for information related to follow-up care and any pertinent home health orders  Disposition: Home with hospice    Discharge Statement:  I spent >30 minutes discharging the patient  This time was spent on the day of discharge  I had direct contact with the patient on the day of discharge  Greater than 50% of the total time was spent examining patient, answering all patient questions, arranging and discussing plan of care with patient as well as directly providing post-discharge instructions  Additional time then spent on discharge activities  Discharge Medications:  See after visit summary for reconciled discharge medications provided to patient and family  ** Please Note: Dragon 360 Dictation voice to text software may have been used in the creation of this document   **

## 2021-03-12 NOTE — CASE MANAGEMENT
CM informed by RN that Patient's transport is here for the Patient  Patient will be transported by [facility] to the Patient's home  [place] form faxed for Auth  CM provided the Patient with a resource list of private caregiver agencies to supplement his care at home

## 2021-03-12 NOTE — HOSPICE NOTE
Talked with Angie Jeffrey again today about home with hospice vs SNF  He does not want to pay oop for a SNF and wants to try hospice at home  Reviewed hospice care and services to him again per Medicare guidelines and he is in agreement with the same  Consents signed and faxed to the office, copies with pt and OOH DNR to CM for transport  Dr Luis Olguin to send scripts for comfort meds to pt pharmacy in [place]  Pt to be opened to hospice tomorrow  For discharge today   I will call and update his daughter

## 2021-03-16 NOTE — PROGRESS NOTES
Spoke with daughter Sasha Mcclendon, pt was discharged from [address] to home with [facility]  Feels well supported by family and hospice team    Will close to outpatient care management at this time

## 2021-03-23 PROBLEM — N17.9 ACUTE RENAL FAILURE SUPERIMPOSED ON STAGE 2 CHRONIC KIDNEY DISEASE (HCC): Status: RESOLVED | Noted: 2019-08-27 | Resolved: 2021-01-01

## 2021-03-23 PROBLEM — N18.2 ACUTE RENAL FAILURE SUPERIMPOSED ON STAGE 2 CHRONIC KIDNEY DISEASE (HCC): Status: RESOLVED | Noted: 2019-08-27 | Resolved: 2021-01-01

## 2021-03-23 PROBLEM — N18.2 BENIGN HYPERTENSION WITH CKD (CHRONIC KIDNEY DISEASE), STAGE II: Status: ACTIVE | Noted: 2020-01-01

## 2021-03-23 PROBLEM — N18.2 STAGE 2 CHRONIC KIDNEY DISEASE: Status: ACTIVE | Noted: 2021-01-01

## 2021-03-23 PROBLEM — I12.9 BENIGN HYPERTENSION WITH CKD (CHRONIC KIDNEY DISEASE), STAGE II: Status: ACTIVE | Noted: 2020-01-01

## 2021-03-23 NOTE — PROGRESS NOTES
Virtual Regular Visit    Assessment/Plan:  CKD stage 2, baseline creatinine 0 8 to 1 0  - CKD suspect in the setting of long-term diabetes, hypertension  - UA this month shows 0 to 5 RBCs, no proteinuria  - he was recently hospitalized when he had an episode of BJ with peak creatinine 2 5 suspected to be secondary to prerenal, use of ACE-inhibitor and much improved with IV fluid trial with most recent serum creatinine 0 7 on 3/11/21   - Since discharge from the hospital, he was restarted back on low-dose lisinopril 5 mg daily  - Currently remains on home hospice  Patient does not do more physician appointments and he does not leave home  Obtained [facility] contact info and personally spoke to Yonis Demarco from [address]  Who mentions that patient is not to continue any more physicians appointments or any more diagnostic or lab testing  Focus is to keep patient comfortable on home hospice  -  Will not do further diagnostic testing  Prior microalbuminuria, last urine microalbumin/creatinine ratio 41 mg in early 2020  This may be in the setting of underlying diabetes, hypertension   -  No further diagnostic testing as mentioned above as patient currently remains on home hospice  Hypertension, patient does not get his blood pressure check  Currently on home hospice  Currently on low-dose lisinopril which was restarted on discharge from the hospital       Chronic respiratory failure, interstitial lung disease, pulmonary fibrosis, on tapering steroids currently  On 3 L home O2        Problem List Items Addressed This Visit        Endocrine    Microalbuminuria due to type 2 diabetes mellitus ([facility] )       Cardiovascular and Mediastinum    Benign hypertension with CKD (chronic kidney disease), stage II - Primary       Genitourinary    Stage 2 chronic kidney disease          Reason for visit is   Chief Complaint   Patient presents with    Consult     CKD2    Virtual Regular Visit        Encounter provider Desirae Isabel MD    Provider located at [address] [place] [id number]      Recent Visits  No visits were found meeting these conditions  Showing recent visits within past 7 days and meeting all other requirements     Today's Visits  Date Type Provider Dept   03/23/21 Telemedicine Desirae Isabel MD [address] [facility] [address] today's visits and meeting all other requirements     Future Appointments  No visits were found meeting these conditions  Showing future appointments within next 150 days and meeting all other requirements        The patient was identified by name and date of birth  Evangelist Swain was informed that this is a telemedicine visit and that the visit is being conducted through [facility] and patient was informed that this is a secure, HIPAA-compliant platform  He agrees to proceed     My office door was closed  No one else was in the room  He acknowledged consent and understanding of privacy and security of the video platform  The patient has agreed to participate and understands they can discontinue the visit at any time  Patient is aware this is a billable service  Subjective  Evangelist Swain is a 73 y o  male   With significant medical issues of CAD, status post PTCA, diabetes for 10 years, hypertension for 20 years, CKD stage 2 with baseline 0 8 to 1 0, chronic respiratory failure, on 3 L home O2, interstitial lung disease, pulmonary fibrosis, presented for post hospitalization telemedicine follow-up of BJ/CKD  Old medical records including recent BMP results from [facility] records were reviewed  Patient has had recently episode of BJ with peak creatinine 2 5 during hospitalization suspected to be secondary to prerenal, ACE-inhibitor and improved with IV fluid with most recent creatinine back to baseline    Due to significant and end-stage lung disease issues, patient now remains on home hospice  Personally spoke to patient's home hospice team who mentions that patient is not to continue any physician appointments or further diagnostic testing  Currently patient remains on tapering steroid  Denies any worsening dyspnea  Remains on home oxygen  Denies chest pain, lightheadedness, dizziness, nausea, vomiting      Past Medical History:   Diagnosis Date    [facility] of inferior wall [facility] 11/2011    inferiorwall; drug-eluting stent RCA    ATN (acute tubular necrosis) (HCC)     Chronic tension type headache     last assessed: 7/4/2012    Colon polyp     Developmental dysplasia of hip     bilateral    Diabetes mellitus [facility]     Former smoker     quit 2011     Gout     last assessed: 8/31/2012    Hemorrhage of anus and rectum     last assessed: 8/12/2013    Hyperlipidemia     Hypertension     Hypokalemia 3/7/2021    Internal hemorrhoids     Migraine     last assessed: 11/25/2013    Old MI (myocardial infarction) 2011    Pulmonary fibrosis ([facility] )     Renal insufficiency syndrome     last assessed: 10/22/2014       Past Surgical History:   Procedure Laterality Date    CARDIAC CATHETERIZATION      COLONOSCOPY  05/2012    int hem, dirverticulosis Dr Dany Paul RCA    LEG SURGERY Bilateral      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    LUMBAR LAMINECTOMY  03/2010    decompressive more than 2 lumbar segments L4-S1        Current Outpatient Medications   Medication Sig Dispense Refill    aspirin 81 MG tablet Take 2 tablets by mouth daily      famotidine (PEPCID) 40 MG tablet Take 1 tablet (40 mg total) by mouth daily 90 tablet 1    glucose blood test strip by In Vitro route daily      hydrocodone-chlorpheniramine polistirex (TUSSIONEX) 10-8 mg/5 mL ER suspension Take 5 mL by mouth every 12 (twelve) hoursMax Daily Amount: 10 mL 115 mL 0    lisinopril (ZESTRIL) 10 mg tablet Take 0 5 tablets (5 mg total) by mouth daily 90 tablet 1    metoprolol tartrate (LOPRESSOR) 25 mg tablet Take 0 5 tablets (12 5 mg total) by mouth every 12 (twelve) hours 30 tablet 0    nystatin (MYCOSTATIN) powder Apply topically 2 (two) times a day 15 g 0    ONETOUCH DELICA LANCETS 05V MISC by Does not apply route daily      polyethylene glycol (MIRALAX) 17 g packet Take 17 g by mouth 2 (two) times a day  0    predniSONE 10 mg tablet Take 4 tablets (40 mg total) by mouth see administration instructions 60 tablet 0    atorvastatin (LIPITOR) 40 mg tablet TAKE 1 TABLET BY MOUTH EVERY DAY (Patient not taking: Reported on 3/23/2021) 90 tablet 1    docusate sodium (COLACE) 100 mg capsule Take 1 capsule (100 mg total) by mouth 2 (two) times a day (Patient not taking: Reported on 3/23/2021) 60 capsule 1    LORazepam (ATIVAN) 0 5 mg tablet Take 1 tablet (0 5 mg total) by mouth every 6 (six) hours as needed for anxiety or seizures (for insomnia, nausea, agitation) for up to 10 days (Patient not taking: Reported on 3/23/2021) 15 tablet 0    oxyCODONE (ROXICODONE) 5 mg immediate release tablet Take 0 5 tab(2 5mg) - 1 tab(5mg) every 4 hours as needed for moderate pain, shortness of breath, difficulty breathing  (Patient not taking: Reported on 3/23/2021) 15 tablet 0    senna (SENOKOT) 8 6 mg Take 2 tablets (17 2 mg total) by mouth daily at bedtime (Patient not taking: Reported on 3/23/2021)  0     No current facility-administered medications for this visit  No Known Allergies    Review of system:  More than 10 review of system were obtained and no other pertinent positive findings other than mentioned in the note  Video Exam    Vitals:    03/23/21 0923   Weight: 43 1 kg (95 lb)   Height: 4' 11" (1 499 m)       Physical Exam  Constitutional:       Appearance: He is well-developed  HENT:      Head: Normocephalic and atraumatic        Right Ear: External ear normal  Left Ear: External ear normal       Nose: Nose normal    Cardiovascular:      Comments: No significant edema in legs  Pulmonary:      Comments: On chronic home oxygen, no conversational dyspnea noted  Abdominal:      General: There is no distension  Musculoskeletal:      Right lower leg: No edema  Left lower leg: No edema  Skin:     Findings: No rash  Neurological:      Mental Status: He is alert and oriented to person, place, and time  Psychiatric:         Behavior: Behavior normal           I spent 20 minutes directly with the patient during this visit      VIRTUAL VISIT DISCLAIMER    Deondre Villasenor acknowledges that he has consented to an online visit or consultation  He understands that the online visit is based solely on information provided by him, and that, in the absence of a face-to-face physical evaluation by the physician, the diagnosis he receives is both limited and provisional in terms of accuracy and completeness  This is not intended to replace a full medical face-to-face evaluation by the physician  Deondre Villasenor understands and accepts these terms

## 2021-03-24 NOTE — UTILIZATION REVIEW
Notification of Discharge  This is a Notification of Discharge from our facility [address] [place] [address]  Please be advised that this patient has been discharge from our facility  Below you will find the admission and discharge date and time including the patients disposition  PRESENTATION DATE: 3/2/2021 11:59 AM  OBS ADMISSION DATE:   IP ADMISSION DATE: 3/3/21 1412   DISCHARGE DATE: 3/12/2021  2:00 PM  DISPOSITION: Home with [address] with Hospice Care   Admission Orders listed below:  Admission Orders (From admission, onward)     Ordered        03/03/21 1412  Inpatient Admission  Once         03/02/21 1643  Place in Observation  Once                   Please contact the UR Department if additional information is required to close this patient's authorization/case  [address] [facility] Utilization Review Department  Main: [phone] x carefully listen to the prompts  All voicemails are confidential   [email or web address]  Send all requests for admission clinical reviews, approved or denied determinations and any other requests to dedicated fax number below belonging to the campus where the patient is receiving treatment   List of dedicated fax numbers:  [address] DENIALS (Administrative/Medical Necessity) [phone]   [address] (Maternity/NICU/Pediatrics) [phone]   Elfida Lips [phone]   Judit Julio [phone]   Jennifer Roseglen [phone]   [facility] [address] [facility] [phone]   [facility]  [phone]   [address] [place] [address], [address]  [address] [facility] 1000 [facility] [phone]

## 2021-04-19 NOTE — CONSULTS
Domi [age] y o  male MRN: [id number]  Unit/Bed#: S -01 Encounter: [id number]    ASSESSMENT and PLAN:  1  Acute Kidney Injury  · Clinical Course:  · Patient admitted 3/2 with a creatinine of 0 89  Patient received contrast on 3/2  · On 3/4, creatinine increased to 1 93; patient started on NSS at 75mL/hr yesterday afternoon  · Patient with documented poor oral intake and protein calorie malnutrition, unintentional weight loss of 20 pounds over the past month  · Patient with documented episodes of soft blood pressure readings on 3/3 (SBP 91)  Patient also taking an ACE inhibitor for HTN  Denies use of NSAIDs  · Prior GI work-up revealed no organic cause of poor PO intake  · Currently, creatinine up to 2 5  NSS increased to 100mL/hr    · Only 200mL urine output documented yesterday, ? accuracy  · Work up:   · CXR 3/2/2020 revealed changes consistent with known history of interstitial pulmonary fibrosis similar to prior study  · CTA 3/2/2020 negative for pulmonary embolism  · CTA 2/3/2020 - No evidence of hydronephrosis, nonspecific bladder distention  · No prior renal imaging   · Etiology:   · Likely multifactorial - A component of prerenal intravascular volume depletion in the setting of chronic poor oral intake, documented episodes of hypotension, and ATN with onset of BJ within the 48-72 hour window of receiving IV contrast  Patient was also taking an ACE prior to hospital admission  Do not suspect post-renal obstruction  · Plan/Recommendations:  · Continue IV hydration  · Avoid nephrotoxic agents, discuss with nephrology if there is a need for further contrast studies  · Hold ACE inhibitor  · Maintain MAP > 65  · Strict I&O  · Check UA, urine sodium  2  Chronic Kidney Disease Stage 2  · Baseline creatinine appears to be 0 8 - 1 0    · Prior microalbumin/creatinine ratio 41  3   Hyperkalemia (POA)  · Patient admitted with a K level of 5 2, which increased to 6 0 on 3/3  Patient received a single dose of kayexalate with a decrease in K to 5 1    · He is currently taking a beta blocker and ACE in the outpatient setting for HTN  · Currently, K is back up to 5 8 on morning las  · Etiology: Likely decreased K excretion due to drop in GFR in the setting of BJ  Consider the effect of beta-blocker  Also on heparin which blocks the effective aldosterone and may cause hyperkalemia  · Plan/Recommendations:  · Continue low K diet, add sodium restriction  · Continue IV hydration with NSS  · Continue to hold ACE  · Kayexalate 15 g x 1  · Consider dose of loop diuretic  4  Hyponatremia  · Sodium 135 on admission  Decreased to 131 yesterday morning  Patient started in NSS at 75mL/hr yesterday afternoon, which was increased to 100mL/hr this morning  · Sodium level currently down to 129  · Work-Up:   · Calculated serum osmolality 295  Prior TSH from 2/3/2021 is 1 249  · CT C/A/P from 2/3 - No evidence of mass or malignancy  · CXR 3/2 - No evidence of pulmonary mass or malignancy  · Etiology:   · Unclear at this time, possibility of "tea and toast" diet in the setting of documented protein calorie malnutrition and weight loss  Also a component of decreased ability of the kidney to excrete free water in the setting of acute kidney injury  · Plan/Recommendations  · Check UA, urine sodium, urine osmolality  · Fluid restriction 1500 mL per day  · Diet supplement to increase solute intake  5  Blood Pressure/volume status  · Blood pressure readings acceptable  · Patient has only received 2 doses of metoprolol since admission  · Currently net +1 3L since admission  · Appears euvolemic on examination today  · Prior echocardiogram 02/04/2021:  Ejection fraction 00%, grade 1 diastolic dysfunction  Mild MR Mild TR  · Decrease metoprolol to 12 5 mg every 12 hours  6  Interstitial Lung Disease  · Secondary to occupational exposure  Patient on home oxygen therapy - 3L     · Pulmonary following  Started on solu-medrol yesterday  · Palliative care following  7  Others  · Protein Calorie Malnutrition - Nutrition following  · CAD - Recent NSTEMI 11/2020, medically managed by cardiology  · DM - ISF coverage, management per primary  HISTORY OF PRESENT ILLNESS:  Requesting Physician: Jesus Morales MD  Reason for Consult: Acute Kidney Injury, Hyperkalemia, and Hyponatremia    Jasiel Graham is a 60 y o  male with a past medical history of pulmonary fibrosis on chronic home oxygen therapy, CAD, CKD stage 2, and HTN who was admitted to [facility] on 3/2 after presenting with worsening shortness of breath  Patient reports increasing shortness of breath at rest for the past month  Oxygen saturations were 70% on EMS arrival  He was provided with mid-flow NC by EMS and was subsequently weaned back to his baseline requirement of 3L NC  Patient underwent a CTA to rule out PE  During hospitalization, patient develop hyperkalemia with a K level of 6 andreceived kayexalate, which decreased the potassium level to 5 1  Creatinine was at baseline until yesterday when it increased to 1 62  He was started on IVF at 75mL/hr with no improvement in creatinine on morning labs today  Patient's potassium level has also increased to 5 8  Patient also has worsening hyponatremia since admission, dropping to 129 this morning  A renal consultation is requested today for assistance in the management of acute kidney injury, hyponatremia, and hyperkalemia  This morning, patient reports continued poor oral intake and states they started him on nutritional supplements  He reports he has decreased urine output, but denies any difficulty or feeling unable to completely empty his bladder  He reports that his breathing feels back to baseline      PAST MEDICAL HISTORY:  Past Medical History:   Diagnosis Date    Acute myocardial infarction of inferior wall ([address] ) 11/2011    inferiorwall; drug-eluting stent RCA    Chronic tension type headache     last assessed: 2012    Colon polyp     Developmental dysplasia of hip     bilateral    Diabetes mellitus ([facility] [address] )     Former smoker     quit      Gout     last assessed: 2012    Hemorrhage of anus and rectum     last assessed: 2013    Hyperlipidemia     Hypertension     Internal hemorrhoids     Migraine     last assessed: 2013    Old MI (myocardial infarction)     Pulmonary fibrosis ([facility] [address] [facility]     Renal insufficiency syndrome     last assessed: 10/22/2014       PAST SURGICAL HISTORY:  Past Surgical History:   Procedure Laterality Date    CARDIAC CATHETERIZATION      COLONOSCOPY  2012    int hem, dirverticulosis Dr Darrin Moore Bilateral      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    LUMBAR LAMINECTOMY  2010    decompressive more than 2 lumbar segments L4-S1        ALLERGIES:  No Known Allergies    SOCIAL HISTORY:  Social History     Substance and Sexual Activity   Alcohol Use No     Social History     Substance and Sexual Activity   Drug Use No     Social History     Tobacco Use   Smoking Status Former Smoker    Packs/day: 1 00    Years: 35 00    Pack years: 35 00    Quit date: 2011    Years since quittin 3   Smokeless Tobacco Never Used       FAMILY HISTORY:  Family History   Problem Relation Age of Onset    Kidney failure Mother     Diabetes Mother     Hypertension Mother        MEDICATIONS:    Current Facility-Administered Medications:     acetaminophen (TYLENOL) tablet 650 mg, 650 mg, Oral, Q6H PRN, Patrizia Shah PA-C    albuterol (PROVENTIL HFA,VENTOLIN HFA) inhaler 2 puff, 2 puff, Inhalation, Q4H PRN, EMPERATRIZ Bonilla, 2 puff at 21 1812    aspirin chewable tablet 81 mg, 81 mg, Oral, Daily, Patrizia Shah PA-C, 81 mg at 21 0846    atorvastatin (LIPITOR) tablet 40 mg, 40 mg, Oral, Daily, Patrizia Shah PA-C, 40 mg at 03/05/21 0846    carbamide peroxide (DEBROX) 6 5 % otic solution 5 drop, 5 drop, Both Ears, BID, Noni Bernard MD, 5 drop at 03/05/21 0847    docusate sodium (COLACE) capsule 100 mg, 100 mg, Oral, BID, Patrizia Shah PA-C, 100 mg at 03/04/21 0818    famotidine (PEPCID) tablet 40 mg, 40 mg, Oral, Daily, Patrizia Shah PA-C, 40 mg at 03/05/21 0846    heparin (porcine) subcutaneous injection 5,000 Units, 5,000 Units, Subcutaneous, Q8H [address], 5,000 Units at 03/05/21 0700 **AND** [CANCELED] Platelet count, , , Once, Patrizia Shah PA-C    insulin lispro (HumaLOG) 100 units/mL subcutaneous injection 1-5 Units, 1-5 Units, Subcutaneous, TID AC, 2 Units at 03/04/21 1703 **AND** Fingerstick Glucose (POCT), , , TID AC, Negra Vernon MD    insulin lispro (HumaLOG) 100 units/mL subcutaneous injection 1-5 Units, 1-5 Units, Subcutaneous, HS, Noni Bernard MD, 2 Units at 03/03/21 2210    methylPREDNISolone sodium succinate (Solu-MEDROL) injection 40 mg, 40 mg, Intravenous, Q12H [address], Alise Andrea PA-C, 40 mg at 03/05/21 0847    metoprolol tartrate (LOPRESSOR) tablet 25 mg, 25 mg, Oral, Q12H [address], Patrizia Shah PA-C, 25 mg at 03/04/21 2115    nystatin (MYCOSTATIN) powder, , Topical, BID, Patrizia Shah PA-C, Given at 03/05/21 0847    ondansetron (ZOFRAN) injection 4 mg, 4 mg, Intravenous, Q6H PRN, Patrizia Shah PA-C, 4 mg at 03/03/21 0142    pantoprazole (PROTONIX) EC tablet 40 mg, 40 mg, Oral, Early Morning, Patrizia Shah PA-C, 40 mg at 03/05/21 0846    polyethylene glycol (MIRALAX) packet 17 g, 17 g, Oral, BID, Patrizia Shah PA-C, 17 g at 03/04/21 0819    senna (SENOKOT) tablet 17 2 mg, 17 2 mg, Oral, HS, Patrizia Shah PA-C    sodium chloride 0 9 % infusion, 100 mL/hr, Intravenous, Continuous, Cherelle Ramirez MD, Last Rate: 100 mL/hr at 03/05/21 0846, 100 mL/hr at 03/05/21 0846    REVIEW OF SYSTEMS:  Constitutional: Positive for fatigue, weakness, poor appetite  HENT: Negative for postnasal drip  Eyes: Negative for visual disturbance  Respiratory: Positive for chronic shortness of breath  Denies cough or wheezing  Cardiovascular: Negative for chest pain, palpitations and leg swelling  Gastrointestinal: Negative for abdominal pain, constipation, diarrhea, nausea and vomiting  Genitourinary: Positive for decreased urine output  No dysuria, hematuria, difficulty voiding  Musculoskeletal: Negative for arthralgias, back pain and joint swelling  Skin: Negative for rash  Neurological: Negative for focal weakness, headaches, dizziness  Hematological: Negative for easy bruising or bleeding  Psychiatric/Behavioral: Negative for confusion and sleep disturbance  All the systems were reviewed and were negative except as documented on the HPI  PHYSICAL EXAM:  Current Weight: Weight - Scale: 43 2 kg (95 lb 3 8 oz)  First Weight: Weight - Scale: 43 2 kg (95 lb 3 8 oz)  Vitals:    03/04/21 1457 03/04/21 2113 03/04/21 2209 03/05/21 0700   BP: 122/59 115/55 102/57 108/56   BP Location: Left arm Left arm Left arm Right arm   Pulse: 91 66 67 70   Resp: 18  18 18   Temp: 97 8 °F (36 6 °C)  97 8 °F (36 6 °C) 97 8 °F (36 6 °C)   TempSrc: Oral  Oral Oral   SpO2: 100%  100% 99%   Weight:           Intake/Output Summary (Last 24 hours) at 3/5/2021 0944  Last data filed at 3/5/2021 0801  Gross per 24 hour   Intake 2199 08 ml   Output 500 ml   Net 1699 08 ml     Physical Exam  Constitutional:       General: He is awake  He is not in acute distress  Appearance: He is cachectic  HENT:      Head: Normocephalic  Eyes:      General: Lids are normal       Extraocular Movements: Extraocular movements intact  Pupils: Pupils are equal, round, and reactive to light  Neck:      Musculoskeletal: Normal range of motion and neck supple        Trachea: Trachea normal       Comments: No JVD  Cardiovascular: Rate and Rhythm: Normal rate and regular rhythm  Pulses: Normal pulses  Heart sounds: No murmur  No friction rub  No gallop  Pulmonary:      Effort: Pulmonary effort is normal       Breath sounds: Rales present  Comments: Fine rales in bilateral lower lobes  Abdominal:      General: Abdomen is flat  Bowel sounds are normal  There is no distension  Palpations: Abdomen is soft  Tenderness: There is no abdominal tenderness  Musculoskeletal: Normal range of motion  Skin:     General: Skin is warm and dry  Capillary Refill: Capillary refill takes less than 2 seconds  Coloration: Skin is pale  Skin is not jaundiced  Findings: No bruising, erythema, lesion or rash  Neurological:      General: No focal deficit present  Mental Status: He is alert and oriented to person, place, and time  Motor: Weakness present  Psychiatric:         Mood and Affect: Mood normal          Behavior: Behavior normal  Behavior is cooperative  Thought Content:  Thought content normal          Invasive Devices:      Lab Results:   Results from last 7 days   Lab Units 03/05/21  0604 03/04/21  0540 03/03/21  1623 03/03/21  0527 03/02/21  1404  03/02/21  1250   WBC Thousand/uL 10 24*  --   --  10 63*  --   --  9 84   HEMOGLOBIN g/dL 10 0*  --   --  11 7*  --   --  13 3   HEMATOCRIT % 31 6*  --   --  38 1  --   --  43 4   PLATELETS Thousands/uL 259  --   --  289  --   --  366   POTASSIUM mmol/L 5 8* 5 1 6 0* 5 7* 5 2   < >  --    CHLORIDE mmol/L 95* 95*  --  99* 96*   < >  --    CO2 mmol/L 28 28  --  25 26   < >  --    BUN mg/dL 82* 71*  --  51* 59*   < >  --    CREATININE mg/dL 2 50* 1 93*  --  0 80 0 89   < >  --    CALCIUM mg/dL 8 3 9 3  --  9 7 10 5*   < >  --    MAGNESIUM mg/dL  --   --   --  1 7  --   --   --    ALK PHOS U/L  --   --   --  116 145*  --   --    ALT U/L  --   --   --  99* 136*  --   --    AST U/L  --   --   --  116* 156*  --   --     < > = values in this interval not displayed       Other Studies: Dorsal Nasal Flap Text: The defect edges were debeveled with a #15 scalpel blade.  Given the location of the defect and the proximity to free margins a dorsal nasal flap was deemed most appropriate.  Using a sterile surgical marker, an appropriate dorsal nasal flap was drawn around the defect.    The area thus outlined was incised deep to adipose tissue with a #15 scalpel blade.  The skin margins were undermined to an appropriate distance in all directions utilizing iris scissors.

## 2023-08-25 NOTE — ASSESSMENT & PLAN NOTE
Patient's mother called back regarding patient's pain.      Future Appointments   Date Time Provider [address]   11/2/2023  8:30 AM Mayda Means DO [facility] · Related to worsening of underlying pulmonary fibrosis generalized deconditioning related to weight loss  · Not appear to be acutely volume overloaded  Lasix x 1 dose given by pulmonary today  · Trial steroids IV 40 mg BID   · CT scan shows chronic fibrotic interstitial lung disease  · Pulmonary function test were unable to be performed as an outpatient and given patient's worsening dyspnea unclear when these will be able to be performed  · D-dimer is elevated  He is not significantly tachypneic and has no lower extremity edema or erythema to suggest DVT  Do not suspect pulmonary embolism  · Troponin x 3 flat at 0 5-0 6

## 2024-02-19 NOTE — ASSESSMENT & PLAN NOTE
· Patient is on acute kidney injury protocol  · Nephrologist on board  · IV fluids  · Monitor kidney function  · Monitor input and output  · Avoid nephrotoxins  · Avoid hypotension  · PVR measured:  None  Detail Level: Zone